# Patient Record
Sex: MALE | Race: WHITE | NOT HISPANIC OR LATINO | Employment: OTHER | URBAN - METROPOLITAN AREA
[De-identification: names, ages, dates, MRNs, and addresses within clinical notes are randomized per-mention and may not be internally consistent; named-entity substitution may affect disease eponyms.]

---

## 2017-01-01 ENCOUNTER — APPOINTMENT (OUTPATIENT)
Dept: RADIOLOGY | Facility: MEDICAL CENTER | Age: 77
End: 2017-01-01
Attending: HOSPITALIST
Payer: MEDICARE

## 2017-01-01 ENCOUNTER — HOSPITAL ENCOUNTER (OUTPATIENT)
Facility: MEDICAL CENTER | Age: 77
End: 2017-05-10
Attending: INTERNAL MEDICINE | Admitting: INTERNAL MEDICINE
Payer: MEDICARE

## 2017-01-01 ENCOUNTER — HOSPITAL ENCOUNTER (INPATIENT)
Facility: REHABILITATION | Age: 77
End: 2017-01-01
Attending: PHYSICAL MEDICINE & REHABILITATION | Admitting: PHYSICAL MEDICINE & REHABILITATION
Payer: MEDICARE

## 2017-01-01 ENCOUNTER — APPOINTMENT (OUTPATIENT)
Dept: RADIOLOGY | Facility: MEDICAL CENTER | Age: 77
End: 2017-01-01
Attending: INTERNAL MEDICINE
Payer: MEDICARE

## 2017-01-01 ENCOUNTER — RESOLUTE PROFESSIONAL BILLING HOSPITAL PROF FEE (OUTPATIENT)
Dept: HOSPITALIST | Facility: MEDICAL CENTER | Age: 77
End: 2017-01-01
Payer: MEDICARE

## 2017-01-01 ENCOUNTER — APPOINTMENT (OUTPATIENT)
Dept: RADIOLOGY | Facility: MEDICAL CENTER | Age: 77
DRG: 853 | End: 2017-01-01
Attending: HOSPITALIST
Payer: MEDICARE

## 2017-01-01 ENCOUNTER — APPOINTMENT (OUTPATIENT)
Dept: RADIOLOGY | Facility: MEDICAL CENTER | Age: 77
DRG: 853 | End: 2017-01-01
Attending: EMERGENCY MEDICINE
Payer: MEDICARE

## 2017-01-01 ENCOUNTER — APPOINTMENT (OUTPATIENT)
Dept: RADIOLOGY | Facility: MEDICAL CENTER | Age: 77
DRG: 853 | End: 2017-01-01
Attending: INTERNAL MEDICINE
Payer: MEDICARE

## 2017-01-01 ENCOUNTER — HOSPITAL ENCOUNTER (INPATIENT)
Facility: MEDICAL CENTER | Age: 77
LOS: 1 days | DRG: 378 | End: 2017-03-23
Attending: EMERGENCY MEDICINE | Admitting: HOSPITALIST
Payer: MEDICARE

## 2017-01-01 ENCOUNTER — HOSPITAL ENCOUNTER (INPATIENT)
Facility: MEDICAL CENTER | Age: 77
LOS: 14 days | DRG: 853 | End: 2017-06-07
Attending: EMERGENCY MEDICINE | Admitting: INTERNAL MEDICINE
Payer: MEDICARE

## 2017-01-01 ENCOUNTER — HOSPITAL ENCOUNTER (OUTPATIENT)
Facility: MEDICAL CENTER | Age: 77
End: 2017-07-03
Attending: HOSPITALIST | Admitting: HOSPITALIST
Payer: MEDICARE

## 2017-01-01 VITALS
OXYGEN SATURATION: 100 % | RESPIRATION RATE: 18 BRPM | TEMPERATURE: 97.9 F | HEIGHT: 72 IN | DIASTOLIC BLOOD PRESSURE: 70 MMHG | SYSTOLIC BLOOD PRESSURE: 151 MMHG | WEIGHT: 236.55 LBS | BODY MASS INDEX: 32.04 KG/M2 | HEART RATE: 82 BPM

## 2017-01-01 VITALS
RESPIRATION RATE: 27 BRPM | DIASTOLIC BLOOD PRESSURE: 66 MMHG | SYSTOLIC BLOOD PRESSURE: 104 MMHG | OXYGEN SATURATION: 99 % | BODY MASS INDEX: 31.78 KG/M2 | HEIGHT: 70 IN | WEIGHT: 222 LBS | TEMPERATURE: 97.4 F | HEART RATE: 90 BPM

## 2017-01-01 VITALS
DIASTOLIC BLOOD PRESSURE: 64 MMHG | WEIGHT: 247.8 LBS | HEART RATE: 96 BPM | RESPIRATION RATE: 18 BRPM | BODY MASS INDEX: 33.56 KG/M2 | TEMPERATURE: 98.5 F | OXYGEN SATURATION: 91 % | SYSTOLIC BLOOD PRESSURE: 114 MMHG | HEIGHT: 72 IN

## 2017-01-01 DIAGNOSIS — J96.01 ACUTE RESPIRATORY FAILURE WITH HYPOXIA (HCC): ICD-10-CM

## 2017-01-01 DIAGNOSIS — N28.9 RENAL INSUFFICIENCY: ICD-10-CM

## 2017-01-01 DIAGNOSIS — K92.2 ACUTE LOWER GI HEMORRHAGE: ICD-10-CM

## 2017-01-01 LAB
ABO GROUP BLD: NORMAL
ALBUMIN SERPL BCP-MCNC: 2 G/DL (ref 3.2–4.9)
ALBUMIN SERPL BCP-MCNC: 2.3 G/DL (ref 3.2–4.9)
ALBUMIN SERPL BCP-MCNC: 2.4 G/DL (ref 3.2–4.9)
ALBUMIN SERPL BCP-MCNC: 2.5 G/DL (ref 3.2–4.9)
ALBUMIN SERPL BCP-MCNC: 2.5 G/DL (ref 3.2–4.9)
ALBUMIN SERPL BCP-MCNC: 2.6 G/DL (ref 3.2–4.9)
ALBUMIN SERPL BCP-MCNC: 2.6 G/DL (ref 3.2–4.9)
ALBUMIN SERPL BCP-MCNC: 2.7 G/DL (ref 3.2–4.9)
ALBUMIN SERPL BCP-MCNC: 2.8 G/DL (ref 3.2–4.9)
ALBUMIN SERPL BCP-MCNC: 2.8 G/DL (ref 3.2–4.9)
ALBUMIN SERPL BCP-MCNC: 2.9 G/DL (ref 3.2–4.9)
ALBUMIN SERPL BCP-MCNC: 2.9 G/DL (ref 3.2–4.9)
ALBUMIN SERPL BCP-MCNC: 3 G/DL (ref 3.2–4.9)
ALBUMIN SERPL BCP-MCNC: 3.2 G/DL (ref 3.2–4.9)
ALBUMIN SERPL BCP-MCNC: 3.4 G/DL (ref 3.2–4.9)
ALBUMIN SERPL BCP-MCNC: 3.6 G/DL (ref 3.2–4.9)
ALBUMIN SERPL BCP-MCNC: 4.2 G/DL (ref 3.2–4.9)
ALBUMIN/GLOB SERPL: 0.5 G/DL
ALBUMIN/GLOB SERPL: 0.6 G/DL
ALBUMIN/GLOB SERPL: 0.6 G/DL
ALBUMIN/GLOB SERPL: 0.7 G/DL
ALBUMIN/GLOB SERPL: 0.8 G/DL
ALBUMIN/GLOB SERPL: 0.9 G/DL
ALBUMIN/GLOB SERPL: 0.9 G/DL
ALBUMIN/GLOB SERPL: 1 G/DL
ALBUMIN/GLOB SERPL: 1 G/DL
ALBUMIN/GLOB SERPL: 1.2 G/DL
ALP SERPL-CCNC: 100 U/L (ref 30–99)
ALP SERPL-CCNC: 104 U/L (ref 30–99)
ALP SERPL-CCNC: 112 U/L (ref 30–99)
ALP SERPL-CCNC: 147 U/L (ref 30–99)
ALP SERPL-CCNC: 157 U/L (ref 30–99)
ALP SERPL-CCNC: 35 U/L (ref 30–99)
ALP SERPL-CCNC: 40 U/L (ref 30–99)
ALP SERPL-CCNC: 41 U/L (ref 30–99)
ALP SERPL-CCNC: 43 U/L (ref 30–99)
ALP SERPL-CCNC: 44 U/L (ref 30–99)
ALP SERPL-CCNC: 48 U/L (ref 30–99)
ALP SERPL-CCNC: 50 U/L (ref 30–99)
ALP SERPL-CCNC: 66 U/L (ref 30–99)
ALP SERPL-CCNC: 76 U/L (ref 30–99)
ALP SERPL-CCNC: 82 U/L (ref 30–99)
ALP SERPL-CCNC: 82 U/L (ref 30–99)
ALP SERPL-CCNC: 85 U/L (ref 30–99)
ALP SERPL-CCNC: 87 U/L (ref 30–99)
ALP SERPL-CCNC: 95 U/L (ref 30–99)
ALP SERPL-CCNC: 97 U/L (ref 30–99)
ALP SERPL-CCNC: NORMAL U/L (ref 30–99)
ALT SERPL-CCNC: 100 U/L (ref 2–50)
ALT SERPL-CCNC: 11 U/L (ref 2–50)
ALT SERPL-CCNC: 131 U/L (ref 2–50)
ALT SERPL-CCNC: 147 U/L (ref 2–50)
ALT SERPL-CCNC: 16 U/L (ref 2–50)
ALT SERPL-CCNC: 19 U/L (ref 2–50)
ALT SERPL-CCNC: 196 U/L (ref 2–50)
ALT SERPL-CCNC: 27 U/L (ref 2–50)
ALT SERPL-CCNC: 39 U/L (ref 2–50)
ALT SERPL-CCNC: 42 U/L (ref 2–50)
ALT SERPL-CCNC: 42 U/L (ref 2–50)
ALT SERPL-CCNC: 44 U/L (ref 2–50)
ALT SERPL-CCNC: 48 U/L (ref 2–50)
ALT SERPL-CCNC: 49 U/L (ref 2–50)
ALT SERPL-CCNC: 51 U/L (ref 2–50)
ALT SERPL-CCNC: 53 U/L (ref 2–50)
ALT SERPL-CCNC: 54 U/L (ref 2–50)
ALT SERPL-CCNC: 71 U/L (ref 2–50)
ALT SERPL-CCNC: 86 U/L (ref 2–50)
ALT SERPL-CCNC: 93 U/L (ref 2–50)
ALT SERPL-CCNC: NORMAL U/L (ref 2–50)
AMMONIA PLAS-SCNC: 37 UMOL/L (ref 11–45)
ANION GAP SERPL CALC-SCNC: 10 MMOL/L (ref 0–11.9)
ANION GAP SERPL CALC-SCNC: 11 MMOL/L (ref 0–11.9)
ANION GAP SERPL CALC-SCNC: 12 MMOL/L (ref 0–11.9)
ANION GAP SERPL CALC-SCNC: 13 MMOL/L (ref 0–11.9)
ANION GAP SERPL CALC-SCNC: 14 MMOL/L (ref 0–11.9)
ANION GAP SERPL CALC-SCNC: 14 MMOL/L (ref 0–11.9)
ANION GAP SERPL CALC-SCNC: 15 MMOL/L (ref 0–11.9)
ANION GAP SERPL CALC-SCNC: 16 MMOL/L (ref 0–11.9)
ANION GAP SERPL CALC-SCNC: 19 MMOL/L (ref 0–11.9)
ANION GAP SERPL CALC-SCNC: 4 MMOL/L (ref 0–11.9)
ANION GAP SERPL CALC-SCNC: 5 MMOL/L (ref 0–11.9)
ANION GAP SERPL CALC-SCNC: 6 MMOL/L (ref 0–11.9)
ANION GAP SERPL CALC-SCNC: 7 MMOL/L (ref 0–11.9)
ANION GAP SERPL CALC-SCNC: 8 MMOL/L (ref 0–11.9)
ANION GAP SERPL CALC-SCNC: 9 MMOL/L (ref 0–11.9)
ANISOCYTOSIS BLD QL SMEAR: ABNORMAL
APPEARANCE UR: ABNORMAL
APPEARANCE UR: ABNORMAL
APPEARANCE UR: CLEAR
APPEARANCE UR: CLEAR
APTT PPP: 26.7 SEC (ref 24.7–36)
APTT PPP: 28.5 SEC (ref 24.7–36)
APTT PPP: 31.5 SEC (ref 24.7–36)
APTT PPP: 33.6 SEC (ref 24.7–36)
APTT PPP: 33.7 SEC (ref 24.7–36)
APTT PPP: 33.9 SEC (ref 24.7–36)
APTT PPP: 35.7 SEC (ref 24.7–36)
APTT PPP: 39.6 SEC (ref 24.7–36)
AST SERPL-CCNC: 110 U/L (ref 12–45)
AST SERPL-CCNC: 114 U/L (ref 12–45)
AST SERPL-CCNC: 129 U/L (ref 12–45)
AST SERPL-CCNC: 18 U/L (ref 12–45)
AST SERPL-CCNC: 269 U/L (ref 12–45)
AST SERPL-CCNC: 29 U/L (ref 12–45)
AST SERPL-CCNC: 33 U/L (ref 12–45)
AST SERPL-CCNC: 33 U/L (ref 12–45)
AST SERPL-CCNC: 34 U/L (ref 12–45)
AST SERPL-CCNC: 35 U/L (ref 12–45)
AST SERPL-CCNC: 36 U/L (ref 12–45)
AST SERPL-CCNC: 41 U/L (ref 12–45)
AST SERPL-CCNC: 45 U/L (ref 12–45)
AST SERPL-CCNC: 46 U/L (ref 12–45)
AST SERPL-CCNC: 48 U/L (ref 12–45)
AST SERPL-CCNC: 53 U/L (ref 12–45)
AST SERPL-CCNC: 56 U/L (ref 12–45)
AST SERPL-CCNC: 58 U/L (ref 12–45)
AST SERPL-CCNC: 63 U/L (ref 12–45)
AST SERPL-CCNC: 63 U/L (ref 12–45)
AST SERPL-CCNC: 86 U/L (ref 12–45)
AST SERPL-CCNC: 97 U/L (ref 12–45)
BACTERIA #/AREA URNS HPF: ABNORMAL /HPF
BACTERIA BLD CULT: NORMAL
BACTERIA SPEC RESP CULT: ABNORMAL
BACTERIA SPEC RESP CULT: ABNORMAL
BACTERIA SPEC RESP CULT: NORMAL
BACTERIA UR CULT: NORMAL
BACTERIA UR CULT: NORMAL
BARCODED ABORH UBTYP: 600
BARCODED ABORH UBTYP: 9500
BARCODED PRD CODE UBPRD: NORMAL
BARCODED UNIT NUM UBUNT: NORMAL
BASE EXCESS BLDA CALC-SCNC: -1 MMOL/L (ref -4–3)
BASE EXCESS BLDA CALC-SCNC: -2 MMOL/L (ref -4–3)
BASE EXCESS BLDA CALC-SCNC: -4 MMOL/L (ref -4–3)
BASE EXCESS BLDA CALC-SCNC: -5 MMOL/L (ref -4–3)
BASE EXCESS BLDA CALC-SCNC: -5 MMOL/L (ref -4–3)
BASE EXCESS BLDA CALC-SCNC: -6 MMOL/L (ref -4–3)
BASE EXCESS BLDA CALC-SCNC: -7 MMOL/L (ref -4–3)
BASE EXCESS BLDA CALC-SCNC: -9 MMOL/L (ref -4–3)
BASE EXCESS BLDA CALC-SCNC: 1 MMOL/L (ref -4–3)
BASE EXCESS BLDA CALC-SCNC: 3 MMOL/L (ref -4–3)
BASE EXCESS BLDA CALC-SCNC: 4 MMOL/L (ref -4–3)
BASO STIPL BLD QL SMEAR: NORMAL
BASOPHILS # BLD AUTO: 0 % (ref 0–1.8)
BASOPHILS # BLD AUTO: 0.8 % (ref 0–1.8)
BASOPHILS # BLD AUTO: 0.8 % (ref 0–1.8)
BASOPHILS # BLD AUTO: 0.9 % (ref 0–1.8)
BASOPHILS # BLD AUTO: 1 % (ref 0–1.8)
BASOPHILS # BLD AUTO: 1.1 % (ref 0–1.8)
BASOPHILS # BLD AUTO: 1.2 % (ref 0–1.8)
BASOPHILS # BLD AUTO: 1.2 % (ref 0–1.8)
BASOPHILS # BLD AUTO: 1.3 % (ref 0–1.8)
BASOPHILS # BLD AUTO: 1.8 % (ref 0–1.8)
BASOPHILS # BLD AUTO: 1.8 % (ref 0–1.8)
BASOPHILS # BLD AUTO: 2 % (ref 0–1.8)
BASOPHILS # BLD AUTO: 2.7 % (ref 0–1.8)
BASOPHILS # BLD AUTO: 2.7 % (ref 0–1.8)
BASOPHILS # BLD AUTO: 6.2 % (ref 0–1.8)
BASOPHILS # BLD: 0 K/UL (ref 0–0.12)
BASOPHILS # BLD: 0.05 K/UL (ref 0–0.12)
BASOPHILS # BLD: 0.06 K/UL (ref 0–0.12)
BASOPHILS # BLD: 0.06 K/UL (ref 0–0.12)
BASOPHILS # BLD: 0.07 K/UL (ref 0–0.12)
BASOPHILS # BLD: 0.08 K/UL (ref 0–0.12)
BASOPHILS # BLD: 0.1 K/UL (ref 0–0.12)
BASOPHILS # BLD: 0.11 K/UL (ref 0–0.12)
BASOPHILS # BLD: 0.11 K/UL (ref 0–0.12)
BASOPHILS # BLD: 0.12 K/UL (ref 0–0.12)
BASOPHILS # BLD: 0.14 K/UL (ref 0–0.12)
BASOPHILS # BLD: 0.17 K/UL (ref 0–0.12)
BASOPHILS # BLD: 0.18 K/UL (ref 0–0.12)
BASOPHILS # BLD: 0.2 K/UL (ref 0–0.12)
BASOPHILS # BLD: 0.29 K/UL (ref 0–0.12)
BASOPHILS # BLD: 0.35 K/UL (ref 0–0.12)
BILIRUB CONJ SERPL-MCNC: 0.9 MG/DL (ref 0.1–0.5)
BILIRUB CONJ SERPL-MCNC: 4.2 MG/DL (ref 0.1–0.5)
BILIRUB CONJ SERPL-MCNC: 6 MG/DL (ref 0.1–0.5)
BILIRUB INDIRECT SERPL-MCNC: 1.1 MG/DL (ref 0–1)
BILIRUB INDIRECT SERPL-MCNC: 2.4 MG/DL (ref 0–1)
BILIRUB INDIRECT SERPL-MCNC: 3 MG/DL (ref 0–1)
BILIRUB SERPL-MCNC: 1.3 MG/DL (ref 0.1–1.5)
BILIRUB SERPL-MCNC: 1.5 MG/DL (ref 0.1–1.5)
BILIRUB SERPL-MCNC: 1.7 MG/DL (ref 0.1–1.5)
BILIRUB SERPL-MCNC: 1.7 MG/DL (ref 0.1–1.5)
BILIRUB SERPL-MCNC: 1.8 MG/DL (ref 0.1–1.5)
BILIRUB SERPL-MCNC: 1.9 MG/DL (ref 0.1–1.5)
BILIRUB SERPL-MCNC: 1.9 MG/DL (ref 0.1–1.5)
BILIRUB SERPL-MCNC: 12.2 MG/DL (ref 0.1–1.5)
BILIRUB SERPL-MCNC: 12.5 MG/DL (ref 0.1–1.5)
BILIRUB SERPL-MCNC: 2 MG/DL (ref 0.1–1.5)
BILIRUB SERPL-MCNC: 2.1 MG/DL (ref 0.1–1.5)
BILIRUB SERPL-MCNC: 2.1 MG/DL (ref 0.1–1.5)
BILIRUB SERPL-MCNC: 2.2 MG/DL (ref 0.1–1.5)
BILIRUB SERPL-MCNC: 2.4 MG/DL (ref 0.1–1.5)
BILIRUB SERPL-MCNC: 2.5 MG/DL (ref 0.1–1.5)
BILIRUB SERPL-MCNC: 3 MG/DL (ref 0.1–1.5)
BILIRUB SERPL-MCNC: 3.5 MG/DL (ref 0.1–1.5)
BILIRUB SERPL-MCNC: 4.2 MG/DL (ref 0.1–1.5)
BILIRUB SERPL-MCNC: 4.3 MG/DL (ref 0.1–1.5)
BILIRUB SERPL-MCNC: 5 MG/DL (ref 0.1–1.5)
BILIRUB SERPL-MCNC: 6.6 MG/DL (ref 0.1–1.5)
BILIRUB SERPL-MCNC: 9 MG/DL (ref 0.1–1.5)
BILIRUB UR QL STRIP.AUTO: NEGATIVE
BLD GP AB INVEST PLASRBC-IMP: NORMAL
BLD GP AB INVEST PLASRBC-IMP: NORMAL
BLD GP AB SCN SERPL QL ELUTION: NORMAL
BLD GP AB SCN SERPL QL: NORMAL
BNP SERPL-MCNC: 125 PG/ML (ref 0–100)
BNP SERPL-MCNC: 153 PG/ML (ref 0–100)
BNP SERPL-MCNC: 27 PG/ML (ref 0–100)
BNP SERPL-MCNC: 454 PG/ML (ref 0–100)
BNP SERPL-MCNC: 67 PG/ML (ref 0–100)
BODY TEMPERATURE: 36.8 CENTIGRADE
BODY TEMPERATURE: 38 CENTIGRADE
BODY TEMPERATURE: 38.3 CENTIGRADE
BODY TEMPERATURE: 38.6 CENTIGRADE
BODY TEMPERATURE: 38.8 CENTIGRADE
BODY TEMPERATURE: ABNORMAL CENTIGRADE
BODY TEMPERATURE: ABNORMAL CENTIGRADE
BODY TEMPERATURE: ABNORMAL DEGREES
BUN SERPL-MCNC: 15 MG/DL (ref 8–22)
BUN SERPL-MCNC: 19 MG/DL (ref 8–22)
BUN SERPL-MCNC: 20 MG/DL (ref 8–22)
BUN SERPL-MCNC: 21 MG/DL (ref 8–22)
BUN SERPL-MCNC: 23 MG/DL (ref 8–22)
BUN SERPL-MCNC: 25 MG/DL (ref 8–22)
BUN SERPL-MCNC: 26 MG/DL (ref 8–22)
BUN SERPL-MCNC: 27 MG/DL (ref 8–22)
BUN SERPL-MCNC: 29 MG/DL (ref 8–22)
BUN SERPL-MCNC: 30 MG/DL (ref 8–22)
BUN SERPL-MCNC: 32 MG/DL (ref 8–22)
BUN SERPL-MCNC: 32 MG/DL (ref 8–22)
BUN SERPL-MCNC: 33 MG/DL (ref 8–22)
BUN SERPL-MCNC: 34 MG/DL (ref 8–22)
BUN SERPL-MCNC: 34 MG/DL (ref 8–22)
BUN SERPL-MCNC: 35 MG/DL (ref 8–22)
BUN SERPL-MCNC: 37 MG/DL (ref 8–22)
BUN SERPL-MCNC: 38 MG/DL (ref 8–22)
BUN SERPL-MCNC: 39 MG/DL (ref 8–22)
BUN SERPL-MCNC: 39 MG/DL (ref 8–22)
BUN SERPL-MCNC: 41 MG/DL (ref 8–22)
BUN SERPL-MCNC: 41 MG/DL (ref 8–22)
BUN SERPL-MCNC: 42 MG/DL (ref 8–22)
BUN SERPL-MCNC: 43 MG/DL (ref 8–22)
BUN SERPL-MCNC: 44 MG/DL (ref 8–22)
BUN SERPL-MCNC: 46 MG/DL (ref 8–22)
BUN SERPL-MCNC: 50 MG/DL (ref 8–22)
BUN SERPL-MCNC: 51 MG/DL (ref 8–22)
BUN SERPL-MCNC: 51 MG/DL (ref 8–22)
BUN SERPL-MCNC: 52 MG/DL (ref 8–22)
BUN SERPL-MCNC: 56 MG/DL (ref 8–22)
BUN SERPL-MCNC: 56 MG/DL (ref 8–22)
BUN SERPL-MCNC: 61 MG/DL (ref 8–22)
BUN SERPL-MCNC: 61 MG/DL (ref 8–22)
BUN SERPL-MCNC: 63 MG/DL (ref 8–22)
BUN SERPL-MCNC: 63 MG/DL (ref 8–22)
BUN SERPL-MCNC: 66 MG/DL (ref 8–22)
BUN SERPL-MCNC: 67 MG/DL (ref 8–22)
BUN SERPL-MCNC: 74 MG/DL (ref 8–22)
BUN SERPL-MCNC: 75 MG/DL (ref 8–22)
BUN SERPL-MCNC: 75 MG/DL (ref 8–22)
CALCIUM SERPL-MCNC: 6.6 MG/DL (ref 8.4–10.2)
CALCIUM SERPL-MCNC: 7.3 MG/DL (ref 8.5–10.5)
CALCIUM SERPL-MCNC: 7.3 MG/DL (ref 8.5–10.5)
CALCIUM SERPL-MCNC: 7.6 MG/DL (ref 8.4–10.2)
CALCIUM SERPL-MCNC: 7.6 MG/DL (ref 8.5–10.5)
CALCIUM SERPL-MCNC: 7.7 MG/DL (ref 8.4–10.2)
CALCIUM SERPL-MCNC: 7.9 MG/DL (ref 8.5–10.5)
CALCIUM SERPL-MCNC: 8 MG/DL (ref 8.4–10.2)
CALCIUM SERPL-MCNC: 8 MG/DL (ref 8.5–10.5)
CALCIUM SERPL-MCNC: 8.1 MG/DL (ref 8.4–10.2)
CALCIUM SERPL-MCNC: 8.1 MG/DL (ref 8.5–10.5)
CALCIUM SERPL-MCNC: 8.2 MG/DL (ref 8.4–10.2)
CALCIUM SERPL-MCNC: 8.2 MG/DL (ref 8.4–10.2)
CALCIUM SERPL-MCNC: 8.3 MG/DL (ref 8.4–10.2)
CALCIUM SERPL-MCNC: 8.3 MG/DL (ref 8.5–10.5)
CALCIUM SERPL-MCNC: 8.3 MG/DL (ref 8.5–10.5)
CALCIUM SERPL-MCNC: 8.4 MG/DL (ref 8.4–10.2)
CALCIUM SERPL-MCNC: 8.5 MG/DL (ref 8.4–10.2)
CALCIUM SERPL-MCNC: 8.5 MG/DL (ref 8.4–10.2)
CALCIUM SERPL-MCNC: 8.5 MG/DL (ref 8.5–10.5)
CALCIUM SERPL-MCNC: 8.5 MG/DL (ref 8.5–10.5)
CALCIUM SERPL-MCNC: 8.6 MG/DL (ref 8.4–10.2)
CALCIUM SERPL-MCNC: 8.6 MG/DL (ref 8.4–10.2)
CALCIUM SERPL-MCNC: 8.6 MG/DL (ref 8.5–10.5)
CALCIUM SERPL-MCNC: 8.7 MG/DL (ref 8.4–10.2)
CALCIUM SERPL-MCNC: 8.7 MG/DL (ref 8.5–10.5)
CALCIUM SERPL-MCNC: 8.8 MG/DL (ref 8.4–10.2)
CALCIUM SERPL-MCNC: 8.8 MG/DL (ref 8.4–10.2)
CALCIUM SERPL-MCNC: 8.9 MG/DL (ref 8.4–10.2)
CALCIUM SERPL-MCNC: 8.9 MG/DL (ref 8.5–10.5)
CALCIUM SERPL-MCNC: 8.9 MG/DL (ref 8.5–10.5)
CALCIUM SERPL-MCNC: 9 MG/DL (ref 8.4–10.2)
CALCIUM SERPL-MCNC: 9.1 MG/DL (ref 8.4–10.2)
CALCIUM SERPL-MCNC: 9.3 MG/DL (ref 8.5–10.5)
CALCIUM SERPL-MCNC: 9.4 MG/DL (ref 8.5–10.5)
CASTS URNS QL MICRO: ABNORMAL /LPF
CASTS URNS QL MICRO: ABNORMAL /LPF
CHLORIDE SERPL-SCNC: 100 MMOL/L (ref 96–112)
CHLORIDE SERPL-SCNC: 102 MMOL/L (ref 96–112)
CHLORIDE SERPL-SCNC: 102 MMOL/L (ref 96–112)
CHLORIDE SERPL-SCNC: 103 MMOL/L (ref 96–112)
CHLORIDE SERPL-SCNC: 103 MMOL/L (ref 96–112)
CHLORIDE SERPL-SCNC: 104 MMOL/L (ref 96–112)
CHLORIDE SERPL-SCNC: 104 MMOL/L (ref 96–112)
CHLORIDE SERPL-SCNC: 105 MMOL/L (ref 96–112)
CHLORIDE SERPL-SCNC: 105 MMOL/L (ref 96–112)
CHLORIDE SERPL-SCNC: 106 MMOL/L (ref 96–112)
CHLORIDE SERPL-SCNC: 106 MMOL/L (ref 96–112)
CHLORIDE SERPL-SCNC: 107 MMOL/L (ref 96–112)
CHLORIDE SERPL-SCNC: 107 MMOL/L (ref 96–112)
CHLORIDE SERPL-SCNC: 108 MMOL/L (ref 96–112)
CHLORIDE SERPL-SCNC: 109 MMOL/L (ref 96–112)
CHLORIDE SERPL-SCNC: 110 MMOL/L (ref 96–112)
CHLORIDE SERPL-SCNC: 111 MMOL/L (ref 96–112)
CHLORIDE SERPL-SCNC: 111 MMOL/L (ref 96–112)
CHLORIDE SERPL-SCNC: 112 MMOL/L (ref 96–112)
CHLORIDE SERPL-SCNC: 113 MMOL/L (ref 96–112)
CHLORIDE SERPL-SCNC: 113 MMOL/L (ref 96–112)
CHLORIDE SERPL-SCNC: 115 MMOL/L (ref 96–112)
CHLORIDE SERPL-SCNC: 115 MMOL/L (ref 96–112)
CHLORIDE SERPL-SCNC: 118 MMOL/L (ref 96–112)
CHLORIDE SERPL-SCNC: 119 MMOL/L (ref 96–112)
CHLORIDE SERPL-SCNC: 120 MMOL/L (ref 96–112)
CHLORIDE SERPL-SCNC: 120 MMOL/L (ref 96–112)
CHLORIDE SERPL-SCNC: 121 MMOL/L (ref 96–112)
CO2 BLDA-SCNC: 19 MMOL/L (ref 20–33)
CO2 BLDA-SCNC: 19 MMOL/L (ref 20–33)
CO2 BLDA-SCNC: 20 MMOL/L (ref 20–33)
CO2 BLDA-SCNC: 21 MMOL/L (ref 20–33)
CO2 BLDA-SCNC: 22 MMOL/L (ref 20–33)
CO2 BLDA-SCNC: 24 MMOL/L (ref 20–33)
CO2 BLDA-SCNC: 25 MMOL/L (ref 20–33)
CO2 BLDA-SCNC: 27 MMOL/L (ref 20–33)
CO2 BLDA-SCNC: 29 MMOL/L (ref 20–33)
CO2 SERPL-SCNC: 14 MMOL/L (ref 20–33)
CO2 SERPL-SCNC: 17 MMOL/L (ref 20–33)
CO2 SERPL-SCNC: 17 MMOL/L (ref 20–33)
CO2 SERPL-SCNC: 19 MMOL/L (ref 20–33)
CO2 SERPL-SCNC: 19 MMOL/L (ref 20–33)
CO2 SERPL-SCNC: 20 MMOL/L (ref 20–33)
CO2 SERPL-SCNC: 21 MMOL/L (ref 20–33)
CO2 SERPL-SCNC: 22 MMOL/L (ref 20–33)
CO2 SERPL-SCNC: 22 MMOL/L (ref 20–33)
CO2 SERPL-SCNC: 23 MMOL/L (ref 20–33)
CO2 SERPL-SCNC: 24 MMOL/L (ref 20–33)
CO2 SERPL-SCNC: 25 MMOL/L (ref 20–33)
CO2 SERPL-SCNC: 26 MMOL/L (ref 20–33)
CO2 SERPL-SCNC: 27 MMOL/L (ref 20–33)
CO2 SERPL-SCNC: 28 MMOL/L (ref 20–33)
CO2 SERPL-SCNC: 29 MMOL/L (ref 20–33)
CO2 SERPL-SCNC: 30 MMOL/L (ref 20–33)
CO2 SERPL-SCNC: 32 MMOL/L (ref 20–33)
CO2 SERPL-SCNC: 32 MMOL/L (ref 20–33)
COLOR UR: YELLOW
COMMENT 1642: NORMAL
COMMENT 1642: NORMAL
COMPONENT P 8504P: NORMAL
COMPONENT R 8504R: NORMAL
CORTIS SERPL-MCNC: 5 UG/DL (ref 0–23)
CREAT SERPL-MCNC: 1.25 MG/DL (ref 0.5–1.4)
CREAT SERPL-MCNC: 1.26 MG/DL (ref 0.5–1.4)
CREAT SERPL-MCNC: 1.34 MG/DL (ref 0.5–1.4)
CREAT SERPL-MCNC: 1.37 MG/DL (ref 0.5–1.4)
CREAT SERPL-MCNC: 1.37 MG/DL (ref 0.5–1.4)
CREAT SERPL-MCNC: 1.4 MG/DL (ref 0.5–1.4)
CREAT SERPL-MCNC: 1.42 MG/DL (ref 0.5–1.4)
CREAT SERPL-MCNC: 1.43 MG/DL (ref 0.5–1.4)
CREAT SERPL-MCNC: 1.47 MG/DL (ref 0.5–1.4)
CREAT SERPL-MCNC: 1.52 MG/DL (ref 0.5–1.4)
CREAT SERPL-MCNC: 1.52 MG/DL (ref 0.5–1.4)
CREAT SERPL-MCNC: 1.53 MG/DL (ref 0.5–1.4)
CREAT SERPL-MCNC: 1.55 MG/DL (ref 0.5–1.4)
CREAT SERPL-MCNC: 1.57 MG/DL (ref 0.5–1.4)
CREAT SERPL-MCNC: 1.57 MG/DL (ref 0.5–1.4)
CREAT SERPL-MCNC: 1.6 MG/DL (ref 0.5–1.4)
CREAT SERPL-MCNC: 1.61 MG/DL (ref 0.5–1.4)
CREAT SERPL-MCNC: 1.64 MG/DL (ref 0.5–1.4)
CREAT SERPL-MCNC: 1.66 MG/DL (ref 0.5–1.4)
CREAT SERPL-MCNC: 1.69 MG/DL (ref 0.5–1.4)
CREAT SERPL-MCNC: 1.75 MG/DL (ref 0.5–1.4)
CREAT SERPL-MCNC: 1.78 MG/DL (ref 0.5–1.4)
CREAT SERPL-MCNC: 1.82 MG/DL (ref 0.5–1.4)
CREAT SERPL-MCNC: 1.87 MG/DL (ref 0.5–1.4)
CREAT SERPL-MCNC: 1.88 MG/DL (ref 0.5–1.4)
CREAT SERPL-MCNC: 1.91 MG/DL (ref 0.5–1.4)
CREAT SERPL-MCNC: 2.01 MG/DL (ref 0.5–1.4)
CREAT SERPL-MCNC: 2.07 MG/DL (ref 0.5–1.4)
CREAT SERPL-MCNC: 2.17 MG/DL (ref 0.5–1.4)
CREAT SERPL-MCNC: 2.18 MG/DL (ref 0.5–1.4)
CREAT SERPL-MCNC: 2.23 MG/DL (ref 0.5–1.4)
CREAT SERPL-MCNC: 2.39 MG/DL (ref 0.5–1.4)
CREAT SERPL-MCNC: 2.39 MG/DL (ref 0.5–1.4)
CREAT SERPL-MCNC: 2.47 MG/DL (ref 0.5–1.4)
CREAT SERPL-MCNC: 2.5 MG/DL (ref 0.5–1.4)
CREAT SERPL-MCNC: 2.53 MG/DL (ref 0.5–1.4)
CREAT SERPL-MCNC: 2.55 MG/DL (ref 0.5–1.4)
CREAT SERPL-MCNC: 2.63 MG/DL (ref 0.5–1.4)
CREAT SERPL-MCNC: 2.65 MG/DL (ref 0.5–1.4)
CREAT SERPL-MCNC: 2.78 MG/DL (ref 0.5–1.4)
CREAT SERPL-MCNC: 3.15 MG/DL (ref 0.5–1.4)
CRP SERPL HS-MCNC: 10.2 MG/DL (ref 0–0.75)
CRP SERPL HS-MCNC: 10.76 MG/DL (ref 0–0.75)
DAT C3D-SP REAG RBC QL: NORMAL
DAT IGG-SP REAG RBC QL: NORMAL
DEPRECATED D DIMER PPP IA-ACNC: 1928 NG/ML(D-DU)
EKG IMPRESSION: NORMAL
EOSINOPHIL # BLD AUTO: 0 K/UL (ref 0–0.51)
EOSINOPHIL # BLD AUTO: 0.02 K/UL (ref 0–0.51)
EOSINOPHIL # BLD AUTO: 0.03 K/UL (ref 0–0.51)
EOSINOPHIL # BLD AUTO: 0.05 K/UL (ref 0–0.51)
EOSINOPHIL # BLD AUTO: 0.06 K/UL (ref 0–0.51)
EOSINOPHIL # BLD AUTO: 0.12 K/UL (ref 0–0.51)
EOSINOPHIL # BLD AUTO: 0.12 K/UL (ref 0–0.51)
EOSINOPHIL # BLD AUTO: 0.15 K/UL (ref 0–0.51)
EOSINOPHIL # BLD AUTO: 0.15 K/UL (ref 0–0.51)
EOSINOPHIL # BLD AUTO: 0.16 K/UL (ref 0–0.51)
EOSINOPHIL # BLD AUTO: 0.17 K/UL (ref 0–0.51)
EOSINOPHIL # BLD AUTO: 0.18 K/UL (ref 0–0.51)
EOSINOPHIL # BLD AUTO: 0.25 K/UL (ref 0–0.51)
EOSINOPHIL # BLD AUTO: 0.3 K/UL (ref 0–0.51)
EOSINOPHIL # BLD AUTO: 0.34 K/UL (ref 0–0.51)
EOSINOPHIL # BLD AUTO: 0.34 K/UL (ref 0–0.51)
EOSINOPHIL # BLD AUTO: 0.36 K/UL (ref 0–0.51)
EOSINOPHIL # BLD AUTO: 0.37 K/UL (ref 0–0.51)
EOSINOPHIL # BLD AUTO: 0.77 K/UL (ref 0–0.51)
EOSINOPHIL NFR BLD: 0 % (ref 0–6.9)
EOSINOPHIL NFR BLD: 0.2 % (ref 0–6.9)
EOSINOPHIL NFR BLD: 0.2 % (ref 0–6.9)
EOSINOPHIL NFR BLD: 0.3 % (ref 0–6.9)
EOSINOPHIL NFR BLD: 0.3 % (ref 0–6.9)
EOSINOPHIL NFR BLD: 0.5 % (ref 0–6.9)
EOSINOPHIL NFR BLD: 1 % (ref 0–6.9)
EOSINOPHIL NFR BLD: 2 % (ref 0–6.9)
EOSINOPHIL NFR BLD: 2.7 % (ref 0–6.9)
EOSINOPHIL NFR BLD: 2.7 % (ref 0–6.9)
EOSINOPHIL NFR BLD: 3 % (ref 0–6.9)
EOSINOPHIL NFR BLD: 3 % (ref 0–6.9)
EOSINOPHIL NFR BLD: 3.4 % (ref 0–6.9)
EOSINOPHIL NFR BLD: 3.6 % (ref 0–6.9)
EOSINOPHIL NFR BLD: 3.9 % (ref 0–6.9)
EOSINOPHIL NFR BLD: 4 % (ref 0–6.9)
EOSINOPHIL NFR BLD: 4.4 % (ref 0–6.9)
EOSINOPHIL NFR BLD: 4.5 % (ref 0–6.9)
EOSINOPHIL NFR BLD: 6.4 % (ref 0–6.9)
EOSINOPHIL NFR BLD: 6.4 % (ref 0–6.9)
EOSINOPHIL NFR BLD: 9 % (ref 0–6.9)
EPI CELLS #/AREA URNS HPF: ABNORMAL /HPF
EPI CELLS #/AREA URNS HPF: ABNORMAL /HPF
ERYTHROCYTE [DISTWIDTH] IN BLOOD BY AUTOMATED COUNT: 61.2 FL (ref 35.9–50)
ERYTHROCYTE [DISTWIDTH] IN BLOOD BY AUTOMATED COUNT: 61.3 FL (ref 35.9–50)
ERYTHROCYTE [DISTWIDTH] IN BLOOD BY AUTOMATED COUNT: 63.3 FL (ref 35.9–50)
ERYTHROCYTE [DISTWIDTH] IN BLOOD BY AUTOMATED COUNT: 63.5 FL (ref 35.9–50)
ERYTHROCYTE [DISTWIDTH] IN BLOOD BY AUTOMATED COUNT: 63.9 FL (ref 35.9–50)
ERYTHROCYTE [DISTWIDTH] IN BLOOD BY AUTOMATED COUNT: 64 FL (ref 35.9–50)
ERYTHROCYTE [DISTWIDTH] IN BLOOD BY AUTOMATED COUNT: 64.1 FL (ref 35.9–50)
ERYTHROCYTE [DISTWIDTH] IN BLOOD BY AUTOMATED COUNT: 64.1 FL (ref 35.9–50)
ERYTHROCYTE [DISTWIDTH] IN BLOOD BY AUTOMATED COUNT: 64.4 FL (ref 35.9–50)
ERYTHROCYTE [DISTWIDTH] IN BLOOD BY AUTOMATED COUNT: 64.5 FL (ref 35.9–50)
ERYTHROCYTE [DISTWIDTH] IN BLOOD BY AUTOMATED COUNT: 64.6 FL (ref 35.9–50)
ERYTHROCYTE [DISTWIDTH] IN BLOOD BY AUTOMATED COUNT: 64.7 FL (ref 35.9–50)
ERYTHROCYTE [DISTWIDTH] IN BLOOD BY AUTOMATED COUNT: 64.9 FL (ref 35.9–50)
ERYTHROCYTE [DISTWIDTH] IN BLOOD BY AUTOMATED COUNT: 64.9 FL (ref 35.9–50)
ERYTHROCYTE [DISTWIDTH] IN BLOOD BY AUTOMATED COUNT: 65 FL (ref 35.9–50)
ERYTHROCYTE [DISTWIDTH] IN BLOOD BY AUTOMATED COUNT: 65.4 FL (ref 35.9–50)
ERYTHROCYTE [DISTWIDTH] IN BLOOD BY AUTOMATED COUNT: 65.5 FL (ref 35.9–50)
ERYTHROCYTE [DISTWIDTH] IN BLOOD BY AUTOMATED COUNT: 65.8 FL (ref 35.9–50)
ERYTHROCYTE [DISTWIDTH] IN BLOOD BY AUTOMATED COUNT: 66.9 FL (ref 35.9–50)
ERYTHROCYTE [DISTWIDTH] IN BLOOD BY AUTOMATED COUNT: 67.2 FL (ref 35.9–50)
ERYTHROCYTE [DISTWIDTH] IN BLOOD BY AUTOMATED COUNT: 67.3 FL (ref 35.9–50)
ERYTHROCYTE [DISTWIDTH] IN BLOOD BY AUTOMATED COUNT: 67.7 FL (ref 35.9–50)
ERYTHROCYTE [DISTWIDTH] IN BLOOD BY AUTOMATED COUNT: 67.8 FL (ref 35.9–50)
ERYTHROCYTE [DISTWIDTH] IN BLOOD BY AUTOMATED COUNT: 68.1 FL (ref 35.9–50)
ERYTHROCYTE [DISTWIDTH] IN BLOOD BY AUTOMATED COUNT: 68.5 FL (ref 35.9–50)
ERYTHROCYTE [DISTWIDTH] IN BLOOD BY AUTOMATED COUNT: 68.9 FL (ref 35.9–50)
ERYTHROCYTE [DISTWIDTH] IN BLOOD BY AUTOMATED COUNT: 69 FL (ref 35.9–50)
ERYTHROCYTE [DISTWIDTH] IN BLOOD BY AUTOMATED COUNT: 69.4 FL (ref 35.9–50)
ERYTHROCYTE [DISTWIDTH] IN BLOOD BY AUTOMATED COUNT: 69.7 FL (ref 35.9–50)
ERYTHROCYTE [DISTWIDTH] IN BLOOD BY AUTOMATED COUNT: 69.8 FL (ref 35.9–50)
ERYTHROCYTE [DISTWIDTH] IN BLOOD BY AUTOMATED COUNT: 70.1 FL (ref 35.9–50)
ERYTHROCYTE [DISTWIDTH] IN BLOOD BY AUTOMATED COUNT: 70.2 FL (ref 35.9–50)
ERYTHROCYTE [DISTWIDTH] IN BLOOD BY AUTOMATED COUNT: 70.4 FL (ref 35.9–50)
ERYTHROCYTE [DISTWIDTH] IN BLOOD BY AUTOMATED COUNT: 70.5 FL (ref 35.9–50)
ERYTHROCYTE [DISTWIDTH] IN BLOOD BY AUTOMATED COUNT: 71.6 FL (ref 35.9–50)
ERYTHROCYTE [DISTWIDTH] IN BLOOD BY AUTOMATED COUNT: 71.7 FL (ref 35.9–50)
ERYTHROCYTE [DISTWIDTH] IN BLOOD BY AUTOMATED COUNT: 72.8 FL (ref 35.9–50)
ERYTHROCYTE [DISTWIDTH] IN BLOOD BY AUTOMATED COUNT: 73.1 FL (ref 35.9–50)
ERYTHROCYTE [DISTWIDTH] IN BLOOD BY AUTOMATED COUNT: 73.6 FL (ref 35.9–50)
ERYTHROCYTE [DISTWIDTH] IN BLOOD BY AUTOMATED COUNT: 74.1 FL (ref 35.9–50)
ERYTHROCYTE [DISTWIDTH] IN BLOOD BY AUTOMATED COUNT: 75 FL (ref 35.9–50)
ERYTHROCYTE [DISTWIDTH] IN BLOOD BY AUTOMATED COUNT: 75.7 FL (ref 35.9–50)
ERYTHROCYTE [DISTWIDTH] IN BLOOD BY AUTOMATED COUNT: 75.9 FL (ref 35.9–50)
ERYTHROCYTE [DISTWIDTH] IN BLOOD BY AUTOMATED COUNT: 76.2 FL (ref 35.9–50)
ERYTHROCYTE [DISTWIDTH] IN BLOOD BY AUTOMATED COUNT: 76.3 FL (ref 35.9–50)
ERYTHROCYTE [DISTWIDTH] IN BLOOD BY AUTOMATED COUNT: 77.6 FL (ref 35.9–50)
ERYTHROCYTE [DISTWIDTH] IN BLOOD BY AUTOMATED COUNT: 78.2 FL (ref 35.9–50)
ERYTHROCYTE [DISTWIDTH] IN BLOOD BY AUTOMATED COUNT: 78.3 FL (ref 35.9–50)
ERYTHROCYTE [DISTWIDTH] IN BLOOD BY AUTOMATED COUNT: 79.2 FL (ref 35.9–50)
ERYTHROCYTE [DISTWIDTH] IN BLOOD BY AUTOMATED COUNT: 79.7 FL (ref 35.9–50)
ERYTHROCYTE [DISTWIDTH] IN BLOOD BY AUTOMATED COUNT: 80.9 FL (ref 35.9–50)
EST. AVERAGE GLUCOSE BLD GHB EST-MCNC: 117 MG/DL
FERRITIN SERPL-MCNC: 1171.6 NG/ML (ref 22–322)
FIBRINOGEN PPP-MCNC: 258 MG/DL (ref 215–460)
FOLATE SERPL-MCNC: >23.3 NG/ML
FSP PPP-MCNC: ABNORMAL UG/ML
FUNGUS SPEC CULT: NORMAL
FUNGUS SPEC CULT: NORMAL
GFR SERPL CREATININE-BSD FRML MDRD: 19 ML/MIN/1.73 M 2
GFR SERPL CREATININE-BSD FRML MDRD: 22 ML/MIN/1.73 M 2
GFR SERPL CREATININE-BSD FRML MDRD: 24 ML/MIN/1.73 M 2
GFR SERPL CREATININE-BSD FRML MDRD: 24 ML/MIN/1.73 M 2
GFR SERPL CREATININE-BSD FRML MDRD: 25 ML/MIN/1.73 M 2
GFR SERPL CREATININE-BSD FRML MDRD: 26 ML/MIN/1.73 M 2
GFR SERPL CREATININE-BSD FRML MDRD: 27 ML/MIN/1.73 M 2
GFR SERPL CREATININE-BSD FRML MDRD: 27 ML/MIN/1.73 M 2
GFR SERPL CREATININE-BSD FRML MDRD: 29 ML/MIN/1.73 M 2
GFR SERPL CREATININE-BSD FRML MDRD: 30 ML/MIN/1.73 M 2
GFR SERPL CREATININE-BSD FRML MDRD: 30 ML/MIN/1.73 M 2
GFR SERPL CREATININE-BSD FRML MDRD: 31 ML/MIN/1.73 M 2
GFR SERPL CREATININE-BSD FRML MDRD: 33 ML/MIN/1.73 M 2
GFR SERPL CREATININE-BSD FRML MDRD: 34 ML/MIN/1.73 M 2
GFR SERPL CREATININE-BSD FRML MDRD: 35 ML/MIN/1.73 M 2
GFR SERPL CREATININE-BSD FRML MDRD: 36 ML/MIN/1.73 M 2
GFR SERPL CREATININE-BSD FRML MDRD: 37 ML/MIN/1.73 M 2
GFR SERPL CREATININE-BSD FRML MDRD: 38 ML/MIN/1.73 M 2
GFR SERPL CREATININE-BSD FRML MDRD: 40 ML/MIN/1.73 M 2
GFR SERPL CREATININE-BSD FRML MDRD: 41 ML/MIN/1.73 M 2
GFR SERPL CREATININE-BSD FRML MDRD: 41 ML/MIN/1.73 M 2
GFR SERPL CREATININE-BSD FRML MDRD: 42 ML/MIN/1.73 M 2
GFR SERPL CREATININE-BSD FRML MDRD: 42 ML/MIN/1.73 M 2
GFR SERPL CREATININE-BSD FRML MDRD: 43 ML/MIN/1.73 M 2
GFR SERPL CREATININE-BSD FRML MDRD: 43 ML/MIN/1.73 M 2
GFR SERPL CREATININE-BSD FRML MDRD: 44 ML/MIN/1.73 M 2
GFR SERPL CREATININE-BSD FRML MDRD: 45 ML/MIN/1.73 M 2
GFR SERPL CREATININE-BSD FRML MDRD: 47 ML/MIN/1.73 M 2
GFR SERPL CREATININE-BSD FRML MDRD: 48 ML/MIN/1.73 M 2
GFR SERPL CREATININE-BSD FRML MDRD: 49 ML/MIN/1.73 M 2
GFR SERPL CREATININE-BSD FRML MDRD: 49 ML/MIN/1.73 M 2
GFR SERPL CREATININE-BSD FRML MDRD: 51 ML/MIN/1.73 M 2
GFR SERPL CREATININE-BSD FRML MDRD: 51 ML/MIN/1.73 M 2
GFR SERPL CREATININE-BSD FRML MDRD: 52 ML/MIN/1.73 M 2
GFR SERPL CREATININE-BSD FRML MDRD: 56 ML/MIN/1.73 M 2
GFR SERPL CREATININE-BSD FRML MDRD: 56 ML/MIN/1.73 M 2
GIANT PLATELETS BLD QL SMEAR: NORMAL
GIANT PLATELETS BLD QL SMEAR: NORMAL
GLOBULIN SER CALC-MCNC: 2.4 G/DL (ref 1.9–3.5)
GLOBULIN SER CALC-MCNC: 3.1 G/DL (ref 1.9–3.5)
GLOBULIN SER CALC-MCNC: 3.1 G/DL (ref 1.9–3.5)
GLOBULIN SER CALC-MCNC: 3.2 G/DL (ref 1.9–3.5)
GLOBULIN SER CALC-MCNC: 3.2 G/DL (ref 1.9–3.5)
GLOBULIN SER CALC-MCNC: 3.3 G/DL (ref 1.9–3.5)
GLOBULIN SER CALC-MCNC: 3.4 G/DL (ref 1.9–3.5)
GLOBULIN SER CALC-MCNC: 3.4 G/DL (ref 1.9–3.5)
GLOBULIN SER CALC-MCNC: 3.5 G/DL (ref 1.9–3.5)
GLOBULIN SER CALC-MCNC: 3.6 G/DL (ref 1.9–3.5)
GLOBULIN SER CALC-MCNC: 3.9 G/DL (ref 1.9–3.5)
GLOBULIN SER CALC-MCNC: 4 G/DL (ref 1.9–3.5)
GLOBULIN SER CALC-MCNC: 4.2 G/DL (ref 1.9–3.5)
GLOBULIN SER CALC-MCNC: 4.2 G/DL (ref 1.9–3.5)
GLOBULIN SER CALC-MCNC: 4.5 G/DL (ref 1.9–3.5)
GLOBULIN SER CALC-MCNC: 4.8 G/DL (ref 1.9–3.5)
GLOBULIN SER CALC-MCNC: 5.4 G/DL (ref 1.9–3.5)
GLUCOSE BLD-MCNC: 100 MG/DL (ref 65–99)
GLUCOSE BLD-MCNC: 101 MG/DL (ref 65–99)
GLUCOSE BLD-MCNC: 102 MG/DL (ref 65–99)
GLUCOSE BLD-MCNC: 104 MG/DL (ref 65–99)
GLUCOSE BLD-MCNC: 106 MG/DL (ref 65–99)
GLUCOSE BLD-MCNC: 106 MG/DL (ref 65–99)
GLUCOSE BLD-MCNC: 107 MG/DL (ref 65–99)
GLUCOSE BLD-MCNC: 109 MG/DL (ref 65–99)
GLUCOSE BLD-MCNC: 110 MG/DL (ref 65–99)
GLUCOSE BLD-MCNC: 111 MG/DL (ref 65–99)
GLUCOSE BLD-MCNC: 111 MG/DL (ref 65–99)
GLUCOSE BLD-MCNC: 112 MG/DL (ref 65–99)
GLUCOSE BLD-MCNC: 113 MG/DL (ref 65–99)
GLUCOSE BLD-MCNC: 114 MG/DL (ref 65–99)
GLUCOSE BLD-MCNC: 115 MG/DL (ref 65–99)
GLUCOSE BLD-MCNC: 116 MG/DL (ref 65–99)
GLUCOSE BLD-MCNC: 116 MG/DL (ref 65–99)
GLUCOSE BLD-MCNC: 117 MG/DL (ref 65–99)
GLUCOSE BLD-MCNC: 118 MG/DL (ref 65–99)
GLUCOSE BLD-MCNC: 120 MG/DL (ref 65–99)
GLUCOSE BLD-MCNC: 121 MG/DL (ref 65–99)
GLUCOSE BLD-MCNC: 122 MG/DL (ref 65–99)
GLUCOSE BLD-MCNC: 122 MG/DL (ref 65–99)
GLUCOSE BLD-MCNC: 123 MG/DL (ref 65–99)
GLUCOSE BLD-MCNC: 125 MG/DL (ref 65–99)
GLUCOSE BLD-MCNC: 127 MG/DL (ref 65–99)
GLUCOSE BLD-MCNC: 128 MG/DL (ref 65–99)
GLUCOSE BLD-MCNC: 128 MG/DL (ref 65–99)
GLUCOSE BLD-MCNC: 129 MG/DL (ref 65–99)
GLUCOSE BLD-MCNC: 129 MG/DL (ref 65–99)
GLUCOSE BLD-MCNC: 133 MG/DL (ref 65–99)
GLUCOSE BLD-MCNC: 134 MG/DL (ref 65–99)
GLUCOSE BLD-MCNC: 137 MG/DL (ref 65–99)
GLUCOSE BLD-MCNC: 138 MG/DL (ref 65–99)
GLUCOSE BLD-MCNC: 138 MG/DL (ref 65–99)
GLUCOSE BLD-MCNC: 139 MG/DL (ref 65–99)
GLUCOSE BLD-MCNC: 140 MG/DL (ref 65–99)
GLUCOSE BLD-MCNC: 143 MG/DL (ref 65–99)
GLUCOSE BLD-MCNC: 144 MG/DL (ref 65–99)
GLUCOSE BLD-MCNC: 144 MG/DL (ref 65–99)
GLUCOSE BLD-MCNC: 145 MG/DL (ref 65–99)
GLUCOSE BLD-MCNC: 146 MG/DL (ref 65–99)
GLUCOSE BLD-MCNC: 146 MG/DL (ref 65–99)
GLUCOSE BLD-MCNC: 147 MG/DL (ref 65–99)
GLUCOSE BLD-MCNC: 148 MG/DL (ref 65–99)
GLUCOSE BLD-MCNC: 150 MG/DL (ref 65–99)
GLUCOSE BLD-MCNC: 150 MG/DL (ref 65–99)
GLUCOSE BLD-MCNC: 151 MG/DL (ref 65–99)
GLUCOSE BLD-MCNC: 152 MG/DL (ref 65–99)
GLUCOSE BLD-MCNC: 154 MG/DL (ref 65–99)
GLUCOSE BLD-MCNC: 155 MG/DL (ref 65–99)
GLUCOSE BLD-MCNC: 158 MG/DL (ref 65–99)
GLUCOSE BLD-MCNC: 160 MG/DL (ref 65–99)
GLUCOSE BLD-MCNC: 160 MG/DL (ref 65–99)
GLUCOSE BLD-MCNC: 163 MG/DL (ref 65–99)
GLUCOSE BLD-MCNC: 163 MG/DL (ref 65–99)
GLUCOSE BLD-MCNC: 165 MG/DL (ref 65–99)
GLUCOSE BLD-MCNC: 167 MG/DL (ref 65–99)
GLUCOSE BLD-MCNC: 169 MG/DL (ref 65–99)
GLUCOSE BLD-MCNC: 171 MG/DL (ref 65–99)
GLUCOSE BLD-MCNC: 171 MG/DL (ref 65–99)
GLUCOSE BLD-MCNC: 173 MG/DL (ref 65–99)
GLUCOSE BLD-MCNC: 175 MG/DL (ref 65–99)
GLUCOSE BLD-MCNC: 180 MG/DL (ref 65–99)
GLUCOSE BLD-MCNC: 182 MG/DL (ref 65–99)
GLUCOSE BLD-MCNC: 182 MG/DL (ref 65–99)
GLUCOSE BLD-MCNC: 184 MG/DL (ref 65–99)
GLUCOSE BLD-MCNC: 186 MG/DL (ref 65–99)
GLUCOSE BLD-MCNC: 187 MG/DL (ref 65–99)
GLUCOSE BLD-MCNC: 198 MG/DL (ref 65–99)
GLUCOSE BLD-MCNC: 201 MG/DL (ref 65–99)
GLUCOSE BLD-MCNC: 203 MG/DL (ref 65–99)
GLUCOSE BLD-MCNC: 203 MG/DL (ref 65–99)
GLUCOSE BLD-MCNC: 206 MG/DL (ref 65–99)
GLUCOSE BLD-MCNC: 210 MG/DL (ref 65–99)
GLUCOSE BLD-MCNC: 212 MG/DL (ref 65–99)
GLUCOSE BLD-MCNC: 214 MG/DL (ref 65–99)
GLUCOSE BLD-MCNC: 223 MG/DL (ref 65–99)
GLUCOSE BLD-MCNC: 224 MG/DL (ref 65–99)
GLUCOSE BLD-MCNC: 226 MG/DL (ref 65–99)
GLUCOSE BLD-MCNC: 230 MG/DL (ref 65–99)
GLUCOSE BLD-MCNC: 231 MG/DL (ref 65–99)
GLUCOSE BLD-MCNC: 237 MG/DL (ref 65–99)
GLUCOSE BLD-MCNC: 258 MG/DL (ref 65–99)
GLUCOSE BLD-MCNC: 269 MG/DL (ref 65–99)
GLUCOSE BLD-MCNC: 280 MG/DL (ref 65–99)
GLUCOSE BLD-MCNC: 283 MG/DL (ref 65–99)
GLUCOSE BLD-MCNC: 284 MG/DL (ref 65–99)
GLUCOSE BLD-MCNC: 285 MG/DL (ref 65–99)
GLUCOSE BLD-MCNC: 294 MG/DL (ref 65–99)
GLUCOSE BLD-MCNC: 300 MG/DL (ref 65–99)
GLUCOSE BLD-MCNC: 307 MG/DL (ref 65–99)
GLUCOSE BLD-MCNC: 320 MG/DL (ref 65–99)
GLUCOSE BLD-MCNC: 329 MG/DL (ref 65–99)
GLUCOSE BLD-MCNC: 333 MG/DL (ref 65–99)
GLUCOSE BLD-MCNC: 360 MG/DL (ref 65–99)
GLUCOSE BLD-MCNC: 95 MG/DL (ref 65–99)
GLUCOSE SERPL-MCNC: 105 MG/DL (ref 65–99)
GLUCOSE SERPL-MCNC: 105 MG/DL (ref 65–99)
GLUCOSE SERPL-MCNC: 107 MG/DL (ref 65–99)
GLUCOSE SERPL-MCNC: 108 MG/DL (ref 65–99)
GLUCOSE SERPL-MCNC: 108 MG/DL (ref 65–99)
GLUCOSE SERPL-MCNC: 109 MG/DL (ref 65–99)
GLUCOSE SERPL-MCNC: 109 MG/DL (ref 65–99)
GLUCOSE SERPL-MCNC: 111 MG/DL (ref 65–99)
GLUCOSE SERPL-MCNC: 114 MG/DL (ref 65–99)
GLUCOSE SERPL-MCNC: 116 MG/DL (ref 65–99)
GLUCOSE SERPL-MCNC: 117 MG/DL (ref 65–99)
GLUCOSE SERPL-MCNC: 117 MG/DL (ref 65–99)
GLUCOSE SERPL-MCNC: 121 MG/DL (ref 65–99)
GLUCOSE SERPL-MCNC: 123 MG/DL (ref 65–99)
GLUCOSE SERPL-MCNC: 125 MG/DL (ref 65–99)
GLUCOSE SERPL-MCNC: 127 MG/DL (ref 65–99)
GLUCOSE SERPL-MCNC: 133 MG/DL (ref 65–99)
GLUCOSE SERPL-MCNC: 135 MG/DL (ref 65–99)
GLUCOSE SERPL-MCNC: 14 MG/DL (ref 65–99)
GLUCOSE SERPL-MCNC: 141 MG/DL (ref 65–99)
GLUCOSE SERPL-MCNC: 143 MG/DL (ref 65–99)
GLUCOSE SERPL-MCNC: 144 MG/DL (ref 65–99)
GLUCOSE SERPL-MCNC: 148 MG/DL (ref 65–99)
GLUCOSE SERPL-MCNC: 149 MG/DL (ref 65–99)
GLUCOSE SERPL-MCNC: 153 MG/DL (ref 65–99)
GLUCOSE SERPL-MCNC: 155 MG/DL (ref 65–99)
GLUCOSE SERPL-MCNC: 155 MG/DL (ref 65–99)
GLUCOSE SERPL-MCNC: 170 MG/DL (ref 65–99)
GLUCOSE SERPL-MCNC: 175 MG/DL (ref 65–99)
GLUCOSE SERPL-MCNC: 179 MG/DL (ref 65–99)
GLUCOSE SERPL-MCNC: 179 MG/DL (ref 65–99)
GLUCOSE SERPL-MCNC: 220 MG/DL (ref 65–99)
GLUCOSE SERPL-MCNC: 220 MG/DL (ref 65–99)
GLUCOSE SERPL-MCNC: 224 MG/DL (ref 65–99)
GLUCOSE SERPL-MCNC: 240 MG/DL (ref 65–99)
GLUCOSE SERPL-MCNC: 249 MG/DL (ref 65–99)
GLUCOSE SERPL-MCNC: 249 MG/DL (ref 65–99)
GLUCOSE SERPL-MCNC: 277 MG/DL (ref 65–99)
GLUCOSE SERPL-MCNC: 290 MG/DL (ref 65–99)
GLUCOSE SERPL-MCNC: 306 MG/DL (ref 65–99)
GLUCOSE SERPL-MCNC: 328 MG/DL (ref 65–99)
GLUCOSE SERPL-MCNC: 94 MG/DL (ref 65–99)
GLUCOSE SERPL-MCNC: 95 MG/DL (ref 65–99)
GLUCOSE UR STRIP.AUTO-MCNC: NEGATIVE MG/DL
GRAM STN SPEC: ABNORMAL
GRAM STN SPEC: NORMAL
HAV IGM SERPL QL IA: NEGATIVE
HBA1C MFR BLD: 5.7 % (ref 0–5.6)
HBV CORE IGM SER QL: NEGATIVE
HBV SURFACE AG SER QL: NEGATIVE
HCO3 BLDA-SCNC: 17 MMOL/L (ref 17–25)
HCO3 BLDA-SCNC: 17.9 MMOL/L (ref 17–25)
HCO3 BLDA-SCNC: 18 MMOL/L (ref 17–25)
HCO3 BLDA-SCNC: 18.8 MMOL/L (ref 17–25)
HCO3 BLDA-SCNC: 19.9 MMOL/L (ref 17–25)
HCO3 BLDA-SCNC: 21.1 MMOL/L (ref 17–25)
HCO3 BLDA-SCNC: 21.4 MMOL/L (ref 17–25)
HCO3 BLDA-SCNC: 22 MMOL/L (ref 17–25)
HCO3 BLDA-SCNC: 22.6 MMOL/L (ref 17–25)
HCO3 BLDA-SCNC: 22.7 MMOL/L (ref 17–25)
HCO3 BLDA-SCNC: 24 MMOL/L (ref 17–25)
HCO3 BLDA-SCNC: 24 MMOL/L (ref 17–25)
HCO3 BLDA-SCNC: 26.4 MMOL/L (ref 17–25)
HCO3 BLDA-SCNC: 27 MMOL/L (ref 17–25)
HCO3 BLDA-SCNC: 27.4 MMOL/L (ref 17–25)
HCO3 BLDA-SCNC: 28 MMOL/L (ref 17–25)
HCO3 BLDA-SCNC: 29 MMOL/L (ref 17–25)
HCO3 BLDA-SCNC: 30 MMOL/L (ref 17–25)
HCT VFR BLD AUTO: 19.5 % (ref 42–52)
HCT VFR BLD AUTO: 20.2 % (ref 42–52)
HCT VFR BLD AUTO: 20.5 % (ref 42–52)
HCT VFR BLD AUTO: 21.7 % (ref 42–52)
HCT VFR BLD AUTO: 21.9 % (ref 42–52)
HCT VFR BLD AUTO: 21.9 % (ref 42–52)
HCT VFR BLD AUTO: 22.5 % (ref 42–52)
HCT VFR BLD AUTO: 22.6 % (ref 42–52)
HCT VFR BLD AUTO: 22.9 % (ref 42–52)
HCT VFR BLD AUTO: 23.2 % (ref 42–52)
HCT VFR BLD AUTO: 23.3 % (ref 42–52)
HCT VFR BLD AUTO: 23.5 % (ref 42–52)
HCT VFR BLD AUTO: 23.7 % (ref 42–52)
HCT VFR BLD AUTO: 23.8 % (ref 42–52)
HCT VFR BLD AUTO: 24 % (ref 42–52)
HCT VFR BLD AUTO: 24 % (ref 42–52)
HCT VFR BLD AUTO: 24.2 % (ref 42–52)
HCT VFR BLD AUTO: 24.3 % (ref 42–52)
HCT VFR BLD AUTO: 24.3 % (ref 42–52)
HCT VFR BLD AUTO: 24.5 % (ref 42–52)
HCT VFR BLD AUTO: 24.6 % (ref 42–52)
HCT VFR BLD AUTO: 24.6 % (ref 42–52)
HCT VFR BLD AUTO: 24.7 % (ref 42–52)
HCT VFR BLD AUTO: 25.1 % (ref 42–52)
HCT VFR BLD AUTO: 25.3 % (ref 42–52)
HCT VFR BLD AUTO: 25.4 % (ref 42–52)
HCT VFR BLD AUTO: 25.4 % (ref 42–52)
HCT VFR BLD AUTO: 25.5 % (ref 42–52)
HCT VFR BLD AUTO: 25.6 % (ref 42–52)
HCT VFR BLD AUTO: 25.8 % (ref 42–52)
HCT VFR BLD AUTO: 25.8 % (ref 42–52)
HCT VFR BLD AUTO: 26 % (ref 42–52)
HCT VFR BLD AUTO: 26 % (ref 42–52)
HCT VFR BLD AUTO: 26.1 % (ref 42–52)
HCT VFR BLD AUTO: 26.2 % (ref 42–52)
HCT VFR BLD AUTO: 26.2 % (ref 42–52)
HCT VFR BLD AUTO: 26.4 % (ref 42–52)
HCT VFR BLD AUTO: 26.8 % (ref 42–52)
HCT VFR BLD AUTO: 27 % (ref 42–52)
HCT VFR BLD AUTO: 27.2 % (ref 42–52)
HCT VFR BLD AUTO: 27.3 % (ref 42–52)
HCT VFR BLD AUTO: 27.3 % (ref 42–52)
HCT VFR BLD AUTO: 27.4 % (ref 42–52)
HCT VFR BLD AUTO: 27.4 % (ref 42–52)
HCT VFR BLD AUTO: 27.5 % (ref 42–52)
HCT VFR BLD AUTO: 27.9 % (ref 42–52)
HCT VFR BLD AUTO: 28.7 % (ref 42–52)
HCT VFR BLD AUTO: 29.1 % (ref 42–52)
HCT VFR BLD AUTO: 29.1 % (ref 42–52)
HCT VFR BLD AUTO: 29.9 % (ref 42–52)
HCT VFR BLD AUTO: 29.9 % (ref 42–52)
HCT VFR BLD AUTO: 31 % (ref 42–52)
HCT VFR BLD AUTO: 32.3 % (ref 42–52)
HCT VFR BLD AUTO: 32.7 % (ref 42–52)
HCT VFR BLD AUTO: 32.8 % (ref 42–52)
HCT VFR BLD AUTO: 33.5 % (ref 42–52)
HCT VFR BLD AUTO: 34.8 % (ref 42–52)
HCT VFR BLD AUTO: 36.1 % (ref 42–52)
HCV AB SER QL: NEGATIVE
HEMOCCULT STL QL: POSITIVE
HEPIND PLTAB  51052: NEGATIVE
HGB BLD-MCNC: 10.3 G/DL (ref 14–18)
HGB BLD-MCNC: 10.6 G/DL (ref 14–18)
HGB BLD-MCNC: 6 G/DL (ref 14–18)
HGB BLD-MCNC: 6 G/DL (ref 14–18)
HGB BLD-MCNC: 6.1 G/DL (ref 14–18)
HGB BLD-MCNC: 6.3 G/DL (ref 14–18)
HGB BLD-MCNC: 6.5 G/DL (ref 14–18)
HGB BLD-MCNC: 6.6 G/DL (ref 14–18)
HGB BLD-MCNC: 6.6 G/DL (ref 14–18)
HGB BLD-MCNC: 6.8 G/DL (ref 14–18)
HGB BLD-MCNC: 6.9 G/DL (ref 14–18)
HGB BLD-MCNC: 6.9 G/DL (ref 14–18)
HGB BLD-MCNC: 7 G/DL (ref 14–18)
HGB BLD-MCNC: 7.1 G/DL (ref 14–18)
HGB BLD-MCNC: 7.2 G/DL (ref 14–18)
HGB BLD-MCNC: 7.2 G/DL (ref 14–18)
HGB BLD-MCNC: 7.3 G/DL (ref 14–18)
HGB BLD-MCNC: 7.4 G/DL (ref 14–18)
HGB BLD-MCNC: 7.5 G/DL (ref 14–18)
HGB BLD-MCNC: 7.6 G/DL (ref 14–18)
HGB BLD-MCNC: 7.7 G/DL (ref 14–18)
HGB BLD-MCNC: 7.8 G/DL (ref 14–18)
HGB BLD-MCNC: 7.9 G/DL (ref 14–18)
HGB BLD-MCNC: 8 G/DL (ref 14–18)
HGB BLD-MCNC: 8.1 G/DL (ref 14–18)
HGB BLD-MCNC: 8.2 G/DL (ref 14–18)
HGB BLD-MCNC: 8.2 G/DL (ref 14–18)
HGB BLD-MCNC: 8.3 G/DL (ref 14–18)
HGB BLD-MCNC: 8.4 G/DL (ref 14–18)
HGB BLD-MCNC: 8.5 G/DL (ref 14–18)
HGB BLD-MCNC: 8.6 G/DL (ref 14–18)
HGB BLD-MCNC: 8.8 G/DL (ref 14–18)
HGB BLD-MCNC: 9.2 G/DL (ref 14–18)
HGB BLD-MCNC: 9.3 G/DL (ref 14–18)
HGB BLD-MCNC: 9.5 G/DL (ref 14–18)
HGB BLD-MCNC: 9.6 G/DL (ref 14–18)
HGB BLD-MCNC: 9.9 G/DL (ref 14–18)
HYALINE CASTS #/AREA URNS LPF: ABNORMAL /LPF
HYPOCHROMIA BLD QL SMEAR: ABNORMAL
IMM GRANULOCYTES # BLD AUTO: 0.13 K/UL (ref 0–0.11)
IMM GRANULOCYTES # BLD AUTO: 0.14 K/UL (ref 0–0.11)
IMM GRANULOCYTES # BLD AUTO: 0.14 K/UL (ref 0–0.11)
IMM GRANULOCYTES # BLD AUTO: 0.21 K/UL (ref 0–0.11)
IMM GRANULOCYTES # BLD AUTO: 0.21 K/UL (ref 0–0.11)
IMM GRANULOCYTES # BLD AUTO: 0.22 K/UL (ref 0–0.11)
IMM GRANULOCYTES # BLD AUTO: 0.22 K/UL (ref 0–0.11)
IMM GRANULOCYTES # BLD AUTO: 0.26 K/UL (ref 0–0.11)
IMM GRANULOCYTES # BLD AUTO: 0.32 K/UL (ref 0–0.11)
IMM GRANULOCYTES # BLD AUTO: 0.61 K/UL (ref 0–0.11)
IMM GRANULOCYTES NFR BLD AUTO: 1.5 % (ref 0–0.9)
IMM GRANULOCYTES NFR BLD AUTO: 2.1 % (ref 0–0.9)
IMM GRANULOCYTES NFR BLD AUTO: 3.1 % (ref 0–0.9)
IMM GRANULOCYTES NFR BLD AUTO: 3.7 % (ref 0–0.9)
IMM GRANULOCYTES NFR BLD AUTO: 4.1 % (ref 0–0.9)
IMM GRANULOCYTES NFR BLD AUTO: 4.9 % (ref 0–0.9)
IMM GRANULOCYTES NFR BLD AUTO: 6.1 % (ref 0–0.9)
IMM GRANULOCYTES NFR BLD AUTO: 7 % (ref 0–0.9)
INR PPP: 1.05 (ref 0.87–1.13)
INR PPP: 1.11 (ref 0.87–1.13)
INR PPP: 1.12 (ref 0.87–1.13)
INR PPP: 1.14 (ref 0.87–1.13)
INR PPP: 1.19 (ref 0.87–1.13)
INR PPP: 1.24 (ref 0.87–1.13)
INR PPP: 1.28 (ref 0.87–1.13)
INR PPP: 1.45 (ref 0.87–1.13)
IRON SATN MFR SERPL: 18 % (ref 15–55)
IRON SATN MFR SERPL: 77 % (ref 15–55)
IRON SERPL-MCNC: 119 UG/DL (ref 50–180)
IRON SERPL-MCNC: 50 UG/DL (ref 50–180)
KETONES UR STRIP.AUTO-MCNC: NEGATIVE MG/DL
LACTATE BLD-SCNC: 0.7 MMOL/L (ref 0.5–2)
LACTATE BLD-SCNC: 1.33 MMOL/L (ref 0.5–2)
LACTATE BLD-SCNC: 1.82 MMOL/L (ref 0.5–2)
LACTATE BLD-SCNC: 2.3 MMOL/L (ref 0.5–2)
LACTATE BLD-SCNC: 2.9 MMOL/L (ref 0.5–2)
LACTATE BLD-SCNC: 3 MMOL/L (ref 0.5–2)
LACTATE BLD-SCNC: 4.82 MMOL/L (ref 0.5–2)
LEUKOCYTE ESTERASE UR QL STRIP.AUTO: ABNORMAL
LEUKOCYTE ESTERASE UR QL STRIP.AUTO: ABNORMAL
LEUKOCYTE ESTERASE UR QL STRIP.AUTO: NEGATIVE
LEUKOCYTE ESTERASE UR QL STRIP.AUTO: NEGATIVE
LG PLATELETS BLD QL SMEAR: NORMAL
LIPASE SERPL-CCNC: 24 U/L (ref 11–82)
LIPASE SERPL-CCNC: 98 U/L (ref 11–82)
LV EJECT FRACT  99904: 60
LV EJECT FRACT  99904: 60
LV EJECT FRACT MOD 2C 99903: 53.68
LV EJECT FRACT MOD 4C 99902: 76.37
LV EJECT FRACT MOD BP 99901: 67.73
LYMPHOCYTES # BLD AUTO: 0.3 K/UL (ref 1–4.8)
LYMPHOCYTES # BLD AUTO: 0.39 K/UL (ref 1–4.8)
LYMPHOCYTES # BLD AUTO: 0.42 K/UL (ref 1–4.8)
LYMPHOCYTES # BLD AUTO: 0.55 K/UL (ref 1–4.8)
LYMPHOCYTES # BLD AUTO: 0.55 K/UL (ref 1–4.8)
LYMPHOCYTES # BLD AUTO: 0.62 K/UL (ref 1–4.8)
LYMPHOCYTES # BLD AUTO: 0.66 K/UL (ref 1–4.8)
LYMPHOCYTES # BLD AUTO: 0.66 K/UL (ref 1–4.8)
LYMPHOCYTES # BLD AUTO: 0.68 K/UL (ref 1–4.8)
LYMPHOCYTES # BLD AUTO: 0.69 K/UL (ref 1–4.8)
LYMPHOCYTES # BLD AUTO: 0.73 K/UL (ref 1–4.8)
LYMPHOCYTES # BLD AUTO: 0.75 K/UL (ref 1–4.8)
LYMPHOCYTES # BLD AUTO: 0.76 K/UL (ref 1–4.8)
LYMPHOCYTES # BLD AUTO: 0.78 K/UL (ref 1–4.8)
LYMPHOCYTES # BLD AUTO: 0.79 K/UL (ref 1–4.8)
LYMPHOCYTES # BLD AUTO: 0.82 K/UL (ref 1–4.8)
LYMPHOCYTES # BLD AUTO: 0.86 K/UL (ref 1–4.8)
LYMPHOCYTES # BLD AUTO: 1.02 K/UL (ref 1–4.8)
LYMPHOCYTES # BLD AUTO: 1.03 K/UL (ref 1–4.8)
LYMPHOCYTES # BLD AUTO: 1.14 K/UL (ref 1–4.8)
LYMPHOCYTES # BLD AUTO: 1.16 K/UL (ref 1–4.8)
LYMPHOCYTES # BLD AUTO: 1.17 K/UL (ref 1–4.8)
LYMPHOCYTES # BLD AUTO: 1.24 K/UL (ref 1–4.8)
LYMPHOCYTES # BLD AUTO: 1.28 K/UL (ref 1–4.8)
LYMPHOCYTES # BLD AUTO: 1.39 K/UL (ref 1–4.8)
LYMPHOCYTES # BLD AUTO: 1.48 K/UL (ref 1–4.8)
LYMPHOCYTES # BLD AUTO: 1.56 K/UL (ref 1–4.8)
LYMPHOCYTES # BLD AUTO: 1.75 K/UL (ref 1–4.8)
LYMPHOCYTES # BLD AUTO: 1.81 K/UL (ref 1–4.8)
LYMPHOCYTES # BLD AUTO: 2 K/UL (ref 1–4.8)
LYMPHOCYTES NFR BLD: 10 % (ref 22–41)
LYMPHOCYTES NFR BLD: 10.3 % (ref 22–41)
LYMPHOCYTES NFR BLD: 11.8 % (ref 22–41)
LYMPHOCYTES NFR BLD: 11.9 % (ref 22–41)
LYMPHOCYTES NFR BLD: 12.8 % (ref 22–41)
LYMPHOCYTES NFR BLD: 12.9 % (ref 22–41)
LYMPHOCYTES NFR BLD: 13 % (ref 22–41)
LYMPHOCYTES NFR BLD: 13.2 % (ref 22–41)
LYMPHOCYTES NFR BLD: 13.4 % (ref 22–41)
LYMPHOCYTES NFR BLD: 13.6 % (ref 22–41)
LYMPHOCYTES NFR BLD: 13.7 % (ref 22–41)
LYMPHOCYTES NFR BLD: 13.8 % (ref 22–41)
LYMPHOCYTES NFR BLD: 16 % (ref 22–41)
LYMPHOCYTES NFR BLD: 17.8 % (ref 22–41)
LYMPHOCYTES NFR BLD: 18.2 % (ref 22–41)
LYMPHOCYTES NFR BLD: 18.5 % (ref 22–41)
LYMPHOCYTES NFR BLD: 19 % (ref 22–41)
LYMPHOCYTES NFR BLD: 21 % (ref 22–41)
LYMPHOCYTES NFR BLD: 21 % (ref 22–41)
LYMPHOCYTES NFR BLD: 23 % (ref 22–41)
LYMPHOCYTES NFR BLD: 30 % (ref 22–41)
LYMPHOCYTES NFR BLD: 5.3 % (ref 22–41)
LYMPHOCYTES NFR BLD: 6.2 % (ref 22–41)
LYMPHOCYTES NFR BLD: 6.4 % (ref 22–41)
LYMPHOCYTES NFR BLD: 7 % (ref 22–41)
LYMPHOCYTES NFR BLD: 7.2 % (ref 22–41)
LYMPHOCYTES NFR BLD: 7.3 % (ref 22–41)
LYMPHOCYTES NFR BLD: 8.1 % (ref 22–41)
LYMPHOCYTES NFR BLD: 9 % (ref 22–41)
LYMPHOCYTES NFR BLD: 9 % (ref 22–41)
LYMPHOCYTES NFR BLD: 9.7 % (ref 22–41)
LYMPHOCYTES NFR BLD: 9.8 % (ref 22–41)
MACROCYTES BLD QL SMEAR: ABNORMAL
MAGNESIUM SERPL-MCNC: 2.3 MG/DL (ref 1.5–2.5)
MAGNESIUM SERPL-MCNC: 2.5 MG/DL (ref 1.5–2.5)
MAGNESIUM SERPL-MCNC: 2.8 MG/DL (ref 1.5–2.5)
MAGNESIUM SERPL-MCNC: 3 MG/DL (ref 1.5–2.5)
MAGNESIUM SERPL-MCNC: 3 MG/DL (ref 1.5–2.5)
MANUAL DIFF BLD: NORMAL
MCH RBC QN AUTO: 23.7 PG (ref 27–33)
MCH RBC QN AUTO: 23.9 PG (ref 27–33)
MCH RBC QN AUTO: 24.3 PG (ref 27–33)
MCH RBC QN AUTO: 25.2 PG (ref 27–33)
MCH RBC QN AUTO: 25.3 PG (ref 27–33)
MCH RBC QN AUTO: 25.4 PG (ref 27–33)
MCH RBC QN AUTO: 25.4 PG (ref 27–33)
MCH RBC QN AUTO: 25.5 PG (ref 27–33)
MCH RBC QN AUTO: 25.6 PG (ref 27–33)
MCH RBC QN AUTO: 25.7 PG (ref 27–33)
MCH RBC QN AUTO: 25.7 PG (ref 27–33)
MCH RBC QN AUTO: 25.8 PG (ref 27–33)
MCH RBC QN AUTO: 25.9 PG (ref 27–33)
MCH RBC QN AUTO: 25.9 PG (ref 27–33)
MCH RBC QN AUTO: 26 PG (ref 27–33)
MCH RBC QN AUTO: 26.2 PG (ref 27–33)
MCH RBC QN AUTO: 26.3 PG (ref 27–33)
MCH RBC QN AUTO: 26.7 PG (ref 27–33)
MCH RBC QN AUTO: 26.8 PG (ref 27–33)
MCH RBC QN AUTO: 26.8 PG (ref 27–33)
MCH RBC QN AUTO: 26.9 PG (ref 27–33)
MCH RBC QN AUTO: 27 PG (ref 27–33)
MCH RBC QN AUTO: 27.1 PG (ref 27–33)
MCH RBC QN AUTO: 27.2 PG (ref 27–33)
MCH RBC QN AUTO: 27.3 PG (ref 27–33)
MCH RBC QN AUTO: 27.4 PG (ref 27–33)
MCH RBC QN AUTO: 27.4 PG (ref 27–33)
MCH RBC QN AUTO: 27.8 PG (ref 27–33)
MCH RBC QN AUTO: 27.8 PG (ref 27–33)
MCH RBC QN AUTO: 28.8 PG (ref 27–33)
MCH RBC QN AUTO: 29.1 PG (ref 27–33)
MCH RBC QN AUTO: 29.3 PG (ref 27–33)
MCH RBC QN AUTO: 29.4 PG (ref 27–33)
MCH RBC QN AUTO: 29.9 PG (ref 27–33)
MCHC RBC AUTO-ENTMCNC: 27.5 G/DL (ref 33.7–35.3)
MCHC RBC AUTO-ENTMCNC: 27.8 G/DL (ref 33.7–35.3)
MCHC RBC AUTO-ENTMCNC: 27.9 G/DL (ref 33.7–35.3)
MCHC RBC AUTO-ENTMCNC: 28.1 G/DL (ref 33.7–35.3)
MCHC RBC AUTO-ENTMCNC: 28.3 G/DL (ref 33.7–35.3)
MCHC RBC AUTO-ENTMCNC: 28.3 G/DL (ref 33.7–35.3)
MCHC RBC AUTO-ENTMCNC: 28.4 G/DL (ref 33.7–35.3)
MCHC RBC AUTO-ENTMCNC: 28.6 G/DL (ref 33.7–35.3)
MCHC RBC AUTO-ENTMCNC: 28.7 G/DL (ref 33.7–35.3)
MCHC RBC AUTO-ENTMCNC: 28.7 G/DL (ref 33.7–35.3)
MCHC RBC AUTO-ENTMCNC: 28.8 G/DL (ref 33.7–35.3)
MCHC RBC AUTO-ENTMCNC: 28.9 G/DL (ref 33.7–35.3)
MCHC RBC AUTO-ENTMCNC: 29 G/DL (ref 33.7–35.3)
MCHC RBC AUTO-ENTMCNC: 29.2 G/DL (ref 33.7–35.3)
MCHC RBC AUTO-ENTMCNC: 29.3 G/DL (ref 33.7–35.3)
MCHC RBC AUTO-ENTMCNC: 29.4 G/DL (ref 33.7–35.3)
MCHC RBC AUTO-ENTMCNC: 29.4 G/DL (ref 33.7–35.3)
MCHC RBC AUTO-ENTMCNC: 29.5 G/DL (ref 33.7–35.3)
MCHC RBC AUTO-ENTMCNC: 29.6 G/DL (ref 33.7–35.3)
MCHC RBC AUTO-ENTMCNC: 29.7 G/DL (ref 33.7–35.3)
MCHC RBC AUTO-ENTMCNC: 29.9 G/DL (ref 33.7–35.3)
MCHC RBC AUTO-ENTMCNC: 29.9 G/DL (ref 33.7–35.3)
MCHC RBC AUTO-ENTMCNC: 30 G/DL (ref 33.7–35.3)
MCHC RBC AUTO-ENTMCNC: 30.1 G/DL (ref 33.7–35.3)
MCHC RBC AUTO-ENTMCNC: 30.2 G/DL (ref 33.7–35.3)
MCHC RBC AUTO-ENTMCNC: 30.4 G/DL (ref 33.7–35.3)
MCHC RBC AUTO-ENTMCNC: 30.5 G/DL (ref 33.7–35.3)
MCHC RBC AUTO-ENTMCNC: 30.5 G/DL (ref 33.7–35.3)
MCHC RBC AUTO-ENTMCNC: 30.6 G/DL (ref 33.7–35.3)
MCHC RBC AUTO-ENTMCNC: 30.7 G/DL (ref 33.7–35.3)
MCHC RBC AUTO-ENTMCNC: 31.1 G/DL (ref 33.7–35.3)
MCV RBC AUTO: 102 FL (ref 81.4–97.8)
MCV RBC AUTO: 106 FL (ref 81.4–97.8)
MCV RBC AUTO: 84.4 FL (ref 81.4–97.8)
MCV RBC AUTO: 84.9 FL (ref 81.4–97.8)
MCV RBC AUTO: 85 FL (ref 81.4–97.8)
MCV RBC AUTO: 85.3 FL (ref 81.4–97.8)
MCV RBC AUTO: 85.4 FL (ref 81.4–97.8)
MCV RBC AUTO: 85.5 FL (ref 81.4–97.8)
MCV RBC AUTO: 85.9 FL (ref 81.4–97.8)
MCV RBC AUTO: 86.1 FL (ref 81.4–97.8)
MCV RBC AUTO: 86.8 FL (ref 81.4–97.8)
MCV RBC AUTO: 87.2 FL (ref 81.4–97.8)
MCV RBC AUTO: 87.5 FL (ref 81.4–97.8)
MCV RBC AUTO: 87.5 FL (ref 81.4–97.8)
MCV RBC AUTO: 88.1 FL (ref 81.4–97.8)
MCV RBC AUTO: 88.3 FL (ref 81.4–97.8)
MCV RBC AUTO: 88.5 FL (ref 81.4–97.8)
MCV RBC AUTO: 88.7 FL (ref 81.4–97.8)
MCV RBC AUTO: 88.7 FL (ref 81.4–97.8)
MCV RBC AUTO: 88.8 FL (ref 81.4–97.8)
MCV RBC AUTO: 88.9 FL (ref 81.4–97.8)
MCV RBC AUTO: 89.1 FL (ref 81.4–97.8)
MCV RBC AUTO: 89.2 FL (ref 81.4–97.8)
MCV RBC AUTO: 89.4 FL (ref 81.4–97.8)
MCV RBC AUTO: 89.5 FL (ref 81.4–97.8)
MCV RBC AUTO: 89.5 FL (ref 81.4–97.8)
MCV RBC AUTO: 89.7 FL (ref 81.4–97.8)
MCV RBC AUTO: 89.8 FL (ref 81.4–97.8)
MCV RBC AUTO: 89.9 FL (ref 81.4–97.8)
MCV RBC AUTO: 90 FL (ref 81.4–97.8)
MCV RBC AUTO: 90.1 FL (ref 81.4–97.8)
MCV RBC AUTO: 90.3 FL (ref 81.4–97.8)
MCV RBC AUTO: 90.3 FL (ref 81.4–97.8)
MCV RBC AUTO: 90.4 FL (ref 81.4–97.8)
MCV RBC AUTO: 90.4 FL (ref 81.4–97.8)
MCV RBC AUTO: 90.6 FL (ref 81.4–97.8)
MCV RBC AUTO: 90.6 FL (ref 81.4–97.8)
MCV RBC AUTO: 90.7 FL (ref 81.4–97.8)
MCV RBC AUTO: 91.1 FL (ref 81.4–97.8)
MCV RBC AUTO: 91.1 FL (ref 81.4–97.8)
MCV RBC AUTO: 91.5 FL (ref 81.4–97.8)
MCV RBC AUTO: 91.7 FL (ref 81.4–97.8)
MCV RBC AUTO: 91.8 FL (ref 81.4–97.8)
MCV RBC AUTO: 91.8 FL (ref 81.4–97.8)
MCV RBC AUTO: 92 FL (ref 81.4–97.8)
MCV RBC AUTO: 92.3 FL (ref 81.4–97.8)
MCV RBC AUTO: 92.7 FL (ref 81.4–97.8)
MCV RBC AUTO: 92.9 FL (ref 81.4–97.8)
MCV RBC AUTO: 93.1 FL (ref 81.4–97.8)
MCV RBC AUTO: 94.5 FL (ref 81.4–97.8)
MCV RBC AUTO: 96.5 FL (ref 81.4–97.8)
MCV RBC AUTO: 97.3 FL (ref 81.4–97.8)
METAMYELOCYTES NFR BLD MANUAL: 0.9 %
METAMYELOCYTES NFR BLD MANUAL: 1 %
METAMYELOCYTES NFR BLD MANUAL: 1 %
METAMYELOCYTES NFR BLD MANUAL: 1.9 %
METAMYELOCYTES NFR BLD MANUAL: 2 %
METAMYELOCYTES NFR BLD MANUAL: 2.7 %
METAMYELOCYTES NFR BLD MANUAL: 2.7 %
METAMYELOCYTES NFR BLD MANUAL: 2.8 %
MICRO URNS: ABNORMAL
MICRO URNS: NORMAL
MICROCYTES BLD QL SMEAR: ABNORMAL
MONOCYTES # BLD AUTO: 0 K/UL (ref 0–0.85)
MONOCYTES # BLD AUTO: 0.05 K/UL (ref 0–0.85)
MONOCYTES # BLD AUTO: 0.08 K/UL (ref 0–0.85)
MONOCYTES # BLD AUTO: 0.09 K/UL (ref 0–0.85)
MONOCYTES # BLD AUTO: 0.1 K/UL (ref 0–0.85)
MONOCYTES # BLD AUTO: 0.12 K/UL (ref 0–0.85)
MONOCYTES # BLD AUTO: 0.14 K/UL (ref 0–0.85)
MONOCYTES # BLD AUTO: 0.15 K/UL (ref 0–0.85)
MONOCYTES # BLD AUTO: 0.18 K/UL (ref 0–0.85)
MONOCYTES # BLD AUTO: 0.18 K/UL (ref 0–0.85)
MONOCYTES # BLD AUTO: 0.2 K/UL (ref 0–0.85)
MONOCYTES # BLD AUTO: 0.22 K/UL (ref 0–0.85)
MONOCYTES # BLD AUTO: 0.22 K/UL (ref 0–0.85)
MONOCYTES # BLD AUTO: 0.23 K/UL (ref 0–0.85)
MONOCYTES # BLD AUTO: 0.24 K/UL (ref 0–0.85)
MONOCYTES # BLD AUTO: 0.25 K/UL (ref 0–0.85)
MONOCYTES # BLD AUTO: 0.26 K/UL (ref 0–0.85)
MONOCYTES # BLD AUTO: 0.27 K/UL (ref 0–0.85)
MONOCYTES # BLD AUTO: 0.29 K/UL (ref 0–0.85)
MONOCYTES # BLD AUTO: 0.31 K/UL (ref 0–0.85)
MONOCYTES # BLD AUTO: 0.34 K/UL (ref 0–0.85)
MONOCYTES # BLD AUTO: 0.34 K/UL (ref 0–0.85)
MONOCYTES # BLD AUTO: 0.39 K/UL (ref 0–0.85)
MONOCYTES # BLD AUTO: 0.4 K/UL (ref 0–0.85)
MONOCYTES # BLD AUTO: 0.43 K/UL (ref 0–0.85)
MONOCYTES # BLD AUTO: 0.43 K/UL (ref 0–0.85)
MONOCYTES # BLD AUTO: 0.74 K/UL (ref 0–0.85)
MONOCYTES NFR BLD AUTO: 0 % (ref 0–13.4)
MONOCYTES NFR BLD AUTO: 1 % (ref 0–13.4)
MONOCYTES NFR BLD AUTO: 1.8 % (ref 0–13.4)
MONOCYTES NFR BLD AUTO: 2 % (ref 0–13.4)
MONOCYTES NFR BLD AUTO: 2.6 % (ref 0–13.4)
MONOCYTES NFR BLD AUTO: 2.7 % (ref 0–13.4)
MONOCYTES NFR BLD AUTO: 2.8 % (ref 0–13.4)
MONOCYTES NFR BLD AUTO: 2.8 % (ref 0–13.4)
MONOCYTES NFR BLD AUTO: 3.7 % (ref 0–13.4)
MONOCYTES NFR BLD AUTO: 3.7 % (ref 0–13.4)
MONOCYTES NFR BLD AUTO: 3.9 % (ref 0–13.4)
MONOCYTES NFR BLD AUTO: 4 % (ref 0–13.4)
MONOCYTES NFR BLD AUTO: 4.1 % (ref 0–13.4)
MONOCYTES NFR BLD AUTO: 4.3 % (ref 0–13.4)
MONOCYTES NFR BLD AUTO: 4.7 % (ref 0–13.4)
MONOCYTES NFR BLD AUTO: 4.9 % (ref 0–13.4)
MONOCYTES NFR BLD AUTO: 5.5 % (ref 0–13.4)
MONOCYTES NFR BLD AUTO: 5.6 % (ref 0–13.4)
MONOCYTES NFR BLD AUTO: 6.1 % (ref 0–13.4)
MONOCYTES NFR BLD AUTO: 7.4 % (ref 0–13.4)
MORPHOLOGY BLD-IMP: NORMAL
MRSA DNA SPEC QL NAA+PROBE: NORMAL
MUCOUS THREADS #/AREA URNS HPF: ABNORMAL /HPF
MUCOUS THREADS #/AREA URNS HPF: ABNORMAL /HPF
MYELOCYTES NFR BLD MANUAL: 0.9 %
MYELOCYTES NFR BLD MANUAL: 1 %
MYELOCYTES NFR BLD MANUAL: 1.9 %
MYELOCYTES NFR BLD MANUAL: 2 %
MYELOCYTES NFR BLD MANUAL: 3.7 %
NEUTROPHILS # BLD AUTO: 10.82 K/UL (ref 1.82–7.42)
NEUTROPHILS # BLD AUTO: 12.31 K/UL (ref 1.82–7.42)
NEUTROPHILS # BLD AUTO: 17.6 K/UL (ref 1.82–7.42)
NEUTROPHILS # BLD AUTO: 3.48 K/UL (ref 1.82–7.42)
NEUTROPHILS # BLD AUTO: 3.66 K/UL (ref 1.82–7.42)
NEUTROPHILS # BLD AUTO: 3.74 K/UL (ref 1.82–7.42)
NEUTROPHILS # BLD AUTO: 3.82 K/UL (ref 1.82–7.42)
NEUTROPHILS # BLD AUTO: 3.82 K/UL (ref 1.82–7.42)
NEUTROPHILS # BLD AUTO: 3.87 K/UL (ref 1.82–7.42)
NEUTROPHILS # BLD AUTO: 4.04 K/UL (ref 1.82–7.42)
NEUTROPHILS # BLD AUTO: 4.33 K/UL (ref 1.82–7.42)
NEUTROPHILS # BLD AUTO: 4.55 K/UL (ref 1.82–7.42)
NEUTROPHILS # BLD AUTO: 4.66 K/UL (ref 1.82–7.42)
NEUTROPHILS # BLD AUTO: 4.7 K/UL (ref 1.82–7.42)
NEUTROPHILS # BLD AUTO: 4.7 K/UL (ref 1.82–7.42)
NEUTROPHILS # BLD AUTO: 4.77 K/UL (ref 1.82–7.42)
NEUTROPHILS # BLD AUTO: 4.85 K/UL (ref 1.82–7.42)
NEUTROPHILS # BLD AUTO: 4.88 K/UL (ref 1.82–7.42)
NEUTROPHILS # BLD AUTO: 4.94 K/UL (ref 1.82–7.42)
NEUTROPHILS # BLD AUTO: 5.01 K/UL (ref 1.82–7.42)
NEUTROPHILS # BLD AUTO: 5.09 K/UL (ref 1.82–7.42)
NEUTROPHILS # BLD AUTO: 5.3 K/UL (ref 1.82–7.42)
NEUTROPHILS # BLD AUTO: 5.33 K/UL (ref 1.82–7.42)
NEUTROPHILS # BLD AUTO: 5.53 K/UL (ref 1.82–7.42)
NEUTROPHILS # BLD AUTO: 5.84 K/UL (ref 1.82–7.42)
NEUTROPHILS # BLD AUTO: 6.08 K/UL (ref 1.82–7.42)
NEUTROPHILS # BLD AUTO: 6.21 K/UL (ref 1.82–7.42)
NEUTROPHILS # BLD AUTO: 6.45 K/UL (ref 1.82–7.42)
NEUTROPHILS # BLD AUTO: 7.47 K/UL (ref 1.82–7.42)
NEUTROPHILS # BLD AUTO: 7.79 K/UL (ref 1.82–7.42)
NEUTROPHILS # BLD AUTO: 8.33 K/UL (ref 1.82–7.42)
NEUTROPHILS # BLD AUTO: 8.77 K/UL (ref 1.82–7.42)
NEUTROPHILS NFR BLD: 58 % (ref 44–72)
NEUTROPHILS NFR BLD: 63 % (ref 44–72)
NEUTROPHILS NFR BLD: 64.5 % (ref 44–72)
NEUTROPHILS NFR BLD: 65 % (ref 44–72)
NEUTROPHILS NFR BLD: 70.6 % (ref 44–72)
NEUTROPHILS NFR BLD: 70.9 % (ref 44–72)
NEUTROPHILS NFR BLD: 71 % (ref 44–72)
NEUTROPHILS NFR BLD: 71 % (ref 44–72)
NEUTROPHILS NFR BLD: 71.1 % (ref 44–72)
NEUTROPHILS NFR BLD: 71.4 % (ref 44–72)
NEUTROPHILS NFR BLD: 71.7 % (ref 44–72)
NEUTROPHILS NFR BLD: 72.3 % (ref 44–72)
NEUTROPHILS NFR BLD: 74.1 % (ref 44–72)
NEUTROPHILS NFR BLD: 74.6 % (ref 44–72)
NEUTROPHILS NFR BLD: 74.7 % (ref 44–72)
NEUTROPHILS NFR BLD: 75.7 % (ref 44–72)
NEUTROPHILS NFR BLD: 76.2 % (ref 44–72)
NEUTROPHILS NFR BLD: 77 % (ref 44–72)
NEUTROPHILS NFR BLD: 78.6 % (ref 44–72)
NEUTROPHILS NFR BLD: 79 % (ref 44–72)
NEUTROPHILS NFR BLD: 80.2 % (ref 44–72)
NEUTROPHILS NFR BLD: 80.8 % (ref 44–72)
NEUTROPHILS NFR BLD: 81.9 % (ref 44–72)
NEUTROPHILS NFR BLD: 82.2 % (ref 44–72)
NEUTROPHILS NFR BLD: 82.9 % (ref 44–72)
NEUTROPHILS NFR BLD: 83 % (ref 44–72)
NEUTROPHILS NFR BLD: 83.5 % (ref 44–72)
NEUTROPHILS NFR BLD: 84 % (ref 44–72)
NEUTROPHILS NFR BLD: 84.1 % (ref 44–72)
NEUTROPHILS NFR BLD: 85 % (ref 44–72)
NEUTROPHILS NFR BLD: 85.8 % (ref 44–72)
NEUTROPHILS NFR BLD: 88.3 % (ref 44–72)
NEUTS BAND NFR BLD MANUAL: 0.9 % (ref 0–10)
NEUTS BAND NFR BLD MANUAL: 0.9 % (ref 0–10)
NEUTS BAND NFR BLD MANUAL: 1 % (ref 0–10)
NEUTS BAND NFR BLD MANUAL: 1.9 % (ref 0–10)
NEUTS BAND NFR BLD MANUAL: 1.9 % (ref 0–10)
NEUTS BAND NFR BLD MANUAL: 2 % (ref 0–10)
NEUTS BAND NFR BLD MANUAL: 2.7 % (ref 0–10)
NEUTS BAND NFR BLD MANUAL: 2.7 % (ref 0–10)
NEUTS BAND NFR BLD MANUAL: 2.8 % (ref 0–10)
NEUTS BAND NFR BLD MANUAL: 3 % (ref 0–10)
NEUTS BAND NFR BLD MANUAL: 3 % (ref 0–10)
NEUTS BAND NFR BLD MANUAL: 4 % (ref 0–10)
NEUTS BAND NFR BLD MANUAL: 4 % (ref 0–10)
NEUTS BAND NFR BLD MANUAL: 4.1 % (ref 0–10)
NEUTS BAND NFR BLD MANUAL: 4.5 % (ref 0–10)
NEUTS BAND NFR BLD MANUAL: 5 % (ref 0–10)
NEUTS BAND NFR BLD MANUAL: 5 % (ref 0–10)
NEUTS BAND NFR BLD MANUAL: 5.4 % (ref 0–10)
NEUTS BAND NFR BLD MANUAL: 6 % (ref 0–10)
NEUTS BAND NFR BLD MANUAL: 6.4 % (ref 0–10)
NITRITE UR QL STRIP.AUTO: NEGATIVE
NRBC # BLD AUTO: 0.1 K/UL
NRBC # BLD AUTO: 0.14 K/UL
NRBC # BLD AUTO: 0.16 K/UL
NRBC # BLD AUTO: 0.19 K/UL
NRBC # BLD AUTO: 0.31 K/UL
NRBC # BLD AUTO: 0.32 K/UL
NRBC # BLD AUTO: 0.32 K/UL
NRBC # BLD AUTO: 0.37 K/UL
NRBC # BLD AUTO: 0.47 K/UL
NRBC # BLD AUTO: 0.5 K/UL
NRBC # BLD AUTO: 0.51 K/UL
NRBC # BLD AUTO: 0.51 K/UL
NRBC # BLD AUTO: 0.54 K/UL
NRBC # BLD AUTO: 0.58 K/UL
NRBC # BLD AUTO: 0.66 K/UL
NRBC # BLD AUTO: 0.67 K/UL
NRBC # BLD AUTO: 0.69 K/UL
NRBC # BLD AUTO: 0.69 K/UL
NRBC # BLD AUTO: 0.7 K/UL
NRBC # BLD AUTO: 0.71 K/UL
NRBC # BLD AUTO: 0.73 K/UL
NRBC # BLD AUTO: 0.74 K/UL
NRBC # BLD AUTO: 0.76 K/UL
NRBC # BLD AUTO: 0.78 K/UL
NRBC # BLD AUTO: 0.83 K/UL
NRBC # BLD AUTO: 0.85 K/UL
NRBC # BLD AUTO: 0.94 K/UL
NRBC # BLD AUTO: 0.97 K/UL
NRBC # BLD AUTO: 1.12 K/UL
NRBC # BLD AUTO: 1.13 K/UL
NRBC # BLD AUTO: 1.68 K/UL
NRBC # BLD AUTO: 2.09 K/UL
NRBC BLD AUTO-RTO: 1.3 /100 WBC
NRBC BLD AUTO-RTO: 1.5 /100 WBC
NRBC BLD AUTO-RTO: 10.5 /100 WBC
NRBC BLD AUTO-RTO: 10.7 /100 WBC
NRBC BLD AUTO-RTO: 10.9 /100 WBC
NRBC BLD AUTO-RTO: 11.1 /100 WBC
NRBC BLD AUTO-RTO: 11.2 /100 WBC
NRBC BLD AUTO-RTO: 11.3 /100 WBC
NRBC BLD AUTO-RTO: 11.9 /100 WBC
NRBC BLD AUTO-RTO: 12 /100 WBC
NRBC BLD AUTO-RTO: 12.3 /100 WBC
NRBC BLD AUTO-RTO: 12.4 /100 WBC
NRBC BLD AUTO-RTO: 12.8 /100 WBC
NRBC BLD AUTO-RTO: 13.1 /100 WBC
NRBC BLD AUTO-RTO: 14.4 /100 WBC
NRBC BLD AUTO-RTO: 14.6 /100 WBC
NRBC BLD AUTO-RTO: 21 /100 WBC
NRBC BLD AUTO-RTO: 3.1 /100 WBC
NRBC BLD AUTO-RTO: 3.3 /100 WBC
NRBC BLD AUTO-RTO: 4.1 /100 WBC
NRBC BLD AUTO-RTO: 4.2 /100 WBC
NRBC BLD AUTO-RTO: 5.7 /100 WBC
NRBC BLD AUTO-RTO: 5.7 /100 WBC
NRBC BLD AUTO-RTO: 6.1 /100 WBC
NRBC BLD AUTO-RTO: 6.2 /100 WBC
NRBC BLD AUTO-RTO: 6.6 /100 WBC
NRBC BLD AUTO-RTO: 8.4 /100 WBC
NRBC BLD AUTO-RTO: 8.5 /100 WBC
NRBC BLD AUTO-RTO: 8.9 /100 WBC
NRBC BLD AUTO-RTO: 9.3 /100 WBC
NRBC BLD AUTO-RTO: 9.6 /100 WBC
NRBC BLD AUTO-RTO: 9.7 /100 WBC
O2/TOTAL GAS SETTING VFR VENT: 100 %
O2/TOTAL GAS SETTING VFR VENT: 100 %
O2/TOTAL GAS SETTING VFR VENT: 30 %
O2/TOTAL GAS SETTING VFR VENT: 40 %
O2/TOTAL GAS SETTING VFR VENT: 45 %
O2/TOTAL GAS SETTING VFR VENT: 70 % (ref 30–60)
O2/TOTAL GAS SETTING VFR VENT: 70 % (ref 30–60)
O2/TOTAL GAS SETTING VFR VENT: 90 % (ref 30–60)
OVALOCYTES BLD QL SMEAR: NORMAL
PCO2 BLDA: 26.8 MMHG (ref 26–37)
PCO2 BLDA: 29.8 MMHG (ref 26–37)
PCO2 BLDA: 32.3 MMHG (ref 26–37)
PCO2 BLDA: 32.7 MMHG (ref 26–37)
PCO2 BLDA: 33.1 MMHG (ref 26–37)
PCO2 BLDA: 33.9 MMHG (ref 26–37)
PCO2 BLDA: 35.3 MMHG (ref 26–37)
PCO2 BLDA: 37 MMHG (ref 26–37)
PCO2 BLDA: 38.8 MMHG (ref 26–37)
PCO2 BLDA: 43.1 MMHG (ref 26–37)
PCO2 BLDA: 45.1 MMHG (ref 26–37)
PCO2 BLDA: 48.7 MMHG (ref 26–37)
PCO2 BLDA: 49.3 MMHG (ref 26–37)
PCO2 BLDA: 56.1 MMHG (ref 26–37)
PCO2 BLDA: 57.3 MMHG (ref 26–37)
PCO2 BLDA: 73.3 MMHG (ref 26–37)
PCO2 BLDA: 78.7 MMHG (ref 26–37)
PCO2 BLDA: 96.1 MMHG (ref 26–37)
PCO2 TEMP ADJ BLDA: 104 MMHG (ref 26–37)
PCO2 TEMP ADJ BLDA: 30.2 MMHG (ref 26–37)
PCO2 TEMP ADJ BLDA: 33.6 MMHG (ref 26–37)
PCO2 TEMP ADJ BLDA: 33.9 MMHG (ref 26–37)
PCO2 TEMP ADJ BLDA: 34.4 MMHG (ref 26–37)
PCO2 TEMP ADJ BLDA: 35.1 MMHG (ref 26–37)
PCO2 TEMP ADJ BLDA: 37.6 MMHG (ref 26–37)
PCO2 TEMP ADJ BLDA: 38.6 MMHG (ref 26–37)
PCO2 TEMP ADJ BLDA: 40 MMHG (ref 26–37)
PCO2 TEMP ADJ BLDA: 42.5 MMHG (ref 26–37)
PCO2 TEMP ADJ BLDA: 48.3 MMHG (ref 26–37)
PCO2 TEMP ADJ BLDA: 51.5 MMHG (ref 26–37)
PCO2 TEMP ADJ BLDA: 55.8 MMHG (ref 26–37)
PCO2 TEMP ADJ BLDA: 59.4 MMHG (ref 26–37)
PCO2 TEMP ADJ BLDA: 86.2 MMHG (ref 26–37)
PH BLDA: 7.04 [PH] (ref 7.4–7.5)
PH BLDA: 7.08 [PH] (ref 7.4–7.5)
PH BLDA: 7.13 [PH] (ref 7.4–7.5)
PH BLDA: 7.2 [PH] (ref 7.4–7.5)
PH BLDA: 7.31 [PH] (ref 7.4–7.5)
PH BLDA: 7.34 [PH] (ref 7.4–7.5)
PH BLDA: 7.35 [PH] (ref 7.4–7.5)
PH BLDA: 7.36 [PH] (ref 7.4–7.5)
PH BLDA: 7.37 [PH] (ref 7.4–7.5)
PH BLDA: 7.37 [PH] (ref 7.4–7.5)
PH BLDA: 7.38 [PH] (ref 7.4–7.5)
PH BLDA: 7.39 [PH] (ref 7.4–7.5)
PH BLDA: 7.39 [PH] (ref 7.4–7.5)
PH BLDA: 7.4 [PH] (ref 7.4–7.5)
PH BLDA: 7.41 [PH] (ref 7.4–7.5)
PH BLDA: 7.42 [PH] (ref 7.4–7.5)
PH BLDA: 7.42 [PH] (ref 7.4–7.5)
PH BLDA: 7.51 [PH] (ref 7.4–7.5)
PH TEMP ADJ BLDA: 7.05 [PH] (ref 7.4–7.5)
PH TEMP ADJ BLDA: 7.12 [PH] (ref 7.4–7.5)
PH TEMP ADJ BLDA: 7.21 [PH] (ref 7.4–7.5)
PH TEMP ADJ BLDA: 7.32 [PH] (ref 7.4–7.5)
PH TEMP ADJ BLDA: 7.33 [PH] (ref 7.4–7.5)
PH TEMP ADJ BLDA: 7.35 [PH] (ref 7.4–7.5)
PH TEMP ADJ BLDA: 7.36 [PH] (ref 7.4–7.5)
PH TEMP ADJ BLDA: 7.36 [PH] (ref 7.4–7.5)
PH TEMP ADJ BLDA: 7.37 [PH] (ref 7.4–7.5)
PH TEMP ADJ BLDA: 7.38 [PH] (ref 7.4–7.5)
PH TEMP ADJ BLDA: 7.38 [PH] (ref 7.4–7.5)
PH TEMP ADJ BLDA: 7.39 [PH] (ref 7.4–7.5)
PH TEMP ADJ BLDA: 7.39 [PH] (ref 7.4–7.5)
PH TEMP ADJ BLDA: 7.42 [PH] (ref 7.4–7.5)
PH TEMP ADJ BLDA: 7.5 [PH] (ref 7.4–7.5)
PH UR STRIP.AUTO: 5 [PH]
PH UR STRIP.AUTO: 5.5 [PH]
PHOSPHATE SERPL-MCNC: 2.6 MG/DL (ref 2.5–4.5)
PHOSPHATE SERPL-MCNC: 3.3 MG/DL (ref 2.5–4.5)
PHOSPHATE SERPL-MCNC: 3.3 MG/DL (ref 2.5–4.5)
PLATELET # BLD AUTO: 100 K/UL (ref 164–446)
PLATELET # BLD AUTO: 103 K/UL (ref 164–446)
PLATELET # BLD AUTO: 104 K/UL (ref 164–446)
PLATELET # BLD AUTO: 118 K/UL (ref 164–446)
PLATELET # BLD AUTO: 120 K/UL (ref 164–446)
PLATELET # BLD AUTO: 124 K/UL (ref 164–446)
PLATELET # BLD AUTO: 157 K/UL (ref 164–446)
PLATELET # BLD AUTO: 160 K/UL (ref 164–446)
PLATELET # BLD AUTO: 166 K/UL (ref 164–446)
PLATELET # BLD AUTO: 167 K/UL (ref 164–446)
PLATELET # BLD AUTO: 169 K/UL (ref 164–446)
PLATELET # BLD AUTO: 171 K/UL (ref 164–446)
PLATELET # BLD AUTO: 172 K/UL (ref 164–446)
PLATELET # BLD AUTO: 173 K/UL (ref 164–446)
PLATELET # BLD AUTO: 175 K/UL (ref 164–446)
PLATELET # BLD AUTO: 176 K/UL (ref 164–446)
PLATELET # BLD AUTO: 179 K/UL (ref 164–446)
PLATELET # BLD AUTO: 180 K/UL (ref 164–446)
PLATELET # BLD AUTO: 182 K/UL (ref 164–446)
PLATELET # BLD AUTO: 184 K/UL (ref 164–446)
PLATELET # BLD AUTO: 193 K/UL (ref 164–446)
PLATELET # BLD AUTO: 194 K/UL (ref 164–446)
PLATELET # BLD AUTO: 199 K/UL (ref 164–446)
PLATELET # BLD AUTO: 204 K/UL (ref 164–446)
PLATELET # BLD AUTO: 222 K/UL (ref 164–446)
PLATELET # BLD AUTO: 277 K/UL (ref 164–446)
PLATELET # BLD AUTO: 49 K/UL (ref 164–446)
PLATELET # BLD AUTO: 55 K/UL (ref 164–446)
PLATELET # BLD AUTO: 61 K/UL (ref 164–446)
PLATELET # BLD AUTO: 61 K/UL (ref 164–446)
PLATELET # BLD AUTO: 62 K/UL (ref 164–446)
PLATELET # BLD AUTO: 64 K/UL (ref 164–446)
PLATELET # BLD AUTO: 64 K/UL (ref 164–446)
PLATELET # BLD AUTO: 65 K/UL (ref 164–446)
PLATELET # BLD AUTO: 66 K/UL (ref 164–446)
PLATELET # BLD AUTO: 67 K/UL (ref 164–446)
PLATELET # BLD AUTO: 68 K/UL (ref 164–446)
PLATELET # BLD AUTO: 68 K/UL (ref 164–446)
PLATELET # BLD AUTO: 69 K/UL (ref 164–446)
PLATELET # BLD AUTO: 70 K/UL (ref 164–446)
PLATELET # BLD AUTO: 71 K/UL (ref 164–446)
PLATELET # BLD AUTO: 73 K/UL (ref 164–446)
PLATELET # BLD AUTO: 74 K/UL (ref 164–446)
PLATELET # BLD AUTO: 75 K/UL (ref 164–446)
PLATELET # BLD AUTO: 78 K/UL (ref 164–446)
PLATELET # BLD AUTO: 79 K/UL (ref 164–446)
PLATELET # BLD AUTO: 80 K/UL (ref 164–446)
PLATELET # BLD AUTO: 82 K/UL (ref 164–446)
PLATELET # BLD AUTO: 83 K/UL (ref 164–446)
PLATELET # BLD AUTO: 84 K/UL (ref 164–446)
PLATELET # BLD AUTO: 87 K/UL (ref 164–446)
PLATELET # BLD AUTO: 88 K/UL (ref 164–446)
PLATELET # BLD AUTO: 90 K/UL (ref 164–446)
PLATELET # BLD AUTO: 91 K/UL (ref 164–446)
PLATELET BLD QL SMEAR: NORMAL
PLATELETS.RETICULATED NFR BLD AUTO: 10.4 K/UL (ref 0.6–13.1)
PMV BLD AUTO: 10.1 FL (ref 9–12.9)
PMV BLD AUTO: 10.2 FL (ref 9–12.9)
PMV BLD AUTO: 10.3 FL (ref 9–12.9)
PMV BLD AUTO: 10.4 FL (ref 9–12.9)
PMV BLD AUTO: 10.5 FL (ref 9–12.9)
PMV BLD AUTO: 10.6 FL (ref 9–12.9)
PMV BLD AUTO: 10.7 FL (ref 9–12.9)
PMV BLD AUTO: 10.9 FL (ref 9–12.9)
PMV BLD AUTO: 11 FL (ref 9–12.9)
PMV BLD AUTO: 11 FL (ref 9–12.9)
PMV BLD AUTO: 11.1 FL (ref 9–12.9)
PMV BLD AUTO: 11.4 FL (ref 9–12.9)
PMV BLD AUTO: 11.5 FL (ref 9–12.9)
PMV BLD AUTO: 11.6 FL (ref 9–12.9)
PMV BLD AUTO: 12.1 FL (ref 9–12.9)
PMV BLD AUTO: 12.8 FL (ref 9–12.9)
PMV BLD AUTO: 12.9 FL (ref 9–12.9)
PMV BLD AUTO: 9.4 FL (ref 9–12.9)
PMV BLD AUTO: 9.4 FL (ref 9–12.9)
PMV BLD AUTO: 9.7 FL (ref 9–12.9)
PMV BLD AUTO: 9.8 FL (ref 9–12.9)
PMV BLD AUTO: 9.8 FL (ref 9–12.9)
PO2 BLDA: 116.2 MMHG (ref 64–87)
PO2 BLDA: 27.5 MMHG (ref 64–87)
PO2 BLDA: 42.7 MMHG (ref 64–87)
PO2 BLDA: 49.2 MMHG (ref 64–87)
PO2 BLDA: 52 MMHG (ref 64–87)
PO2 BLDA: 52 MMHG (ref 64–87)
PO2 BLDA: 58 MMHG (ref 64–87)
PO2 BLDA: 59 MMHG (ref 64–87)
PO2 BLDA: 69 MMHG (ref 64–87)
PO2 BLDA: 73 MMHG (ref 64–87)
PO2 BLDA: 73 MMHG (ref 64–87)
PO2 BLDA: 75.4 MMHG (ref 64–87)
PO2 BLDA: 78.7 MMHG (ref 64–87)
PO2 BLDA: 80 MMHG (ref 64–87)
PO2 BLDA: 85.8 MMHG (ref 64–87)
PO2 BLDA: 93 MMHG (ref 64–87)
PO2 BLDA: 95 MMHG (ref 64–87)
PO2 BLDA: 99 MMHG (ref 64–87)
PO2 TEMP ADJ BLDA: 105 MMHG (ref 64–87)
PO2 TEMP ADJ BLDA: 48.5 MMHG (ref 64–87)
PO2 TEMP ADJ BLDA: 53 MMHG (ref 64–87)
PO2 TEMP ADJ BLDA: 59.4 MMHG (ref 64–87)
PO2 TEMP ADJ BLDA: 61 MMHG (ref 64–87)
PO2 TEMP ADJ BLDA: 65 MMHG (ref 64–87)
PO2 TEMP ADJ BLDA: 66 MMHG (ref 64–87)
PO2 TEMP ADJ BLDA: 77 MMHG (ref 64–87)
PO2 TEMP ADJ BLDA: 77.7 MMHG (ref 64–87)
PO2 TEMP ADJ BLDA: 81 MMHG (ref 64–87)
PO2 TEMP ADJ BLDA: 82.2 MMHG (ref 64–87)
PO2 TEMP ADJ BLDA: 84 MMHG (ref 64–87)
PO2 TEMP ADJ BLDA: 91.5 MMHG (ref 64–87)
PO2 TEMP ADJ BLDA: 93 MMHG (ref 64–87)
PO2 TEMP ADJ BLDA: 96 MMHG (ref 64–87)
POIKILOCYTOSIS BLD QL SMEAR: NORMAL
POLYCHROMASIA BLD QL SMEAR: NORMAL
POTASSIUM SERPL-SCNC: 3.3 MMOL/L (ref 3.6–5.5)
POTASSIUM SERPL-SCNC: 3.4 MMOL/L (ref 3.6–5.5)
POTASSIUM SERPL-SCNC: 3.5 MMOL/L (ref 3.6–5.5)
POTASSIUM SERPL-SCNC: 3.6 MMOL/L (ref 3.6–5.5)
POTASSIUM SERPL-SCNC: 3.6 MMOL/L (ref 3.6–5.5)
POTASSIUM SERPL-SCNC: 3.7 MMOL/L (ref 3.6–5.5)
POTASSIUM SERPL-SCNC: 3.8 MMOL/L (ref 3.6–5.5)
POTASSIUM SERPL-SCNC: 3.9 MMOL/L (ref 3.6–5.5)
POTASSIUM SERPL-SCNC: 4 MMOL/L (ref 3.6–5.5)
POTASSIUM SERPL-SCNC: 4.1 MMOL/L (ref 3.6–5.5)
POTASSIUM SERPL-SCNC: 4.2 MMOL/L (ref 3.6–5.5)
POTASSIUM SERPL-SCNC: 4.3 MMOL/L (ref 3.6–5.5)
POTASSIUM SERPL-SCNC: 4.4 MMOL/L (ref 3.6–5.5)
POTASSIUM SERPL-SCNC: 4.5 MMOL/L (ref 3.6–5.5)
POTASSIUM SERPL-SCNC: 4.6 MMOL/L (ref 3.6–5.5)
POTASSIUM SERPL-SCNC: 4.8 MMOL/L (ref 3.6–5.5)
POTASSIUM SERPL-SCNC: 4.8 MMOL/L (ref 3.6–5.5)
POTASSIUM SERPL-SCNC: 5.1 MMOL/L (ref 3.6–5.5)
POTASSIUM SERPL-SCNC: 5.2 MMOL/L (ref 3.6–5.5)
POTASSIUM SERPL-SCNC: 5.5 MMOL/L (ref 3.6–5.5)
POTASSIUM SERPL-SCNC: 5.7 MMOL/L (ref 3.6–5.5)
POTASSIUM SERPL-SCNC: 6 MMOL/L (ref 3.6–5.5)
POTASSIUM SERPL-SCNC: 6.7 MMOL/L (ref 3.6–5.5)
PREALB SERPL-MCNC: 17 MG/DL (ref 18–38)
PRODUCT TYPE UPROD: NORMAL
PROT SERPL-MCNC: 4.4 G/DL (ref 6–8.2)
PROT SERPL-MCNC: 5.4 G/DL (ref 6–8.2)
PROT SERPL-MCNC: 5.6 G/DL (ref 6–8.2)
PROT SERPL-MCNC: 5.7 G/DL (ref 6–8.2)
PROT SERPL-MCNC: 5.8 G/DL (ref 6–8.2)
PROT SERPL-MCNC: 5.9 G/DL (ref 6–8.2)
PROT SERPL-MCNC: 5.9 G/DL (ref 6–8.2)
PROT SERPL-MCNC: 6 G/DL (ref 6–8.2)
PROT SERPL-MCNC: 6.1 G/DL (ref 6–8.2)
PROT SERPL-MCNC: 6.1 G/DL (ref 6–8.2)
PROT SERPL-MCNC: 6.2 G/DL (ref 6–8.2)
PROT SERPL-MCNC: 6.5 G/DL (ref 6–8.2)
PROT SERPL-MCNC: 6.5 G/DL (ref 6–8.2)
PROT SERPL-MCNC: 6.8 G/DL (ref 6–8.2)
PROT SERPL-MCNC: 6.9 G/DL (ref 6–8.2)
PROT SERPL-MCNC: 7.1 G/DL (ref 6–8.2)
PROT SERPL-MCNC: 7.6 G/DL (ref 6–8.2)
PROT SERPL-MCNC: 7.7 G/DL (ref 6–8.2)
PROT SERPL-MCNC: 7.7 G/DL (ref 6–8.2)
PROT SERPL-MCNC: 8.2 G/DL (ref 6–8.2)
PROT UR QL STRIP: 30 MG/DL
PROT UR QL STRIP: 50 MG/DL
PROT UR QL STRIP: NEGATIVE MG/DL
PROT UR QL STRIP: NEGATIVE MG/DL
PROTHROMBIN TIME: 13.5 SEC (ref 12–14.6)
PROTHROMBIN TIME: 14.2 SEC (ref 12–14.6)
PROTHROMBIN TIME: 14.4 SEC (ref 12–14.6)
PROTHROMBIN TIME: 14.7 SEC (ref 12–14.6)
PROTHROMBIN TIME: 14.9 SEC (ref 12–14.6)
PROTHROMBIN TIME: 15.4 SEC (ref 12–14.6)
PROTHROMBIN TIME: 15.8 SEC (ref 12–14.6)
PROTHROMBIN TIME: 17.4 SEC (ref 12–14.6)
RBC # BLD AUTO: 2.12 M/UL (ref 4.7–6.1)
RBC # BLD AUTO: 2.2 M/UL (ref 4.7–6.1)
RBC # BLD AUTO: 2.25 M/UL (ref 4.7–6.1)
RBC # BLD AUTO: 2.31 M/UL (ref 4.7–6.1)
RBC # BLD AUTO: 2.44 M/UL (ref 4.7–6.1)
RBC # BLD AUTO: 2.48 M/UL (ref 4.7–6.1)
RBC # BLD AUTO: 2.57 M/UL (ref 4.7–6.1)
RBC # BLD AUTO: 2.58 M/UL (ref 4.7–6.1)
RBC # BLD AUTO: 2.63 M/UL (ref 4.7–6.1)
RBC # BLD AUTO: 2.65 M/UL (ref 4.7–6.1)
RBC # BLD AUTO: 2.68 M/UL (ref 4.7–6.1)
RBC # BLD AUTO: 2.68 M/UL (ref 4.7–6.1)
RBC # BLD AUTO: 2.69 M/UL (ref 4.7–6.1)
RBC # BLD AUTO: 2.7 M/UL (ref 4.7–6.1)
RBC # BLD AUTO: 2.7 M/UL (ref 4.7–6.1)
RBC # BLD AUTO: 2.71 M/UL (ref 4.7–6.1)
RBC # BLD AUTO: 2.72 M/UL (ref 4.7–6.1)
RBC # BLD AUTO: 2.73 M/UL (ref 4.7–6.1)
RBC # BLD AUTO: 2.76 M/UL (ref 4.7–6.1)
RBC # BLD AUTO: 2.79 M/UL (ref 4.7–6.1)
RBC # BLD AUTO: 2.8 M/UL (ref 4.7–6.1)
RBC # BLD AUTO: 2.81 M/UL (ref 4.7–6.1)
RBC # BLD AUTO: 2.82 M/UL (ref 4.7–6.1)
RBC # BLD AUTO: 2.82 M/UL (ref 4.7–6.1)
RBC # BLD AUTO: 2.86 M/UL (ref 4.7–6.1)
RBC # BLD AUTO: 2.86 M/UL (ref 4.7–6.1)
RBC # BLD AUTO: 2.87 M/UL (ref 4.7–6.1)
RBC # BLD AUTO: 2.87 M/UL (ref 4.7–6.1)
RBC # BLD AUTO: 2.88 M/UL (ref 4.7–6.1)
RBC # BLD AUTO: 2.88 M/UL (ref 4.7–6.1)
RBC # BLD AUTO: 2.9 M/UL (ref 4.7–6.1)
RBC # BLD AUTO: 2.93 M/UL (ref 4.7–6.1)
RBC # BLD AUTO: 2.94 M/UL (ref 4.7–6.1)
RBC # BLD AUTO: 2.97 M/UL (ref 4.7–6.1)
RBC # BLD AUTO: 2.98 M/UL (ref 4.7–6.1)
RBC # BLD AUTO: 2.99 M/UL (ref 4.7–6.1)
RBC # BLD AUTO: 3 M/UL (ref 4.7–6.1)
RBC # BLD AUTO: 3.04 M/UL (ref 4.7–6.1)
RBC # BLD AUTO: 3.09 M/UL (ref 4.7–6.1)
RBC # BLD AUTO: 3.09 M/UL (ref 4.7–6.1)
RBC # BLD AUTO: 3.12 M/UL (ref 4.7–6.1)
RBC # BLD AUTO: 3.12 M/UL (ref 4.7–6.1)
RBC # BLD AUTO: 3.18 M/UL (ref 4.7–6.1)
RBC # BLD AUTO: 3.24 M/UL (ref 4.7–6.1)
RBC # BLD AUTO: 3.26 M/UL (ref 4.7–6.1)
RBC # BLD AUTO: 3.42 M/UL (ref 4.7–6.1)
RBC # BLD AUTO: 3.47 M/UL (ref 4.7–6.1)
RBC # BLD AUTO: 3.54 M/UL (ref 4.7–6.1)
RBC # BLD AUTO: 3.55 M/UL (ref 4.7–6.1)
RBC # BLD AUTO: 3.59 M/UL (ref 4.7–6.1)
RBC # BLD AUTO: 3.65 M/UL (ref 4.7–6.1)
RBC # BLD AUTO: 4.01 M/UL (ref 4.7–6.1)
RBC # URNS HPF: ABNORMAL /HPF
RBC BLD AUTO: PRESENT
RBC UR QL AUTO: ABNORMAL
RBC UR QL AUTO: NEGATIVE
RH BLD: NORMAL
RHODAMINE-AURAMINE STN SPEC: NORMAL
RHODAMINE-AURAMINE STN SPEC: NORMAL
SAO2 % BLDA: 49.4 % (ref 93–99)
SAO2 % BLDA: 68 % (ref 93–99)
SAO2 % BLDA: 70 % (ref 93–99)
SAO2 % BLDA: 77.5 % (ref 93–99)
SAO2 % BLDA: 87 % (ref 93–99)
SAO2 % BLDA: 89 % (ref 93–99)
SAO2 % BLDA: 91 % (ref 93–99)
SAO2 % BLDA: 93 % (ref 93–99)
SAO2 % BLDA: 93.9 % (ref 93–99)
SAO2 % BLDA: 94 % (ref 93–99)
SAO2 % BLDA: 94.2 % (ref 93–99)
SAO2 % BLDA: 95 % (ref 93–99)
SAO2 % BLDA: 95.5 % (ref 93–99)
SAO2 % BLDA: 96 % (ref 93–99)
SAO2 % BLDA: 97 % (ref 93–99)
SAO2 % BLDA: 97 % (ref 93–99)
SAO2 % BLDA: 97.5 % (ref 93–99)
SAO2 % BLDA: 98 % (ref 93–99)
SCHISTOCYTES BLD QL SMEAR: NORMAL
SIGNIFICANT IND 70042: ABNORMAL
SIGNIFICANT IND 70042: NORMAL
SITE SITE: ABNORMAL
SITE SITE: NORMAL
SMUDGE CELLS BLD QL SMEAR: NORMAL
SODIUM SERPL-SCNC: 133 MMOL/L (ref 135–145)
SODIUM SERPL-SCNC: 136 MMOL/L (ref 135–145)
SODIUM SERPL-SCNC: 137 MMOL/L (ref 135–145)
SODIUM SERPL-SCNC: 138 MMOL/L (ref 135–145)
SODIUM SERPL-SCNC: 139 MMOL/L (ref 135–145)
SODIUM SERPL-SCNC: 140 MMOL/L (ref 135–145)
SODIUM SERPL-SCNC: 140 MMOL/L (ref 135–145)
SODIUM SERPL-SCNC: 141 MMOL/L (ref 135–145)
SODIUM SERPL-SCNC: 142 MMOL/L (ref 135–145)
SODIUM SERPL-SCNC: 142 MMOL/L (ref 135–145)
SODIUM SERPL-SCNC: 144 MMOL/L (ref 135–145)
SODIUM SERPL-SCNC: 145 MMOL/L (ref 135–145)
SODIUM SERPL-SCNC: 145 MMOL/L (ref 135–145)
SODIUM SERPL-SCNC: 146 MMOL/L (ref 135–145)
SODIUM SERPL-SCNC: 147 MMOL/L (ref 135–145)
SODIUM SERPL-SCNC: 147 MMOL/L (ref 135–145)
SODIUM SERPL-SCNC: 148 MMOL/L (ref 135–145)
SODIUM SERPL-SCNC: 149 MMOL/L (ref 135–145)
SODIUM SERPL-SCNC: 150 MMOL/L (ref 135–145)
SODIUM SERPL-SCNC: 152 MMOL/L (ref 135–145)
SODIUM SERPL-SCNC: 155 MMOL/L (ref 135–145)
SOURCE SOURCE: ABNORMAL
SOURCE SOURCE: NORMAL
SP GR UR STRIP.AUTO: 1.01
SP GR UR STRIP.AUTO: 1.02
SPECIMEN DRAWN FROM PATIENT: ABNORMAL
SPECIMEN DRAWN FROM PATIENT: NORMAL
SPECIMEN DRAWN FROM PATIENT: NORMAL
STAT TRANS INVEST 8506STI: NORMAL
STOMATOCYTES BLD QL SMEAR: NORMAL
T4 FREE SERPL-MCNC: 0.97 NG/DL (ref 0.53–1.43)
TIBC SERPL-MCNC: 155 UG/DL (ref 250–450)
TIBC SERPL-MCNC: 283 UG/DL (ref 250–450)
TRIGL SERPL-MCNC: 105 MG/DL (ref 0–149)
TRIGL SERPL-MCNC: 162 MG/DL (ref 0–149)
TRIGL SERPL-MCNC: 169 MG/DL (ref 0–149)
TRIGL SERPL-MCNC: 272 MG/DL (ref 0–149)
TRIGL SERPL-MCNC: 291 MG/DL (ref 0–149)
TROPONIN I SERPL-MCNC: 0.02 NG/ML (ref 0–0.04)
TROPONIN I SERPL-MCNC: 0.09 NG/ML (ref 0–0.04)
TROPONIN I SERPL-MCNC: 0.1 NG/ML (ref 0–0.04)
TROPONIN I SERPL-MCNC: 0.11 NG/ML (ref 0–0.04)
TROPONIN I SERPL-MCNC: 0.14 NG/ML (ref 0–0.04)
TROPONIN I SERPL-MCNC: 0.27 NG/ML (ref 0–0.04)
TROPONIN I SERPL-MCNC: 0.39 NG/ML (ref 0–0.04)
TROPONIN I SERPL-MCNC: 0.53 NG/ML (ref 0–0.04)
TROPONIN I SERPL-MCNC: NORMAL NG/ML (ref 0–0.04)
TSH SERPL DL<=0.005 MIU/L-ACNC: 1.37 UIU/ML (ref 0.35–5.5)
TSH SERPL DL<=0.005 MIU/L-ACNC: 1.37 UIU/ML (ref 0.3–3.7)
TSH SERPL DL<=0.005 MIU/L-ACNC: 20.21 UIU/ML (ref 0.35–5.5)
TSH SERPL DL<=0.005 MIU/L-ACNC: 4.93 UIU/ML (ref 0.35–5.5)
UNIT STATUS USTAT: NORMAL
VIT B12 SERPL-MCNC: 600 PG/ML (ref 211–911)
WBC # BLD AUTO: 10 K/UL (ref 4.8–10.8)
WBC # BLD AUTO: 10.7 K/UL (ref 4.8–10.8)
WBC # BLD AUTO: 11.3 K/UL (ref 4.8–10.8)
WBC # BLD AUTO: 12.3 K/UL (ref 4.8–10.8)
WBC # BLD AUTO: 13.4 K/UL (ref 4.8–10.8)
WBC # BLD AUTO: 14.3 K/UL (ref 4.8–10.8)
WBC # BLD AUTO: 14.5 K/UL (ref 4.8–10.8)
WBC # BLD AUTO: 14.7 K/UL (ref 4.8–10.8)
WBC # BLD AUTO: 19.5 K/UL (ref 4.8–10.8)
WBC # BLD AUTO: 20 K/UL (ref 4.8–10.8)
WBC # BLD AUTO: 21.1 K/UL (ref 4.8–10.8)
WBC # BLD AUTO: 27.7 K/UL (ref 4.8–10.8)
WBC # BLD AUTO: 4.9 K/UL (ref 4.8–10.8)
WBC # BLD AUTO: 5 K/UL (ref 4.8–10.8)
WBC # BLD AUTO: 5.2 K/UL (ref 4.8–10.8)
WBC # BLD AUTO: 5.3 K/UL (ref 4.8–10.8)
WBC # BLD AUTO: 5.3 K/UL (ref 4.8–10.8)
WBC # BLD AUTO: 5.4 K/UL (ref 4.8–10.8)
WBC # BLD AUTO: 5.6 K/UL (ref 4.8–10.8)
WBC # BLD AUTO: 5.6 K/UL (ref 4.8–10.8)
WBC # BLD AUTO: 5.7 K/UL (ref 4.8–10.8)
WBC # BLD AUTO: 5.9 K/UL (ref 4.8–10.8)
WBC # BLD AUTO: 6 K/UL (ref 4.8–10.8)
WBC # BLD AUTO: 6 K/UL (ref 4.8–10.8)
WBC # BLD AUTO: 6.1 K/UL (ref 4.8–10.8)
WBC # BLD AUTO: 6.2 K/UL (ref 4.8–10.8)
WBC # BLD AUTO: 6.3 K/UL (ref 4.8–10.8)
WBC # BLD AUTO: 6.3 K/UL (ref 4.8–10.8)
WBC # BLD AUTO: 6.4 K/UL (ref 4.8–10.8)
WBC # BLD AUTO: 6.5 K/UL (ref 4.8–10.8)
WBC # BLD AUTO: 6.6 K/UL (ref 4.8–10.8)
WBC # BLD AUTO: 6.8 K/UL (ref 4.8–10.8)
WBC # BLD AUTO: 6.8 K/UL (ref 4.8–10.8)
WBC # BLD AUTO: 6.9 K/UL (ref 4.8–10.8)
WBC # BLD AUTO: 7.6 K/UL (ref 4.8–10.8)
WBC # BLD AUTO: 7.9 K/UL (ref 4.8–10.8)
WBC # BLD AUTO: 8 K/UL (ref 4.8–10.8)
WBC # BLD AUTO: 8 K/UL (ref 4.8–10.8)
WBC # BLD AUTO: 8.1 K/UL (ref 4.8–10.8)
WBC # BLD AUTO: 8.3 K/UL (ref 4.8–10.8)
WBC # BLD AUTO: 8.5 K/UL (ref 4.8–10.8)
WBC # BLD AUTO: 8.6 K/UL (ref 4.8–10.8)
WBC # BLD AUTO: 8.7 K/UL (ref 4.8–10.8)
WBC # BLD AUTO: 9.1 K/UL (ref 4.8–10.8)
WBC # BLD AUTO: 9.1 K/UL (ref 4.8–10.8)
WBC # BLD AUTO: 9.3 K/UL (ref 4.8–10.8)
WBC #/AREA URNS HPF: ABNORMAL /HPF

## 2017-01-01 PROCEDURE — 86880 COOMBS TEST DIRECT: CPT

## 2017-01-01 PROCEDURE — 700102 HCHG RX REV CODE 250 W/ 637 OVERRIDE(OP): Performed by: INTERNAL MEDICINE

## 2017-01-01 PROCEDURE — 700111 HCHG RX REV CODE 636 W/ 250 OVERRIDE (IP): Performed by: EMERGENCY MEDICINE

## 2017-01-01 PROCEDURE — 71010 DX-CHEST-PORTABLE (1 VIEW): CPT

## 2017-01-01 PROCEDURE — 99291 CRITICAL CARE FIRST HOUR: CPT | Performed by: HOSPITALIST

## 2017-01-01 PROCEDURE — 74000 DX-ABDOMEN-1 VIEW: CPT

## 2017-01-01 PROCEDURE — 86901 BLOOD TYPING SEROLOGIC RH(D): CPT

## 2017-01-01 PROCEDURE — 94640 AIRWAY INHALATION TREATMENT: CPT

## 2017-01-01 PROCEDURE — 700111 HCHG RX REV CODE 636 W/ 250 OVERRIDE (IP): Performed by: INTERNAL MEDICINE

## 2017-01-01 PROCEDURE — 70450 CT HEAD/BRAIN W/O DYE: CPT

## 2017-01-01 PROCEDURE — 86900 BLOOD TYPING SEROLOGIC ABO: CPT

## 2017-01-01 PROCEDURE — 97167 OT EVAL HIGH COMPLEX 60 MIN: CPT

## 2017-01-01 PROCEDURE — 80053 COMPREHEN METABOLIC PANEL: CPT

## 2017-01-01 PROCEDURE — 84484 ASSAY OF TROPONIN QUANT: CPT

## 2017-01-01 PROCEDURE — 86922 COMPATIBILITY TEST ANTIGLOB: CPT

## 2017-01-01 PROCEDURE — 82533 TOTAL CORTISOL: CPT

## 2017-01-01 PROCEDURE — 99291 CRITICAL CARE FIRST HOUR: CPT | Performed by: INTERNAL MEDICINE

## 2017-01-01 PROCEDURE — 94003 VENT MGMT INPAT SUBQ DAY: CPT

## 2017-01-01 PROCEDURE — 80048 BASIC METABOLIC PNL TOTAL CA: CPT

## 2017-01-01 PROCEDURE — 36415 COLL VENOUS BLD VENIPUNCTURE: CPT

## 2017-01-01 PROCEDURE — 85018 HEMOGLOBIN: CPT

## 2017-01-01 PROCEDURE — 82962 GLUCOSE BLOOD TEST: CPT | Mod: 91

## 2017-01-01 PROCEDURE — 86850 RBC ANTIBODY SCREEN: CPT

## 2017-01-01 PROCEDURE — 86870 RBC ANTIBODY IDENTIFICATION: CPT | Mod: 91

## 2017-01-01 PROCEDURE — 85027 COMPLETE CBC AUTOMATED: CPT

## 2017-01-01 PROCEDURE — 99232 SBSQ HOSP IP/OBS MODERATE 35: CPT | Performed by: HOSPITALIST

## 2017-01-01 PROCEDURE — 85025 COMPLETE CBC W/AUTO DIFF WBC: CPT | Mod: 91

## 2017-01-01 PROCEDURE — 700105 HCHG RX REV CODE 258: Performed by: INTERNAL MEDICINE

## 2017-01-01 PROCEDURE — 86902 BLOOD TYPE ANTIGEN DONOR EA: CPT

## 2017-01-01 PROCEDURE — 87015 SPECIMEN INFECT AGNT CONCNTJ: CPT

## 2017-01-01 PROCEDURE — C9113 INJ PANTOPRAZOLE SODIUM, VIA: HCPCS | Performed by: INTERNAL MEDICINE

## 2017-01-01 PROCEDURE — A9270 NON-COVERED ITEM OR SERVICE: HCPCS | Performed by: INTERNAL MEDICINE

## 2017-01-01 PROCEDURE — 85007 BL SMEAR W/DIFF WBC COUNT: CPT

## 2017-01-01 PROCEDURE — 36430 TRANSFUSION BLD/BLD COMPNT: CPT

## 2017-01-01 PROCEDURE — 94002 VENT MGMT INPAT INIT DAY: CPT

## 2017-01-01 PROCEDURE — 82746 ASSAY OF FOLIC ACID SERUM: CPT

## 2017-01-01 PROCEDURE — 93306 TTE W/DOPPLER COMPLETE: CPT | Mod: 26 | Performed by: INTERNAL MEDICINE

## 2017-01-01 PROCEDURE — 85610 PROTHROMBIN TIME: CPT

## 2017-01-01 PROCEDURE — 36600 WITHDRAWAL OF ARTERIAL BLOOD: CPT

## 2017-01-01 PROCEDURE — 31645 BRNCHSC W/THER ASPIR 1ST: CPT | Performed by: INTERNAL MEDICINE

## 2017-01-01 PROCEDURE — 700101 HCHG RX REV CODE 250: Performed by: INTERNAL MEDICINE

## 2017-01-01 PROCEDURE — 96375 TX/PRO/DX INJ NEW DRUG ADDON: CPT

## 2017-01-01 PROCEDURE — 160048 HCHG OR STATISTICAL LEVEL 1-5: Performed by: INTERNAL MEDICINE

## 2017-01-01 PROCEDURE — 85055 RETICULATED PLATELET ASSAY: CPT

## 2017-01-01 PROCEDURE — 85379 FIBRIN DEGRADATION QUANT: CPT

## 2017-01-01 PROCEDURE — 0BH18EZ INSERTION OF ENDOTRACHEAL AIRWAY INTO TRACHEA, VIA NATURAL OR ARTIFICIAL OPENING ENDOSCOPIC: ICD-10-PCS | Performed by: EMERGENCY MEDICINE

## 2017-01-01 PROCEDURE — 99233 SBSQ HOSP IP/OBS HIGH 50: CPT | Performed by: HOSPITALIST

## 2017-01-01 PROCEDURE — 85027 COMPLETE CBC AUTOMATED: CPT | Mod: 91

## 2017-01-01 PROCEDURE — 81003 URINALYSIS AUTO W/O SCOPE: CPT

## 2017-01-01 PROCEDURE — 85025 COMPLETE CBC W/AUTO DIFF WBC: CPT

## 2017-01-01 PROCEDURE — 70480 CT ORBIT/EAR/FOSSA W/O DYE: CPT

## 2017-01-01 PROCEDURE — 82803 BLOOD GASES ANY COMBINATION: CPT

## 2017-01-01 PROCEDURE — G0378 HOSPITAL OBSERVATION PER HR: HCPCS

## 2017-01-01 PROCEDURE — 700105 HCHG RX REV CODE 258

## 2017-01-01 PROCEDURE — 85730 THROMBOPLASTIN TIME PARTIAL: CPT

## 2017-01-01 PROCEDURE — 88305 TISSUE EXAM BY PATHOLOGIST: CPT

## 2017-01-01 PROCEDURE — 87205 SMEAR GRAM STAIN: CPT

## 2017-01-01 PROCEDURE — 85362 FIBRIN DEGRADATION PRODUCTS: CPT

## 2017-01-01 PROCEDURE — A9270 NON-COVERED ITEM OR SERVICE: HCPCS | Performed by: HOSPITALIST

## 2017-01-01 PROCEDURE — 770022 HCHG ROOM/CARE - ICU (200)

## 2017-01-01 PROCEDURE — 86902 BLOOD TYPE ANTIGEN DONOR EA: CPT | Mod: 91

## 2017-01-01 PROCEDURE — 86923 COMPATIBILITY TEST ELECTRIC: CPT

## 2017-01-01 PROCEDURE — 700111 HCHG RX REV CODE 636 W/ 250 OVERRIDE (IP): Performed by: PSYCHIATRY & NEUROLOGY

## 2017-01-01 PROCEDURE — 700105 HCHG RX REV CODE 258: Performed by: EMERGENCY MEDICINE

## 2017-01-01 PROCEDURE — 160204 HCHG ENDO MINUTES - 1ST 30 MINS LEVEL 5: Performed by: INTERNAL MEDICINE

## 2017-01-01 PROCEDURE — 99220 PR INITIAL OBSERVATION CARE,LEVL III: CPT | Performed by: HOSPITALIST

## 2017-01-01 PROCEDURE — 87086 URINE CULTURE/COLONY COUNT: CPT

## 2017-01-01 PROCEDURE — 99291 CRITICAL CARE FIRST HOUR: CPT | Mod: 25 | Performed by: INTERNAL MEDICINE

## 2017-01-01 PROCEDURE — 160035 HCHG PACU - 1ST 60 MINS PHASE I: Performed by: INTERNAL MEDICINE

## 2017-01-01 PROCEDURE — 84478 ASSAY OF TRIGLYCERIDES: CPT

## 2017-01-01 PROCEDURE — 94150 VITAL CAPACITY TEST: CPT

## 2017-01-01 PROCEDURE — 700111 HCHG RX REV CODE 636 W/ 250 OVERRIDE (IP)

## 2017-01-01 PROCEDURE — 87102 FUNGUS ISOLATION CULTURE: CPT

## 2017-01-01 PROCEDURE — 82962 GLUCOSE BLOOD TEST: CPT

## 2017-01-01 PROCEDURE — 87206 SMEAR FLUORESCENT/ACID STAI: CPT

## 2017-01-01 PROCEDURE — 99292 CRITICAL CARE ADDL 30 MIN: CPT | Mod: 25 | Performed by: INTERNAL MEDICINE

## 2017-01-01 PROCEDURE — 83605 ASSAY OF LACTIC ACID: CPT

## 2017-01-01 PROCEDURE — 700105 HCHG RX REV CODE 258: Performed by: PHARMACIST

## 2017-01-01 PROCEDURE — 700101 HCHG RX REV CODE 250: Performed by: EMERGENCY MEDICINE

## 2017-01-01 PROCEDURE — 36556 INSERT NON-TUNNEL CV CATH: CPT

## 2017-01-01 PROCEDURE — 83880 ASSAY OF NATRIURETIC PEPTIDE: CPT

## 2017-01-01 PROCEDURE — 87116 MYCOBACTERIA CULTURE: CPT

## 2017-01-01 PROCEDURE — 87070 CULTURE OTHR SPECIMN AEROBIC: CPT

## 2017-01-01 PROCEDURE — 83690 ASSAY OF LIPASE: CPT

## 2017-01-01 PROCEDURE — 02HV33Z INSERTION OF INFUSION DEVICE INTO SUPERIOR VENA CAVA, PERCUTANEOUS APPROACH: ICD-10-PCS | Performed by: INTERNAL MEDICINE

## 2017-01-01 PROCEDURE — 84443 ASSAY THYROID STIM HORMONE: CPT

## 2017-01-01 PROCEDURE — 302214 INTUBATION BOX: Performed by: EMERGENCY MEDICINE

## 2017-01-01 PROCEDURE — P9016 RBC LEUKOCYTES REDUCED: HCPCS

## 2017-01-01 PROCEDURE — 700105 HCHG RX REV CODE 258: Performed by: HOSPITALIST

## 2017-01-01 PROCEDURE — A4606 OXYGEN PROBE USED W OXIMETER: HCPCS | Performed by: INTERNAL MEDICINE

## 2017-01-01 PROCEDURE — 96367 TX/PROPH/DG ADDL SEQ IV INF: CPT

## 2017-01-01 PROCEDURE — 80076 HEPATIC FUNCTION PANEL: CPT

## 2017-01-01 PROCEDURE — 83735 ASSAY OF MAGNESIUM: CPT

## 2017-01-01 PROCEDURE — 76937 US GUIDE VASCULAR ACCESS: CPT

## 2017-01-01 PROCEDURE — P9034 PLATELETS, PHERESIS: HCPCS

## 2017-01-01 PROCEDURE — 84132 ASSAY OF SERUM POTASSIUM: CPT

## 2017-01-01 PROCEDURE — 80074 ACUTE HEPATITIS PANEL: CPT

## 2017-01-01 PROCEDURE — 82803 BLOOD GASES ANY COMBINATION: CPT | Mod: 91

## 2017-01-01 PROCEDURE — 93970 EXTREMITY STUDY: CPT | Mod: 26 | Performed by: SURGERY

## 2017-01-01 PROCEDURE — 85014 HEMATOCRIT: CPT

## 2017-01-01 PROCEDURE — 500066 HCHG BITE BLOCK, ECT: Performed by: INTERNAL MEDICINE

## 2017-01-01 PROCEDURE — 700111 HCHG RX REV CODE 636 W/ 250 OVERRIDE (IP): Performed by: HOSPITALIST

## 2017-01-01 PROCEDURE — 0BCB8ZZ EXTIRPATION OF MATTER FROM LEFT LOWER LOBE BRONCHUS, VIA NATURAL OR ARTIFICIAL OPENING ENDOSCOPIC: ICD-10-PCS | Performed by: INTERNAL MEDICINE

## 2017-01-01 PROCEDURE — 51702 INSERT TEMP BLADDER CATH: CPT

## 2017-01-01 PROCEDURE — 160203 HCHG ENDO MINUTES - 1ST 30 MINS LEVEL 4: Performed by: INTERNAL MEDICINE

## 2017-01-01 PROCEDURE — 83550 IRON BINDING TEST: CPT

## 2017-01-01 PROCEDURE — 88112 CYTOPATH CELL ENHANCE TECH: CPT

## 2017-01-01 PROCEDURE — 94668 MNPJ CHEST WALL SBSQ: CPT

## 2017-01-01 PROCEDURE — 700102 HCHG RX REV CODE 250 W/ 637 OVERRIDE(OP): Performed by: HOSPITALIST

## 2017-01-01 PROCEDURE — 99285 EMERGENCY DEPT VISIT HI MDM: CPT

## 2017-01-01 PROCEDURE — 84100 ASSAY OF PHOSPHORUS: CPT

## 2017-01-01 PROCEDURE — 85007 BL SMEAR W/DIFF WBC COUNT: CPT | Mod: 91

## 2017-01-01 PROCEDURE — 302978 HCHG BRONCHOSCOPY-DIAGNOSTIC

## 2017-01-01 PROCEDURE — 0BB68ZX EXCISION OF RIGHT LOWER LOBE BRONCHUS, VIA NATURAL OR ARTIFICIAL OPENING ENDOSCOPIC, DIAGNOSTIC: ICD-10-PCS | Performed by: INTERNAL MEDICINE

## 2017-01-01 PROCEDURE — 96366 THER/PROPH/DIAG IV INF ADDON: CPT

## 2017-01-01 PROCEDURE — 76775 US EXAM ABDO BACK WALL LIM: CPT

## 2017-01-01 PROCEDURE — 0BBB8ZX EXCISION OF LEFT LOWER LOBE BRONCHUS, VIA NATURAL OR ARTIFICIAL OPENING ENDOSCOPIC, DIAGNOSTIC: ICD-10-PCS | Performed by: INTERNAL MEDICINE

## 2017-01-01 PROCEDURE — 73630 X-RAY EXAM OF FOOT: CPT | Mod: RT

## 2017-01-01 PROCEDURE — 84439 ASSAY OF FREE THYROXINE: CPT

## 2017-01-01 PROCEDURE — 93971 EXTREMITY STUDY: CPT | Mod: RT

## 2017-01-01 PROCEDURE — 83540 ASSAY OF IRON: CPT

## 2017-01-01 PROCEDURE — 85018 HEMOGLOBIN: CPT | Mod: 59

## 2017-01-01 PROCEDURE — 86860 RBC ANTIBODY ELUTION: CPT

## 2017-01-01 PROCEDURE — 94760 N-INVAS EAR/PLS OXIMETRY 1: CPT

## 2017-01-01 PROCEDURE — 96368 THER/DIAG CONCURRENT INF: CPT

## 2017-01-01 PROCEDURE — 700111 HCHG RX REV CODE 636 W/ 250 OVERRIDE (IP): Performed by: PHARMACIST

## 2017-01-01 PROCEDURE — 82272 OCCULT BLD FECES 1-3 TESTS: CPT

## 2017-01-01 PROCEDURE — 87040 BLOOD CULTURE FOR BACTERIA: CPT | Mod: 91

## 2017-01-01 PROCEDURE — 82607 VITAMIN B-12: CPT

## 2017-01-01 PROCEDURE — 80069 RENAL FUNCTION PANEL: CPT

## 2017-01-01 PROCEDURE — 0DB68ZX EXCISION OF STOMACH, VIA NATURAL OR ARTIFICIAL OPENING ENDOSCOPIC, DIAGNOSTIC: ICD-10-PCS | Performed by: INTERNAL MEDICINE

## 2017-01-01 PROCEDURE — 99239 HOSP IP/OBS DSCHRG MGMT >30: CPT | Performed by: INTERNAL MEDICINE

## 2017-01-01 PROCEDURE — 770020 HCHG ROOM/CARE - TELE (206)

## 2017-01-01 PROCEDURE — B548ZZA ULTRASONOGRAPHY OF SUPERIOR VENA CAVA, GUIDANCE: ICD-10-PCS | Performed by: INTERNAL MEDICINE

## 2017-01-01 PROCEDURE — P9016 RBC LEUKOCYTES REDUCED: HCPCS | Mod: 91

## 2017-01-01 PROCEDURE — 97162 PT EVAL MOD COMPLEX 30 MIN: CPT

## 2017-01-01 PROCEDURE — 83036 HEMOGLOBIN GLYCOSYLATED A1C: CPT

## 2017-01-01 PROCEDURE — 87186 SC STD MICRODIL/AGAR DIL: CPT | Mod: 91

## 2017-01-01 PROCEDURE — 502240 HCHG MISC OR SUPPLY RC 0272: Performed by: INTERNAL MEDICINE

## 2017-01-01 PROCEDURE — 36556 INSERT NON-TUNNEL CV CATH: CPT | Performed by: INTERNAL MEDICINE

## 2017-01-01 PROCEDURE — 85018 HEMOGLOBIN: CPT | Mod: 91

## 2017-01-01 PROCEDURE — 5A1955Z RESPIRATORY VENTILATION, GREATER THAN 96 CONSECUTIVE HOURS: ICD-10-PCS | Performed by: INTERNAL MEDICINE

## 2017-01-01 PROCEDURE — 88312 SPECIAL STAINS GROUP 1: CPT

## 2017-01-01 PROCEDURE — 85384 FIBRINOGEN ACTIVITY: CPT

## 2017-01-01 PROCEDURE — 160208 HCHG ENDO MINUTES - EA ADDL 1 MIN LEVEL 4: Performed by: INTERNAL MEDICINE

## 2017-01-01 PROCEDURE — 96365 THER/PROPH/DIAG IV INF INIT: CPT

## 2017-01-01 PROCEDURE — 99233 SBSQ HOSP IP/OBS HIGH 50: CPT | Performed by: INTERNAL MEDICINE

## 2017-01-01 PROCEDURE — 82728 ASSAY OF FERRITIN: CPT

## 2017-01-01 PROCEDURE — 0B968ZZ DRAINAGE OF RIGHT LOWER LOBE BRONCHUS, VIA NATURAL OR ARTIFICIAL OPENING ENDOSCOPIC: ICD-10-PCS | Performed by: INTERNAL MEDICINE

## 2017-01-01 PROCEDURE — 302136 NUTRITION PUMP: Performed by: INTERNAL MEDICINE

## 2017-01-01 PROCEDURE — 92610 EVALUATE SWALLOWING FUNCTION: CPT

## 2017-01-01 PROCEDURE — 700101 HCHG RX REV CODE 250

## 2017-01-01 PROCEDURE — 700101 HCHG RX REV CODE 250: Performed by: PSYCHIATRY & NEUROLOGY

## 2017-01-01 PROCEDURE — 87186 SC STD MICRODIL/AGAR DIL: CPT

## 2017-01-01 PROCEDURE — 93970 EXTREMITY STUDY: CPT

## 2017-01-01 PROCEDURE — G8988 SELF CARE GOAL STATUS: HCPCS | Mod: CJ

## 2017-01-01 PROCEDURE — 81001 URINALYSIS AUTO W/SCOPE: CPT

## 2017-01-01 PROCEDURE — 87641 MR-STAPH DNA AMP PROBE: CPT

## 2017-01-01 PROCEDURE — 87077 CULTURE AEROBIC IDENTIFY: CPT

## 2017-01-01 PROCEDURE — 160002 HCHG RECOVERY MINUTES (STAT): Performed by: INTERNAL MEDICINE

## 2017-01-01 PROCEDURE — 99152 MOD SED SAME PHYS/QHP 5/>YRS: CPT | Performed by: INTERNAL MEDICINE

## 2017-01-01 PROCEDURE — 82140 ASSAY OF AMMONIA: CPT

## 2017-01-01 PROCEDURE — 302244 HCHG LTACH STAT

## 2017-01-01 PROCEDURE — 93306 TTE W/DOPPLER COMPLETE: CPT

## 2017-01-01 PROCEDURE — 85610 PROTHROMBIN TIME: CPT | Mod: 91

## 2017-01-01 PROCEDURE — 30233N1 TRANSFUSION OF NONAUTOLOGOUS RED BLOOD CELLS INTO PERIPHERAL VEIN, PERCUTANEOUS APPROACH: ICD-10-PCS | Performed by: HOSPITALIST

## 2017-01-01 PROCEDURE — 86140 C-REACTIVE PROTEIN: CPT

## 2017-01-01 PROCEDURE — 0DJD8ZZ INSPECTION OF LOWER INTESTINAL TRACT, VIA NATURAL OR ARTIFICIAL OPENING ENDOSCOPIC: ICD-10-PCS | Performed by: INTERNAL MEDICINE

## 2017-01-01 PROCEDURE — 93005 ELECTROCARDIOGRAM TRACING: CPT | Performed by: EMERGENCY MEDICINE

## 2017-01-01 PROCEDURE — 86901 BLOOD TYPING SEROLOGIC RH(D): CPT | Mod: 91

## 2017-01-01 PROCEDURE — 304561 HCHG STAT O2

## 2017-01-01 PROCEDURE — 0BC68ZZ EXTIRPATION OF MATTER FROM RIGHT LOWER LOBE BRONCHUS, VIA NATURAL OR ARTIFICIAL OPENING ENDOSCOPIC: ICD-10-PCS | Performed by: INTERNAL MEDICINE

## 2017-01-01 PROCEDURE — 85014 HEMATOCRIT: CPT | Mod: 91

## 2017-01-01 PROCEDURE — 84134 ASSAY OF PREALBUMIN: CPT

## 2017-01-01 PROCEDURE — 94667 MNPJ CHEST WALL 1ST: CPT

## 2017-01-01 PROCEDURE — G8996 SWALLOW CURRENT STATUS: HCPCS | Mod: CM

## 2017-01-01 PROCEDURE — G8979 MOBILITY GOAL STATUS: HCPCS | Mod: CI

## 2017-01-01 PROCEDURE — 99232 SBSQ HOSP IP/OBS MODERATE 35: CPT | Performed by: INTERNAL MEDICINE

## 2017-01-01 PROCEDURE — 302136 NUTRITION PUMP: Performed by: HOSPITALIST

## 2017-01-01 PROCEDURE — 99153 MOD SED SAME PHYS/QHP EA: CPT | Performed by: INTERNAL MEDICINE

## 2017-01-01 PROCEDURE — 86922 COMPATIBILITY TEST ANTIGLOB: CPT | Mod: 91

## 2017-01-01 PROCEDURE — 31500 INSERT EMERGENCY AIRWAY: CPT

## 2017-01-01 PROCEDURE — 0B9B8ZZ DRAINAGE OF LEFT LOWER LOBE BRONCHUS, VIA NATURAL OR ARTIFICIAL OPENING ENDOSCOPIC: ICD-10-PCS | Performed by: INTERNAL MEDICINE

## 2017-01-01 PROCEDURE — 99291 CRITICAL CARE FIRST HOUR: CPT

## 2017-01-01 PROCEDURE — 86022 PLATELET ANTIBODIES: CPT

## 2017-01-01 PROCEDURE — 87040 BLOOD CULTURE FOR BACTERIA: CPT

## 2017-01-01 PROCEDURE — 86850 RBC ANTIBODY SCREEN: CPT | Mod: 91

## 2017-01-01 PROCEDURE — G8997 SWALLOW GOAL STATUS: HCPCS | Mod: CJ

## 2017-01-01 PROCEDURE — 303105 HCHG CATHETER EXTRA

## 2017-01-01 PROCEDURE — G8987 SELF CARE CURRENT STATUS: HCPCS | Mod: CM

## 2017-01-01 PROCEDURE — 80048 BASIC METABOLIC PNL TOTAL CA: CPT | Mod: 91

## 2017-01-01 PROCEDURE — 99238 HOSP IP/OBS DSCHRG MGMT 30/<: CPT | Performed by: HOSPITALIST

## 2017-01-01 PROCEDURE — 99292 CRITICAL CARE ADDL 30 MIN: CPT | Performed by: HOSPITALIST

## 2017-01-01 PROCEDURE — G8978 MOBILITY CURRENT STATUS: HCPCS | Mod: CM

## 2017-01-01 PROCEDURE — 302146: Performed by: INTERNAL MEDICINE

## 2017-01-01 RX ORDER — FUROSEMIDE 10 MG/ML
40 INJECTION INTRAMUSCULAR; INTRAVENOUS
Status: DISCONTINUED | OUTPATIENT
Start: 2017-01-01 | End: 2017-01-01

## 2017-01-01 RX ORDER — AMOXICILLIN 250 MG
2 CAPSULE ORAL 2 TIMES DAILY
Status: DISCONTINUED | OUTPATIENT
Start: 2017-01-01 | End: 2017-01-01

## 2017-01-01 RX ORDER — SODIUM CHLORIDE 9 MG/ML
INJECTION, SOLUTION INTRAVENOUS
Status: COMPLETED
Start: 2017-01-01 | End: 2017-01-01

## 2017-01-01 RX ORDER — LISINOPRIL 5 MG/1
5 TABLET ORAL DAILY
Status: DISCONTINUED | OUTPATIENT
Start: 2017-01-01 | End: 2017-01-01 | Stop reason: HOSPADM

## 2017-01-01 RX ORDER — POLYETHYLENE GLYCOL 3350 17 G/17G
1 POWDER, FOR SOLUTION ORAL
Status: DISCONTINUED | OUTPATIENT
Start: 2017-01-01 | End: 2017-01-01

## 2017-01-01 RX ORDER — POLYETHYLENE GLYCOL 3350 17 G/17G
1 POWDER, FOR SOLUTION ORAL
Status: DISCONTINUED | OUTPATIENT
Start: 2017-01-01 | End: 2017-01-01 | Stop reason: HOSPADM

## 2017-01-01 RX ORDER — FERROUS SULFATE 325(65) MG
325 TABLET ORAL 2 TIMES DAILY WITH MEALS
Qty: 60 TAB | Refills: 0 | Status: SHIPPED | OUTPATIENT
Start: 2017-01-01

## 2017-01-01 RX ORDER — SODIUM CHLORIDE 9 MG/ML
1000 INJECTION, SOLUTION INTRAVENOUS
Status: DISCONTINUED | OUTPATIENT
Start: 2017-01-01 | End: 2017-01-01

## 2017-01-01 RX ORDER — ANTIARTHRITIC COMBINATION NO.2 900 MG
1 TABLET ORAL DAILY
COMMUNITY

## 2017-01-01 RX ORDER — ASPIRIN 81 MG/1
81 TABLET, CHEWABLE ORAL DAILY
COMMUNITY
End: 2017-01-01

## 2017-01-01 RX ORDER — MIDAZOLAM HYDROCHLORIDE 1 MG/ML
INJECTION INTRAMUSCULAR; INTRAVENOUS
Status: DISPENSED
Start: 2017-01-01 | End: 2017-01-01

## 2017-01-01 RX ORDER — AMOXICILLIN 250 MG
2 CAPSULE ORAL 2 TIMES DAILY
Status: DISCONTINUED | OUTPATIENT
Start: 2017-01-01 | End: 2017-01-01 | Stop reason: HOSPADM

## 2017-01-01 RX ORDER — POTASSIUM CHLORIDE 1.5 G/1.58G
20 POWDER, FOR SOLUTION ORAL ONCE
Status: COMPLETED | OUTPATIENT
Start: 2017-01-01 | End: 2017-01-01

## 2017-01-01 RX ORDER — BISACODYL 10 MG
10 SUPPOSITORY, RECTAL RECTAL
Status: DISCONTINUED | OUTPATIENT
Start: 2017-01-01 | End: 2017-01-01 | Stop reason: HOSPADM

## 2017-01-01 RX ORDER — MIDAZOLAM HYDROCHLORIDE 1 MG/ML
INJECTION INTRAMUSCULAR; INTRAVENOUS
Status: DISCONTINUED | OUTPATIENT
Start: 2017-01-01 | End: 2017-01-01 | Stop reason: HOSPADM

## 2017-01-01 RX ORDER — CHLORHEXIDINE GLUCONATE ORAL RINSE 1.2 MG/ML
15 SOLUTION DENTAL 2 TIMES DAILY
Status: DISCONTINUED | OUTPATIENT
Start: 2017-01-01 | End: 2017-01-01

## 2017-01-01 RX ORDER — KETAMINE HYDROCHLORIDE 50 MG/ML
INJECTION, SOLUTION INTRAMUSCULAR; INTRAVENOUS
Status: COMPLETED
Start: 2017-01-01 | End: 2017-01-01

## 2017-01-01 RX ORDER — SODIUM CHLORIDE 9 MG/ML
INJECTION, SOLUTION INTRAVENOUS CONTINUOUS
Status: DISCONTINUED | OUTPATIENT
Start: 2017-01-01 | End: 2017-01-01

## 2017-01-01 RX ORDER — KETAMINE HYDROCHLORIDE 50 MG/ML
150 INJECTION, SOLUTION INTRAMUSCULAR; INTRAVENOUS ONCE
Status: COMPLETED | OUTPATIENT
Start: 2017-01-01 | End: 2017-01-01

## 2017-01-01 RX ORDER — OXYCODONE HYDROCHLORIDE 5 MG/1
2.5 TABLET ORAL
Status: DISCONTINUED | OUTPATIENT
Start: 2017-01-01 | End: 2017-01-01 | Stop reason: HOSPADM

## 2017-01-01 RX ORDER — DEXTROSE MONOHYDRATE 25 G/50ML
25 INJECTION, SOLUTION INTRAVENOUS
Status: DISCONTINUED | OUTPATIENT
Start: 2017-01-01 | End: 2017-01-01 | Stop reason: HOSPADM

## 2017-01-01 RX ORDER — HEPARIN SODIUM 1000 [USP'U]/ML
3800 INJECTION, SOLUTION INTRAVENOUS; SUBCUTANEOUS PRN
Status: DISCONTINUED | OUTPATIENT
Start: 2017-01-01 | End: 2017-01-01

## 2017-01-01 RX ORDER — ANTIARTHRITIC COMBINATION NO.2 900 MG
1 TABLET ORAL DAILY
Status: DISCONTINUED | OUTPATIENT
Start: 2017-01-01 | End: 2017-01-01

## 2017-01-01 RX ORDER — PANTOPRAZOLE SODIUM 40 MG/10ML
40 INJECTION, POWDER, LYOPHILIZED, FOR SOLUTION INTRAVENOUS 2 TIMES DAILY
Status: DISCONTINUED | OUTPATIENT
Start: 2017-01-01 | End: 2017-01-01

## 2017-01-01 RX ORDER — POTASSIUM CHLORIDE 1.5 G/1.58G
40 POWDER, FOR SOLUTION ORAL 2 TIMES DAILY
Status: DISCONTINUED | OUTPATIENT
Start: 2017-01-01 | End: 2017-01-01

## 2017-01-01 RX ORDER — PRAVASTATIN SODIUM 40 MG
20 TABLET ORAL NIGHTLY
Status: ON HOLD | COMMUNITY
End: 2017-01-01

## 2017-01-01 RX ORDER — NIACIN 500 MG
500 TABLET ORAL DAILY
Status: DISCONTINUED | OUTPATIENT
Start: 2017-01-01 | End: 2017-01-01 | Stop reason: HOSPADM

## 2017-01-01 RX ORDER — LEVOTHYROXINE SODIUM 0.07 MG/1
150 TABLET ORAL
Status: DISCONTINUED | OUTPATIENT
Start: 2017-01-01 | End: 2017-01-01 | Stop reason: HOSPADM

## 2017-01-01 RX ORDER — PEG-3350, SODIUM SULFATE, SODIUM CHLORIDE, POTASSIUM CHLORIDE, SODIUM ASCORBATE AND ASCORBIC ACID 7.5-2.691G
100 KIT ORAL 2 TIMES DAILY
Status: COMPLETED | OUTPATIENT
Start: 2017-01-01 | End: 2017-01-01

## 2017-01-01 RX ORDER — FUROSEMIDE 10 MG/ML
40 INJECTION INTRAMUSCULAR; INTRAVENOUS ONCE
Status: COMPLETED | OUTPATIENT
Start: 2017-01-01 | End: 2017-01-01

## 2017-01-01 RX ORDER — ONDANSETRON 2 MG/ML
4 INJECTION INTRAMUSCULAR; INTRAVENOUS EVERY 4 HOURS PRN
Status: DISCONTINUED | OUTPATIENT
Start: 2017-01-01 | End: 2017-01-01 | Stop reason: HOSPADM

## 2017-01-01 RX ORDER — ACETAMINOPHEN 325 MG/1
650 TABLET ORAL EVERY 6 HOURS PRN
Status: DISCONTINUED | OUTPATIENT
Start: 2017-01-01 | End: 2017-01-01 | Stop reason: HOSPADM

## 2017-01-01 RX ORDER — AMLODIPINE BESYLATE 5 MG/1
5 TABLET ORAL DAILY
COMMUNITY
End: 2017-01-01

## 2017-01-01 RX ORDER — SODIUM CHLORIDE, SODIUM LACTATE, POTASSIUM CHLORIDE, CALCIUM CHLORIDE 600; 310; 30; 20 MG/100ML; MG/100ML; MG/100ML; MG/100ML
INJECTION, SOLUTION INTRAVENOUS CONTINUOUS
Status: DISCONTINUED | OUTPATIENT
Start: 2017-01-01 | End: 2017-01-01

## 2017-01-01 RX ORDER — LISINOPRIL 5 MG/1
5 TABLET ORAL DAILY
Status: ON HOLD | COMMUNITY
End: 2017-01-01

## 2017-01-01 RX ORDER — SODIUM CHLORIDE 9 MG/ML
1000 INJECTION, SOLUTION INTRAVENOUS ONCE
Status: COMPLETED | OUTPATIENT
Start: 2017-01-01 | End: 2017-01-01

## 2017-01-01 RX ORDER — SODIUM CHLORIDE 9 MG/ML
INJECTION, SOLUTION INTRAVENOUS CONTINUOUS
Status: DISCONTINUED | OUTPATIENT
Start: 2017-01-01 | End: 2017-01-01 | Stop reason: HOSPADM

## 2017-01-01 RX ORDER — ONDANSETRON 4 MG/1
4 TABLET, ORALLY DISINTEGRATING ORAL EVERY 4 HOURS PRN
Status: DISCONTINUED | OUTPATIENT
Start: 2017-01-01 | End: 2017-01-01 | Stop reason: HOSPADM

## 2017-01-01 RX ORDER — SODIUM CHLORIDE 9 MG/ML
30 INJECTION, SOLUTION INTRAVENOUS
Status: DISCONTINUED | OUTPATIENT
Start: 2017-01-01 | End: 2017-01-01 | Stop reason: HOSPADM

## 2017-01-01 RX ORDER — POTASSIUM CHLORIDE 1.5 G/1.58G
40 POWDER, FOR SOLUTION ORAL 4 TIMES DAILY
Status: DISCONTINUED | OUTPATIENT
Start: 2017-01-01 | End: 2017-01-01 | Stop reason: HOSPADM

## 2017-01-01 RX ORDER — PRAVASTATIN SODIUM 20 MG
40 TABLET ORAL
Status: DISCONTINUED | OUTPATIENT
Start: 2017-01-01 | End: 2017-01-01 | Stop reason: HOSPADM

## 2017-01-01 RX ORDER — SODIUM CHLORIDE 9 MG/ML
40 INJECTION, SOLUTION INTRAVENOUS ONCE
Status: COMPLETED | OUTPATIENT
Start: 2017-01-01 | End: 2017-01-01

## 2017-01-01 RX ORDER — PRAVASTATIN SODIUM 40 MG
40 TABLET ORAL DAILY
Qty: 30 TAB | Refills: 11
Start: 2017-01-01

## 2017-01-01 RX ORDER — HEPARIN SODIUM 1000 [USP'U]/ML
7000 INJECTION, SOLUTION INTRAVENOUS; SUBCUTANEOUS ONCE
Status: COMPLETED | OUTPATIENT
Start: 2017-01-01 | End: 2017-01-01

## 2017-01-01 RX ORDER — PANTOPRAZOLE SODIUM 40 MG/10ML
40 INJECTION, POWDER, LYOPHILIZED, FOR SOLUTION INTRAVENOUS DAILY
Status: DISCONTINUED | OUTPATIENT
Start: 2017-01-01 | End: 2017-01-01

## 2017-01-01 RX ORDER — LISINOPRIL 5 MG/1
5 TABLET ORAL DAILY
COMMUNITY
End: 2017-01-01

## 2017-01-01 RX ORDER — IPRATROPIUM BROMIDE AND ALBUTEROL SULFATE 2.5; .5 MG/3ML; MG/3ML
3 SOLUTION RESPIRATORY (INHALATION)
Status: DISCONTINUED | OUTPATIENT
Start: 2017-01-01 | End: 2017-01-01 | Stop reason: HOSPADM

## 2017-01-01 RX ORDER — THIAMINE HCL 100 MG
800 TABLET ORAL DAILY
COMMUNITY
End: 2017-01-01

## 2017-01-01 RX ORDER — MAGNESIUM GLUCONATE 27 MG(500)
400 TABLET ORAL 2 TIMES DAILY
COMMUNITY
End: 2017-01-01

## 2017-01-01 RX ORDER — FUROSEMIDE 10 MG/ML
40 INJECTION INTRAMUSCULAR; INTRAVENOUS
Status: DISCONTINUED | OUTPATIENT
Start: 2017-01-01 | End: 2017-01-01 | Stop reason: HOSPADM

## 2017-01-01 RX ORDER — AMLODIPINE BESYLATE 5 MG/1
5 TABLET ORAL DAILY
Status: DISCONTINUED | OUTPATIENT
Start: 2017-01-01 | End: 2017-01-01 | Stop reason: HOSPADM

## 2017-01-01 RX ORDER — ROCURONIUM BROMIDE 10 MG/ML
100 INJECTION, SOLUTION INTRAVENOUS ONCE
Status: COMPLETED | OUTPATIENT
Start: 2017-01-01 | End: 2017-01-01

## 2017-01-01 RX ORDER — OXYCODONE HYDROCHLORIDE 5 MG/1
5 TABLET ORAL
Status: DISCONTINUED | OUTPATIENT
Start: 2017-01-01 | End: 2017-01-01 | Stop reason: HOSPADM

## 2017-01-01 RX ORDER — FERROUS SULFATE 325(65) MG
325 TABLET ORAL 2 TIMES DAILY WITH MEALS
Status: DISCONTINUED | OUTPATIENT
Start: 2017-01-01 | End: 2017-01-01 | Stop reason: HOSPADM

## 2017-01-01 RX ORDER — MAGNESIUM OXIDE 400 MG/1
400 TABLET ORAL 2 TIMES DAILY
COMMUNITY

## 2017-01-01 RX ORDER — CEFTRIAXONE 2 G/1
2 INJECTION, POWDER, FOR SOLUTION INTRAMUSCULAR; INTRAVENOUS ONCE
Status: DISCONTINUED | OUTPATIENT
Start: 2017-01-01 | End: 2017-01-01

## 2017-01-01 RX ORDER — BISACODYL 10 MG
10 SUPPOSITORY, RECTAL RECTAL
Status: DISCONTINUED | OUTPATIENT
Start: 2017-01-01 | End: 2017-01-01

## 2017-01-01 RX ORDER — LIDOCAINE HYDROCHLORIDE 10 MG/ML
1-2 INJECTION, SOLUTION INFILTRATION; PERINEURAL
Status: DISCONTINUED | OUTPATIENT
Start: 2017-01-01 | End: 2017-01-01 | Stop reason: HOSPADM

## 2017-01-01 RX ORDER — MORPHINE SULFATE 4 MG/ML
2 INJECTION, SOLUTION INTRAMUSCULAR; INTRAVENOUS
Status: DISCONTINUED | OUTPATIENT
Start: 2017-01-01 | End: 2017-01-01 | Stop reason: HOSPADM

## 2017-01-01 RX ORDER — IPRATROPIUM BROMIDE AND ALBUTEROL SULFATE 2.5; .5 MG/3ML; MG/3ML
3 SOLUTION RESPIRATORY (INHALATION)
Status: DISCONTINUED | OUTPATIENT
Start: 2017-01-01 | End: 2017-01-01

## 2017-01-01 RX ORDER — SODIUM CHLORIDE, SODIUM LACTATE, POTASSIUM CHLORIDE, CALCIUM CHLORIDE 600; 310; 30; 20 MG/100ML; MG/100ML; MG/100ML; MG/100ML
INJECTION, SOLUTION INTRAVENOUS
Status: COMPLETED
Start: 2017-01-01 | End: 2017-01-01

## 2017-01-01 RX ADMIN — OMEPRAZOLE 40 MG: 20 CAPSULE, DELAYED RELEASE ORAL at 20:25

## 2017-01-01 RX ADMIN — IPRATROPIUM BROMIDE AND ALBUTEROL SULFATE 3 ML: .5; 3 SOLUTION RESPIRATORY (INHALATION) at 10:39

## 2017-01-01 RX ADMIN — FUROSEMIDE 40 MG: 10 INJECTION, SOLUTION INTRAMUSCULAR; INTRAVENOUS at 05:01

## 2017-01-01 RX ADMIN — Medication 325 MG: at 09:09

## 2017-01-01 RX ADMIN — LEVOTHYROXINE SODIUM 150 MCG: 75 TABLET ORAL at 05:40

## 2017-01-01 RX ADMIN — POTASSIUM CHLORIDE 40 MEQ: 1.5 POWDER, FOR SOLUTION ORAL at 14:28

## 2017-01-01 RX ADMIN — PROPOFOL 25 MCG/KG/MIN: 10 INJECTION, EMULSION INTRAVENOUS at 22:00

## 2017-01-01 RX ADMIN — Medication 325 MG: at 17:38

## 2017-01-01 RX ADMIN — POLYETHYLENE GLYCOL 3350 1 PACKET: 17 POWDER, FOR SOLUTION ORAL at 03:27

## 2017-01-01 RX ADMIN — INSULIN LISPRO 4 UNITS: 100 INJECTION, SOLUTION INTRAVENOUS; SUBCUTANEOUS at 12:13

## 2017-01-01 RX ADMIN — PROPOFOL 25 MCG/KG/MIN: 10 INJECTION, EMULSION INTRAVENOUS at 19:29

## 2017-01-01 RX ADMIN — FUROSEMIDE 40 MG: 10 INJECTION, SOLUTION INTRAMUSCULAR; INTRAVENOUS at 06:00

## 2017-01-01 RX ADMIN — Medication 500 MG: at 09:26

## 2017-01-01 RX ADMIN — OXYCODONE HYDROCHLORIDE 5 MG: 5 TABLET ORAL at 20:12

## 2017-01-01 RX ADMIN — IPRATROPIUM BROMIDE AND ALBUTEROL SULFATE 3 ML: .5; 3 SOLUTION RESPIRATORY (INHALATION) at 14:23

## 2017-01-01 RX ADMIN — PROPOFOL 35 MCG/KG/MIN: 10 INJECTION, EMULSION INTRAVENOUS at 23:17

## 2017-01-01 RX ADMIN — KETAMINE HYDROCHLORIDE 150 MG: 50 INJECTION, SOLUTION INTRAMUSCULAR; INTRAVENOUS at 01:28

## 2017-01-01 RX ADMIN — POTASSIUM CHLORIDE 40 MEQ: 1.5 POWDER, FOR SOLUTION ORAL at 02:35

## 2017-01-01 RX ADMIN — FUROSEMIDE 40 MG: 10 INJECTION, SOLUTION INTRAMUSCULAR; INTRAVENOUS at 11:41

## 2017-01-01 RX ADMIN — IPRATROPIUM BROMIDE AND ALBUTEROL SULFATE 3 ML: .5; 3 SOLUTION RESPIRATORY (INHALATION) at 02:15

## 2017-01-01 RX ADMIN — IPRATROPIUM BROMIDE AND ALBUTEROL SULFATE 3 ML: .5; 3 SOLUTION RESPIRATORY (INHALATION) at 07:01

## 2017-01-01 RX ADMIN — PANTOPRAZOLE SODIUM 40 MG: 40 INJECTION, POWDER, FOR SOLUTION INTRAVENOUS at 07:43

## 2017-01-01 RX ADMIN — Medication 325 MG: at 06:35

## 2017-01-01 RX ADMIN — PROPOFOL 35 MCG/KG/MIN: 10 INJECTION, EMULSION INTRAVENOUS at 19:07

## 2017-01-01 RX ADMIN — AMPICILLIN SODIUM AND SULBACTAM SODIUM 3 G: 2; 1 INJECTION, POWDER, FOR SOLUTION INTRAMUSCULAR; INTRAVENOUS at 11:00

## 2017-01-01 RX ADMIN — IPRATROPIUM BROMIDE AND ALBUTEROL SULFATE 3 ML: .5; 3 SOLUTION RESPIRATORY (INHALATION) at 22:14

## 2017-01-01 RX ADMIN — IPRATROPIUM BROMIDE AND ALBUTEROL SULFATE 3 ML: .5; 3 SOLUTION RESPIRATORY (INHALATION) at 18:43

## 2017-01-01 RX ADMIN — Medication 325 MG: at 17:30

## 2017-01-01 RX ADMIN — IPRATROPIUM BROMIDE AND ALBUTEROL SULFATE 3 ML: .5; 3 SOLUTION RESPIRATORY (INHALATION) at 18:30

## 2017-01-01 RX ADMIN — PROPOFOL 10 MCG/KG/MIN: 10 INJECTION, EMULSION INTRAVENOUS at 02:00

## 2017-01-01 RX ADMIN — IPRATROPIUM BROMIDE AND ALBUTEROL SULFATE 3 ML: .5; 3 SOLUTION RESPIRATORY (INHALATION) at 20:01

## 2017-01-01 RX ADMIN — IPRATROPIUM BROMIDE AND ALBUTEROL SULFATE 3 ML: .5; 3 SOLUTION RESPIRATORY (INHALATION) at 02:42

## 2017-01-01 RX ADMIN — IPRATROPIUM BROMIDE AND ALBUTEROL SULFATE 3 ML: .5; 3 SOLUTION RESPIRATORY (INHALATION) at 14:31

## 2017-01-01 RX ADMIN — LEVOTHYROXINE SODIUM 150 MCG: 75 TABLET ORAL at 06:14

## 2017-01-01 RX ADMIN — POTASSIUM CHLORIDE 40 MEQ: 1.5 POWDER, FOR SOLUTION ORAL at 20:57

## 2017-01-01 RX ADMIN — IPRATROPIUM BROMIDE AND ALBUTEROL SULFATE 3 ML: .5; 3 SOLUTION RESPIRATORY (INHALATION) at 22:39

## 2017-01-01 RX ADMIN — THERA TABS 1 TABLET: TAB at 08:05

## 2017-01-01 RX ADMIN — AMPICILLIN SODIUM AND SULBACTAM SODIUM 3 G: 2; 1 INJECTION, POWDER, FOR SOLUTION INTRAMUSCULAR; INTRAVENOUS at 20:22

## 2017-01-01 RX ADMIN — IPRATROPIUM BROMIDE AND ALBUTEROL SULFATE 3 ML: .5; 3 SOLUTION RESPIRATORY (INHALATION) at 14:46

## 2017-01-01 RX ADMIN — IPRATROPIUM BROMIDE AND ALBUTEROL SULFATE 3 ML: .5; 3 SOLUTION RESPIRATORY (INHALATION) at 16:13

## 2017-01-01 RX ADMIN — Medication 325 MG: at 16:13

## 2017-01-01 RX ADMIN — Medication 325 MG: at 07:59

## 2017-01-01 RX ADMIN — Medication 325 MG: at 06:14

## 2017-01-01 RX ADMIN — IPRATROPIUM BROMIDE AND ALBUTEROL SULFATE 3 ML: .5; 3 SOLUTION RESPIRATORY (INHALATION) at 02:37

## 2017-01-01 RX ADMIN — LEVOTHYROXINE SODIUM 150 MCG: 75 TABLET ORAL at 05:47

## 2017-01-01 RX ADMIN — AMPICILLIN SODIUM AND SULBACTAM SODIUM 3 G: 2; 1 INJECTION, POWDER, FOR SOLUTION INTRAMUSCULAR; INTRAVENOUS at 09:22

## 2017-01-01 RX ADMIN — PRAVASTATIN SODIUM 40 MG: 20 TABLET ORAL at 22:27

## 2017-01-01 RX ADMIN — THERA TABS 1 TABLET: TAB at 10:48

## 2017-01-01 RX ADMIN — Medication 500 MG: at 07:59

## 2017-01-01 RX ADMIN — PROPOFOL 40 MCG/KG/MIN: 10 INJECTION, EMULSION INTRAVENOUS at 04:45

## 2017-01-01 RX ADMIN — PROPOFOL 30 MCG/KG/MIN: 10 INJECTION, EMULSION INTRAVENOUS at 09:26

## 2017-01-01 RX ADMIN — CHLORHEXIDINE GLUCONATE 15 ML: 1.2 RINSE ORAL at 07:58

## 2017-01-01 RX ADMIN — PRAVASTATIN SODIUM 40 MG: 20 TABLET ORAL at 22:07

## 2017-01-01 RX ADMIN — IPRATROPIUM BROMIDE AND ALBUTEROL SULFATE 3 ML: .5; 3 SOLUTION RESPIRATORY (INHALATION) at 22:47

## 2017-01-01 RX ADMIN — OXYCODONE HYDROCHLORIDE 5 MG: 5 TABLET ORAL at 01:29

## 2017-01-01 RX ADMIN — PROPOFOL 30 MCG/KG/MIN: 10 INJECTION, EMULSION INTRAVENOUS at 02:38

## 2017-01-01 RX ADMIN — MAGNESIUM GLUCONATE 500 MG ORAL TABLET 400 MG: 500 TABLET ORAL at 22:19

## 2017-01-01 RX ADMIN — IPRATROPIUM BROMIDE AND ALBUTEROL SULFATE 3 ML: .5; 3 SOLUTION RESPIRATORY (INHALATION) at 11:50

## 2017-01-01 RX ADMIN — OMEPRAZOLE 40 MG: 20 CAPSULE, DELAYED RELEASE ORAL at 20:39

## 2017-01-01 RX ADMIN — LEVOTHYROXINE SODIUM 150 MCG: 75 TABLET ORAL at 06:41

## 2017-01-01 RX ADMIN — PANTOPRAZOLE SODIUM 40 MG: 40 INJECTION, POWDER, FOR SOLUTION INTRAVENOUS at 08:26

## 2017-01-01 RX ADMIN — INSULIN LISPRO 3 UNITS: 100 INJECTION, SOLUTION INTRAVENOUS; SUBCUTANEOUS at 06:38

## 2017-01-01 RX ADMIN — Medication 325 MG: at 08:11

## 2017-01-01 RX ADMIN — IPRATROPIUM BROMIDE AND ALBUTEROL SULFATE 3 ML: .5; 3 SOLUTION RESPIRATORY (INHALATION) at 22:37

## 2017-01-01 RX ADMIN — FENTANYL CITRATE 75 MCG: 50 INJECTION, SOLUTION INTRAMUSCULAR; INTRAVENOUS at 03:43

## 2017-01-01 RX ADMIN — STANDARDIZED SENNA CONCENTRATE AND DOCUSATE SODIUM 2 TABLET: 8.6; 5 TABLET, FILM COATED ORAL at 22:35

## 2017-01-01 RX ADMIN — SODIUM CHLORIDE 8 MG/HR: 9 INJECTION, SOLUTION INTRAVENOUS at 12:13

## 2017-01-01 RX ADMIN — PROPOFOL 40 MCG/KG/MIN: 10 INJECTION, EMULSION INTRAVENOUS at 02:00

## 2017-01-01 RX ADMIN — KETAMINE HYDROCHLORIDE 150 MG: 50 INJECTION, SOLUTION, CONCENTRATE INTRAMUSCULAR; INTRAVENOUS at 01:28

## 2017-01-01 RX ADMIN — PROPOFOL 35 MCG/KG/MIN: 10 INJECTION, EMULSION INTRAVENOUS at 18:28

## 2017-01-01 RX ADMIN — CHLORHEXIDINE GLUCONATE 15 ML: 1.2 RINSE ORAL at 09:26

## 2017-01-01 RX ADMIN — OMEPRAZOLE 40 MG: 20 CAPSULE, DELAYED RELEASE ORAL at 08:09

## 2017-01-01 RX ADMIN — SODIUM CHLORIDE: 9 INJECTION, SOLUTION INTRAVENOUS at 10:34

## 2017-01-01 RX ADMIN — INSULIN LISPRO 3 UNITS: 100 INJECTION, SOLUTION INTRAVENOUS; SUBCUTANEOUS at 11:16

## 2017-01-01 RX ADMIN — Medication 500 MG: at 08:26

## 2017-01-01 RX ADMIN — SODIUM CHLORIDE, POTASSIUM CHLORIDE, SODIUM LACTATE AND CALCIUM CHLORIDE: 600; 310; 30; 20 INJECTION, SOLUTION INTRAVENOUS at 09:27

## 2017-01-01 RX ADMIN — STANDARDIZED SENNA CONCENTRATE AND DOCUSATE SODIUM 2 TABLET: 8.6; 5 TABLET, FILM COATED ORAL at 07:40

## 2017-01-01 RX ADMIN — IPRATROPIUM BROMIDE AND ALBUTEROL SULFATE 3 ML: .5; 3 SOLUTION RESPIRATORY (INHALATION) at 02:41

## 2017-01-01 RX ADMIN — FENTANYL CITRATE 75 MCG: 50 INJECTION, SOLUTION INTRAMUSCULAR; INTRAVENOUS at 21:49

## 2017-01-01 RX ADMIN — IPRATROPIUM BROMIDE AND ALBUTEROL SULFATE 3 ML: .5; 3 SOLUTION RESPIRATORY (INHALATION) at 07:15

## 2017-01-01 RX ADMIN — THERA TABS 1 TABLET: TAB at 08:10

## 2017-01-01 RX ADMIN — CHLORHEXIDINE GLUCONATE 15 ML: 1.2 RINSE ORAL at 07:54

## 2017-01-01 RX ADMIN — IPRATROPIUM BROMIDE AND ALBUTEROL SULFATE 3 ML: .5; 3 SOLUTION RESPIRATORY (INHALATION) at 19:35

## 2017-01-01 RX ADMIN — AMPICILLIN SODIUM AND SULBACTAM SODIUM 3 G: 2; 1 INJECTION, POWDER, FOR SOLUTION INTRAMUSCULAR; INTRAVENOUS at 17:30

## 2017-01-01 RX ADMIN — IPRATROPIUM BROMIDE AND ALBUTEROL SULFATE 3 ML: .5; 3 SOLUTION RESPIRATORY (INHALATION) at 09:07

## 2017-01-01 RX ADMIN — SODIUM CHLORIDE 250 ML: 9 INJECTION, SOLUTION INTRAVENOUS at 04:45

## 2017-01-01 RX ADMIN — IPRATROPIUM BROMIDE AND ALBUTEROL SULFATE 3 ML: .5; 3 SOLUTION RESPIRATORY (INHALATION) at 14:09

## 2017-01-01 RX ADMIN — OXYCODONE HYDROCHLORIDE 5 MG: 5 TABLET ORAL at 09:20

## 2017-01-01 RX ADMIN — HEPARIN SODIUM 7000 UNITS: 1000 INJECTION, SOLUTION INTRAVENOUS; SUBCUTANEOUS at 23:18

## 2017-01-01 RX ADMIN — INSULIN LISPRO 3 UNITS: 100 INJECTION, SOLUTION INTRAVENOUS; SUBCUTANEOUS at 17:46

## 2017-01-01 RX ADMIN — PROPOFOL 80 MCG/KG/MIN: 10 INJECTION, EMULSION INTRAVENOUS at 15:01

## 2017-01-01 RX ADMIN — Medication 325 MG: at 08:05

## 2017-01-01 RX ADMIN — MAGNESIUM GLUCONATE 500 MG ORAL TABLET 400 MG: 500 TABLET ORAL at 07:50

## 2017-01-01 RX ADMIN — POTASSIUM CHLORIDE 40 MEQ: 1.5 POWDER, FOR SOLUTION ORAL at 20:39

## 2017-01-01 RX ADMIN — CHLORHEXIDINE GLUCONATE 15 ML: 1.2 RINSE ORAL at 22:35

## 2017-01-01 RX ADMIN — IPRATROPIUM BROMIDE AND ALBUTEROL SULFATE 3 ML: .5; 3 SOLUTION RESPIRATORY (INHALATION) at 22:36

## 2017-01-01 RX ADMIN — AMPICILLIN SODIUM AND SULBACTAM SODIUM 3 G: 2; 1 INJECTION, POWDER, FOR SOLUTION INTRAMUSCULAR; INTRAVENOUS at 23:35

## 2017-01-01 RX ADMIN — Medication 325 MG: at 10:49

## 2017-01-01 RX ADMIN — SODIUM CHLORIDE: 9 INJECTION, SOLUTION INTRAVENOUS at 00:53

## 2017-01-01 RX ADMIN — SODIUM CHLORIDE: 9 INJECTION, SOLUTION INTRAVENOUS at 08:45

## 2017-01-01 RX ADMIN — STANDARDIZED SENNA CONCENTRATE AND DOCUSATE SODIUM 2 TABLET: 8.6; 5 TABLET, FILM COATED ORAL at 20:59

## 2017-01-01 RX ADMIN — CHLORHEXIDINE GLUCONATE 15 ML: 1.2 RINSE ORAL at 22:19

## 2017-01-01 RX ADMIN — Medication 500 MG: at 09:09

## 2017-01-01 RX ADMIN — PANTOPRAZOLE SODIUM 40 MG: 40 INJECTION, POWDER, FOR SOLUTION INTRAVENOUS at 12:55

## 2017-01-01 RX ADMIN — PRAVASTATIN SODIUM 40 MG: 20 TABLET ORAL at 22:21

## 2017-01-01 RX ADMIN — INSULIN LISPRO 3 UNITS: 100 INJECTION, SOLUTION INTRAVENOUS; SUBCUTANEOUS at 00:20

## 2017-01-01 RX ADMIN — POTASSIUM CHLORIDE 20 MEQ: 1.5 POWDER, FOR SOLUTION ORAL at 11:41

## 2017-01-01 RX ADMIN — AMPICILLIN SODIUM AND SULBACTAM SODIUM 3 G: 2; 1 INJECTION, POWDER, FOR SOLUTION INTRAMUSCULAR; INTRAVENOUS at 18:02

## 2017-01-01 RX ADMIN — IPRATROPIUM BROMIDE AND ALBUTEROL SULFATE 3 ML: .5; 3 SOLUTION RESPIRATORY (INHALATION) at 09:44

## 2017-01-01 RX ADMIN — SODIUM CHLORIDE, POTASSIUM CHLORIDE, SODIUM LACTATE AND CALCIUM CHLORIDE 1000 ML: 600; 310; 30; 20 INJECTION, SOLUTION INTRAVENOUS at 19:26

## 2017-01-01 RX ADMIN — INSULIN LISPRO 3 UNITS: 100 INJECTION, SOLUTION INTRAVENOUS; SUBCUTANEOUS at 11:31

## 2017-01-01 RX ADMIN — INSULIN LISPRO 3 UNITS: 100 INJECTION, SOLUTION INTRAVENOUS; SUBCUTANEOUS at 11:28

## 2017-01-01 RX ADMIN — CHLORHEXIDINE GLUCONATE 15 ML: 1.2 RINSE ORAL at 07:31

## 2017-01-01 RX ADMIN — AMPICILLIN SODIUM AND SULBACTAM SODIUM 3 G: 2; 1 INJECTION, POWDER, FOR SOLUTION INTRAMUSCULAR; INTRAVENOUS at 00:17

## 2017-01-01 RX ADMIN — FUROSEMIDE 40 MG: 10 INJECTION, SOLUTION INTRAMUSCULAR; INTRAVENOUS at 16:48

## 2017-01-01 RX ADMIN — IPRATROPIUM BROMIDE AND ALBUTEROL SULFATE 3 ML: .5; 3 SOLUTION RESPIRATORY (INHALATION) at 18:46

## 2017-01-01 RX ADMIN — IPRATROPIUM BROMIDE AND ALBUTEROL SULFATE 3 ML: .5; 3 SOLUTION RESPIRATORY (INHALATION) at 19:49

## 2017-01-01 RX ADMIN — POTASSIUM CHLORIDE 40 MEQ: 1.5 POWDER, FOR SOLUTION ORAL at 08:06

## 2017-01-01 RX ADMIN — OMEPRAZOLE 40 MG: 20 CAPSULE, DELAYED RELEASE ORAL at 10:47

## 2017-01-01 RX ADMIN — SODIUM CHLORIDE, POTASSIUM CHLORIDE, SODIUM LACTATE AND CALCIUM CHLORIDE: 600; 310; 30; 20 INJECTION, SOLUTION INTRAVENOUS at 22:47

## 2017-01-01 RX ADMIN — Medication 500 MG: at 07:45

## 2017-01-01 RX ADMIN — Medication 325 MG: at 05:37

## 2017-01-01 RX ADMIN — AMPICILLIN SODIUM AND SULBACTAM SODIUM 3 G: 2; 1 INJECTION, POWDER, FOR SOLUTION INTRAMUSCULAR; INTRAVENOUS at 18:08

## 2017-01-01 RX ADMIN — CHLORHEXIDINE GLUCONATE 15 ML: 1.2 RINSE ORAL at 20:24

## 2017-01-01 RX ADMIN — FENTANYL CITRATE 75 MCG: 50 INJECTION, SOLUTION INTRAMUSCULAR; INTRAVENOUS at 07:40

## 2017-01-01 RX ADMIN — STANDARDIZED SENNA CONCENTRATE AND DOCUSATE SODIUM 2 TABLET: 8.6; 5 TABLET, FILM COATED ORAL at 22:07

## 2017-01-01 RX ADMIN — IPRATROPIUM BROMIDE AND ALBUTEROL SULFATE 3 ML: .5; 3 SOLUTION RESPIRATORY (INHALATION) at 18:52

## 2017-01-01 RX ADMIN — FUROSEMIDE 40 MG: 10 INJECTION, SOLUTION INTRAMUSCULAR; INTRAVENOUS at 11:34

## 2017-01-01 RX ADMIN — AMPICILLIN SODIUM AND SULBACTAM SODIUM 3 G: 2; 1 INJECTION, POWDER, FOR SOLUTION INTRAMUSCULAR; INTRAVENOUS at 23:19

## 2017-01-01 RX ADMIN — POTASSIUM CHLORIDE 40 MEQ: 1.5 POWDER, FOR SOLUTION ORAL at 02:30

## 2017-01-01 RX ADMIN — PANTOPRAZOLE SODIUM 40 MG: 40 INJECTION, POWDER, FOR SOLUTION INTRAVENOUS at 07:50

## 2017-01-01 RX ADMIN — POLYETHYLENE GLYCOL 3350, SODIUM SULFATE, SODIUM CHLORIDE, POTASSIUM CHLORIDE, ASCORBIC ACID, SODIUM ASCORBATE 100 G: KIT at 08:21

## 2017-01-01 RX ADMIN — FENTANYL CITRATE 75 MCG/HR: 50 INJECTION, SOLUTION INTRAMUSCULAR; INTRAVENOUS at 12:55

## 2017-01-01 RX ADMIN — PRAVASTATIN SODIUM 40 MG: 20 TABLET ORAL at 20:25

## 2017-01-01 RX ADMIN — IPRATROPIUM BROMIDE AND ALBUTEROL SULFATE 3 ML: .5; 3 SOLUTION RESPIRATORY (INHALATION) at 22:26

## 2017-01-01 RX ADMIN — PROPOFOL 30 MCG/KG/MIN: 10 INJECTION, EMULSION INTRAVENOUS at 23:38

## 2017-01-01 RX ADMIN — IPRATROPIUM BROMIDE AND ALBUTEROL SULFATE 3 ML: .5; 3 SOLUTION RESPIRATORY (INHALATION) at 07:00

## 2017-01-01 RX ADMIN — ACETAMINOPHEN 650 MG: 325 TABLET, FILM COATED ORAL at 19:26

## 2017-01-01 RX ADMIN — IPRATROPIUM BROMIDE AND ALBUTEROL SULFATE 3 ML: .5; 3 SOLUTION RESPIRATORY (INHALATION) at 22:25

## 2017-01-01 RX ADMIN — AMPICILLIN SODIUM AND SULBACTAM SODIUM 3 G: 2; 1 INJECTION, POWDER, FOR SOLUTION INTRAMUSCULAR; INTRAVENOUS at 20:16

## 2017-01-01 RX ADMIN — OMEPRAZOLE 40 MG: 20 CAPSULE, DELAYED RELEASE ORAL at 20:08

## 2017-01-01 RX ADMIN — SODIUM CHLORIDE: 9 INJECTION, SOLUTION INTRAVENOUS at 23:00

## 2017-01-01 RX ADMIN — AMPICILLIN SODIUM AND SULBACTAM SODIUM 3 G: 2; 1 INJECTION, POWDER, FOR SOLUTION INTRAMUSCULAR; INTRAVENOUS at 05:38

## 2017-01-01 RX ADMIN — PROPOFOL 25 MCG/KG/MIN: 10 INJECTION, EMULSION INTRAVENOUS at 03:30

## 2017-01-01 RX ADMIN — IPRATROPIUM BROMIDE AND ALBUTEROL SULFATE 3 ML: .5; 3 SOLUTION RESPIRATORY (INHALATION) at 06:41

## 2017-01-01 RX ADMIN — PROPOFOL 20 MCG/KG/MIN: 10 INJECTION, EMULSION INTRAVENOUS at 06:30

## 2017-01-01 RX ADMIN — POTASSIUM CHLORIDE 40 MEQ: 1.5 POWDER, FOR SOLUTION ORAL at 10:47

## 2017-01-01 RX ADMIN — POTASSIUM CHLORIDE 40 MEQ: 1.5 POWDER, FOR SOLUTION ORAL at 22:08

## 2017-01-01 RX ADMIN — STANDARDIZED SENNA CONCENTRATE AND DOCUSATE SODIUM 2 TABLET: 8.6; 5 TABLET, FILM COATED ORAL at 08:05

## 2017-01-01 RX ADMIN — CHLORHEXIDINE GLUCONATE 15 ML: 1.2 RINSE ORAL at 20:13

## 2017-01-01 RX ADMIN — HEPARIN SODIUM 1450 UNITS/HR: 5000 INJECTION, SOLUTION INTRAVENOUS at 23:19

## 2017-01-01 RX ADMIN — IPRATROPIUM BROMIDE AND ALBUTEROL SULFATE 3 ML: .5; 3 SOLUTION RESPIRATORY (INHALATION) at 18:51

## 2017-01-01 RX ADMIN — OMEPRAZOLE 40 MG: 20 CAPSULE, DELAYED RELEASE ORAL at 22:07

## 2017-01-01 RX ADMIN — Medication 325 MG: at 09:26

## 2017-01-01 RX ADMIN — POTASSIUM CHLORIDE 20 MEQ: 1.5 POWDER, FOR SOLUTION ORAL at 11:34

## 2017-01-01 RX ADMIN — POTASSIUM CHLORIDE 40 MEQ: 1.5 POWDER, FOR SOLUTION ORAL at 20:12

## 2017-01-01 RX ADMIN — PRAVASTATIN SODIUM 40 MG: 20 TABLET ORAL at 22:36

## 2017-01-01 RX ADMIN — DOXYCYCLINE 100 MG: 100 INJECTION, POWDER, LYOPHILIZED, FOR SOLUTION INTRAVENOUS at 10:35

## 2017-01-01 RX ADMIN — Medication 325 MG: at 17:31

## 2017-01-01 RX ADMIN — SODIUM CHLORIDE: 9 INJECTION, SOLUTION INTRAVENOUS at 15:47

## 2017-01-01 RX ADMIN — PROPOFOL 15 MCG/KG/MIN: 10 INJECTION, EMULSION INTRAVENOUS at 02:23

## 2017-01-01 RX ADMIN — Medication 325 MG: at 05:33

## 2017-01-01 RX ADMIN — INSULIN LISPRO 3 UNITS: 100 INJECTION, SOLUTION INTRAVENOUS; SUBCUTANEOUS at 17:31

## 2017-01-01 RX ADMIN — Medication 325 MG: at 17:03

## 2017-01-01 RX ADMIN — Medication 500 MG: at 08:07

## 2017-01-01 RX ADMIN — OMEPRAZOLE 40 MG: 20 CAPSULE, DELAYED RELEASE ORAL at 07:58

## 2017-01-01 RX ADMIN — PROPOFOL 40 MCG/KG/MIN: 10 INJECTION, EMULSION INTRAVENOUS at 13:36

## 2017-01-01 RX ADMIN — ROCURONIUM BROMIDE 100 MG: 10 INJECTION INTRAVENOUS at 01:28

## 2017-01-01 RX ADMIN — AMPICILLIN SODIUM AND SULBACTAM SODIUM 3 G: 2; 1 INJECTION, POWDER, FOR SOLUTION INTRAMUSCULAR; INTRAVENOUS at 18:28

## 2017-01-01 RX ADMIN — LEVOTHYROXINE SODIUM 150 MCG: 75 TABLET ORAL at 07:00

## 2017-01-01 RX ADMIN — IPRATROPIUM BROMIDE AND ALBUTEROL SULFATE 3 ML: .5; 3 SOLUTION RESPIRATORY (INHALATION) at 02:44

## 2017-01-01 RX ADMIN — IPRATROPIUM BROMIDE AND ALBUTEROL SULFATE 3 ML: .5; 3 SOLUTION RESPIRATORY (INHALATION) at 07:43

## 2017-01-01 RX ADMIN — AMPICILLIN SODIUM AND SULBACTAM SODIUM 3 G: 2; 1 INJECTION, POWDER, FOR SOLUTION INTRAMUSCULAR; INTRAVENOUS at 05:47

## 2017-01-01 RX ADMIN — FENTANYL CITRATE 50 MCG: 50 INJECTION, SOLUTION INTRAMUSCULAR; INTRAVENOUS at 04:54

## 2017-01-01 RX ADMIN — PROPOFOL 40 MCG/KG/MIN: 10 INJECTION, EMULSION INTRAVENOUS at 13:34

## 2017-01-01 RX ADMIN — Medication 325 MG: at 16:48

## 2017-01-01 RX ADMIN — IPRATROPIUM BROMIDE AND ALBUTEROL SULFATE 3 ML: .5; 3 SOLUTION RESPIRATORY (INHALATION) at 07:36

## 2017-01-01 RX ADMIN — THERA TABS 1 TABLET: TAB at 09:26

## 2017-01-01 RX ADMIN — STANDARDIZED SENNA CONCENTRATE AND DOCUSATE SODIUM 2 TABLET: 8.6; 5 TABLET, FILM COATED ORAL at 07:50

## 2017-01-01 RX ADMIN — SODIUM CHLORIDE, POTASSIUM CHLORIDE, SODIUM LACTATE AND CALCIUM CHLORIDE 1000 ML: 600; 310; 30; 20 INJECTION, SOLUTION INTRAVENOUS at 03:25

## 2017-01-01 RX ADMIN — IPRATROPIUM BROMIDE AND ALBUTEROL SULFATE 3 ML: .5; 3 SOLUTION RESPIRATORY (INHALATION) at 22:42

## 2017-01-01 RX ADMIN — LEVOTHYROXINE SODIUM 150 MCG: 75 TABLET ORAL at 08:07

## 2017-01-01 RX ADMIN — FUROSEMIDE 40 MG: 10 INJECTION, SOLUTION INTRAMUSCULAR; INTRAVENOUS at 15:18

## 2017-01-01 RX ADMIN — PROPOFOL 40 MCG/KG/MIN: 10 INJECTION, EMULSION INTRAVENOUS at 03:41

## 2017-01-01 RX ADMIN — STANDARDIZED SENNA CONCENTRATE AND DOCUSATE SODIUM 2 TABLET: 8.6; 5 TABLET, FILM COATED ORAL at 09:26

## 2017-01-01 RX ADMIN — IPRATROPIUM BROMIDE AND ALBUTEROL SULFATE 3 ML: .5; 3 SOLUTION RESPIRATORY (INHALATION) at 18:36

## 2017-01-01 RX ADMIN — PROPOFOL 40 MCG/KG/MIN: 10 INJECTION, EMULSION INTRAVENOUS at 05:50

## 2017-01-01 RX ADMIN — LEVOTHYROXINE SODIUM 150 MCG: 75 TABLET ORAL at 10:49

## 2017-01-01 RX ADMIN — DOXYCYCLINE 100 MG: 100 INJECTION, POWDER, LYOPHILIZED, FOR SOLUTION INTRAVENOUS at 21:15

## 2017-01-01 RX ADMIN — ROCURONIUM BROMIDE 100 MG: 10 INJECTION INTRAVENOUS at 02:00

## 2017-01-01 RX ADMIN — PROPOFOL 40 MCG/KG/MIN: 10 INJECTION, EMULSION INTRAVENOUS at 02:27

## 2017-01-01 RX ADMIN — IPRATROPIUM BROMIDE AND ALBUTEROL SULFATE 3 ML: .5; 3 SOLUTION RESPIRATORY (INHALATION) at 15:24

## 2017-01-01 RX ADMIN — SODIUM CHLORIDE 500 ML: 9 INJECTION, SOLUTION INTRAVENOUS at 22:21

## 2017-01-01 RX ADMIN — FUROSEMIDE 40 MG: 10 INJECTION, SOLUTION INTRAMUSCULAR; INTRAVENOUS at 15:27

## 2017-01-01 RX ADMIN — IPRATROPIUM BROMIDE AND ALBUTEROL SULFATE 3 ML: .5; 3 SOLUTION RESPIRATORY (INHALATION) at 19:10

## 2017-01-01 RX ADMIN — PROPOFOL 20 MCG/KG/MIN: 10 INJECTION, EMULSION INTRAVENOUS at 07:45

## 2017-01-01 RX ADMIN — SODIUM CHLORIDE 1000 ML: 9 INJECTION, SOLUTION INTRAVENOUS at 13:25

## 2017-01-01 RX ADMIN — CEFEPIME 2 G: 2 INJECTION, POWDER, FOR SOLUTION INTRAMUSCULAR; INTRAVENOUS at 21:30

## 2017-01-01 RX ADMIN — CHLORHEXIDINE GLUCONATE 15 ML: 1.2 RINSE ORAL at 22:14

## 2017-01-01 RX ADMIN — IPRATROPIUM BROMIDE AND ALBUTEROL SULFATE 3 ML: .5; 3 SOLUTION RESPIRATORY (INHALATION) at 09:37

## 2017-01-01 RX ADMIN — CHLORHEXIDINE GLUCONATE 15 ML: 1.2 RINSE ORAL at 08:26

## 2017-01-01 RX ADMIN — FUROSEMIDE 40 MG: 10 INJECTION, SOLUTION INTRAMUSCULAR; INTRAVENOUS at 08:26

## 2017-01-01 RX ADMIN — FENTANYL CITRATE 50 MCG: 50 INJECTION, SOLUTION INTRAMUSCULAR; INTRAVENOUS at 00:02

## 2017-01-01 RX ADMIN — PRAVASTATIN SODIUM 40 MG: 20 TABLET ORAL at 20:09

## 2017-01-01 RX ADMIN — LEVOTHYROXINE SODIUM 150 MCG: 75 TABLET ORAL at 08:10

## 2017-01-01 RX ADMIN — PROPOFOL 40 MCG/KG/MIN: 10 INJECTION, EMULSION INTRAVENOUS at 17:34

## 2017-01-01 RX ADMIN — IPRATROPIUM BROMIDE AND ALBUTEROL SULFATE 3 ML: .5; 3 SOLUTION RESPIRATORY (INHALATION) at 06:11

## 2017-01-01 RX ADMIN — Medication 325 MG: at 17:45

## 2017-01-01 RX ADMIN — OXYCODONE HYDROCHLORIDE 5 MG: 5 TABLET ORAL at 22:10

## 2017-01-01 RX ADMIN — Medication 325 MG: at 05:52

## 2017-01-01 RX ADMIN — LEVOTHYROXINE SODIUM 150 MCG: 75 TABLET ORAL at 05:38

## 2017-01-01 RX ADMIN — CHLORHEXIDINE GLUCONATE 15 ML: 1.2 RINSE ORAL at 20:16

## 2017-01-01 RX ADMIN — POTASSIUM CHLORIDE 40 MEQ: 1.5 POWDER, FOR SOLUTION ORAL at 15:18

## 2017-01-01 RX ADMIN — SODIUM CHLORIDE, POTASSIUM CHLORIDE, SODIUM LACTATE AND CALCIUM CHLORIDE: 600; 310; 30; 20 INJECTION, SOLUTION INTRAVENOUS at 11:41

## 2017-01-01 RX ADMIN — THERA TABS 1 TABLET: TAB at 07:50

## 2017-01-01 RX ADMIN — DOXYCYCLINE 100 MG: 100 INJECTION, POWDER, LYOPHILIZED, FOR SOLUTION INTRAVENOUS at 07:50

## 2017-01-01 RX ADMIN — SODIUM CHLORIDE 4264 ML: 9 INJECTION, SOLUTION INTRAVENOUS at 21:00

## 2017-01-01 RX ADMIN — POTASSIUM CHLORIDE 40 MEQ: 1.5 POWDER, FOR SOLUTION ORAL at 11:16

## 2017-01-01 RX ADMIN — THERA TABS 1 TABLET: TAB at 08:26

## 2017-01-01 RX ADMIN — PANTOPRAZOLE SODIUM 40 MG: 40 INJECTION, POWDER, FOR SOLUTION INTRAVENOUS at 09:10

## 2017-01-01 RX ADMIN — AMPICILLIN SODIUM AND SULBACTAM SODIUM 3 G: 2; 1 INJECTION, POWDER, FOR SOLUTION INTRAMUSCULAR; INTRAVENOUS at 07:30

## 2017-01-01 RX ADMIN — LEVOTHYROXINE SODIUM 150 MCG: 75 TABLET ORAL at 06:13

## 2017-01-01 RX ADMIN — AMPICILLIN SODIUM AND SULBACTAM SODIUM 3 G: 2; 1 INJECTION, POWDER, FOR SOLUTION INTRAMUSCULAR; INTRAVENOUS at 12:45

## 2017-01-01 RX ADMIN — IPRATROPIUM BROMIDE AND ALBUTEROL SULFATE 3 ML: .5; 3 SOLUTION RESPIRATORY (INHALATION) at 09:46

## 2017-01-01 RX ADMIN — LEVOTHYROXINE SODIUM 150 MCG: 75 TABLET ORAL at 06:06

## 2017-01-01 RX ADMIN — CHLORHEXIDINE GLUCONATE 15 ML: 1.2 RINSE ORAL at 20:08

## 2017-01-01 RX ADMIN — PRAVASTATIN SODIUM 40 MG: 20 TABLET ORAL at 20:07

## 2017-01-01 RX ADMIN — OMEPRAZOLE 40 MG: 20 CAPSULE, DELAYED RELEASE ORAL at 08:06

## 2017-01-01 RX ADMIN — OMEPRAZOLE 40 MG: 20 CAPSULE, DELAYED RELEASE ORAL at 09:26

## 2017-01-01 RX ADMIN — Medication 325 MG: at 17:26

## 2017-01-01 RX ADMIN — PANTOPRAZOLE SODIUM 40 MG: 40 INJECTION, POWDER, FOR SOLUTION INTRAVENOUS at 09:22

## 2017-01-01 RX ADMIN — IPRATROPIUM BROMIDE AND ALBUTEROL SULFATE 3 ML: .5; 3 SOLUTION RESPIRATORY (INHALATION) at 18:54

## 2017-01-01 RX ADMIN — AMPICILLIN SODIUM AND SULBACTAM SODIUM 3 G: 2; 1 INJECTION, POWDER, FOR SOLUTION INTRAMUSCULAR; INTRAVENOUS at 05:50

## 2017-01-01 RX ADMIN — THERA TABS 1 TABLET: TAB at 07:54

## 2017-01-01 RX ADMIN — STANDARDIZED SENNA CONCENTRATE AND DOCUSATE SODIUM 2 TABLET: 8.6; 5 TABLET, FILM COATED ORAL at 08:10

## 2017-01-01 RX ADMIN — Medication 500 MG: at 08:08

## 2017-01-01 RX ADMIN — PROPOFOL 30 MCG/KG/MIN: 10 INJECTION, EMULSION INTRAVENOUS at 14:17

## 2017-01-01 RX ADMIN — IPRATROPIUM BROMIDE AND ALBUTEROL SULFATE 3 ML: .5; 3 SOLUTION RESPIRATORY (INHALATION) at 06:26

## 2017-01-01 RX ADMIN — ACETAMINOPHEN 650 MG: 325 TABLET, FILM COATED ORAL at 03:57

## 2017-01-01 RX ADMIN — CHLORHEXIDINE GLUCONATE 15 ML: 1.2 RINSE ORAL at 07:50

## 2017-01-01 RX ADMIN — Medication 500 MG: at 08:10

## 2017-01-01 RX ADMIN — MAGNESIUM SULFATE HEPTAHYDRATE 1 G: 1 INJECTION, SOLUTION INTRAVENOUS at 07:30

## 2017-01-01 RX ADMIN — SODIUM CHLORIDE: 9 INJECTION, SOLUTION INTRAVENOUS at 21:10

## 2017-01-01 RX ADMIN — PANTOPRAZOLE SODIUM 40 MG: 40 INJECTION, POWDER, FOR SOLUTION INTRAVENOUS at 20:16

## 2017-01-01 RX ADMIN — SODIUM CHLORIDE: 9 INJECTION, SOLUTION INTRAVENOUS at 16:14

## 2017-01-01 RX ADMIN — INSULIN LISPRO 3 UNITS: 100 INJECTION, SOLUTION INTRAVENOUS; SUBCUTANEOUS at 17:32

## 2017-01-01 RX ADMIN — PROPOFOL 45 MCG/KG/MIN: 10 INJECTION, EMULSION INTRAVENOUS at 05:29

## 2017-01-01 RX ADMIN — PROPOFOL 40 MCG/KG/MIN: 10 INJECTION, EMULSION INTRAVENOUS at 13:46

## 2017-01-01 RX ADMIN — IPRATROPIUM BROMIDE AND ALBUTEROL SULFATE 3 ML: .5; 3 SOLUTION RESPIRATORY (INHALATION) at 06:25

## 2017-01-01 RX ADMIN — IPRATROPIUM BROMIDE AND ALBUTEROL SULFATE 3 ML: .5; 3 SOLUTION RESPIRATORY (INHALATION) at 06:15

## 2017-01-01 RX ADMIN — IPRATROPIUM BROMIDE AND ALBUTEROL SULFATE 3 ML: .5; 3 SOLUTION RESPIRATORY (INHALATION) at 18:53

## 2017-01-01 RX ADMIN — PROPOFOL 40 MCG/KG/MIN: 10 INJECTION, EMULSION INTRAVENOUS at 09:10

## 2017-01-01 RX ADMIN — PROPOFOL 15 MCG/KG/MIN: 10 INJECTION, EMULSION INTRAVENOUS at 14:34

## 2017-01-01 RX ADMIN — PROPOFOL 45 MCG/KG/MIN: 10 INJECTION, EMULSION INTRAVENOUS at 17:30

## 2017-01-01 RX ADMIN — SODIUM CHLORIDE 8 MG/HR: 9 INJECTION, SOLUTION INTRAVENOUS at 07:30

## 2017-01-01 RX ADMIN — IPRATROPIUM BROMIDE AND ALBUTEROL SULFATE 3 ML: .5; 3 SOLUTION RESPIRATORY (INHALATION) at 07:37

## 2017-01-01 RX ADMIN — AMPICILLIN SODIUM AND SULBACTAM SODIUM 3 G: 2; 1 INJECTION, POWDER, FOR SOLUTION INTRAMUSCULAR; INTRAVENOUS at 12:19

## 2017-01-01 RX ADMIN — IPRATROPIUM BROMIDE AND ALBUTEROL SULFATE 3 ML: .5; 3 SOLUTION RESPIRATORY (INHALATION) at 02:46

## 2017-01-01 RX ADMIN — AMPICILLIN SODIUM AND SULBACTAM SODIUM 3 G: 2; 1 INJECTION, POWDER, FOR SOLUTION INTRAMUSCULAR; INTRAVENOUS at 00:11

## 2017-01-01 RX ADMIN — STANDARDIZED SENNA CONCENTRATE AND DOCUSATE SODIUM 2 TABLET: 8.6; 5 TABLET, FILM COATED ORAL at 22:19

## 2017-01-01 RX ADMIN — IPRATROPIUM BROMIDE AND ALBUTEROL SULFATE 3 ML: .5; 3 SOLUTION RESPIRATORY (INHALATION) at 14:08

## 2017-01-01 RX ADMIN — SODIUM CHLORIDE 500 ML: 9 INJECTION, SOLUTION INTRAVENOUS at 09:37

## 2017-01-01 RX ADMIN — THERA TABS 1 TABLET: TAB at 08:08

## 2017-01-01 RX ADMIN — INSULIN LISPRO 3 UNITS: 100 INJECTION, SOLUTION INTRAVENOUS; SUBCUTANEOUS at 06:27

## 2017-01-01 RX ADMIN — OMEPRAZOLE 40 MG: 20 CAPSULE, DELAYED RELEASE ORAL at 08:26

## 2017-01-01 RX ADMIN — THERA TABS 1 TABLET: TAB at 07:59

## 2017-01-01 RX ADMIN — IPRATROPIUM BROMIDE AND ALBUTEROL SULFATE 3 ML: .5; 3 SOLUTION RESPIRATORY (INHALATION) at 14:19

## 2017-01-01 RX ADMIN — POTASSIUM CHLORIDE 40 MEQ: 1.5 POWDER, FOR SOLUTION ORAL at 01:29

## 2017-01-01 RX ADMIN — IPRATROPIUM BROMIDE AND ALBUTEROL SULFATE 3 ML: .5; 3 SOLUTION RESPIRATORY (INHALATION) at 11:34

## 2017-01-01 RX ADMIN — IPRATROPIUM BROMIDE AND ALBUTEROL SULFATE 3 ML: .5; 3 SOLUTION RESPIRATORY (INHALATION) at 11:44

## 2017-01-01 RX ADMIN — IPRATROPIUM BROMIDE AND ALBUTEROL SULFATE 3 ML: .5; 3 SOLUTION RESPIRATORY (INHALATION) at 15:39

## 2017-01-01 RX ADMIN — IPRATROPIUM BROMIDE AND ALBUTEROL SULFATE 3 ML: .5; 3 SOLUTION RESPIRATORY (INHALATION) at 12:17

## 2017-01-01 RX ADMIN — PROPOFOL 20 MCG/KG/MIN: 10 INJECTION, EMULSION INTRAVENOUS at 15:37

## 2017-01-01 RX ADMIN — Medication 325 MG: at 17:23

## 2017-01-01 RX ADMIN — MAGNESIUM GLUCONATE 500 MG ORAL TABLET 400 MG: 500 TABLET ORAL at 07:40

## 2017-01-01 RX ADMIN — OMEPRAZOLE 40 MG: 20 CAPSULE, DELAYED RELEASE ORAL at 20:07

## 2017-01-01 RX ADMIN — Medication 500 MG: at 07:51

## 2017-01-01 RX ADMIN — PROPOFOL 45 MCG/KG/MIN: 10 INJECTION, EMULSION INTRAVENOUS at 12:35

## 2017-01-01 RX ADMIN — POTASSIUM CHLORIDE 40 MEQ: 1.5 POWDER, FOR SOLUTION ORAL at 08:09

## 2017-01-01 RX ADMIN — STANDARDIZED SENNA CONCENTRATE AND DOCUSATE SODIUM 2 TABLET: 8.6; 5 TABLET, FILM COATED ORAL at 09:09

## 2017-01-01 RX ADMIN — CHLORHEXIDINE GLUCONATE 15 ML: 1.2 RINSE ORAL at 09:09

## 2017-01-01 RX ADMIN — POTASSIUM CHLORIDE 40 MEQ: 1.5 POWDER, FOR SOLUTION ORAL at 08:26

## 2017-01-01 RX ADMIN — INSULIN LISPRO 3 UNITS: 100 INJECTION, SOLUTION INTRAVENOUS; SUBCUTANEOUS at 11:49

## 2017-01-01 RX ADMIN — IPRATROPIUM BROMIDE AND ALBUTEROL SULFATE 3 ML: .5; 3 SOLUTION RESPIRATORY (INHALATION) at 22:24

## 2017-01-01 RX ADMIN — IPRATROPIUM BROMIDE AND ALBUTEROL SULFATE 3 ML: .5; 3 SOLUTION RESPIRATORY (INHALATION) at 14:02

## 2017-01-01 RX ADMIN — PRAVASTATIN SODIUM 40 MG: 20 TABLET ORAL at 20:13

## 2017-01-01 RX ADMIN — THERA TABS 1 TABLET: TAB at 07:40

## 2017-01-01 RX ADMIN — PROPOFOL 30 MCG/KG/MIN: 10 INJECTION, EMULSION INTRAVENOUS at 04:24

## 2017-01-01 RX ADMIN — IPRATROPIUM BROMIDE AND ALBUTEROL SULFATE 3 ML: .5; 3 SOLUTION RESPIRATORY (INHALATION) at 11:14

## 2017-01-01 RX ADMIN — OMEPRAZOLE 40 MG: 20 CAPSULE, DELAYED RELEASE ORAL at 20:57

## 2017-01-01 RX ADMIN — THERA TABS 1 TABLET: TAB at 09:09

## 2017-01-01 RX ADMIN — CHLORHEXIDINE GLUCONATE 15 ML: 1.2 RINSE ORAL at 09:22

## 2017-01-01 RX ADMIN — POTASSIUM CHLORIDE 40 MEQ: 1.5 POWDER, FOR SOLUTION ORAL at 15:27

## 2017-01-01 RX ADMIN — IPRATROPIUM BROMIDE AND ALBUTEROL SULFATE 3 ML: .5; 3 SOLUTION RESPIRATORY (INHALATION) at 14:36

## 2017-01-01 RX ADMIN — INSULIN LISPRO 3 UNITS: 100 INJECTION, SOLUTION INTRAVENOUS; SUBCUTANEOUS at 12:29

## 2017-01-01 RX ADMIN — AMPICILLIN SODIUM AND SULBACTAM SODIUM 3 G: 2; 1 INJECTION, POWDER, FOR SOLUTION INTRAMUSCULAR; INTRAVENOUS at 06:41

## 2017-01-01 RX ADMIN — PROPOFOL 40 MCG/KG/MIN: 10 INJECTION, EMULSION INTRAVENOUS at 09:36

## 2017-01-01 RX ADMIN — DOXYCYCLINE 100 MG: 100 INJECTION, POWDER, LYOPHILIZED, FOR SOLUTION INTRAVENOUS at 08:23

## 2017-01-01 RX ADMIN — IPRATROPIUM BROMIDE AND ALBUTEROL SULFATE 3 ML: .5; 3 SOLUTION RESPIRATORY (INHALATION) at 02:11

## 2017-01-01 RX ADMIN — OMEPRAZOLE 40 MG: 20 CAPSULE, DELAYED RELEASE ORAL at 22:14

## 2017-01-01 RX ADMIN — STANDARDIZED SENNA CONCENTRATE AND DOCUSATE SODIUM 2 TABLET: 8.6; 5 TABLET, FILM COATED ORAL at 07:59

## 2017-01-01 RX ADMIN — PROPOFOL 45 MCG/KG/MIN: 10 INJECTION, EMULSION INTRAVENOUS at 08:21

## 2017-01-01 RX ADMIN — IPRATROPIUM BROMIDE AND ALBUTEROL SULFATE 3 ML: .5; 3 SOLUTION RESPIRATORY (INHALATION) at 18:47

## 2017-01-01 RX ADMIN — PROPOFOL 20 MCG/KG/MIN: 10 INJECTION, EMULSION INTRAVENOUS at 12:58

## 2017-01-01 RX ADMIN — PANTOPRAZOLE SODIUM 40 MG: 40 INJECTION, POWDER, FOR SOLUTION INTRAVENOUS at 20:13

## 2017-01-01 RX ADMIN — OMEPRAZOLE 40 MG: 20 CAPSULE, DELAYED RELEASE ORAL at 08:07

## 2017-01-01 RX ADMIN — Medication 325 MG: at 08:08

## 2017-01-01 RX ADMIN — PROPOFOL 40 MCG/KG/MIN: 10 INJECTION, EMULSION INTRAVENOUS at 21:58

## 2017-01-01 RX ADMIN — Medication 500 MG: at 07:54

## 2017-01-01 RX ADMIN — CHLORHEXIDINE GLUCONATE 15 ML: 1.2 RINSE ORAL at 07:44

## 2017-01-01 RX ADMIN — LEVOTHYROXINE SODIUM 150 MCG: 75 TABLET ORAL at 06:34

## 2017-01-01 RX ADMIN — FUROSEMIDE 40 MG: 10 INJECTION, SOLUTION INTRAMUSCULAR; INTRAVENOUS at 11:16

## 2017-01-01 RX ADMIN — FUROSEMIDE 40 MG: 10 INJECTION, SOLUTION INTRAMUSCULAR; INTRAVENOUS at 06:38

## 2017-01-01 RX ADMIN — PROPOFOL 45 MCG/KG/MIN: 10 INJECTION, EMULSION INTRAVENOUS at 02:43

## 2017-01-01 RX ADMIN — PROPOFOL 45 MCG/KG/MIN: 10 INJECTION, EMULSION INTRAVENOUS at 19:26

## 2017-01-01 RX ADMIN — PROPOFOL 40 MCG/KG/MIN: 10 INJECTION, EMULSION INTRAVENOUS at 22:49

## 2017-01-01 RX ADMIN — DOXYCYCLINE 100 MG: 100 INJECTION, POWDER, LYOPHILIZED, FOR SOLUTION INTRAVENOUS at 22:19

## 2017-01-01 RX ADMIN — SODIUM CHLORIDE: 9 INJECTION, SOLUTION INTRAVENOUS at 12:03

## 2017-01-01 RX ADMIN — SODIUM CHLORIDE, POTASSIUM CHLORIDE, SODIUM LACTATE AND CALCIUM CHLORIDE: 600; 310; 30; 20 INJECTION, SOLUTION INTRAVENOUS at 00:17

## 2017-01-01 RX ADMIN — PRAVASTATIN SODIUM 40 MG: 20 TABLET ORAL at 20:57

## 2017-01-01 RX ADMIN — Medication 325 MG: at 18:31

## 2017-01-01 RX ADMIN — DOXYCYCLINE 100 MG: 100 INJECTION, POWDER, LYOPHILIZED, FOR SOLUTION INTRAVENOUS at 20:28

## 2017-01-01 RX ADMIN — CHLORHEXIDINE GLUCONATE 15 ML: 1.2 RINSE ORAL at 20:06

## 2017-01-01 RX ADMIN — IPRATROPIUM BROMIDE AND ALBUTEROL SULFATE 3 ML: .5; 3 SOLUTION RESPIRATORY (INHALATION) at 15:12

## 2017-01-01 RX ADMIN — STANDARDIZED SENNA CONCENTRATE AND DOCUSATE SODIUM 2 TABLET: 8.6; 5 TABLET, FILM COATED ORAL at 08:26

## 2017-01-01 RX ADMIN — ACETAMINOPHEN 650 MG: 325 TABLET, FILM COATED ORAL at 07:07

## 2017-01-01 RX ADMIN — POLYETHYLENE GLYCOL 3350, SODIUM SULFATE, SODIUM CHLORIDE, POTASSIUM CHLORIDE, ASCORBIC ACID, SODIUM ASCORBATE 100 G: KIT at 20:48

## 2017-01-01 RX ADMIN — AMPICILLIN SODIUM AND SULBACTAM SODIUM 3 G: 2; 1 INJECTION, POWDER, FOR SOLUTION INTRAMUSCULAR; INTRAVENOUS at 05:03

## 2017-01-01 RX ADMIN — IPRATROPIUM BROMIDE AND ALBUTEROL SULFATE 3 ML: .5; 3 SOLUTION RESPIRATORY (INHALATION) at 06:33

## 2017-01-01 RX ADMIN — INSULIN LISPRO 3 UNITS: 100 INJECTION, SOLUTION INTRAVENOUS; SUBCUTANEOUS at 22:18

## 2017-01-01 RX ADMIN — LEVOTHYROXINE SODIUM 150 MCG: 75 TABLET ORAL at 05:52

## 2017-01-01 RX ADMIN — PANTOPRAZOLE SODIUM 40 MG: 40 INJECTION, POWDER, FOR SOLUTION INTRAVENOUS at 22:35

## 2017-01-01 RX ADMIN — OMEPRAZOLE 40 MG: 20 CAPSULE, DELAYED RELEASE ORAL at 20:12

## 2017-01-01 RX ADMIN — AMPICILLIN SODIUM AND SULBACTAM SODIUM 3 G: 2; 1 INJECTION, POWDER, FOR SOLUTION INTRAMUSCULAR; INTRAVENOUS at 12:32

## 2017-01-01 RX ADMIN — BISACODYL 10 MG: 10 SUPPOSITORY RECTAL at 19:27

## 2017-01-01 RX ADMIN — IPRATROPIUM BROMIDE AND ALBUTEROL SULFATE 3 ML: .5; 3 SOLUTION RESPIRATORY (INHALATION) at 10:56

## 2017-01-01 RX ADMIN — FENTANYL CITRATE 50 MCG/HR: 50 INJECTION, SOLUTION INTRAMUSCULAR; INTRAVENOUS at 20:06

## 2017-01-01 RX ADMIN — PRAVASTATIN SODIUM 40 MG: 20 TABLET ORAL at 20:39

## 2017-01-01 RX ADMIN — SODIUM CHLORIDE: 9 INJECTION, SOLUTION INTRAVENOUS at 07:44

## 2017-01-01 RX ADMIN — Medication 500 MG: at 10:47

## 2017-01-01 RX ADMIN — STANDARDIZED SENNA CONCENTRATE AND DOCUSATE SODIUM 2 TABLET: 8.6; 5 TABLET, FILM COATED ORAL at 20:14

## 2017-01-01 RX ADMIN — PROPOFOL 35 MCG/KG/MIN: 10 INJECTION, EMULSION INTRAVENOUS at 22:17

## 2017-01-01 RX ADMIN — MAGNESIUM HYDROXIDE 30 ML: 400 SUSPENSION ORAL at 14:19

## 2017-01-01 RX ADMIN — SODIUM CHLORIDE, POTASSIUM CHLORIDE, SODIUM LACTATE AND CALCIUM CHLORIDE: 600; 310; 30; 20 INJECTION, SOLUTION INTRAVENOUS at 14:52

## 2017-01-01 RX ADMIN — KETAMINE HYDROCHLORIDE 150 MG: 50 INJECTION, SOLUTION, CONCENTRATE INTRAMUSCULAR; INTRAVENOUS at 02:00

## 2017-01-01 RX ADMIN — PRAVASTATIN SODIUM 40 MG: 20 TABLET ORAL at 22:14

## 2017-01-01 RX ADMIN — Medication 325 MG: at 18:02

## 2017-01-01 RX ADMIN — STANDARDIZED SENNA CONCENTRATE AND DOCUSATE SODIUM 2 TABLET: 8.6; 5 TABLET, FILM COATED ORAL at 20:07

## 2017-01-01 RX ADMIN — SODIUM CHLORIDE, POTASSIUM CHLORIDE, SODIUM LACTATE AND CALCIUM CHLORIDE: 600; 310; 30; 20 INJECTION, SOLUTION INTRAVENOUS at 15:34

## 2017-01-01 RX ADMIN — PROPOFOL 45 MCG/KG/MIN: 10 INJECTION, EMULSION INTRAVENOUS at 20:55

## 2017-01-01 RX ADMIN — OMEPRAZOLE 40 MG: 20 CAPSULE, DELAYED RELEASE ORAL at 07:54

## 2017-01-01 RX ADMIN — LEVOTHYROXINE SODIUM 150 MCG: 75 TABLET ORAL at 05:33

## 2017-01-01 RX ADMIN — AMPICILLIN SODIUM AND SULBACTAM SODIUM 3 G: 2; 1 INJECTION, POWDER, FOR SOLUTION INTRAMUSCULAR; INTRAVENOUS at 11:39

## 2017-01-01 RX ADMIN — IPRATROPIUM BROMIDE AND ALBUTEROL SULFATE 3 ML: .5; 3 SOLUTION RESPIRATORY (INHALATION) at 02:33

## 2017-01-01 RX ADMIN — CHLORHEXIDINE GLUCONATE 15 ML: 1.2 RINSE ORAL at 20:27

## 2017-01-01 ASSESSMENT — ENCOUNTER SYMPTOMS
EYES NEGATIVE: 1
BLOOD IN STOOL: 0
WEAKNESS: 1
FLANK PAIN: 0
CONSTIPATION: 0
ABDOMINAL PAIN: 0
VOMITING: 0
VOMITING: 0
SPUTUM PRODUCTION: 0
VOMITING: 0
SHORTNESS OF BREATH: 0
FEVER: 0
NAUSEA: 0
DEPRESSION: 0
EYE PAIN: 0
CONSTIPATION: 0
CONSTIPATION: 0
SHORTNESS OF BREATH: 0
MEMORY LOSS: 0
BLOOD IN STOOL: 0
VOMITING: 0
PALPITATIONS: 0
FEVER: 0
VOMITING: 0
WEAKNESS: 1
FALLS: 1
BLOOD IN STOOL: 0
EYE PAIN: 0
SEIZURES: 0
FEVER: 0
ABDOMINAL PAIN: 0
FALLS: 1
SHORTNESS OF BREATH: 1
PALPITATIONS: 0
SHORTNESS OF BREATH: 0
BLOOD IN STOOL: 0
PALPITATIONS: 0
ABDOMINAL PAIN: 0
CONSTIPATION: 0
EYE PAIN: 0
NERVOUS/ANXIOUS: 0
COUGH: 0
CHILLS: 0
EYE PAIN: 0
VOMITING: 0
FLANK PAIN: 0
SPUTUM PRODUCTION: 0
ABDOMINAL PAIN: 0
EYE PAIN: 0
PALPITATIONS: 0
DIAPHORESIS: 0
SHORTNESS OF BREATH: 1
EYES NEGATIVE: 1
HEADACHES: 0
EYES NEGATIVE: 1
EYE PAIN: 1
NAUSEA: 0
NAUSEA: 0
FEVER: 0
SHORTNESS OF BREATH: 0
FEVER: 0
HEARTBURN: 0
SHORTNESS OF BREATH: 1
FEVER: 0
FEVER: 0
DIARRHEA: 0
ABDOMINAL PAIN: 0
MEMORY LOSS: 0
COUGH: 1
FALLS: 1
COUGH: 0
COUGH: 0
DIAPHORESIS: 0
BLOOD IN STOOL: 0
EYE PAIN: 0
SHORTNESS OF BREATH: 0
WEAKNESS: 1
NAUSEA: 0
FALLS: 1
VOMITING: 0
ABDOMINAL PAIN: 0
NAUSEA: 0
ABDOMINAL PAIN: 1
NAUSEA: 0
HEADACHES: 0
VOMITING: 0
NAUSEA: 0
NERVOUS/ANXIOUS: 0
FEVER: 0
HEARTBURN: 0
SHORTNESS OF BREATH: 0
CHILLS: 0
WEAKNESS: 1
ORTHOPNEA: 0
PALPITATIONS: 0
NERVOUS/ANXIOUS: 0
WHEEZING: 0
ABDOMINAL PAIN: 0
HEADACHES: 0
ABDOMINAL PAIN: 0
SHORTNESS OF BREATH: 0
FALLS: 1
HEADACHES: 0
HEADACHES: 0
NAUSEA: 0
SHORTNESS OF BREATH: 1
DIARRHEA: 0
SHORTNESS OF BREATH: 0
COUGH: 0
CHILLS: 0
CHILLS: 0
ABDOMINAL PAIN: 0
DIARRHEA: 0
PALPITATIONS: 0
COUGH: 0
SHORTNESS OF BREATH: 0
ABDOMINAL PAIN: 0
VOMITING: 0
ABDOMINAL PAIN: 0
SHORTNESS OF BREATH: 0
FEVER: 0
WEAKNESS: 1
NAUSEA: 0
DIARRHEA: 0
PALPITATIONS: 0
FALLS: 1
SHORTNESS OF BREATH: 0
EYES NEGATIVE: 1
CHILLS: 0
BLOOD IN STOOL: 0
HEADACHES: 0
CHILLS: 0
COUGH: 0
HEADACHES: 0
COUGH: 0
NAUSEA: 0
NAUSEA: 0
SHORTNESS OF BREATH: 0
SORE THROAT: 0
SHORTNESS OF BREATH: 0
VOMITING: 0
SEIZURES: 0
NERVOUS/ANXIOUS: 0
EYE PAIN: 0
EYES NEGATIVE: 1
COUGH: 0
SEIZURES: 0
CONSTIPATION: 0
BLOOD IN STOOL: 0
SEIZURES: 0
FALLS: 1
COUGH: 0
FEVER: 0
NAUSEA: 0
VOMITING: 0
CHILLS: 0
NAUSEA: 0
BLURRED VISION: 0
VOMITING: 0
VOMITING: 0
NAUSEA: 0
EYES NEGATIVE: 1
WEAKNESS: 1
MEMORY LOSS: 0
NAUSEA: 0
HEMOPTYSIS: 0
DIARRHEA: 0
COUGH: 0
BLOOD IN STOOL: 0
COUGH: 0
ABDOMINAL PAIN: 0
ABDOMINAL PAIN: 0
COUGH: 0
ABDOMINAL PAIN: 0
NAUSEA: 0
EYE PAIN: 0
DIZZINESS: 0
WEAKNESS: 1
FEVER: 0
HEADACHES: 0
DIZZINESS: 0
EYES NEGATIVE: 1
CHILLS: 0
FLANK PAIN: 0
SHORTNESS OF BREATH: 0
HEADACHES: 0
FEVER: 0
SHORTNESS OF BREATH: 0
DIZZINESS: 0
FALLS: 1
DIAPHORESIS: 0
FOCAL WEAKNESS: 0
ABDOMINAL PAIN: 0
ABDOMINAL PAIN: 0
DIZZINESS: 0
DIARRHEA: 0
FEVER: 0
SHORTNESS OF BREATH: 0
FALLS: 1
COUGH: 0
COUGH: 0
FOCAL WEAKNESS: 0
NAUSEA: 0
CONSTIPATION: 0
ABDOMINAL PAIN: 0
FALLS: 0
BLURRED VISION: 0
EYE PAIN: 0
ABDOMINAL PAIN: 0
CHILLS: 0
ABDOMINAL PAIN: 0
FEVER: 0
NAUSEA: 0
SHORTNESS OF BREATH: 0
WEAKNESS: 1
BLOOD IN STOOL: 1
BRUISES/BLEEDS EASILY: 0
NAUSEA: 0
COUGH: 1
HEMOPTYSIS: 0
DIARRHEA: 0
VOMITING: 0
HEADACHES: 0
VOMITING: 0
FEVER: 0
PALPITATIONS: 0
COUGH: 0
CHILLS: 0
FEVER: 0
NAUSEA: 0
COUGH: 0
VOMITING: 0
SHORTNESS OF BREATH: 0
PALPITATIONS: 0
FALLS: 1
HEADACHES: 0
PALPITATIONS: 0
WEAKNESS: 1
NAUSEA: 0
FEVER: 0
HEADACHES: 0
CHILLS: 0
ABDOMINAL PAIN: 0
PALPITATIONS: 0
FEVER: 0
VOMITING: 0
CHILLS: 0
NAUSEA: 0
FALLS: 0
FEVER: 0
VOMITING: 0
EYES NEGATIVE: 1
EYE PAIN: 1
FEVER: 0
NAUSEA: 0
HEMOPTYSIS: 0
COUGH: 0
MYALGIAS: 0
HEADACHES: 0
PALPITATIONS: 0
MEMORY LOSS: 0
COUGH: 1
VOMITING: 0
VOMITING: 0
ABDOMINAL PAIN: 0
COUGH: 1
FOCAL WEAKNESS: 0
FALLS: 1
EYES NEGATIVE: 1
FEVER: 0
FEVER: 0
COUGH: 0
ABDOMINAL PAIN: 0
PALPITATIONS: 0
VOMITING: 0
EYES NEGATIVE: 1
VOMITING: 0
FEVER: 0
EYES NEGATIVE: 1
FALLS: 0
COUGH: 0
ABDOMINAL PAIN: 0
DEPRESSION: 0
DIZZINESS: 0
DIARRHEA: 0
VOMITING: 0
FALLS: 0
PALPITATIONS: 0
VOMITING: 0
WEAKNESS: 1
COUGH: 0
FLANK PAIN: 0
FOCAL WEAKNESS: 0
SHORTNESS OF BREATH: 0
HEADACHES: 0
FEVER: 0
SPUTUM PRODUCTION: 0
PALPITATIONS: 0
NAUSEA: 0
COUGH: 0
FEVER: 0
WEAKNESS: 1

## 2017-01-01 ASSESSMENT — PAIN SCALES - GENERAL
PAINLEVEL_OUTOF10: 0
PAINLEVEL_OUTOF10: ASSUMED PAIN PRESENT
PAINLEVEL_OUTOF10: 0

## 2017-01-01 ASSESSMENT — LIFESTYLE VARIABLES
REASON UNABLE TO ASSESS: INTUBATED AND SEDATED
EVER_SMOKED: YES
DO YOU DRINK ALCOHOL: NO
REASON UNABLE TO ASSESS: INTUBATED
EVER_SMOKED: YES
ALCOHOL_USE: NO
EVER_SMOKED: YES
REASON UNABLE TO ASSESS: INTUBATED AND SEDATED
DO YOU DRINK ALCOHOL: NO
REASON UNABLE TO ASSESS: INTUBATED AND SEDATED
EVER_SMOKED: YES
EVER_SMOKED: YES
ALCOHOL_USE: NO
EVER_SMOKED: YES

## 2017-01-01 ASSESSMENT — GAIT ASSESSMENTS: GAIT LEVEL OF ASSIST: UNABLE TO PARTICIPATE

## 2017-01-01 ASSESSMENT — PULMONARY FUNCTION TESTS
EPAP_CMH2O: 8
EPAP_CMH2O: 8
FVC: 1
EPAP_CMH2O: 8
FVC: 1.1
FVC: .5
EPAP_CMH2O: 8
FVC: .7
EPAP_CMH2O: 8
EPAP_CMH2O: 8

## 2017-01-01 ASSESSMENT — COGNITIVE AND FUNCTIONAL STATUS - GENERAL
DAILY ACTIVITIY SCORE: 8
DRESSING REGULAR UPPER BODY CLOTHING: A LOT
DRESSING REGULAR LOWER BODY CLOTHING: TOTAL
MOBILITY SCORE: 7
CLIMB 3 TO 5 STEPS WITH RAILING: TOTAL
SUGGESTED CMS G CODE MODIFIER DAILY ACTIVITY: CM
WALKING IN HOSPITAL ROOM: TOTAL
TOILETING: TOTAL
TURNING FROM BACK TO SIDE WHILE IN FLAT BAD: UNABLE
MOVING FROM LYING ON BACK TO SITTING ON SIDE OF FLAT BED: A LOT
EATING MEALS: TOTAL
HELP NEEDED FOR BATHING: TOTAL
MOVING TO AND FROM BED TO CHAIR: UNABLE
SUGGESTED CMS G CODE MODIFIER MOBILITY: CM
STANDING UP FROM CHAIR USING ARMS: TOTAL
PERSONAL GROOMING: A LOT

## 2017-01-01 ASSESSMENT — COPD QUESTIONNAIRES
DO YOU EVER COUGH UP ANY MUCUS OR PHLEGM?: YES, EVERY DAY
COPD SCREENING SCORE: 9
DURING THE PAST 4 WEEKS HOW MUCH DID YOU FEEL SHORT OF BREATH: MOST  OR ALL OF THE TIME
HAVE YOU SMOKED AT LEAST 100 CIGARETTES IN YOUR ENTIRE LIFE: YES

## 2017-01-01 ASSESSMENT — ACTIVITIES OF DAILY LIVING (ADL): TOILETING: INDEPENDENT

## 2017-03-21 PROBLEM — N28.9 ACUTE ON CHRONIC RENAL INSUFFICIENCY: Status: ACTIVE | Noted: 2017-01-01

## 2017-03-21 PROBLEM — K92.1 HEMATOCHEZIA: Status: ACTIVE | Noted: 2017-01-01

## 2017-03-21 PROBLEM — N18.9 ACUTE ON CHRONIC RENAL INSUFFICIENCY: Status: ACTIVE | Noted: 2017-01-01

## 2017-03-21 NOTE — ED PROVIDER NOTES
"ED Provider Note    CHIEF COMPLAINT  Chief Complaint   Patient presents with   • Bloody Stools       HPI  Sulaiman Rodgers is a 76 y.o. male who presents for evaluation of bloody stools.  Patient states that this morning he said 3 bowel movements with gross blood identified within the toilet after attempting to go.  Patient had some mild weakness associated with this.  The patient denies: Fever, URI symptoms, cardiorespiratory symptoms, vomiting, hematuria, dysuria, abdominal pain.  He indicates he did have a colonoscopy 4 years ago which showed some polyps but no other disease entities that he is aware of.  No recent travel.  No recent antibiotic use.  No other acute symptomatology or complaints.    REVIEW OF SYSTEMS  See HPI for further details.  The patient denies: Seizures, heart disease, cancer, stroke, gastrointestinal disorders.  All other systems negative.    PAST MEDICAL HISTORY  Past Medical History   Diagnosis Date   • Disorder of thyroid    • Cataract      bilateral IOL   • Diabetes (CMS-[facility])      oral meds only  \"pre diabetic\"       FAMILY HISTORY  History reviewed. No pertinent family history.    SOCIAL HISTORY  Previous smoker; positive alcohol use;    SURGICAL HISTORY  Past Surgical History   Procedure Laterality Date   • Other  03/10/2016     larangoscopy   • Microlaryngoscopy with laser N/A 4/22/2016     Procedure: MICROLARYNGOSCOPY WITH GOLD LASER;  Surgeon: Efrain Deleon M.D.;  Location: SURGERY SAME DAY [facility];  Service:        CURRENT MEDICATIONS  See nurses notes    ALLERGIES  No Known Allergies    PHYSICAL EXAM  VITAL SIGNS: /63 mmHg  Pulse 105  Temp(Src) 37.2 °C (99 °F)  Resp 16  Ht 1.829 m (6')  Wt 112.5 kg (248 lb 0.3 oz)  BMI 33.63 kg/m2  SpO2 95%   Constitutional: 76-year-old male, lying comfortably, oriented ×3  HENT: ,Atraumatic, Bilateral external ears normal, tympanic membranes clear, Oropharynx moist, No oral exudates, Nose normal.   Eyes: PERRL, EOMI, " Conjunctiva normal, No discharge.   Neck: Normal range of motion, No tenderness, Supple, No stridor.   Lymphatic: No lymphadenopathy noted.   Cardiovascular: Normal heart rate, Normal rhythm, No murmurs, No rubs, No gallops.   Thorax & Lungs: Normal Equal breath sounds, No respiratory distress, No wheezing, no stridor, no rales. No chest tenderness.   Abdomen: Soft, nontender, nondistended, no organomegaly, positive bowel sounds normal in quality. No guarding or rebound.  Skin: Good skin turgor, pink, warm, dry. No rashes, petechiae, purpura. Normal capillary refill.   Back: No tenderness, No CVA tenderness.   Genitalia: External genitalia appear normal, No masses or lesions. No discharge. Nontender  Rectal: Normal appearance, Normal sphincter tone. No external or internal lesions noted. Stool is grossly bloody and obviously heme-positive;  Extremities: Intact distal pulses, scant bilateral lower extremity edema, No tenderness, No cyanosis, No clubbing. Vascular: Pulses are 2+, symmetric in the upper and lower extremities.  Musculoskeletal: Mild diffuse arthritic changes. No tenderness to palpation or major deformities noted.   Neurologic: Alert & oriented x 3, Normal motor function, Normal sensory function, No gross focal deficits noted.   Psychiatric: Affect normal, Judgment normal, Mood normal.       COURSE & MEDICAL DECISION MAKING  Pertinent Labs & Imaging studies reviewed. (See chart for details)  1.  Monitor  2.  IV normal saline    Laboratory studies: CBC shows white count of 10.7, 79% neutrophils, 13% lymphocytes, 4% monocytes, hemoglobin 10.3, hematocrit 34.8; CMP shows a random glucose 117, BUN 27, creatinine 1.82, otherwise within normal limits; coags within normal limits;    Discussion/consultation: At this time, the patient presents with  hematochezia.  The patient does have a decrease in his hemoglobin and hematocrit.  He also has associated renal insufficiency which has worsened since his last  laboratory testing performed one year ago.  I spoke with the hospitalist on-call.  I spoke with gastroenterology on-call.  The patient will be admitted for further monitoring, treatment, and care.    FINAL IMPRESSION  1. Acute lower GI hemorrhage    2. Renal insufficiency        PLAN  1.  Patient will be admitted for further monitoring, treatment, and care.    Electronically signed by: Guy G Gansert, 3/21/2017 1:03 PM

## 2017-03-21 NOTE — IP AVS SNAPSHOT
" Home Care Instructions                                                                                                                  Name:Sulaiman Rodgers  Medical Record Number:[id number]  CSN: [id number]    YOB: 1940   Age: 76 y.o.  Sex: male  HT:1.829 m (6' 0.01\") WT: 112.4 kg (247 lb 12.8 oz)          Admit Date: 3/21/2017     Discharge Date:   Today's Date: 3/23/2017  Attending Doctor:  Ernesto Bond M.D.                  Allergies:  Review of patient's allergies indicates no known allergies.            Discharge Instructions       Discharge Instructions    Discharged to home by car with relative. Discharged via wheelchair, hospital escort: Yes.  Special equipment needed: Not Applicable    Be sure to schedule a follow-up appointment with your primary care doctor or any specialists as instructed.   Stop aspirin for 2 weeks then restart if no further bleeding, hold BP meds until BP > 140/90 at home (need to buy BP cuff for home use)    Discharge Plan:   Diet Plan: Discussed  Activity Level: Discussed  Confirmed Follow up Appointment: Patient to Call and Schedule Appointment  Confirmed Symptoms Management: Discussed  Medication Reconciliation Updated: Yes  Influenza Vaccine Indication: Patient Refuses    I understand that a diet low in cholesterol, fat, and sodium is recommended for good health. Unless I have been given specific instructions below for another diet, I accept this instruction as my diet prescription.   Other diet: regular    Special Instructions: None    · Is patient discharged on Warfarin / Coumadin?   No     · Is patient Post Blood Transfusion?  No    Depression / Suicide Risk    As you are discharged from this [facility] facility, it is important to learn how to keep safe from harming yourself.    Recognize the warning signs:  · Abrupt changes in personality, positive or negative- including increase in energy   · Giving away possessions  · Change in eating patterns- significant " "weight changes-  positive or negative  · Change in sleeping patterns- unable to sleep or sleeping all the time   · Unwillingness or inability to communicate  · Depression  · Unusual sadness, discouragement and loneliness  · Talk of wanting to die  · Neglect of personal appearance   · Rebelliousness- reckless behavior  · Withdrawal from people/activities they love  · Confusion- inability to concentrate     If you or a loved one observes any of these behaviors or has concerns about self-harm, here's what you can do:  · Talk about it- your feelings and reasons for harming yourself  · Remove any means that you might use to hurt yourself (examples: pills, rope, extension cords, firearm)  · Get professional help from the community (Mental Health, Substance Abuse, psychological counseling)  · Do not be alone:Call your Safe Contact- someone whom you trust who will be there for you.  · Call your local CRISIS HOTLINE 661-0147 or 214-034-8781  · Call your local Children's Mobile Crisis Response Team [place] (289) 928-9470 or wwwThe [employer]  · Call the toll free National Suicide Prevention Hotlines   · National Suicide Prevention Lifeline 133-460-QGZS (1344)  · National Hope Line Network 800-SUICIDE (809-6339)      Bloody Stools  Bloody stools often mean that there is a problem in the digestive tract. Your caregiver may use the term \"melena\" to describe black, tarry, and bad smelling stools or \"hematochezia\" to describe red or maroon-colored stools. Blood seen in the stool can be caused by bleeding anywhere along the intestinal tract.   A black stool usually means that blood is coming from the upper part of the gastrointestinal tract (esophagus, stomach, or small bowel). Passing maroon-colored stools or bright red blood usually means that blood is coming from lower down in the large bowel or the rectum. However, sometimes massive bleeding in the stomach or small intestine can cause bright red bloody stools.   "   Consuming black licorice, lead, iron pills, medicines containing bismuth subsalicylate, or blueberries can also cause black stools. Your caregiver can test black stools to see if blood is present.  It is important that the cause of the bleeding be found. Treatment can then be started, and the problem can be corrected. Rectal bleeding may not be serious, but you should not assume everything is okay until you know the cause. It is very important to follow up with your caregiver or a specialist in gastrointestinal problems.  CAUSES   Blood in the stools can come from various underlying causes. Often, the cause is not found during your first visit. Testing is often needed to discover the cause of bleeding in the gastrointestinal tract. Causes range from simple to serious or even life-threatening. Possible causes include:  · Hemorrhoids. These are veins that are full of blood (engorged) in the rectum. They cause pain, inflammation, and may bleed.  · Anal fissures. These are areas of painful tearing which may bleed. They are often caused by passing hard stool.  · Diverticulosis. These are pouches that form on the colon over time, with age, and may bleed significantly.  · Diverticulitis. This is inflammation in areas with diverticulosis. It can cause pain, fever, and bloody stools, although bleeding is rare.  · Proctitis and colitis. These are inflamed areas of the rectum or colon. They may cause pain, fever, and bloody stools.  · Polyps and cancer. Colon cancer is a leading cause of preventable cancer death. It often starts out as precancerous polyps that can be removed during a colonoscopy, preventing progression into cancer. Sometimes, polyps and cancer may cause rectal bleeding.  · Gastritis and ulcers. Bleeding from the upper gastrointestinal tract (near the stomach) may travel through the intestines and produce black, sometimes tarry, often bad smelling stools. In certain cases, if the bleeding is fast enough, the  stools may not be black, but red and the condition may be life-threatening.  SYMPTOMS   You may have stools that are bright red and bloody, that are normal color with blood on them, or that are dark black and tarry. In some cases, you may only have blood in the toilet bowl. Any of these cases need medical care. You may also have:  · Pain at the anus or anywhere in the rectum.  · Lightheadedness or feeling faint.  · Extreme weakness.  · Nausea or vomiting.  · Fever.  DIAGNOSIS  Your caregiver may use the following methods to find the cause of your bleeding:  · Taking a medical history. Age is important. Older people tend to develop polyps and cancer more often. If there is anal pain and a hard, large stool associated with bleeding, a tear of the anus may be the cause. If blood drips into the toilet after a bowel movement, bleeding hemorrhoids may be the problem. The color and frequency of the bleeding are additional considerations. In most cases, the medical history provides clues, but seldom the final answer.  · A visual and finger (digital) exam. Your caregiver will inspect the anal area, looking for tears and hemorrhoids. A finger exam can provide information when there is tenderness or a growth inside. In men, the prostate is also examined.  · Endoscopy. Several types of small, long scopes (endoscopes) are used to view the colon.  · In the office, your caregiver may use a rigid, or more commonly, a flexible viewing sigmoidoscope. This exam is called flexible sigmoidoscopy. It is performed in 5 to 10 minutes.  · A more thorough exam is accomplished with a colonoscope. It allows your caregiver to view the entire 5 to 6 foot long colon. Medicine to help you relax (sedative) is usually given for this exam. Frequently, a bleeding lesion may be present beyond the reach of the sigmoidoscope. So, a colonoscopy may be the best exam to start with. Both exams are usually done on an outpatient basis. This means the patient  does not stay overnight in the hospital or surgery center.  · An upper endoscopy may be needed to examine your stomach. Sedation is used and a flexible endoscope is put in your mouth, down to your stomach.  · A barium enema X-ray. This is an X-ray exam. It uses liquid barium inserted by enema into the rectum. This test alone may not identify an actual bleeding point. X-rays highlight abnormal shadows, such as those made by lumps (tumors), diverticuli, or colitis.  TREATMENT   Treatment depends on the cause of your bleeding.   · For bleeding from the stomach or colon, the caregiver doing your endoscopy or colonoscopy may be able to stop the bleeding as part of the procedure.  · Inflammation or infection of the colon can be treated with medicines.  · Many rectal problems can be treated with creams, suppositories, or warm baths.  · Surgery is sometimes needed.  · Blood transfusions are sometimes needed if you have lost a lot of blood.  · For any bleeding problem, let your caregiver know if you take aspirin or other blood thinners regularly.  HOME CARE INSTRUCTIONS   · Take any medicines exactly as prescribed.  · Keep your stools soft by eating a diet high in fiber. Prunes (1 to 3 a day) work well for many people.  · Drink enough water and fluids to keep your urine clear or pale yellow.  · Take sitz baths if advised. A sitz bath is when you sit in a bathtub with warm water for 10 to 15 minutes to soak, soothe, and cleanse the rectal area.  · If enemas or suppositories are advised, be sure you know how to use them. Tell your caregiver if you have problems with this.  · Monitor your bowel movements to look for signs of improvement or worsening.  SEEK MEDICAL CARE IF:   · You do not improve in the time expected.  · Your condition worsens after initial improvement.  · You develop any new symptoms.  SEEK IMMEDIATE MEDICAL CARE IF:   · You develop severe or prolonged rectal bleeding.  · You vomit blood.  · You feel weak or  faint.  · You have a fever.  MAKE SURE YOU:  · Understand these instructions.  · Will watch your condition.  · Will get help right away if you are not doing well or get worse.  Document Released: 12/08/2003 Document Revised: 03/11/2013 Document Reviewed: 05/04/2012  ExitCare® Patient Information ©2014 [employer].      Chronic Kidney Disease  Chronic kidney disease occurs when the kidneys are damaged over a long period. The kidneys are two organs that lie on either side of the spine between the middle of the back and the front of the abdomen. The kidneys:  · Remove wastes and extra water from the blood.  · Produce important hormones. These help keep bones strong, regulate blood pressure, and help create red blood cells.  · Balance the fluids and chemicals in the blood and tissues.  A small amount of kidney damage may not cause problems, but a large amount of damage may make it difficult or impossible for the kidneys to work the way they should. If steps are not taken to slow down the kidney damage or stop it from getting worse, the kidneys may stop working permanently. Most of the time, chronic kidney disease does not go away. However, it can often be controlled, and those with the disease can usually live normal lives.  CAUSES  The most common causes of chronic kidney disease are diabetes and high blood pressure (hypertension). Chronic kidney disease may also be caused by:  · Diseases that cause the kidneys' filters to become inflamed.  · Diseases that affect the immune system.  · Genetic diseases.  · Medicines that damage the kidneys, such as anti-inflammatory medicines.  · Poisoning or exposure to toxic substances.  · A reoccurring kidney or urinary infection.  · A problem with urine flow. This may be caused by:  ¨ Cancer.  ¨ Kidney stones.  ¨ An enlarged prostate in males.  SIGNS AND SYMPTOMS  Because the kidney damage in chronic kidney disease occurs slowly, symptoms develop slowly and may not be obvious  until the kidney damage becomes severe. A person may have a kidney disease for years without showing any symptoms. Symptoms can include:  · Swelling (edema) of the legs, ankles, or feet.  · Tiredness (lethargy).  · Nausea or vomiting.  · Confusion.  · Problems with urination, such as:  ¨ Decreased urine production.  ¨ Frequent urination, especially at night.  ¨ Frequent accidents in children who are potty trained.  · Muscle twitches and cramps.  · Shortness of breath.  · Weakness.  · Persistent itchiness.  · Loss of appetite.  · Metallic taste in the mouth.  · Trouble sleeping.  · Slowed development in children.  · Short stature in children.  DIAGNOSIS  Chronic kidney disease may be detected and diagnosed by tests, including blood, urine, imaging, or kidney biopsy tests.  TREATMENT  Most chronic kidney diseases cannot be cured. Treatment usually involves relieving symptoms and preventing or slowing the progression of the disease. Treatment may include:  · A special diet. You may need to avoid alcohol and foods that are salty and high in potassium.  · Medicines. These may:  ¨ Lower blood pressure.  ¨ Relieve anemia.  ¨ Relieve swelling.  ¨ Protect the bones.  HOME CARE INSTRUCTIONS  · Follow your prescribed diet.  Your health care provider may instruct you to limit daily salt (sodium) and protein intake.  · Take medicines only as directed by your health care provider. Do not take any new medicines (prescription, over-the-counter, or nutritional supplements) unless approved by your health care provider. Many medicines can worsen your kidney damage or need to have the dose adjusted.    · Quit smoking if you smoke. Talk to your health care provider about a smoking cessation program.  · Keep all follow-up visits as directed by your health care provider.  · Monitor your blood pressure.  · Start or continue an exercise plan.  · Get immunizations as directed by your health care provider.  · Take vitamin and mineral  supplements as directed by your health care provider.  SEEK IMMEDIATE MEDICAL CARE IF:  · Your symptoms get worse or you develop new symptoms.  · You develop symptoms of end-stage kidney disease. These include:  ¨ Headaches.  ¨ Abnormally dark or light skin.  ¨ Numbness in the hands or feet.  ¨ Easy bruising.  ¨ Frequent hiccups.  ¨ Menstruation stops.  · You have a fever.  · You have decreased urine production.  · You have pain or bleeding when urinating.  MAKE SURE YOU:  · Understand these instructions.  · Will watch your condition.  · Will get help right away if you are not doing well or get worse.  FOR MORE INFORMATION   · American Association of Kidney Patients: www.aakp.org  · National Kidney Foundation: www.kidney.org  · American Kidney Fund: www.akfinc.org  · Life Options Rehabilitation Program: www.lifeoptions.org and www.kidneyschool.org     This information is not intended to replace advice given to you by your health care provider. Make sure you discuss any questions you have with your health care provider.     Document Released: 09/26/2009 Document Revised: 01/08/2016 Document Reviewed: 08/16/2013  [employer] Interactive Patient Education ©2016 Elsevier Inc.      Pravastatin tablets  What is this medicine?  PRAVASTATIN (PRA va stat in) is known as a HMG-CoA reductase inhibitor or 'statin'. It lowers the level of cholesterol and triglycerides in the blood. This drug may also reduce the risk of heart attack, stroke, or other health problems in patients with risk factors for heart disease. Diet and lifestyle changes are often used with this drug.  This medicine may be used for other purposes; ask your health care provider or pharmacist if you have questions.  COMMON BRAND NAME(S): Pravachol  What should I tell my health care provider before I take this medicine?  They need to know if you have any of these conditions:  -frequently drink alcoholic beverages  -kidney disease  -liver disease  -muscle aches or  weakness  -other medical condition  -an unusual or allergic reaction to pravastatin, other medicines, foods, dyes, or preservatives  -pregnant or trying to get pregnant  -breast-feeding  How should I use this medicine?  Take pravastatin tablets by mouth. Swallow the tablets with a drink of water. Pravastatin can be taken at anytime of the day, with or without food. Follow the directions on the prescription label. Take your doses at regular intervals. Do not take your medicine more often than directed.  Talk to your pediatrician regarding the use of this medicine in children. Special care may be needed. Pravastatin has been used in children as young as 8 years of age.  Overdosage: If you think you have taken too much of this medicine contact a poison control center or emergency room at once.  NOTE: This medicine is only for you. Do not share this medicine with others.  What if I miss a dose?  If you miss a dose, take it as soon as you can. If it is almost time for your next dose, take only that dose. Do not take double or extra doses.  What may interact with this medicine?  Do not take this medicine with any of the following medications:  -herbal medicines such as red yeast rice  This medicine may also interact with the following medications:  -alcohol  -antiviral medicines for HIV or AIDS  -certain medicines for fungal infections like ketoconazole and itraconazole  -colchicine  -cyclosporine  -other medicines for high cholesterol  -some antibiotics like clarithromycin, erythromycin, and telithromycin  This list may not describe all possible interactions. Give your health care provider a list of all the medicines, herbs, non-prescription drugs, or dietary supplements you use. Also tell them if you smoke, drink alcohol, or use illegal drugs. Some items may interact with your medicine.  What should I watch for while using this medicine?  Visit your doctor or health care professional for regular check-ups. You may need  regular tests to make sure your liver is working properly.  Tell your doctor or health care professional right away if you get any unexplained muscle pain, tenderness, or weakness, especially if you also have a fever and tiredness. Your doctor or health care professional may tell you to stop taking this medicine if you develop muscle problems. If your muscle problems do not go away after stopping this medicine, contact your health care professional.  This drug is only part of a total heart-health program. Your doctor or a dietician can suggest a low-cholesterol and low-fat diet to help. Avoid alcohol and smoking, and keep a proper exercise schedule.  Do not use this drug if you are pregnant or breast-feeding. Serious side effects to an unborn child or to an infant are possible. Talk to your doctor or pharmacist for more information.  This medicine may affect blood sugar levels. If you have diabetes, check with your doctor or health care professional before you change your diet or the dose of your diabetic medicine.  If you are going to have surgery tell your health care professional that you are taking this drug.  What side effects may I notice from receiving this medicine?  Side effects that you should report to your doctor or health care professional as soon as possible:  -allergic reactions like skin rash, itching or hives, swelling of the face, lips, or tongue  -dark urine  -fever  -muscle pain, cramps, or weakness  -redness, blistering, peeling or loosening of the skin, including inside the mouth  -trouble passing urine or change in the amount of urine  -unusually weak or tired  -yellowing of the eyes or skin  Side effects that usually do not require medical attention (report to your doctor or health care professional if they continue or are bothersome):  -gas  -headache  -heartburn  -indigestion  -stomach pain  This list may not describe all possible side effects. Call your doctor for medical advice about side  effects. You may report side effects to FDA at 0-732-FDA-0950.  Where should I keep my medicine?  Keep out of the reach of children.  Store at room temperature between 15 to 30 degrees C (59 to 86 degrees F). Protect from light. Keep container tightly closed. Throw away any unused medicine after the expiration date.  NOTE: This sheet is a summary. It may not cover all possible information. If you have questions about this medicine, talk to your doctor, pharmacist, or health care provider.  © 2014, Elsevier/[employer]. (11/6/2012 10:39:37 AM)      Iron Chewable Tablets  What is this medicine?  IRON (AHY regi) replaces iron that is essential to healthy red blood cells. Iron is used to treat iron deficiency anemia. Anemia may cause problems like tiredness, shortness of breath, or slowed growth in children. Only take iron if your doctor has told you to. Do not treat yourself with iron if you are feeling tired. Most healthy people get enough iron in their diets, particularly if they eat cereals, meat, poultry, and fish.  This medicine may be used for other purposes; ask your health care provider or pharmacist if you have questions.  COMMON BRAND NAME(S): ICAR Pediatric  What should I tell my health care provider before I take this medicine?  They need to know if you have any of these conditions:  -frequently drink alcohol  -bowel disease  -hemolytic anemia  -iron overload (hemochromatosis, hemosiderosis)  -liver disease  -problems with swallowing  -stomach ulcer or other stomach problems  -an unusual or allergic reaction to iron, other medicines, foods, dyes, or preservatives  -pregnant or trying to get pregnant  -breast-feeding  How should I use this medicine?  Take this medicine by mouth. Chew it completely before swallowing. Follow the directions on the prescription label. Take this medicine in an upright or sitting position. Try to take any bedtime doses at least 10 minutes before lying down. You may take this  medicine with food. Take your medicine at regular intervals. Do not take your medicine more often than directed. Do not stop taking except on your doctor's advice.  Talk to your pediatrician regarding the use of this medicine in children. While this drug may be prescribed for even very young children for selected conditions, precautions do apply.  Overdosage: If you think you have taken too much of this medicine contact a poison control center or emergency room at once.  NOTE: This medicine is only for you. Do not share this medicine with others.  What if I miss a dose?  If you miss a dose, take it as soon as you can. If it is almost time for your next dose, take only that dose. Do not take double or extra doses.  What may interact with this medicine?  If you are taking this iron product, you should not take iron in any other medicine or dietary supplement.  This medicine may also interact with the following medications:  -alendronate  -antacids  -cefdinir  -chloramphenicol  -cholestyramine  -deferoxamine  -dimercaprol  -etidronate  -medicines for stomach ulcers or other stomach problems  -pancreatic enzymes  -quinolone antibiotics (examples: Cipro, Floxin, Levaquin, Tequin and others)  -risedronate  -tetracycline antibiotics (examples: doxycycline, tetracycline, minocycline, and others)  -thyroid hormones  This list may not describe all possible interactions. Give your health care provider a list of all the medicines, herbs, non-prescription drugs, or dietary supplements you use. Also tell them if you smoke, drink alcohol, or use illegal drugs. Some items may interact with your medicine.  What should I watch for while using this medicine?  Use iron supplements only as directed by your health care professional. You will need important blood work while you are taking this medicine. It may take 3 to 6 months of therapy to treat low iron levels. Pregnant women should follow the dose and length of iron treatment as  directed by their doctors.  Do not use iron longer than prescribed, and do not take a higher dose than recommended. Long-term use may cause excess iron to build-up in the body.  Do not take iron with antacids. If you need to take an antacid, take it 2 hours after a dose of iron.  What side effects may I notice from receiving this medicine?  Side effects that you should report to your doctor or health care professional as soon as possible:  -allergic reactions like skin rash, itching or hives, swelling of the face, lips, or tongue  -blue lips, nails, or palms  -dark colored stools (this may be due to the iron, but can indicate a more serious condition)  -drowsiness  -pain with or difficulty swallowing  -pale or clammy skin  -seizures  -stomach pain  -unusually weak or tired  -vomiting  -weak, fast, or irregular heartbeat  Side effects that usually do not require medical attention (report to your doctor or health care professional if they continue or are bothersome):  -constipation  -indigestion  -nausea or stomach upset  This list may not describe all possible side effects. Call your doctor for medical advice about side effects. You may report side effects to FDA at 2-640-FDA-4374.  Where should I keep my medicine?  Keep out of the reach of children. Even small amounts of iron can be harmful to a child.  Store at room temperature between 15 and 30 degrees C (59 and 86 degrees F). Keep container tightly closed. Throw away any unused medicine after the expiration date.  NOTE: This sheet is a summary. It may not cover all possible information. If you have questions about this medicine, talk to your doctor, pharmacist, or health care provider.  © 2014, [employer]vier/Gold Standard. (5/7/2009 5:08:28 PM)              Follow-up Information     1. Follow up with Lavelle Dale D.O. In 1 week.    Specialty:  Family Medicine    Contact information    [address] [address]  [place] [address] [phone]           Discharge  Medication Instructions:    Below are the medications your physician expects you to take upon discharge:    Review all your home medications and newly ordered medications with your doctor and/or pharmacist. Follow medication instructions as directed by your doctor and/or pharmacist.    Please keep your medication list with you and share with your physician.               Medication List      START taking these medications        Instructions    ferrous sulfate 325 (65 FE) MG tablet    Take 1 Tab by mouth 2 times a day, with meals.   Dose:  325 mg         CHANGE how you take these medications        Instructions    pravastatin 40 MG tablet   What changed:    - how much to take  - when to take this   Commonly known as:  PRAVACHOL    Take 1 Tab by mouth every day.   Dose:  40 mg         CONTINUE taking these medications        Instructions    levothyroxine 150 MCG Tabs   Last time this was given:  150 mcg on 3/23/2017  6:13 AM   Commonly known as:  SYNTHROID    Take 150 mcg by mouth Every morning on an empty stomach.   Dose:  150 mcg       Magnesium 500 MG Tabs    Take 1,000 mg by mouth every day.   Dose:  1000 mg       metformin  MG Tb24   Commonly known as:  GLUCOPHAGE XR    Take 500 mg by mouth every day.   Dose:  500 mg       niacin 500 MG Tabs    Take 1,000 mg by mouth every day.   Dose:  1000 mg         STOP taking these medications     amlodipine 5 MG Tabs   Commonly known as:  NORVASC       aspirin 81 MG Chew chewable tablet   Commonly known as:  ASA       lisinopril 5 MG Tabs   Commonly known as:  PRINIVIL               Instructions           Diet / Nutrition:    Follow any diet instructions given to you by your doctor or the dietician, including how much salt (sodium) you are allowed each day.    If you are overweight, talk to your doctor about a weight reduction plan.    Activity:    Remain physically active following your doctor's instructions about exercise and activity.    Rest often.     Any time  you become even a little tired or short of breath, SIT DOWN and rest.    Worsening Symptoms:    Report any of the following signs and symptoms to the doctor's office immediately:    *Pain of jaw, arm, or neck  *Chest pain not relieved by medication                               *Dizziness or loss of consciousness  *Difficulty breathing even when at rest   *More tired than usual                                       *Bleeding drainage or swelling of surgical site  *Swelling of feet, ankles, legs or stomach                 *Fever (>100ºF)  *Pink or blood tinged sputum  *Weight gain (3lbs/day or 5lbs /week)           *Shock from internal defibrillator (if applicable)  *Palpitations or irregular heartbeats                *Cool and/or numb extremities    Stroke Awareness    Common Risk Factors for Stroke include:    Age  Atrial Fibrillation  Carotid Artery Stenosis  Diabetes Mellitus  Excessive alcohol consumption  High blood pressure  Overweight   Physical inactivity  Smoking    Warning signs and symptoms of a stroke include:    *Sudden numbness or weakness of the face, arm or leg (especially on one side of the body).  *Sudden confusion, trouble speaking or understanding.  *Sudden trouble seeing in one or both eyes.  *Sudden trouble walking, dizziness, loss of balance or coordination.Sudden severe headache with no known cause.    It is very important to get treatment quickly when a stroke occurs. If you experience any of the above warning signs, call 911 immediately.                   Disclaimer         Quit Smoking / Tobacco Use:    I understand the use of any tobacco products increases my chance of suffering from future heart disease or stroke and could cause other illnesses which may shorten my life. Quitting the use of tobacco products is the single most important thing I can do to improve my health. For further information on smoking / tobacco cessation call a Toll Free Quit Line at 1-348.197.5216 (*National Cancer  [place]) or 1-220.859.4208 (American Lung Association) or you can access the web based program at www.lungusa.org.    [place] Tobacco Users Help Line:  (632) 609-6569       Toll Free: 1-936.815.2782  Quit Tobacco Program [facility] (628)497-8588    Crisis Hotline:    [place] Crisis Hotline:  6-419-TGIKCNE or 1-654.455.1384    [place] Crisis Hotline:    1-450.648.6452 or 419-760-9207    Discharge Survey:   Thank you for choosing [facility]. We hope we did everything we could to make your hospital stay a pleasant one. You may be receiving a phone survey and we would appreciate your time and participation in answering the questions. Your input is very valuable to us in our efforts to improve our service to our patients and their families.        My signature on this form indicates that:    1. I have reviewed and understand the above information.  2. My questions regarding this information have been answered to my satisfaction.  3. I have formulated a plan with my discharge nurse to obtain my prescribed medications for home.                  Disclaimer         __________________________________                     __________       ________                       Patient Signature                                                 Date                    Time

## 2017-03-21 NOTE — H&P
"HOSPITAL MEDICINE HISTORY/ PHYSICAL    Date & Time note created:    3/21/2017   2:28 PM       Patient ID:   Name: Sulaiman Rodgers. YOB: 1940. Age: 76 y.o. male. MRN: [id number]    Admitting Attending:  Chirag Salinas     PCP : Lavelle Dale D.O.    Outpatient GI: [facility]        Chief Complaint:       Hematochezia    History of Present Illness:    Ana Maria is a 76 y.o. male w/h/o thyroid disorder, bilateral cataracts, diabetes who presents with hematochezia. This morning patient woke up and had the urge to go the bathroom. He then had a bout of hematochezia. It was a large amount which she said felt the toilet with blood. He then went out and then came back and had another episode. He had a total of 3 episodes this morning and that decided to come to the hospital. He had no abdominal pain. She just had a sensation of pressure when he needed to go. He does not have any history of GI bleed.  Patient did have a colonoscopy 4 years ago which was reportedly normal except for polyps.    Review of Systems:    Has cough  Please see HPI, all other systems were reviewed and are negative (AMA/CMS criteria)              Past Medical/ Family / Social history (PFSH):   Past Medical History   Diagnosis Date   • Disorder of thyroid    • Cataract      bilateral IOL   • Diabetes (CMS-[facility])      oral meds only  \"pre diabetic\"     Past Surgical History   Procedure Laterality Date   • Other  03/10/2016     larangoscopy   • Microlaryngoscopy with laser N/A 4/22/2016     Procedure: MICROLARYNGOSCOPY WITH GOLD LASER;  Surgeon: Efrain Deleon M.D.;  Location: SURGERY SAME DAY [facility];  Service:      Current Outpatient Medications:  No current facility-administered medications on file prior to encounter.     Current Outpatient Prescriptions on File Prior to Encounter   Medication Sig Dispense Refill   • metformin ER (GLUCOPHAGE XR) 500 MG TABLET SR 24 HR Take 500 mg by mouth every day.     • levothyroxine (SYNTHROID) " 150 MCG Tab Take 150 mcg by mouth Every morning on an empty stomach.     • amlodipine (NORVASC) 5 MG Tab Take 5 mg by mouth every day.     • aspirin (ASA) 81 MG Chew Tab chewable tablet Take 81 mg by mouth every day.     • niacin 500 MG Tab Take 1,000 mg by mouth every day.       Medication Allergy/Sensitivities:  No Known Allergies  Family History:  History reviewed. No pertinent family history.   Mother had unknown type of cancer  Father had gastrectomy as well as bowel problems    Social History:  Social History   Substance Use Topics   • Smoking status: Former Smoker -- 3.00 packs/day for 20 years     Types: Cigarettes     Quit date: 01/01/1970   • Smokeless tobacco: Never Used   • Alcohol Use: Yes      Comment: 1/week     #################################################################  Physical Exam:   Vitals/ General Appearance:   Weight/BMI: Body mass index is 33.63 kg/(m^2).  Blood pressure 111/63, pulse 105, temperature 37.2 °C (99 °F), resp. rate 16, height 1.829 m (6'), weight 112.5 kg (248 lb 0.3 oz), SpO2 95 %.   Filed Vitals:    03/21/17 1237   BP: 111/63   Pulse: 105   Temp: 37.2 °C (99 °F)   Resp: 16   Height: 1.829 m (6')   Weight: 112.5 kg (248 lb 0.3 oz)   SpO2: 95%    Oxygen Therapy:  Pulse Oximetry: 95 %, O2 Delivery: None (Room Air)    Constitutional:  well developed, obese  HENMT: Normocephalic, atraumatic, b/l ears normal, nose normal  Eyes:  EOMI, conjunctiva normal, no discharge  Neck: no tracheal deviation, supple  Cardiovascular: tachycardic, systolic murmur, normal rhythm, no murmurs, no rubs or gallops; no cyanosis, clubbing. Trace pitting edema bilateral lower extremities  Lungs: Respiratory effort is normal, normal breath sounds, breath sounds clear to auscultation b/l, no rales, rhonchi or wheezing  Abdomen: soft, non-tender, no guarding or rebound  Rectal: Per ERP, positive for blood and no masses  Skin: warm, dry, no erythema, no rash  Neurologic: Alert and oriented  Psychiatric:  Some anxiety or depression    #################################################################  Lab Data Review:    Objective  Recent Results (from the past 24 hour(s))   CBC WITH DIFFERENTIAL    Collection Time: 03/21/17  1:00 PM   Result Value Ref Range    WBC 10.7 4.8 - 10.8 K/uL    RBC 4.01 (L) 4.70 - 6.10 M/uL    Hemoglobin 10.3 (L) 14.0 - 18.0 g/dL    Hematocrit 34.8 (L) 42.0 - 52.0 %    MCV 86.8 81.4 - 97.8 fL    MCH 25.7 (L) 27.0 - 33.0 pg    MCHC 29.6 (L) 33.7 - 35.3 g/dL    RDW 76.2 (H) 35.9 - 50.0 fL    Platelet Count 277 164 - 446 K/uL    MPV 9.8 9.0 - 12.9 fL    Nucleated RBC 1.30 /100 WBC    NRBC (Absolute) 0.14 K/uL    Neutrophils-Polys 79.00 (H) 44.00 - 72.00 %    Lymphocytes 13.00 (L) 22.00 - 41.00 %    Monocytes 4.00 0.00 - 13.40 %    Eosinophils 0.00 0.00 - 6.90 %    Basophils 1.00 0.00 - 1.80 %    Neutrophils (Absolute) 8.77 (H) 1.82 - 7.42 K/uL    Lymphs (Absolute) 1.39 1.00 - 4.80 K/uL    Monos (Absolute) 0.43 0.00 - 0.85 K/uL    Eos (Absolute) 0.00 0.00 - 0.51 K/uL    Baso (Absolute) 0.11 0.00 - 0.12 K/uL   COMP METABOLIC PANEL    Collection Time: 03/21/17  1:00 PM   Result Value Ref Range    Total Protein 7.6 6.0 - 8.2 g/dL    Sodium 138 135 - 145 mmol/L    Potassium 4.6 3.6 - 5.5 mmol/L    Chloride 108 96 - 112 mmol/L    Co2 20 20 - 33 mmol/L    Anion Gap 10.0 0.0 - 11.9    Glucose 117 (H) 65 - 99 mg/dL    Bun 27 (H) 8 - 22 mg/dL    Creatinine 1.82 (H) 0.50 - 1.40 mg/dL    Calcium 8.9 8.4 - 10.2 mg/dL    AST(SGOT) 18 12 - 45 U/L    ALT(SGPT) 16 2 - 50 U/L    Alkaline Phosphatase 43 30 - 99 U/L    Total Bilirubin 1.5 0.1 - 1.5 mg/dL    Albumin 4.2 3.2 - 4.9 g/dL    Globulin 3.4 1.9 - 3.5 g/dL    A-G Ratio 1.2 g/dL   APTT    Collection Time: 03/21/17  1:00 PM   Result Value Ref Range    APTT 33.9 24.7 - 36.0 sec   PROTHROMBIN TIME (INR)    Collection Time: 03/21/17  1:00 PM   Result Value Ref Range    PT 14.4 12.0 - 14.6 sec    INR 1.14 (H) 0.87 - 1.13   DIFFERENTIAL MANUAL    Collection  Time: 03/21/17  1:00 PM   Result Value Ref Range    Bands-Stabs 3.00 0.00 - 10.00 %    Manual Diff Status PERFORMED    ESTIMATED GFR    Collection Time: 03/21/17  1:00 PM   Result Value Ref Range    GFR If  44 (A) >60 mL/min/1.73 m 2    GFR If Non  36 (A) >60 mL/min/1.73 m 2       (click the triangle to expand results)  My interpretation of lab results:   Hemoglobin 10.3, RDW elevated at 76.2, BUN 27, creatinine 1.82  Imaging/Procedures Review:    No orders to display     Assessment and Plan:      1. Hematochezia  - No history of this per patient  - [facility] Dr. Winchester consulted  - Trending hemoglobins and monitoring on telemetry  - Holding outpatient aspirin  2. Anemia due to acute blood loss  - Currently hemodynamically stable and no current indication for transfusion  - Monitoring carefully  3. Elevated RDW  - Checking iron panel  4. Diabetes type II with hyperglycemia  - Holding metformin while inpatient  - Sliding scale insulin  5. Hypothyroidism  - Continue levothyroxine  6. Hypertension  - Continue amlodipine and lisinopril with holding parameters  7. Prophylaxis: SCDs  8. Code: Full code per patient with wife present

## 2017-03-21 NOTE — IP AVS SNAPSHOT
3/23/2017          Sulaiman Rodgers  [address] [place] [address]    Dear Sulaiman:    [facility] wants to ensure your discharge home is safe and you or your loved ones have had all your questions answered regarding your care after you leave the hospital.    You may receive a telephone call within two days of your discharge.  This call is to make certain you understand your discharge instructions as well as ensure we provided you with the best care possible during your stay with us.     The call will only last approximately 3-5 minutes and will be done by a nurse.    Once again, we want to ensure your discharge home is safe and that you have a clear understanding of any next steps in your care.  If you have any questions or concerns, please do not hesitate to contact us, we are here for you.  Thank you for choosing [facility] for your healthcare needs.    Sincerely,    Rick Carrion    [facility]

## 2017-03-21 NOTE — CARE PLAN
Problem: Safety  Goal: Will remain free from injury  Outcome : Progressing as expected.                    Safety Precaution in place. No skid socks in use. Call light within reach. Hourly rounds in effect.  Reminded patient to call for assist.     Problem: Knowledge Deficit  Goal: Knowledge of disease process/condition, treatment plan, diagnostic tests, and medications will improve  Outcome : Progressing as expcted.                    Discussed Plan of care. Questions answered. Test- Colonoscopy in am. Verbalized understanding.

## 2017-03-21 NOTE — PROGRESS NOTES
1505 Received patient from ER via gurney accompanied by on male ER RN. Noted with steady gait.    1555 Admission Profile completed. No c/o's at this time. Fall Protocol in effect. Call light within reach. Reminded patient to call for assist.  Assessment completed. No distress noted. Plan of care reviewed with the patient. Verbalzied understanding.    1615 Dr Taveras visited, new orders received & acknowledged. Patient aware that he'll be having Colonoscopy in am.

## 2017-03-21 NOTE — IP AVS SNAPSHOT
" <p align=\"LEFT\"><IMG SRC=\"//[email or web address]$/[email or web address]\" alt=\"Image\" WIDTH=\"50%\" HEIGHT=\"200\" BORDER=\"\"></p>                   Name:Sulaiman Rodgers  Medical Record Number:[id number]  CSN: [id number]    YOB: 1940   Age: 76 y.o.  Sex: male  HT:1.829 m (6' 0.01\") WT: 112.4 kg (247 lb 12.8 oz)          Admit Date: 3/21/2017     Discharge Date:   Today's Date: 3/23/2017  Attending Doctor:  Ernesto Bond M.D.                  Allergies:  Review of patient's allergies indicates no known allergies.          Follow-up Information     1. Follow up with Lavelle Dale D.O. In 1 week.    Specialty:  Family Medicine    Contact information    [address] [place] [id number] [phone]           Medication List      Take these Medications        Instructions    ferrous sulfate 325 (65 FE) MG tablet    Take 1 Tab by mouth 2 times a day, with meals.   Dose:  325 mg       levothyroxine 150 MCG Tabs   Commonly known as:  SYNTHROID    Take 150 mcg by mouth Every morning on an empty stomach.   Dose:  150 mcg       Magnesium 500 MG Tabs    Take 1,000 mg by mouth every day.   Dose:  1000 mg       metformin  MG Tb24   Commonly known as:  GLUCOPHAGE XR    Take 500 mg by mouth every day.   Dose:  500 mg       niacin 500 MG Tabs    Take 1,000 mg by mouth every day.   Dose:  1000 mg       pravastatin 40 MG tablet   What changed:    - how much to take  - when to take this   Commonly known as:  PRAVACHOL    Take 1 Tab by mouth every day.   Dose:  40 mg         "

## 2017-03-21 NOTE — IP AVS SNAPSHOT
[email or web address] Access Code: [email or web address]  Expires: 4/22/2017 11:39 AM    [email or web address]  A secure, online tool to manage your health information     [employer]’s [email or web address]® is a secure, online tool that connects you to your personalized health information from the privacy of your home -- day or night - making it very easy for you to manage your healthcare. Once the activation process is completed, you can even access your medical information using the [email or web address] adan, which is available for free in the Apple Adan store or Google Play store.     [email or web address] provides the following levels of access (as shown below):   My Chart Features   [facility] Primary Care Doctor [facility]  Specialists [facility]  Urgent  Care Non-[facility]  Primary Care  Doctor   Email your healthcare team securely and privately 24/7 X X X X   Manage appointments: schedule your next appointment; view details of past/upcoming appointments X      Request prescription refills. X      View recent personal medical records, including lab and immunizations X X X X   View health record, including health history, allergies, medications X X X X   Read reports about your outpatient visits, procedures, consult and ER notes X X X X   See your discharge summary, which is a recap of your hospital and/or ER visit that includes your diagnosis, lab results, and care plan. X X       How to register for [email or web address]:  1. Go to  [email or web address].  2. Click on the Sign Up Now box, which takes you to the New Member Sign Up page. You will need to provide the following information:  a. Enter your [email or web address] Access Code exactly as it appears at the top of this page. (You will not need to use this code after you’ve completed the sign-up process. If you do not sign up before the expiration date, you must request a new code.)   b. Enter your date of birth.   c. Enter your home email address.   d. Click Submit, and follow the next screen’s instructions.  3. Create a [email or web address] ID. This will be your [email or web address]  login ID and cannot be changed, so think of one that is secure and easy to remember.  4. Create a [employer] password. You can change your password at any time.  5. Enter your Password Reset Question and Answer. This can be used at a later time if you forget your password.   6. Enter your e-mail address. This allows you to receive e-mail notifications when new information is available in [employer].  7. Click Sign Up. You can now view your health information.    For assistance activating your [employer] account, call [phone]

## 2017-03-21 NOTE — ED NOTES
Pt transported to [facility] with all possessions on zoll monitor with acls certified RN.

## 2017-03-22 NOTE — OR SURGEON
Immediate Post-Operative Note      PreOp Diagnosis: Hematochezia    PostOp Diagnosis: Sigmoid diverticulosis without bleeding.    Procedure(s):  COLONOSCOPY - Wound Class: Clean Contaminated    Surgeon(s):  Gera Taveras D.O.    Anesthesiologist/Type of Anesthesia:  No anesthesia staff entered./Sedation    Surgical Staff:  Endoscopy Technician: Jim Chase  Sedation/Monitoring Nurse: Ulises Martinez R.N.    Specimen: none    Estimated Blood Loss: none    Findings: above    Complications: none    Rec: Hold ASA x 2 weeks           DC home if stable      Will sign off. Call if needed.         3/22/2017 2:49 PM Gera Taveras

## 2017-03-22 NOTE — CONSULTS
DATE OF SERVICE:  03/21/2017    REQUESTING PHYSICIAN:  Chirag Salinas MD.    REASON FOR CONSULTATION:  Hematochezia.    HISTORY OF PRESENT ILLNESS:  This is a 76-year-old male who was in his usual   state of health until this morning.  He states he had sudden urgency to   defecate and passed a large amount of bright red blood.  There was no   associated nausea, vomiting or abdominal pain.  Over the course of the day, he   has had 4 more grossly bloody stools.    He states he otherwise feels well.  He denies any significant fatigue or   dizziness.    His baseline bowel pattern is regular.  He denies any chronic constipation or   diarrhea.  His last colonoscopy was in 2013 performed by me, which showed   diverticulosis.    REVIEW OF SYSTEMS:  PSYCHIATRIC:  He denies anxiety or depression.  MUSCULOSKELETAL:  No joint pain, swelling, or stiffness.  DERMATOLOGIC:  No rashes.  GASTROINTESTINAL:  As above.  CARDIOVASCULAR:  No chest pain, palpitation, or shortness of breath.  PULMONARY:  No cough or wheezing.  ALLERGY AND IMMUNOLOGY:  No hay fever.  ENDOCRINE:  Denies polyuria or polydipsia.  CONSTITUTIONAL:  No fevers, chills, or weight loss.    PAST MEDICAL HISTORY:  Diverticulosis, hypothyroidism, diabetes mellitus,   hypertension, and hyperlipidemia.    PAST SURGICAL HISTORY:  Colonoscopy and eye surgery.    OUTPATIENT MEDICATIONS:  1.  Aspirin.  2.  Amlodipine.  3.  Niacin.  4.  Metformin.  5.  Thyroxine.    ALLERGIES:  None.    SOCIAL HISTORY:  He is .  He does not currently smoke.  He has a   60-pack-year smoking history.  He does use occasional alcohol.    FAMILY HISTORY:  Father with unknown type of cancer.  Father with history of   partial gastric resection.    PHYSICAL EXAMINATION:  VITAL SIGNS:  Temperature is 37.2, pulse 98, respirations 16, BP is 111/63,   and O2 sat is 95% on room air.  GENERAL APPEARANCE:  Obese male, in no distress.  HEENT:  Sclerae are anicteric.  Oropharynx is moist.  NECK:   Supple.  No adenopathy.  HEART:  Regular rate and rhythm with II/VI systolic murmur.  LUNGS:  Clear to auscultation.  ABDOMEN:  Obese, soft, and nontender with positive bowel sounds.  There is no   guarding, rebound or rigidity.  EXTREMITIES:  Warm and dry without clubbing, cyanosis or edema.    LABORATORY DATA:  White count is 10, hematocrit 34, MCV is 86, and platelet   count is 277,000.  Sodium 138, potassium 4.6, chloride 108, bicarb 20, BUN is   27, and creatinine 1.8.  AST is 18, ALT is 16, and bilirubin 1.5.  INR is 1.1.    ASSESSMENT AND PLAN:  1.  A 76-year-old male with acute lower gastrointestinal bleeding.  Given his   known history of diverticulosis, clinical syndrome is consistent with   diverticular hemorrhage.  He is currently hemodynamically stable.  Plan, we   will proceed with colonoscopy tomorrow.  Informed consent was obtained after   explanation of risks, benefits, and alternatives.  If he manifests ongoing   bleeding not amenable to endoscopic therapy, I will consider further   evaluation with CT angiogram and possible embolization.  2.  Anemia secondary to acute blood loss and mild.  Plan is to follow serial   hematocrits.  3.  Chronic kidney disease.  4.  Diabetes mellitus.  5.  Hypothyroidism.  6.  Hypertension.  7.  Obesity.       ____________________________________     RICCO FELIPE DO    PIS / NTS    DD:  03/21/2017 16:26:30  DT:  03/21/2017 20:08:49    D#:  [id number]  Job#:  [id number]    cc: _____ _____

## 2017-03-22 NOTE — PROGRESS NOTES
Patent resting on and off in bed, no s/s of distress noted, respirations even and unlabored, safety precautions in place, CLIP

## 2017-03-22 NOTE — CARE PLAN
Problem: Communication  Goal: The ability to communicate needs accurately and effectively will improve  Oriented patient to the call light in order to alert staff of his needs. Educated patient about the plan of care, procedures, treatments, medications, and allowing for patient questions and answering them appropriately    Problem: Safety  Goal: Will remain free from falls  Assessing patient for risk factors for falls and implementing fall precautions as needed. Bed controls are on and bed is locked in a low position, treaded slipper socks on patient, CLIP

## 2017-03-22 NOTE — PROGRESS NOTES
Patient up to bathroom to void x 3 after bowel prep at this time, patient denies any needs or complaints, safety precautions in place, IVF infusing, CLIP

## 2017-03-22 NOTE — PROGRESS NOTES
0700 [facility] nurseAlicia, at bedside. Pt is resting in bed.    0750 POC (one more dose of moviprep @0900, colonoscopy @1400, NPO) discussed & verbalized understanding.    0830 Monitored heart rhythm is SR/ST w rare PVCs (0.18/ 0.08/ 0.36), rate 's.    1100 Stool is slightly brownish, watery, no blood shown.    1320 Pt transferred to OR for colonoscopy.    1555 Pt is back from OR.    1620 Pt denies any discomfort at this time, VSS, family is at bedside.    1900 Report given to NOC nurseMarilyn, at bedside.

## 2017-03-22 NOTE — PROGRESS NOTES
Report given to Day Shift RN ***, patient laying comfortably in bed and has no complaints at this time, safety precautions in place, CLIP

## 2017-03-22 NOTE — PROGRESS NOTES
Gastroenterology Progress Note     Author: Gera Taveras   Date & Time Created: 3/22/2017 12:12 PM    Interval History:  Stable overnight. Tolerated prep. Bleeding abated.    Review of Systems:  Review of Systems   Constitutional: Negative for fever and chills.   Gastrointestinal: Negative for nausea, vomiting, abdominal pain and blood in stool.       Physical Exam:  Physical Exam   Constitutional: No distress.   Eyes: No scleral icterus.   Cardiovascular: Normal rate and regular rhythm.    Pulmonary/Chest: Effort normal and breath sounds normal.   Abdominal: Soft. Bowel sounds are normal. He exhibits no distension. There is no tenderness.   Nursing note and vitals reviewed.      Labs:        Invalid input(s): [id number]      Recent Labs      17   1300  17   0540   SODIUM  138  140   POTASSIUM  4.6  4.8   CHLORIDE  108  115*   CO2  20  22   BUN  27*  23*   CREATININE  1.82*  1.64*   PHOSPHORUS   --   3.3   CALCIUM  8.9  8.0*     Recent Labs      17   1300  17   0540   ALTSGPT  16   --    ASTSGOT  18   --    ALKPHOSPHAT  43   --    TBILIRUBIN  1.5   --    GLUCOSE  117*  108*     Recent Labs      17   1300  17   1748  17   0020  17   0540   RBC  4.01*   --    --   2.94*   HEMOGLOBIN  10.3*  8.3*  7.9*  7.5*   HEMATOCRIT  34.8*   --    --   26.1*   PLATELETCT  277   --    --   222   PROTHROMBTM  14.4   --    --    --    APTT  33.9   --    --    --    INR  1.14*   --    --    --    IRON  50   --    --    --    TOTIRONBC  283   --    --    --      Recent Labs      17   1300  17   0540   WBC  10.7  6.5   NEUTSPOLYS  79.00*  71.00   LYMPHOCYTES  13.00*  19.00*   MONOCYTES  4.00  4.00   EOSINOPHILS  0.00  0.00   BASOPHILS  1.00  1.00   ASTSGOT  18   --    ALTSGPT  16   --    ALKPHOSPHAT  43   --    TBILIRUBIN  1.5   --      Hemodynamics:  Temp (24hrs), Av.7 °C (98 °F), Min:36.4 °C (97.5 °F), Max:37.2 °C (99 °F)  Temperature: 36.6 °C (97.9 °F)  Pulse   Av.1  Min: 86  Max: 105   Blood Pressure : 112/62 mmHg     Respiratory:    Respiration: 18, Pulse Oximetry: 94 %           Fluids:    Intake/Output Summary (Last 24 hours) at 17 1212  Last data filed at 17 2200   Gross per 24 hour   Intake   1400 ml   Output      0 ml   Net   1400 ml     Weight: 112.4 kg (247 lb 12.8 oz)  GI/Nutrition:  Orders Placed This Encounter   Procedures   • DIET NPO     Standing Status: Standing      Number of Occurrences: 1      Standing Expiration Date:      Order Specific Question:  Restrict to:     Answer:  Sips with Medications [3]     Medical Decision Making, by Problem:  Active Hospital Problems    Diagnosis   • Hematochezia: Likely diverticular hemorrhage.   • Acute on chronic renal insufficiency   Anemia. Acute. Secondary to #1.        Plan:  Colonoscopy today.     Medications reviewed, Labs reviewed and Radiology images reviewed

## 2017-03-22 NOTE — PROGRESS NOTES
Hospital Medicine Progress Note, Adult, Complex               Author: Ernesto Bond Date & Time created: 3/22/2017  4:19 PM     Interval History:  Seen post colonoscopy; feels fine without pain; last bloody bm was around 10 am today; none since.    Review of Systems:  Review of Systems   Constitutional: Negative for fever.   Eyes: Negative for blurred vision.   Respiratory: Negative for cough.    Cardiovascular: Negative for chest pain.   Gastrointestinal: Negative for heartburn and vomiting.   Genitourinary: Negative for dysuria.   Skin: Negative for rash.   Neurological: Negative for dizziness and headaches.   Psychiatric/Behavioral: Negative for depression.       Physical Exam:  Physical Exam   Constitutional: He is oriented to person, place, and time. No distress.   HENT:   Head: Normocephalic.   Eyes: EOM are normal.   Neck: Neck supple.   Cardiovascular: Normal rate and regular rhythm.    Murmur heard.  Pulmonary/Chest: Effort normal and breath sounds normal.   Abdominal: Soft. Bowel sounds are normal. He exhibits no distension. There is no tenderness.   Musculoskeletal: He exhibits no edema.   Neurological: He is alert and oriented to person, place, and time.   Skin: Skin is warm and dry.   Psychiatric: His behavior is normal.       Labs:        Invalid input(s): [id number]      Recent Labs      03/21/17   1300  03/22/17   0540   SODIUM  138  140   POTASSIUM  4.6  4.8   CHLORIDE  108  115*   CO2  20  22   BUN  27*  23*   CREATININE  1.82*  1.64*   PHOSPHORUS   --   3.3   CALCIUM  8.9  8.0*     Recent Labs      03/21/17   1300  03/22/17   0540   ALTSGPT  16   --    ASTSGOT  18   --    ALKPHOSPHAT  43   --    TBILIRUBIN  1.5   --    GLUCOSE  117*  108*     Recent Labs      03/21/17   1300   03/22/17   0020  03/22/17   0540  03/22/17   1237   RBC  4.01*   --    --   2.94*   --    HEMOGLOBIN  10.3*   < >  7.9*  7.5*  7.8*   HEMATOCRIT  34.8*   --    --   26.1*   --    PLATELETCT  277   --    --   222   --     PROTHROMBTM  14.4   --    --    --    --    APTT  33.9   --    --    --    --    INR  1.14*   --    --    --    --    IRON  50   --    --    --    --    TOTIRONBC  283   --    --    --    --     < > = values in this interval not displayed.     Recent Labs      17   1300  17   0540   WBC  10.7  6.5   NEUTSPOLYS  79.00*  71.00   LYMPHOCYTES  13.00*  19.00*   MONOCYTES  4.00  4.00   EOSINOPHILS  0.00  0.00   BASOPHILS  1.00  1.00   ASTSGOT  18   --    ALTSGPT  16   --    ALKPHOSPHAT  43   --    TBILIRUBIN  1.5   --            Hemodynamics:  Temp (24hrs), Av.6 °C (97.8 °F), Min:36.4 °C (97.5 °F), Max:37.1 °C (98.8 °F)  Temperature: 37.1 °C (98.8 °F)  Pulse  Av.1  Min: 86  Max: 105   Blood Pressure : (!) 99/54 mmHg, NIBP: (!) 86/46 mmHg     Respiratory:    Respiration: 16, Pulse Oximetry: 95 %           Fluids:    Intake/Output Summary (Last 24 hours) at 17 1619  Last data filed at 17 1457   Gross per 24 hour   Intake   2000 ml   Output      0 ml   Net   2000 ml        GI/Nutrition:  Orders Placed This Encounter   Procedures   • DIET ORDER     Standing Status: Standing      Number of Occurrences: 1      Standing Expiration Date:      Order Specific Question:  Diet:     Answer:  Regular [1]     Medical Decision Making, by Problem:  Active Hospital Problems    Diagnosis   • Hematochezia [K92.1] due to sigmoid diverticulosis, s/p colo today:  No active bleeding per colo report; continue to monitor h/h and watch clinically; home soon if no more bleeding. Hold ASA.   • Acute on chronic renal insufficiency (HCC) [N28.9, N18.9] improved; monitor  DMII; on Metformin; stable; cont rx  HTN:  Stable; hold meds for recent bleeding/hypovolemia.       Core Measures

## 2017-03-22 NOTE — PROGRESS NOTES
Tele strip at 1921 shows Sinus rhythm with a HR of 89.      Measurements: .18/.08/.32    Tele Shift Summary:    Rhythm : Sinus rhythm/tachycardia  Rate : 80s to low 100s    Ectopy : Per CCT Jazmín, pt had rare PVCs    Telemetry monitoring strips placed in pt chart.

## 2017-03-22 NOTE — PROGRESS NOTES
Bedside report received from Hermelinda RN, patient updated about POC and denies any needs or complaints at this time, safety precautions in place, CLIP

## 2017-03-22 NOTE — OR NURSING
1453 PT RECEIVED IN PACU, REPORT RECEIVED.   VSS, RESP SPONT, EVEN, NON LABORED. PT AWAKE BUT DROWSY. PT DENIES PAIN, NAUSEA. 1510 VSS.  1549 VSS. PT MEETS CRITERIA FOR TRANSFER TO ROOM

## 2017-03-22 NOTE — PROGRESS NOTES
Assessment performed, patient is A&O x 4 at this time, patient denies any pain or discomfort at this time, due medications administered at this time, FSB - no coverage needed, safety precautions in place, CLIP

## 2017-03-23 PROBLEM — I10 HTN (HYPERTENSION): Status: ACTIVE | Noted: 2017-01-01

## 2017-03-23 PROBLEM — N18.9 CKD (CHRONIC KIDNEY DISEASE): Status: ACTIVE | Noted: 2017-01-01

## 2017-03-23 PROBLEM — E11.9 DM (DIABETES MELLITUS) (HCC): Status: ACTIVE | Noted: 2017-01-01

## 2017-03-23 NOTE — PROGRESS NOTES
Pt resting in bed, low fowlers, no s/s of acute distress, denies any needs at this time, calm and cooperative, personal belongings w/in reach, safety precautions in place, all lines patent, IVF infusing, will CTM.

## 2017-03-23 NOTE — CARE PLAN
Problem: Safety  Goal: Will remain free from injury  Outcome: PROGRESSING AS EXPECTED  Discussed w/ pt the importance of using call light for assistance. Call light and personal belongings w/ in reach, bed in lowest position.         Problem: Knowledge Deficit  Goal: Knowledge of disease process/condition, treatment plan, diagnostic tests, and medications will improve  Outcome: PROGRESSING AS EXPECTED  Discussed POC w/ patient. Plan to trend HGB and d/c in AM in stable. Addressed any questions about diagnosis, POC and hospital stay. Pt is resting comfortably, call light w/in reach. Will continue to assess.

## 2017-03-23 NOTE — PROGRESS NOTES
Pt discharged home w/all personal belongings.  All lines discharged and VSS.  Pt verbalized understanding of all discharge instructions.

## 2017-03-23 NOTE — PROGRESS NOTES
Pt's tele summary: sinus rhythm w/rare PVC's and occasional PAC's.      HR 88      WV interval 0.20  QRS complex 0.08  QT interval 0.34

## 2017-03-23 NOTE — PROGRESS NOTES
Received bedside report from JAIME Eugene. Assumed care of pt who is resting in bed eyes closed, RR even/unlabored. Call light within reach.  Will CTM.     2200 Pt does not wish for FSBS to be taken this PM; has been stable in 100s so far. Assessment complete. Pt resting comfortably in bed; states no needs at this time. Bed in lowest position, call light w/in reach, will CTM.     0000 Pt up to BR. VSS. HGB stable.     0400 Tele Note:    Rhythm: Sinus rhythm  Rate: 70-90s  LA: 0.20  QRS: 0.08  QT: 0.32  Ectopy: Occasional PACs    0700 Patient report given to JAIME Edmond. HGB remains stable this AM. Safety precautions in place. Pt stable; no s/sx of distress noted.

## 2017-03-23 NOTE — PROGRESS NOTES
Pt declined schedule meds.  Assessed pt, discussed POC, updated communication board.  Pt resting in bed, semi-fowlers, calm/cooperative, respirations even and unlabored, no s/s of acute distress, all lines patent, IVF infusing, denies pain or discomfort, advised pt to use call light for assistance, personal belongings w/in reach, safety precautions in place, will CTM.

## 2017-03-23 NOTE — DISCHARGE PLANNING
Medical Social Work    Referral: Pt discussed at IDT rounds this AM.    Intervention: Per flowsheet, pt lives with spouse and expects to d.c home.  SW will give pt IMM paperwork after IDT rounds.    Plan: SW available for any assistance with d.c planning.    Care Transition Team Assessment    Information Source  Information Given By: Patient    Readmission Evaluation  Is this a readmission?: No    Elopement Risk  Legal Hold: No  Elopement Risk: Not at Risk for Elopement    Interdisciplinary Discharge Planning  Does Admitting Nurse Feel This Could be a Complex Discharge?: No  Primary Care Physician: Dr Dale (PCP)  Lives with - Patient's Self Care Capacity: Spouse  Patient or legal guardian wants to designate a caregiver (see row info): No  Support Systems: Spouse / Significant Other  Housing / Facility: [address]  Do You Take your Prescribed Medications Regularly: Yes  Able to Return to Previous ADL's: Yes  Mobility Issues: No  Prior Services: None  Patient Expects to be Discharged to:: home  Assistance Needed: No  Durable Medical Equipment: Not Applicable    Discharge Preparedness  What is your plan after discharge?: Home with help  What are your discharge supports?: Spouse         Finances  Prescription Coverage: Yes    Vision / Hearing Impairment  Vision Impairment : Yes  Right Eye Vision: Wears Glasses  Left Eye Vision: Wears Glasses  Hearing Impairment : No    Values / Beliefs / Concerns  Values / Beliefs Concerns : No    Advance Directive  Advance Directive?: None    Domestic Abuse  Have you ever been the victim of abuse or violence?: No  Physical Abuse or Sexual Abuse: No  Verbal Abuse or Emotional Abuse: No  Possible Abuse Reported to:: Not Applicable    Psychological Assessment  History of Substance Abuse: None         Anticipated Discharge Information  Anticipated discharge disposition: Home  Discharge Address: [facility] [address]  [address]  Discharge Contact Phone Number: [phone]

## 2017-03-23 NOTE — PROGRESS NOTES
Received report from [facility] nurseMarilyn.  Discussed POC.  Pt ambulating around bed looking for personal items, no s/s of acute distress, denies pain or discomfort, calm, respirations even and unlabored, on room air, advised pt to use call light for assistance, personal belongings w/in reach, safety precautions in place, assumed pt care, will CTM.

## 2017-03-23 NOTE — PROGRESS NOTES
Hospital Medicine Progress Note, Adult, Complex               Author: Ernesto Bond Date & Time created: 3/23/2017  8:33 AM     Interval History:  Feels ok; no recurrent bleed or bm since yesterday; tolerated po. No new c/o.    Review of Systems:  Review of Systems   Constitutional: Negative for fever.   Eyes: Negative for blurred vision.   Respiratory: Negative for cough.    Cardiovascular: Negative for chest pain.   Gastrointestinal: Negative for heartburn.   Genitourinary: Negative for dysuria.   Musculoskeletal: Negative for myalgias.   Skin: Negative for rash.   Neurological: Negative for dizziness and headaches.   Psychiatric/Behavioral: Negative for depression.       Physical Exam:  Physical Exam   Constitutional: He is oriented to person, place, and time. He appears well-developed.   HENT:   Head: Normocephalic.   Eyes: EOM are normal.   Neck: Neck supple.   Cardiovascular: Normal rate and regular rhythm.    Pulmonary/Chest: Effort normal and breath sounds normal.   Abdominal: Soft. Bowel sounds are normal. He exhibits no distension. There is no tenderness.   Musculoskeletal: He exhibits no edema or tenderness.   Neurological: He is alert and oriented to person, place, and time.   Skin: Skin is warm and dry.   Psychiatric: His behavior is normal.       Labs:        Invalid input(s): [id number]      Recent Labs      03/21/17   1300  03/22/17   0540  03/23/17   0045   SODIUM  138  140  138   POTASSIUM  4.6  4.8  4.0   CHLORIDE  108  115*  112   CO2  20  22  21   BUN  27*  23*  15   CREATININE  1.82*  1.64*  1.47*   PHOSPHORUS   --   3.3   --    CALCIUM  8.9  8.0*  8.1*     Recent Labs      03/21/17   1300  03/22/17   0540  03/23/17   0045   ALTSGPT  16   --    --    ASTSGOT  18   --    --    ALKPHOSPHAT  43   --    --    TBILIRUBIN  1.5   --    --    GLUCOSE  117*  108*  94     Recent Labs      03/21/17   1300   03/22/17   0540   03/22/17   1920  03/23/17   0045  03/23/17   0506   RBC  4.01*   --   2.94*   --     --    --    --    HEMOGLOBIN  10.3*   < >  7.5*   < >  7.6*  7.4*  7.5*   HEMATOCRIT  34.8*   --   26.1*   --    --    --    --    PLATELETCT  277   --   222   --    --    --    --    PROTHROMBTM  14.4   --    --    --    --    --    --    APTT  33.9   --    --    --    --    --    --    INR  1.14*   --    --    --    --    --    --    IRON  50   --    --    --    --    --    --    TOTIRONBC  283   --    --    --    --    --    --     < > = values in this interval not displayed.     Recent Labs      17   1300  17   0540   WBC  10.7  6.5   NEUTSPOLYS  79.00*  71.00   LYMPHOCYTES  13.00*  19.00*   MONOCYTES  4.00  4.00   EOSINOPHILS  0.00  0.00   BASOPHILS  1.00  1.00   ASTSGOT  18   --    ALTSGPT  16   --    ALKPHOSPHAT  43   --    TBILIRUBIN  1.5   --            Hemodynamics:  Temp (24hrs), Av.9 °C (98.5 °F), Min:36.5 °C (97.7 °F), Max:37.3 °C (99.1 °F)  Temperature: 36.7 °C (98 °F)  Pulse  Av.1  Min: 86  Max: 105   Blood Pressure : 116/55 mmHg, NIBP: (!) 86/46 mmHg     Respiratory:    Respiration: 18, Pulse Oximetry: 91 %           Fluids:    Intake/Output Summary (Last 24 hours) at 17 0833  Last data filed at 17 0800   Gross per 24 hour   Intake    810 ml   Output      0 ml   Net    810 ml        GI/Nutrition:  Orders Placed This Encounter   Procedures   • DIET ORDER     Standing Status: Standing      Number of Occurrences: 1      Standing Expiration Date:      Order Specific Question:  Diet:     Answer:  Regular [1]     Medical Decision Making, by Problem:  Active Hospital Problems    Diagnosis   • Hematochezia [K92.1] due to sigmoid diverticulosis: s/p colo; stable without further bleeding;  H/H stable; can go home today on iron; hold ASA for 2 weeks.   • Acute on chronic renal insufficiency (HCC) [N28.9, N18.9]: creat/bun back to baseline; outpatient f/u  HTN: bp's low normal due to recent bleed; hold bp meds until bp>140/90  DM2: stable; cont rx       Core Measures

## 2017-03-23 NOTE — DISCHARGE INSTRUCTIONS
Discharge Instructions    Discharged to home by car with relative. Discharged via wheelchair, hospital escort: Yes.  Special equipment needed: Not Applicable    Be sure to schedule a follow-up appointment with your primary care doctor or any specialists as instructed.   Stop aspirin for 2 weeks then restart if no further bleeding, hold BP meds until BP > 140/90 at home (need to buy BP cuff for home use)    Discharge Plan:   Diet Plan: Discussed  Activity Level: Discussed  Confirmed Follow up Appointment: Patient to Call and Schedule Appointment  Confirmed Symptoms Management: Discussed  Medication Reconciliation Updated: Yes  Influenza Vaccine Indication: Patient Refuses    I understand that a diet low in cholesterol, fat, and sodium is recommended for good health. Unless I have been given specific instructions below for another diet, I accept this instruction as my diet prescription.   Other diet: regular    Special Instructions: None    · Is patient discharged on Warfarin / Coumadin?   No     · Is patient Post Blood Transfusion?  No    Depression / Suicide Risk    As you are discharged from this [facility] facility, it is important to learn how to keep safe from harming yourself.    Recognize the warning signs:  · Abrupt changes in personality, positive or negative- including increase in energy   · Giving away possessions  · Change in eating patterns- significant weight changes-  positive or negative  · Change in sleeping patterns- unable to sleep or sleeping all the time   · Unwillingness or inability to communicate  · Depression  · Unusual sadness, discouragement and loneliness  · Talk of wanting to die  · Neglect of personal appearance   · Rebelliousness- reckless behavior  · Withdrawal from people/activities they love  · Confusion- inability to concentrate     If you or a loved one observes any of these behaviors or has concerns about self-harm, here's what you can do:  · Talk about it- your feelings and  "reasons for harming yourself  · Remove any means that you might use to hurt yourself (examples: pills, rope, extension cords, firearm)  · Get professional help from the community (Mental Health, Substance Abuse, psychological counseling)  · Do not be alone:Call your Safe Contact- someone whom you trust who will be there for you.  · Call your local CRISIS HOTLINE 108-4992 or 165-897-4884  · Call your local Children's Mobile Crisis Response Team [place] (062) 556-8442 or wwwturntable.fm  · Call the toll free National Suicide Prevention Hotlines   · National Suicide Prevention Lifeline 085-780-ULMB (9227)  · bright box Line Network 800-SUICIDE (567-2474)      Bloody Stools  Bloody stools often mean that there is a problem in the digestive tract. Your caregiver may use the term \"melena\" to describe black, tarry, and bad smelling stools or \"hematochezia\" to describe red or maroon-colored stools. Blood seen in the stool can be caused by bleeding anywhere along the intestinal tract.   A black stool usually means that blood is coming from the upper part of the gastrointestinal tract (esophagus, stomach, or small bowel). Passing maroon-colored stools or bright red blood usually means that blood is coming from lower down in the large bowel or the rectum. However, sometimes massive bleeding in the stomach or small intestine can cause bright red bloody stools.   Consuming black licorice, lead, iron pills, medicines containing bismuth subsalicylate, or blueberries can also cause black stools. Your caregiver can test black stools to see if blood is present.  It is important that the cause of the bleeding be found. Treatment can then be started, and the problem can be corrected. Rectal bleeding may not be serious, but you should not assume everything is okay until you know the cause. It is very important to follow up with your caregiver or a specialist in gastrointestinal problems.  CAUSES   Blood in the stools can " come from various underlying causes. Often, the cause is not found during your first visit. Testing is often needed to discover the cause of bleeding in the gastrointestinal tract. Causes range from simple to serious or even life-threatening. Possible causes include:  · Hemorrhoids. These are veins that are full of blood (engorged) in the rectum. They cause pain, inflammation, and may bleed.  · Anal fissures. These are areas of painful tearing which may bleed. They are often caused by passing hard stool.  · Diverticulosis. These are pouches that form on the colon over time, with age, and may bleed significantly.  · Diverticulitis. This is inflammation in areas with diverticulosis. It can cause pain, fever, and bloody stools, although bleeding is rare.  · Proctitis and colitis. These are inflamed areas of the rectum or colon. They may cause pain, fever, and bloody stools.  · Polyps and cancer. Colon cancer is a leading cause of preventable cancer death. It often starts out as precancerous polyps that can be removed during a colonoscopy, preventing progression into cancer. Sometimes, polyps and cancer may cause rectal bleeding.  · Gastritis and ulcers. Bleeding from the upper gastrointestinal tract (near the stomach) may travel through the intestines and produce black, sometimes tarry, often bad smelling stools. In certain cases, if the bleeding is fast enough, the stools may not be black, but red and the condition may be life-threatening.  SYMPTOMS   You may have stools that are bright red and bloody, that are normal color with blood on them, or that are dark black and tarry. In some cases, you may only have blood in the toilet bowl. Any of these cases need medical care. You may also have:  · Pain at the anus or anywhere in the rectum.  · Lightheadedness or feeling faint.  · Extreme weakness.  · Nausea or vomiting.  · Fever.  DIAGNOSIS  Your caregiver may use the following methods to find the cause of your  bleeding:  · Taking a medical history. Age is important. Older people tend to develop polyps and cancer more often. If there is anal pain and a hard, large stool associated with bleeding, a tear of the anus may be the cause. If blood drips into the toilet after a bowel movement, bleeding hemorrhoids may be the problem. The color and frequency of the bleeding are additional considerations. In most cases, the medical history provides clues, but seldom the final answer.  · A visual and finger (digital) exam. Your caregiver will inspect the anal area, looking for tears and hemorrhoids. A finger exam can provide information when there is tenderness or a growth inside. In men, the prostate is also examined.  · Endoscopy. Several types of small, long scopes (endoscopes) are used to view the colon.  · In the office, your caregiver may use a rigid, or more commonly, a flexible viewing sigmoidoscope. This exam is called flexible sigmoidoscopy. It is performed in 5 to 10 minutes.  · A more thorough exam is accomplished with a colonoscope. It allows your caregiver to view the entire 5 to 6 foot long colon. Medicine to help you relax (sedative) is usually given for this exam. Frequently, a bleeding lesion may be present beyond the reach of the sigmoidoscope. So, a colonoscopy may be the best exam to start with. Both exams are usually done on an outpatient basis. This means the patient does not stay overnight in the hospital or surgery center.  · An upper endoscopy may be needed to examine your stomach. Sedation is used and a flexible endoscope is put in your mouth, down to your stomach.  · A barium enema X-ray. This is an X-ray exam. It uses liquid barium inserted by enema into the rectum. This test alone may not identify an actual bleeding point. X-rays highlight abnormal shadows, such as those made by lumps (tumors), diverticuli, or colitis.  TREATMENT   Treatment depends on the cause of your bleeding.   · For bleeding from  the stomach or colon, the caregiver doing your endoscopy or colonoscopy may be able to stop the bleeding as part of the procedure.  · Inflammation or infection of the colon can be treated with medicines.  · Many rectal problems can be treated with creams, suppositories, or warm baths.  · Surgery is sometimes needed.  · Blood transfusions are sometimes needed if you have lost a lot of blood.  · For any bleeding problem, let your caregiver know if you take aspirin or other blood thinners regularly.  HOME CARE INSTRUCTIONS   · Take any medicines exactly as prescribed.  · Keep your stools soft by eating a diet high in fiber. Prunes (1 to 3 a day) work well for many people.  · Drink enough water and fluids to keep your urine clear or pale yellow.  · Take sitz baths if advised. A sitz bath is when you sit in a bathtub with warm water for 10 to 15 minutes to soak, soothe, and cleanse the rectal area.  · If enemas or suppositories are advised, be sure you know how to use them. Tell your caregiver if you have problems with this.  · Monitor your bowel movements to look for signs of improvement or worsening.  SEEK MEDICAL CARE IF:   · You do not improve in the time expected.  · Your condition worsens after initial improvement.  · You develop any new symptoms.  SEEK IMMEDIATE MEDICAL CARE IF:   · You develop severe or prolonged rectal bleeding.  · You vomit blood.  · You feel weak or faint.  · You have a fever.  MAKE SURE YOU:  · Understand these instructions.  · Will watch your condition.  · Will get help right away if you are not doing well or get worse.  Document Released: 12/08/2003 Document Revised: 03/11/2013 Document Reviewed: 05/04/2012  ExitCare® Patient Information ©2014 [employer].      Chronic Kidney Disease  Chronic kidney disease occurs when the kidneys are damaged over a long period. The kidneys are two organs that lie on either side of the spine between the middle of the back and the front of the abdomen. The  kidneys:  · Remove wastes and extra water from the blood.  · Produce important hormones. These help keep bones strong, regulate blood pressure, and help create red blood cells.  · Balance the fluids and chemicals in the blood and tissues.  A small amount of kidney damage may not cause problems, but a large amount of damage may make it difficult or impossible for the kidneys to work the way they should. If steps are not taken to slow down the kidney damage or stop it from getting worse, the kidneys may stop working permanently. Most of the time, chronic kidney disease does not go away. However, it can often be controlled, and those with the disease can usually live normal lives.  CAUSES  The most common causes of chronic kidney disease are diabetes and high blood pressure (hypertension). Chronic kidney disease may also be caused by:  · Diseases that cause the kidneys' filters to become inflamed.  · Diseases that affect the immune system.  · Genetic diseases.  · Medicines that damage the kidneys, such as anti-inflammatory medicines.  · Poisoning or exposure to toxic substances.  · A reoccurring kidney or urinary infection.  · A problem with urine flow. This may be caused by:  ¨ Cancer.  ¨ Kidney stones.  ¨ An enlarged prostate in males.  SIGNS AND SYMPTOMS  Because the kidney damage in chronic kidney disease occurs slowly, symptoms develop slowly and may not be obvious until the kidney damage becomes severe. A person may have a kidney disease for years without showing any symptoms. Symptoms can include:  · Swelling (edema) of the legs, ankles, or feet.  · Tiredness (lethargy).  · Nausea or vomiting.  · Confusion.  · Problems with urination, such as:  ¨ Decreased urine production.  ¨ Frequent urination, especially at night.  ¨ Frequent accidents in children who are potty trained.  · Muscle twitches and cramps.  · Shortness of breath.  · Weakness.  · Persistent itchiness.  · Loss of appetite.  · Metallic taste in the  mouth.  · Trouble sleeping.  · Slowed development in children.  · Short stature in children.  DIAGNOSIS  Chronic kidney disease may be detected and diagnosed by tests, including blood, urine, imaging, or kidney biopsy tests.  TREATMENT  Most chronic kidney diseases cannot be cured. Treatment usually involves relieving symptoms and preventing or slowing the progression of the disease. Treatment may include:  · A special diet. You may need to avoid alcohol and foods that are salty and high in potassium.  · Medicines. These may:  ¨ Lower blood pressure.  ¨ Relieve anemia.  ¨ Relieve swelling.  ¨ Protect the bones.  HOME CARE INSTRUCTIONS  · Follow your prescribed diet.  Your health care provider may instruct you to limit daily salt (sodium) and protein intake.  · Take medicines only as directed by your health care provider. Do not take any new medicines (prescription, over-the-counter, or nutritional supplements) unless approved by your health care provider. Many medicines can worsen your kidney damage or need to have the dose adjusted.    · Quit smoking if you smoke. Talk to your health care provider about a smoking cessation program.  · Keep all follow-up visits as directed by your health care provider.  · Monitor your blood pressure.  · Start or continue an exercise plan.  · Get immunizations as directed by your health care provider.  · Take vitamin and mineral supplements as directed by your health care provider.  SEEK IMMEDIATE MEDICAL CARE IF:  · Your symptoms get worse or you develop new symptoms.  · You develop symptoms of end-stage kidney disease. These include:  ¨ Headaches.  ¨ Abnormally dark or light skin.  ¨ Numbness in the hands or feet.  ¨ Easy bruising.  ¨ Frequent hiccups.  ¨ Menstruation stops.  · You have a fever.  · You have decreased urine production.  · You have pain or bleeding when urinating.  MAKE SURE YOU:  · Understand these instructions.  · Will watch your condition.  · Will get help right  away if you are not doing well or get worse.  FOR MORE INFORMATION   · American Association of Kidney Patients: www.aakp.org  · National Kidney Foundation: www.kidney.org  · American Kidney Fund: www.akfinc.org  · [employer] [facility]: www.lifeoptions.org and www.kidneyschool.org     This information is not intended to replace advice given to you by your health care provider. Make sure you discuss any questions you have with your health care provider.     Document Released: 09/26/2009 Document Revised: 01/08/2016 Document Reviewed: 08/16/2013  [place] Interactive Patient Education ©2016 [employer] Inc.      Pravastatin tablets  What is this medicine?  PRAVASTATIN (PRA va stat in) is known as a HMG-CoA reductase inhibitor or 'statin'. It lowers the level of cholesterol and triglycerides in the blood. This drug may also reduce the risk of heart attack, stroke, or other health problems in patients with risk factors for heart disease. Diet and lifestyle changes are often used with this drug.  This medicine may be used for other purposes; ask your health care provider or pharmacist if you have questions.  COMMON BRAND NAME(S): Pravachol  What should I tell my health care provider before I take this medicine?  They need to know if you have any of these conditions:  -frequently drink alcoholic beverages  -kidney disease  -liver disease  -muscle aches or weakness  -other medical condition  -an unusual or allergic reaction to pravastatin, other medicines, foods, dyes, or preservatives  -pregnant or trying to get pregnant  -breast-feeding  How should I use this medicine?  Take pravastatin tablets by mouth. Swallow the tablets with a drink of water. Pravastatin can be taken at anytime of the day, with or without food. Follow the directions on the prescription label. Take your doses at regular intervals. Do not take your medicine more often than directed.  Talk to your pediatrician regarding the use of this  medicine in children. Special care may be needed. Pravastatin has been used in children as young as 8 years of age.  Overdosage: If you think you have taken too much of this medicine contact a poison control center or emergency room at once.  NOTE: This medicine is only for you. Do not share this medicine with others.  What if I miss a dose?  If you miss a dose, take it as soon as you can. If it is almost time for your next dose, take only that dose. Do not take double or extra doses.  What may interact with this medicine?  Do not take this medicine with any of the following medications:  -herbal medicines such as red yeast rice  This medicine may also interact with the following medications:  -alcohol  -antiviral medicines for HIV or AIDS  -certain medicines for fungal infections like ketoconazole and itraconazole  -colchicine  -cyclosporine  -other medicines for high cholesterol  -some antibiotics like clarithromycin, erythromycin, and telithromycin  This list may not describe all possible interactions. Give your health care provider a list of all the medicines, herbs, non-prescription drugs, or dietary supplements you use. Also tell them if you smoke, drink alcohol, or use illegal drugs. Some items may interact with your medicine.  What should I watch for while using this medicine?  Visit your doctor or health care professional for regular check-ups. You may need regular tests to make sure your liver is working properly.  Tell your doctor or health care professional right away if you get any unexplained muscle pain, tenderness, or weakness, especially if you also have a fever and tiredness. Your doctor or health care professional may tell you to stop taking this medicine if you develop muscle problems. If your muscle problems do not go away after stopping this medicine, contact your health care professional.  This drug is only part of a total heart-health program. Your doctor or a dietician can suggest a  low-cholesterol and low-fat diet to help. Avoid alcohol and smoking, and keep a proper exercise schedule.  Do not use this drug if you are pregnant or breast-feeding. Serious side effects to an unborn child or to an infant are possible. Talk to your doctor or pharmacist for more information.  This medicine may affect blood sugar levels. If you have diabetes, check with your doctor or health care professional before you change your diet or the dose of your diabetic medicine.  If you are going to have surgery tell your health care professional that you are taking this drug.  What side effects may I notice from receiving this medicine?  Side effects that you should report to your doctor or health care professional as soon as possible:  -allergic reactions like skin rash, itching or hives, swelling of the face, lips, or tongue  -dark urine  -fever  -muscle pain, cramps, or weakness  -redness, blistering, peeling or loosening of the skin, including inside the mouth  -trouble passing urine or change in the amount of urine  -unusually weak or tired  -yellowing of the eyes or skin  Side effects that usually do not require medical attention (report to your doctor or health care professional if they continue or are bothersome):  -gas  -headache  -heartburn  -indigestion  -stomach pain  This list may not describe all possible side effects. Call your doctor for medical advice about side effects. You may report side effects to FDA at 9-971-FDA-3158.  Where should I keep my medicine?  Keep out of the reach of children.  Store at room temperature between 15 to 30 degrees C (59 to 86 degrees F). Protect from light. Keep container tightly closed. Throw away any unused medicine after the expiration date.  NOTE: This sheet is a summary. It may not cover all possible information. If you have questions about this medicine, talk to your doctor, pharmacist, or health care provider.  © 2014, Elsevier/[employer]. (11/6/2012 10:39:37  AM)      Iron Chewable Tablets  What is this medicine?  IRON (AHY regi) replaces iron that is essential to healthy red blood cells. Iron is used to treat iron deficiency anemia. Anemia may cause problems like tiredness, shortness of breath, or slowed growth in children. Only take iron if your doctor has told you to. Do not treat yourself with iron if you are feeling tired. Most healthy people get enough iron in their diets, particularly if they eat cereals, meat, poultry, and fish.  This medicine may be used for other purposes; ask your health care provider or pharmacist if you have questions.  COMMON BRAND NAME(S): SHON Pediatric  What should I tell my health care provider before I take this medicine?  They need to know if you have any of these conditions:  -frequently drink alcohol  -bowel disease  -hemolytic anemia  -iron overload (hemochromatosis, hemosiderosis)  -liver disease  -problems with swallowing  -stomach ulcer or other stomach problems  -an unusual or allergic reaction to iron, other medicines, foods, dyes, or preservatives  -pregnant or trying to get pregnant  -breast-feeding  How should I use this medicine?  Take this medicine by mouth. Chew it completely before swallowing. Follow the directions on the prescription label. Take this medicine in an upright or sitting position. Try to take any bedtime doses at least 10 minutes before lying down. You may take this medicine with food. Take your medicine at regular intervals. Do not take your medicine more often than directed. Do not stop taking except on your doctor's advice.  Talk to your pediatrician regarding the use of this medicine in children. While this drug may be prescribed for even very young children for selected conditions, precautions do apply.  Overdosage: If you think you have taken too much of this medicine contact a poison control center or emergency room at once.  NOTE: This medicine is only for you. Do not share this medicine with  others.  What if I miss a dose?  If you miss a dose, take it as soon as you can. If it is almost time for your next dose, take only that dose. Do not take double or extra doses.  What may interact with this medicine?  If you are taking this iron product, you should not take iron in any other medicine or dietary supplement.  This medicine may also interact with the following medications:  -alendronate  -antacids  -cefdinir  -chloramphenicol  -cholestyramine  -deferoxamine  -dimercaprol  -etidronate  -medicines for stomach ulcers or other stomach problems  -pancreatic enzymes  -quinolone antibiotics (examples: Cipro, Floxin, Levaquin, Tequin and others)  -risedronate  -tetracycline antibiotics (examples: doxycycline, tetracycline, minocycline, and others)  -thyroid hormones  This list may not describe all possible interactions. Give your health care provider a list of all the medicines, herbs, non-prescription drugs, or dietary supplements you use. Also tell them if you smoke, drink alcohol, or use illegal drugs. Some items may interact with your medicine.  What should I watch for while using this medicine?  Use iron supplements only as directed by your health care professional. You will need important blood work while you are taking this medicine. It may take 3 to 6 months of therapy to treat low iron levels. Pregnant women should follow the dose and length of iron treatment as directed by their doctors.  Do not use iron longer than prescribed, and do not take a higher dose than recommended. Long-term use may cause excess iron to build-up in the body.  Do not take iron with antacids. If you need to take an antacid, take it 2 hours after a dose of iron.  What side effects may I notice from receiving this medicine?  Side effects that you should report to your doctor or health care professional as soon as possible:  -allergic reactions like skin rash, itching or hives, swelling of the face, lips, or tongue  -blue lips,  nails, or palms  -dark colored stools (this may be due to the iron, but can indicate a more serious condition)  -drowsiness  -pain with or difficulty swallowing  -pale or clammy skin  -seizures  -stomach pain  -unusually weak or tired  -vomiting  -weak, fast, or irregular heartbeat  Side effects that usually do not require medical attention (report to your doctor or health care professional if they continue or are bothersome):  -constipation  -indigestion  -nausea or stomach upset  This list may not describe all possible side effects. Call your doctor for medical advice about side effects. You may report side effects to FDA at 5-199-FDA-1309.  Where should I keep my medicine?  Keep out of the reach of children. Even small amounts of iron can be harmful to a child.  Store at room temperature between 15 and 30 degrees C (59 and 86 degrees F). Keep container tightly closed. Throw away any unused medicine after the expiration date.  NOTE: This sheet is a summary. It may not cover all possible information. If you have questions about this medicine, talk to your doctor, pharmacist, or health care provider.  © 2014, Elsevier/Gold Standard. (5/7/2009 5:08:28 PM)

## 2017-03-23 NOTE — OP REPORT
DATE OF SERVICE:  03/22/2017    PREOPERATIVE DIAGNOSIS:  Lower gastrointestinal bleeding.    POSTOPERATIVE DIAGNOSES:  1.  Sigmoid diverticulosis.  2.  No evidence of active gastrointestinal bleeding.    PROCEDURE PERFORMED:  Colonoscopy.    SUBPROCEDURE:  None.    HISTORY OF PRESENT ILLNESS:  This 76-year-old male presents with acute lower   gastrointestinal bleeding.  Informed consent was obtained after explanation of   risks, benefits, and alternatives.    MEDICATIONS:  Versed 3 mg and fentanyl 100 mcg.    DESCRIPTION OF PROCEDURE:  The procedure was performed in the OR at [facility].  The patient was monitored throughout the procedure.    After adequate sedation was achieved, the Olympus  colonoscope was   advanced via the anus into the rectum; it was then advanced by a standard   procedure to the cecum.  Quality of the bowel prep was good.  Cecum was   identified by the appendiceal orifice and the ileocecal valve.  Cecum was   normal.  Terminal ileum was intubated and colonoscope was advanced several   centimeters.  There was no blood and blood present within the terminal ileum.    The colonoscope was then withdrawn, obtaining circumferential views of the   lumen.  The ascending, transverse, and descending colon were normal.  The   sigmoid colon showed scattered diverticulosis.  The rectum was normal.    Retroflexion of the colonoscope in the rectum revealed no abnormalities.    There was no old or new blood present within the colon.  The colonoscope was   withdrawn.  The patient tolerated the procedure well.  There were no apparent   complications.    IMPRESSION:  A 76-year-old male with acute lower gastrointestinal bleeding.    Colonoscopy shows sigmoid diverticulosis and no active bleeding.       ____________________________________     RICCO FELIPE DO    PIS / NTS    DD:  03/22/2017 14:52:37  DT:  03/22/2017 17:02:07    D#:  [id number]  Job#:  [id number]    cc: SHERRY BRISENO DO

## 2017-03-24 NOTE — DISCHARGE SUMMARY
DATE OF ADMISSION:  03/21/2017    DATE OF DISCHARGE:  03/23/2017    DISCHARGE DIAGNOSES:  1.  Acute lower gastrointestinal bleed secondary to sigmoid diverticulosis.  2.  Blood loss anemia.  3.  Diabetes mellitus type 2.  4.  Hypertension.  5.  Hypothyroidism.  6.  Chronic kidney disease stage III.    CONDITION:  Stable.    ACTIVITY:  As tolerated.    DIET:  Diabetic.    DISCHARGE MEDICATIONS:  Resume these medications at home including   levothyroxine 150 mcg daily, magnesium, metformin 500 mg daily, niacin 1000 mg   daily.  New medications include ferrous sulfate 325 mg b.i.d. with meals and   hold amlodipine 5 mg and lisinopril 5 mg until blood pressures greater than   140/90 at home.  Also hold aspirin 81 mg for 2 weeks and resume then if no   recurrent bleeding.  Also, the patient can continue with Pravachol 40 mg p.o.   daily as before.      FOLLOWUP:  Follow up with primary care physician in 1 week and Dr. Taveras   from GI as instructed.    HOSPITAL COURSE:  This is a 76-year-old male with history of hypertension,   diabetes mellitus type 2, who presents complaining of acute lower GI bleed   symptoms with large amount of bright red blood in the toilet multiple times on   the day of admission.  Patient came to the ER for further evaluation.  He   denies any abdominal pain, nausea or vomiting.  No previous history of GI   bleed.  Patient's initial hemoglobin and hematocrit were 10.3 and 34.8, but   eventually went down to around 7.5 at the time of discharge.  This has   stabilized, however, and the patient does not require transfusion.  Patient   was seen by GI in consultation and underwent colonoscopy.  He underwent a   colonoscopy on 03/22/2017 with Dr. Taveras.  Postop diagnoses were sigmoid   diverticulosis with no evidence of active GI bleed.  Patient did well after   the colonoscopy and had no recurrent bleeding.  He is able to tolerate p.o.   well.  His blood pressure medications have been held  due to low normal blood   pressure because of recent gastrointestinal bleed.  Patient is advised to hold   his blood pressure medications at home until blood pressure is measured to be   greater than 140/90.  He also had a mild acute kidney injury due to the acute   blood loss, but that has stabilized and his creatinine and BUN have gone back   to baseline.  The patient does have what seems like chronic kidney disease   stage III evident on his labs in April of last year.  Patient also was advised   to hold aspirin for 2 weeks and resume then if no recurrent bleeding.       ____________________________________     MD KAMILAH Walter / SEBASTIEN    DD:  03/23/2017 08:49:09  DT:  03/23/2017 12:09:04    D#:  [id number]  Job#:  [id number]

## 2017-05-10 NOTE — IP AVS SNAPSHOT
" <p align=\"LEFT\"><IMG SRC=\"//[email or web address]\" alt=\"Image\" WIDTH=\"50%\" HEIGHT=\"200\" BORDER=\"\"></p>                   Name:Sulaiman Rodgers  Medical Record Number:[id number]  CSN: [id number]    YOB: 1940   Age: 76 y.o.  Sex: male  HT:1.829 m (6') WT: 107.3 kg (236 lb 8.9 oz)          Admit Date: 5/10/2017     Discharge Date:   Today's Date: 5/10/2017  Attending Doctor:  Gera Taveras D.O.                  Allergies:  Review of patient's allergies indicates no known allergies.             Medication List      Ask your Physician about these medications        Instructions    ferrous sulfate 325 (65 FE) MG tablet    Take 1 Tab by mouth 2 times a day, with meals.   Dose:  325 mg       levothyroxine 150 MCG Tabs   Commonly known as:  SYNTHROID    Take 150 mcg by mouth Every morning on an empty stomach.   Dose:  150 mcg       Magnesium 500 MG Tabs    Take 1,000 mg by mouth every day.   Dose:  1000 mg       magnesium gluconate 500 MG tablet   Commonly known as:  MAG-G    Take 400 mg by mouth 2 Times a Day.   Dose:  400 mg       metformin  MG Tb24   Commonly known as:  GLUCOPHAGE XR    Take 500 mg by mouth every day.   Dose:  500 mg       niacin 500 MG Tabs    Take 1,000 mg by mouth every day.   Dose:  1000 mg       pravastatin 40 MG tablet   Commonly known as:  PRAVACHOL    Take 1 Tab by mouth every day.   Dose:  40 mg         "

## 2017-05-10 NOTE — IP AVS SNAPSHOT
Home Care Instructions                                                                                                                  Name:Sulaiman Rodgers  Medical Record Number:[id number]  CSN: [id number]    YOB: 1940   Age: 76 y.o.  Sex: male  HT:1.829 m (6') WT: 107.3 kg (236 lb 8.9 oz)          Admit Date: 5/10/2017     Discharge Date:   Today's Date: 5/10/2017  Attending Doctor:  Gera Taveras D.O.                  Allergies:  Review of patient's allergies indicates no known allergies.            Discharge Instructions       Discharge Instructions    Discharged to home by car with relative. Discharged via wheelchair, hospital escort: Yes.  Special equipment needed: Not Applicable    Be sure to schedule a follow-up appointment with your primary care doctor or any specialists as instructed.     Discharge Plan:   Influenza Vaccine Indication: Not indicated: Previously immunized this influenza season and > 8 years of age    I understand that a diet low in cholesterol, fat, and sodium is recommended for good health. Unless I have been given specific instructions below for another diet, I accept this instruction as my diet prescription.   Other diet: ada    Special Instructions: None    · Is patient discharged on Warfarin / Coumadin?   No     · Is patient Post Blood Transfusion?  No    Depression / Suicide Risk    As you are discharged from this [facility] facility, it is important to learn how to keep safe from harming yourself.    Recognize the warning signs:  · Abrupt changes in personality, positive or negative- including increase in energy   · Giving away possessions  · Change in eating patterns- significant weight changes-  positive or negative  · Change in sleeping patterns- unable to sleep or sleeping all the time   · Unwillingness or inability to communicate  · Depression  · Unusual sadness, discouragement and loneliness  · Talk of wanting to die  · Neglect of personal  appearance   · Rebelliousness- reckless behavior  · Withdrawal from people/activities they love  · Confusion- inability to concentrate     If you or a loved one observes any of these behaviors or has concerns about self-harm, here's what you can do:  · Talk about it- your feelings and reasons for harming yourself  · Remove any means that you might use to hurt yourself (examples: pills, rope, extension cords, firearm)  · Get professional help from the community (Mental Health, Substance Abuse, psychological counseling)  · Do not be alone:Call your Safe Contact- someone whom you trust who will be there for you.  · Call your local CRISIS HOTLINE 155-8045 or 087-308-9446  · Call your local Children's Mobile Crisis Response Team [place] (908) 666-8755 or www.[employer]  · Call the toll free National Suicide Prevention Hotlines   · National Suicide Prevention Lifeline 181-065-KDST (5030)  · [employer] Line Network 800-SUICIDE (380-6855)      Anemia, Nonspecific  Anemia is a condition in which the concentration of red blood cells or hemoglobin in the blood is below normal. Hemoglobin is a substance in red blood cells that carries oxygen to the tissues of the body. Anemia results in not enough oxygen reaching these tissues.   CAUSES   Common causes of anemia include:   · Excessive bleeding. Bleeding may be internal or external. This includes excessive bleeding from periods (in women) or from the intestine.    · Poor nutrition.    · Chronic kidney, thyroid, and liver disease.   · Bone marrow disorders that decrease red blood cell production.  · Cancer and treatments for cancer.  · HIV, AIDS, and their treatments.  · Spleen problems that increase red blood cell destruction.  · Blood disorders.  · Excess destruction of red blood cells due to infection, medicines, and autoimmune disorders.  SIGNS AND SYMPTOMS   · Minor weakness.    · Dizziness.    · Headache.  · Palpitations.    · Shortness of breath, especially  with exercise.    · Paleness.  · Cold sensitivity.  · Indigestion.  · Nausea.  · Difficulty sleeping.  · Difficulty concentrating.  Symptoms may occur suddenly or they may develop slowly.   DIAGNOSIS   Additional blood tests are often needed. These help your health care provider determine the best treatment. Your health care provider will check your stool for blood and look for other causes of blood loss.   TREATMENT   Treatment varies depending on the cause of the anemia. Treatment can include:   · Supplements of iron, vitamin B12, or folic acid.    · Hormone medicines.    · A blood transfusion. This may be needed if blood loss is severe.    · Hospitalization. This may be needed if there is significant continual blood loss.    · Dietary changes.  · Spleen removal.  HOME CARE INSTRUCTIONS  Keep all follow-up appointments. It often takes many weeks to correct anemia, and having your health care provider check on your condition and your response to treatment is very important.  SEEK IMMEDIATE MEDICAL CARE IF:   · You develop extreme weakness, shortness of breath, or chest pain.    · You become dizzy or have trouble concentrating.  · You develop heavy vaginal bleeding.    · You develop a rash.    · You have bloody or black, tarry stools.    · You faint.    · You vomit up blood.    · You vomit repeatedly.    · You have abdominal pain.  · You have a fever or persistent symptoms for more than 2-3 days.    · You have a fever and your symptoms suddenly get worse.    · You are dehydrated.    MAKE SURE YOU:  · Understand these instructions.  · Will watch your condition.  · Will get help right away if you are not doing well or get worse.     This information is not intended to replace advice given to you by your health care provider. Make sure you discuss any questions you have with your health care provider.     Document Released: 01/25/2006 Document Revised: 08/20/2014 Document Reviewed: 06/13/2014  [employer]  Patient Education ©2016 [employer]             Discharge Medication Instructions:    Below are the medications your physician expects you to take upon discharge:    Review all your home medications and newly ordered medications with your doctor and/or pharmacist. Follow medication instructions as directed by your doctor and/or pharmacist.    Please keep your medication list with you and share with your physician.               Medication List      ASK your doctor about these medications        Instructions    Morning Afternoon Evening Bedtime    ferrous sulfate 325 (65 FE) MG tablet        Take 1 Tab by mouth 2 times a day, with meals.   Dose:  325 mg                        levothyroxine 150 MCG Tabs   Commonly known as:  SYNTHROID        Take 150 mcg by mouth Every morning on an empty stomach.   Dose:  150 mcg                        Magnesium 500 MG Tabs        Take 1,000 mg by mouth every day.   Dose:  1000 mg                        magnesium gluconate 500 MG tablet   Commonly known as:  MAG-G        Take 400 mg by mouth 2 Times a Day.   Dose:  400 mg                        metformin  MG Tb24   Commonly known as:  GLUCOPHAGE XR        Take 500 mg by mouth every day.   Dose:  500 mg                        niacin 500 MG Tabs        Take 1,000 mg by mouth every day.   Dose:  1000 mg                        pravastatin 40 MG tablet   Commonly known as:  PRAVACHOL        Take 1 Tab by mouth every day.   Dose:  40 mg                                Instructions           Diet / Nutrition:    Follow any diet instructions given to you by your doctor or the dietician, including how much salt (sodium) you are allowed each day.    If you are overweight, talk to your doctor about a weight reduction plan.    Activity:    Remain physically active following your doctor's instructions about exercise and activity.    Rest often.     Any time you become even a little tired or short of breath, SIT DOWN and rest.    Worsening  Symptoms:    Report any of the following signs and symptoms to the doctor's office immediately:    *Pain of jaw, arm, or neck  *Chest pain not relieved by medication                               *Dizziness or loss of consciousness  *Difficulty breathing even when at rest   *More tired than usual                                       *Bleeding drainage or swelling of surgical site  *Swelling of feet, ankles, legs or stomach                 *Fever (>100ºF)  *Pink or blood tinged sputum  *Weight gain (3lbs/day or 5lbs /week)           *Shock from internal defibrillator (if applicable)  *Palpitations or irregular heartbeats                *Cool and/or numb extremities    Stroke Awareness    Common Risk Factors for Stroke include:    Age  Atrial Fibrillation  Carotid Artery Stenosis  Diabetes Mellitus  Excessive alcohol consumption  High blood pressure  Overweight   Physical inactivity  Smoking    Warning signs and symptoms of a stroke include:    *Sudden numbness or weakness of the face, arm or leg (especially on one side of the body).  *Sudden confusion, trouble speaking or understanding.  *Sudden trouble seeing in one or both eyes.  *Sudden trouble walking, dizziness, loss of balance or coordination.Sudden severe headache with no known cause.    It is very important to get treatment quickly when a stroke occurs. If you experience any of the above warning signs, call 911 immediately.                   Disclaimer         Quit Smoking / Tobacco Use:    I understand the use of any tobacco products increases my chance of suffering from future heart disease or stroke and could cause other illnesses which may shorten my life. Quitting the use of tobacco products is the single most important thing I can do to improve my health. For further information on smoking / tobacco cessation call a Toll Free Quit Line at 1-371.675.6115 (*National Cancer [place]) or 1-548.455.6300 (American Lung Association) or you can access the web  based program at www.lungusa.org.    [place] Tobacco Users Help Line:  (108) 224-3974       Toll Free: 1-875.854.7350  Quit Tobacco Program [facility] (310)404-7420    Crisis Hotline:    [place] Crisis Hotline:  5-689-IUBZPBW or 1-131.868.6505    [place] Crisis Hotline:    1-530.355.9885 or 294-275-5756    Discharge Survey:   Thank you for choosing [facility]. We hope we did everything we could to make your hospital stay a pleasant one. You may be receiving a phone survey and we would appreciate your time and participation in answering the questions. Your input is very valuable to us in our efforts to improve our service to our patients and their families.        My signature on this form indicates that:    1. I have reviewed and understand the above information.  2. My questions regarding this information have been answered to my satisfaction.  3. I have formulated a plan with my discharge nurse to obtain my prescribed medications for home.                  Disclaimer         __________________________________                     __________       ________                       Patient Signature                                                 Date                    Time

## 2017-05-10 NOTE — IP AVS SNAPSHOT
5/10/2017    Sulaiman Rodgers  [address] [place] [address]    Dear Sulaiman:    [facility] wants to ensure your discharge home is safe and you or your loved ones have had all of your questions answered regarding your care after you leave the hospital.    Below is a list of resources and contact information should you have any questions regarding your hospital stay, follow-up instructions, or active medical symptoms.    Questions or Concerns Regarding… Contact   Medical Questions Related to Your Discharge  (7 days a week, 8am-5pm) Contact a Nurse Care Coordinator   [phone]   Medical Questions Not Related to Your Discharge  (24 hours a day / 7 days a week)  Contact the Nurse Health Line   [phone]    Medications or Discharge Instructions Refer to your discharge packet   or contact your [facility] Primary Care Provider   [phone]   Follow-up Appointment(s) Schedule your appointment via MtoV   or contact Scheduling [phone]   Billing Review your statement via MtoV  or contact Billing [phone]   Medical Records Review your records via MtoV   or contact Medical Records [phone]     You may receive a telephone call within two days of discharge. This call is to make certain you understand your discharge instructions and have the opportunity to have any questions answered. You can also easily access your medical information, test results and upcoming appointments via the MtoV free online health management tool. You can learn more and sign up at [email or web address]. For assistance setting up your MtoV account, please call [phone].    Once again, we want to ensure your discharge home is safe and that you have a clear understanding of any next steps in your care. If you have any questions or concerns, please do not hesitate to contact us, we are here for you. Thank you for choosing [facility] for your healthcare needs.    Sincerely,    Your [facility] Healthcare Team

## 2017-05-10 NOTE — PROGRESS NOTES
Direct admit from home. Accepted by Dr. Taveras for transfusion.  ADT signed & held @ 1020, needs to be released upon pt arrival.  Written orders received, faxed to unit and scanned to media tab.  Pt coming by private car.

## 2017-05-10 NOTE — IP AVS SNAPSHOT
CloudBees Access Code: Activation code not generated  Current CloudBees Status: Active    "[employer]"hart  A secure, online tool to manage your health information     [employer]’s CloudBees® is a secure, online tool that connects you to your personalized health information from the privacy of your home -- day or night - making it very easy for you to manage your healthcare. Once the activation process is completed, you can even access your medical information using the CloudBees adan, which is available for free in the Apple Adan store or Google Play store.     CloudBees provides the following levels of access (as shown below):   My Chart Features   [facility] Primary Care Doctor [facility]  Specialists [facility]  Urgent  Care Non-[facility]  Primary Care  Doctor   Email your healthcare team securely and privately 24/7 X X X X   Manage appointments: schedule your next appointment; view details of past/upcoming appointments X      Request prescription refills. X      View recent personal medical records, including lab and immunizations X X X X   View health record, including health history, allergies, medications X X X X   Read reports about your outpatient visits, procedures, consult and ER notes X X X X   See your discharge summary, which is a recap of your hospital and/or ER visit that includes your diagnosis, lab results, and care plan. X X       How to register for CloudBees:  1. Go to  [email or web address].  2. Click on the Sign Up Now box, which takes you to the New Member Sign Up page. You will need to provide the following information:  a. Enter your CloudBees Access Code exactly as it appears at the top of this page. (You will not need to use this code after you’ve completed the sign-up process. If you do not sign up before the expiration date, you must request a new code.)   b. Enter your date of birth.   c. Enter your home email address.   d. Click Submit, and follow the next screen’s instructions.  3. Create a CloudBees ID. This will  be your [employer] login ID and cannot be changed, so think of one that is secure and easy to remember.  4. Create a [employer] password. You can change your password at any time.  5. Enter your Password Reset Question and Answer. This can be used at a later time if you forget your password.   6. Enter your e-mail address. This allows you to receive e-mail notifications when new information is available in Rebel Monkey.  7. Click Sign Up. You can now view your health information.    For assistance activating your [employer] account, call [phone]

## 2017-05-11 NOTE — PROGRESS NOTES
Feli SANDOVAL explained d/c instructions to pt. Pt d/c'd with discharge instructions. No questions/concerns. IV dc'd with tip intact. Pt walked out by CNA to meet wife.

## 2017-05-11 NOTE — PROGRESS NOTES
"2 RN skin assessment done; skin WDL.      General bruising/dry/flaking, states \"from pokes w/my GI doctor out patient\".   No wnds or anything open, no need for wnd care  "

## 2017-05-11 NOTE — DISCHARGE INSTRUCTIONS
Discharge Instructions    Discharged to home by car with relative. Discharged via wheelchair, hospital escort: Yes.  Special equipment needed: Not Applicable    Be sure to schedule a follow-up appointment with your primary care doctor or any specialists as instructed.     Discharge Plan:   Influenza Vaccine Indication: Not indicated: Previously immunized this influenza season and > 8 years of age    I understand that a diet low in cholesterol, fat, and sodium is recommended for good health. Unless I have been given specific instructions below for another diet, I accept this instruction as my diet prescription.   Other diet: ada    Special Instructions: None    · Is patient discharged on Warfarin / Coumadin?   No     · Is patient Post Blood Transfusion?  No    Depression / Suicide Risk    As you are discharged from this [facility] facility, it is important to learn how to keep safe from harming yourself.    Recognize the warning signs:  · Abrupt changes in personality, positive or negative- including increase in energy   · Giving away possessions  · Change in eating patterns- significant weight changes-  positive or negative  · Change in sleeping patterns- unable to sleep or sleeping all the time   · Unwillingness or inability to communicate  · Depression  · Unusual sadness, discouragement and loneliness  · Talk of wanting to die  · Neglect of personal appearance   · Rebelliousness- reckless behavior  · Withdrawal from people/activities they love  · Confusion- inability to concentrate     If you or a loved one observes any of these behaviors or has concerns about self-harm, here's what you can do:  · Talk about it- your feelings and reasons for harming yourself  · Remove any means that you might use to hurt yourself (examples: pills, rope, extension cords, firearm)  · Get professional help from the community (Mental Health, Substance Abuse, psychological counseling)  · Do not be alone:Call your Safe Contact-  someone whom you trust who will be there for you.  · Call your local CRISIS HOTLINE 367-1820 or 835-076-5887  · Call your local Children's Mobile Crisis Response Team [place] (344) 438-1510 or www.CrowdPlat  · Call the toll free National Suicide Prevention Hotlines   · National Suicide Prevention Lifeline 916-333-KYPF (6540)  · National Moneylib Line Network 800-SUICIDE (103-9550)      Anemia, Nonspecific  Anemia is a condition in which the concentration of red blood cells or hemoglobin in the blood is below normal. Hemoglobin is a substance in red blood cells that carries oxygen to the tissues of the body. Anemia results in not enough oxygen reaching these tissues.   CAUSES   Common causes of anemia include:   · Excessive bleeding. Bleeding may be internal or external. This includes excessive bleeding from periods (in women) or from the intestine.    · Poor nutrition.    · Chronic kidney, thyroid, and liver disease.   · Bone marrow disorders that decrease red blood cell production.  · Cancer and treatments for cancer.  · HIV, AIDS, and their treatments.  · Spleen problems that increase red blood cell destruction.  · Blood disorders.  · Excess destruction of red blood cells due to infection, medicines, and autoimmune disorders.  SIGNS AND SYMPTOMS   · Minor weakness.    · Dizziness.    · Headache.  · Palpitations.    · Shortness of breath, especially with exercise.    · Paleness.  · Cold sensitivity.  · Indigestion.  · Nausea.  · Difficulty sleeping.  · Difficulty concentrating.  Symptoms may occur suddenly or they may develop slowly.   DIAGNOSIS   Additional blood tests are often needed. These help your health care provider determine the best treatment. Your health care provider will check your stool for blood and look for other causes of blood loss.   TREATMENT   Treatment varies depending on the cause of the anemia. Treatment can include:   · Supplements of iron, vitamin B12, or folic acid.    · Hormone  medicines.    · A blood transfusion. This may be needed if blood loss is severe.    · Hospitalization. This may be needed if there is significant continual blood loss.    · Dietary changes.  · Spleen removal.  HOME CARE INSTRUCTIONS  Keep all follow-up appointments. It often takes many weeks to correct anemia, and having your health care provider check on your condition and your response to treatment is very important.  SEEK IMMEDIATE MEDICAL CARE IF:   · You develop extreme weakness, shortness of breath, or chest pain.    · You become dizzy or have trouble concentrating.  · You develop heavy vaginal bleeding.    · You develop a rash.    · You have bloody or black, tarry stools.    · You faint.    · You vomit up blood.    · You vomit repeatedly.    · You have abdominal pain.  · You have a fever or persistent symptoms for more than 2-3 days.    · You have a fever and your symptoms suddenly get worse.    · You are dehydrated.    MAKE SURE YOU:  · Understand these instructions.  · Will watch your condition.  · Will get help right away if you are not doing well or get worse.     This information is not intended to replace advice given to you by your health care provider. Make sure you discuss any questions you have with your health care provider.     Document Released: 01/25/2006 Document Revised: 08/20/2014 Document Reviewed: 06/13/2014  [employer] Interactive Patient Education ©2016 [employer]

## 2017-05-22 NOTE — H&P
Patient anemic without gross GI bleeding. Sending for outpatient transfusion.    Exam: VSS              Cor-RRR              Lungs-CTA      A/P     Anemia  -Outpatient transfusion

## 2017-05-24 PROBLEM — J18.9 CAP (COMMUNITY ACQUIRED PNEUMONIA): Status: ACTIVE | Noted: 2017-01-01

## 2017-05-24 NOTE — IP AVS SNAPSHOT
" <p align=\"LEFT\"><IMG SRC=\"//[email or web address]\" alt=\"Image\" WIDTH=\"50%\" HEIGHT=\"200\" BORDER=\"\"></p>                   Name:Sulaiman Rodgers  Medical Record Number:[id number]  CSN: [id number]    YOB: 1940   Age: 76 y.o.  Sex: male  HT:1.778 m (5' 10\") WT: 100.7 kg (222 lb 0.1 oz)          Admit Date: 5/24/2017     Discharge Date:   Today's Date: 6/7/2017  Attending Doctor:  Eduar Tapia M.D.                  Allergies:  Review of patient's allergies indicates no known allergies.             Medication List      Ask your Physician about these medications        Instructions    ferrous sulfate 325 (65 FE) MG tablet    Take 1 Tab by mouth 2 times a day, with meals.   Dose:  325 mg       levothyroxine 150 MCG Tabs   Commonly known as:  SYNTHROID    Take 150 mcg by mouth Every morning on an empty stomach.   Dose:  150 mcg       magnesium oxide 400 MG Tabs   Commonly known as:  MAG-OX    Take 400 mg by mouth 2 times a day.   Dose:  400 mg       MENS 50+ ADVANCED Caps    Take 1 Cap by mouth every day.   Dose:  1 Cap       metformin  MG Tb24   Commonly known as:  GLUCOPHAGE XR    Take 500 mg by mouth every day.   Dose:  500 mg       niacin 500 MG Tabs    Take 500 mg by mouth every day.   Dose:  500 mg       pravastatin 40 MG tablet   Commonly known as:  PRAVACHOL    Take 1 Tab by mouth every day.   Dose:  40 mg         "

## 2017-05-24 NOTE — IP AVS SNAPSHOT
" Home Care Instructions                                                                                                                  Name:Sulaiman Rodgers  Medical Record Number:[id number]  CSN: [id number]    YOB: 1940   Age: 76 y.o.  Sex: male  HT:1.778 m (5' 10\") WT: 100.7 kg (222 lb 0.1 oz)          Admit Date: 5/24/2017     Discharge Date:   Today's Date: 6/7/2017  Attending Doctor:  Eduar Tapia M.D.                  Allergies:  Review of patient's allergies indicates no known allergies.            Discharge Instructions       Discharge Instructions    Discharged to [facility] by ambulance with EMTs. Discharged via ambulance, hospital escort: Refused.  Special equipment needed: Oxygen      Be sure to schedule a follow-up appointment with your primary care doctor or any specialists as instructed.     Discharge Plan:   Diet Plan: Discussed  Activity Level: Discussed  Confirmed Follow up Appointment:  (Pt being transfered to outside facility)  Confirmed Symptoms Management: Discussed  Medication Reconciliation Updated: Yes  Influenza Vaccine Indication: Not indicated: Previously immunized this influenza season and > 8 years of age    I understand that a diet low in cholesterol, fat, and sodium is recommended for good health. Unless I have been given specific instructions below for another diet, I accept this instruction as my diet prescription.   Other diet: On tube feeds    Special Instructions: None    · Is patient discharged on Warfarin / Coumadin?   No     · Is patient Post Blood Transfusion?  Yes  POST BLOOD TRANSFUSION INFORMATION (ADULT)    The purpose of blood transfusion is to correct anemia, low levels of blood clotting factors or to correct acute blood loss.   Blood transfusion is very safe but occasionally unexpected adverse reactions do occur. Most adverse reactions occur during transfusion, within one to two days following transfusion or one to two weeks " following transfusion. Some adverse reactions can occur one to six months after transfusion and some even years later.             If any of the symptoms listed below happen to you during your transfusion,                                 please notify your nurse immediately.   · Fever or Chills  · Flushing of the Face  · Hives, rash or itching  · Difficulty in breathing or shortness of breath  · Pain or oozing of blood from the IV needle site  · Low back pain  · Nausea or vomiting  · Weakness or fainting  · Chest pain  · Blood in the urine  · Decreased frequency of urination    The above symptoms may also occur within 24 to 48 after transfusion; if so, notify your physician.     · Yellowing of the skin    This can happen one to six months after transfusion; if so, notify your physician    Depression / Suicide Risk    As you are discharged from this [facility] facility, it is important to learn how to keep safe from harming yourself.    Recognize the warning signs:  · Abrupt changes in personality, positive or negative- including increase in energy   · Giving away possessions  · Change in eating patterns- significant weight changes-  positive or negative  · Change in sleeping patterns- unable to sleep or sleeping all the time   · Unwillingness or inability to communicate  · Depression  · Unusual sadness, discouragement and loneliness  · Talk of wanting to die  · Neglect of personal appearance   · Rebelliousness- reckless behavior  · Withdrawal from people/activities they love  · Confusion- inability to concentrate     If you or a loved one observes any of these behaviors or has concerns about self-harm, here's what you can do:  · Talk about it- your feelings and reasons for harming yourself  · Remove any means that you might use to hurt yourself (examples: pills, rope, extension cords, firearm)  · Get professional help from the community (Mental Health, Substance Abuse, psychological counseling)  · Do not be  alone:Call your Safe Contact- someone whom you trust who will be there for you.  · Call your local CRISIS HOTLINE 872-5957 or 258-985-5501  · Call your local Children's Mobile Crisis Response Team [place] (285) 226-7044 or www.M-Factor  · Call the toll free National Suicide Prevention Hotlines   · National Suicide Prevention Lifeline 175-115-GPEJ (2891)  · [employer] Hope Line Network 800-SUICIDE (138-0930)           Discharge Medication Instructions:    Below are the medications your physician expects you to take upon discharge:    Review all your home medications and newly ordered medications with your doctor and/or pharmacist. Follow medication instructions as directed by your doctor and/or pharmacist.    Please keep your medication list with you and share with your physician.               Medication List      ASK your doctor about these medications        Instructions    Morning Afternoon Evening Bedtime    ferrous sulfate 325 (65 FE) MG tablet   Last time this was given:  325 mg on 6/7/2017  8:11 AM        Take 1 Tab by mouth 2 times a day, with meals.   Dose:  325 mg                        levothyroxine 150 MCG Tabs   Last time this was given:  150 mcg on 6/7/2017  8:10 AM   Commonly known as:  SYNTHROID        Take 150 mcg by mouth Every morning on an empty stomach.   Dose:  150 mcg                        magnesium oxide 400 MG Tabs   Commonly known as:  MAG-OX        Take 400 mg by mouth 2 times a day.   Dose:  400 mg                        MENS 50+ ADVANCED Caps        Take 1 Cap by mouth every day.   Dose:  1 Cap                        metformin  MG Tb24   Commonly known as:  GLUCOPHAGE XR        Take 500 mg by mouth every day.   Dose:  500 mg                        niacin 500 MG Tabs   Last time this was given:  500 mg on 6/7/2017  8:10 AM        Take 500 mg by mouth every day.   Dose:  500 mg                        pravastatin 40 MG tablet   Last time this was given:  40 mg on 6/6/2017   8:39 PM   Commonly known as:  PRAVACHOL        Take 1 Tab by mouth every day.   Dose:  40 mg                                Orders for after discharge     REFERRAL TO LONG TERM ACUTE CARE    Complete by:  As directed              Instructions           Diet / Nutrition:    Follow any diet instructions given to you by your doctor or the dietician, including how much salt (sodium) you are allowed each day.    If you are overweight, talk to your doctor about a weight reduction plan.    Activity:    Remain physically active following your doctor's instructions about exercise and activity.    Rest often.     Any time you become even a little tired or short of breath, SIT DOWN and rest.    Worsening Symptoms:    Report any of the following signs and symptoms to the doctor's office immediately:    *Pain of jaw, arm, or neck  *Chest pain not relieved by medication                               *Dizziness or loss of consciousness  *Difficulty breathing even when at rest   *More tired than usual                                       *Bleeding drainage or swelling of surgical site  *Swelling of feet, ankles, legs or stomach                 *Fever (>100ºF)  *Pink or blood tinged sputum  *Weight gain (3lbs/day or 5lbs /week)           *Shock from internal defibrillator (if applicable)  *Palpitations or irregular heartbeats                *Cool and/or numb extremities    Stroke Awareness    Common Risk Factors for Stroke include:    Age  Atrial Fibrillation  Carotid Artery Stenosis  Diabetes Mellitus  Excessive alcohol consumption  High blood pressure  Overweight   Physical inactivity  Smoking    Warning signs and symptoms of a stroke include:    *Sudden numbness or weakness of the face, arm or leg (especially on one side of the body).  *Sudden confusion, trouble speaking or understanding.  *Sudden trouble seeing in one or both eyes.  *Sudden trouble walking, dizziness, loss of balance or coordination.Sudden severe headache with  no known cause.    It is very important to get treatment quickly when a stroke occurs. If you experience any of the above warning signs, call 911 immediately.                   Disclaimer         Quit Smoking / Tobacco Use:    I understand the use of any tobacco products increases my chance of suffering from future heart disease or stroke and could cause other illnesses which may shorten my life. Quitting the use of tobacco products is the single most important thing I can do to improve my health. For further information on smoking / tobacco cessation call a Toll Free Quit Line at 1-521.525.4934 (*National Cancer [place]) or 1-576.397.3696 (American Lung Association) or you can access the web based program at www.lungusa.org.    [place] Tobacco Users Help Line:  (128) 673-9468       Toll Free: 1-570.996.3826  Quit Tobacco Program [facility] (094)128-0221    Crisis Hotline:    [employer] Crisis Hotline:  6-282-QQMINMX or 1-100.918.9892    [place] Crisis Hotline:    1-126.825.8032 or 749-620-2206    Discharge Survey:   Thank you for choosing [facility]. We hope we did everything we could to make your hospital stay a pleasant one. You may be receiving a phone survey and we would appreciate your time and participation in answering the questions. Your input is very valuable to us in our efforts to improve our service to our patients and their families.        My signature on this form indicates that:    1. I have reviewed and understand the above information.  2. My questions regarding this information have been answered to my satisfaction.  3. I have formulated a plan with my discharge nurse to obtain my prescribed medications for home.                  Disclaimer         __________________________________                     __________       ________                       Patient Signature                                                 Date                    Time

## 2017-05-24 NOTE — IP AVS SNAPSHOT
DrawQuest Access Code: Activation code not generated  Current DrawQuest Status: Active    [employer]  A secure, online tool to manage your health information     [employer]’s DrawQuest® is a secure, online tool that connects you to your personalized health information from the privacy of your home -- day or night - making it very easy for you to manage your healthcare. Once the activation process is completed, you can even access your medical information using the DrawQuest adan, which is available for free in the Apple Adan store or Google Play store.     DrawQuest provides the following levels of access (as shown below):   My Chart Features   [facility] Primary Care Doctor [facility]  Specialists [facility]  Urgent  Care Non-[facility]  Primary Care  Doctor   Email your healthcare team securely and privately 24/7 X X X X   Manage appointments: schedule your next appointment; view details of past/upcoming appointments X      Request prescription refills. X      View recent personal medical records, including lab and immunizations X X X X   View health record, including health history, allergies, medications X X X X   Read reports about your outpatient visits, procedures, consult and ER notes X X X X   See your discharge summary, which is a recap of your hospital and/or ER visit that includes your diagnosis, lab results, and care plan. X X       How to register for DrawQuest:  1. Go to  [email or web address].  2. Click on the Sign Up Now box, which takes you to the New Member Sign Up page. You will need to provide the following information:  a. Enter your DrawQuest Access Code exactly as it appears at the top of this page. (You will not need to use this code after you’ve completed the sign-up process. If you do not sign up before the expiration date, you must request a new code.)   b. Enter your date of birth.   c. Enter your home email address.   d. Click Submit, and follow the next screen’s instructions.  3. Create a DrawQuest ID. This will  be your [employer] login ID and cannot be changed, so think of one that is secure and easy to remember.  4. Create a [employer] password. You can change your password at any time.  5. Enter your Password Reset Question and Answer. This can be used at a later time if you forget your password.   6. Enter your e-mail address. This allows you to receive e-mail notifications when new information is available in [employer].  7. Click Sign Up. You can now view your health information.    For assistance activating your [employer] account, call [phone]

## 2017-05-24 NOTE — IP AVS SNAPSHOT
6/7/2017    Sulaiman Rodgers  [address] [place] [address]    Dear Sulaiman:    [facility] wants to ensure your discharge home is safe and you or your loved ones have had all of your questions answered regarding your care after you leave the hospital.    Below is a list of resources and contact information should you have any questions regarding your hospital stay, follow-up instructions, or active medical symptoms.    Questions or Concerns Regarding… Contact   Medical Questions Related to Your Discharge  (7 days a week, 8am-5pm) Contact a Nurse Care Coordinator   [phone]   Medical Questions Not Related to Your Discharge  (24 hours a day / 7 days a week)  Contact the Nurse Health Line   [phone]    Medications or Discharge Instructions Refer to your discharge packet   or contact your [facility] Primary Care Provider   [phone]   Follow-up Appointment(s) Schedule your appointment via [employer]   or contact Scheduling [phone]   Billing Review your statement via [employer]  or contact Billing [phone]   Medical Records Review your records via [employer]   or contact Medical Records [phone]     You may receive a telephone call within two days of discharge. This call is to make certain you understand your discharge instructions and have the opportunity to have any questions answered. You can also easily access your medical information, test results and upcoming appointments via the [employer] free online health management tool. You can learn more and sign up at CEDU/Winchannel. For assistance setting up your [employer] account, please call [phone].    Once again, we want to ensure your discharge home is safe and that you have a clear understanding of any next steps in your care. If you have any questions or concerns, please do not hesitate to contact us, we are here for you. Thank you for choosing [facility] for your healthcare needs.    Sincerely,    Your [facility] Healthcare Team

## 2017-05-25 PROBLEM — D69.6 THROMBOCYTOPENIA (HCC): Status: ACTIVE | Noted: 2017-01-01

## 2017-05-25 PROBLEM — J18.9 PNEUMONIA: Status: RESOLVED | Noted: 2017-01-01 | Resolved: 2017-01-01

## 2017-05-25 PROBLEM — A41.9 SEPTIC SHOCK(785.52): Status: ACTIVE | Noted: 2017-01-01

## 2017-05-25 PROBLEM — R65.21 SEPTIC SHOCK(785.52): Status: ACTIVE | Noted: 2017-01-01

## 2017-05-25 PROBLEM — R04.2 HEMOPTYSIS: Status: ACTIVE | Noted: 2017-01-01

## 2017-05-25 PROBLEM — D64.9 ANEMIA: Status: ACTIVE | Noted: 2017-01-01

## 2017-05-25 PROBLEM — J96.01 ACUTE RESPIRATORY FAILURE WITH HYPOXIA (HCC): Status: ACTIVE | Noted: 2017-01-01

## 2017-05-25 PROBLEM — J18.9 PNEUMONIA: Status: ACTIVE | Noted: 2017-01-01

## 2017-05-25 PROBLEM — I35.0 AORTIC STENOSIS, SEVERE: Status: ACTIVE | Noted: 2017-01-01

## 2017-05-25 NOTE — PROCEDURES
DATE OF SERVICE:  05/25/2017    DATE OF PROCEDURE:  05/25/2017    DATE OF SERVICE:  05/25/2017    TITLE OF THE PROCEDURE:  Diagnostic and therapeutic flexible fiberoptic   bronchoscopy.    INDICATION FOR THE PROCEDURE:  Hemoptysis, atelectasis.    POSTPROCEDURE DIAGNOSES:  1.  Normal endobronchial anatomy.  2.  No endobronchial tumor identified.  3.  No active bleeding sites identified in the lungs.  4.  Scant amounts of bright red blood-tinged secretions seen bilaterally,   suctioned until clear.    NARRATIVE:  The patient was sedated, intubated and ventilated at the time of   this procedure.  The flexible fiberoptic bronchoscope was inserted through the   lumen of the endotracheal tube and advanced into the distal trachea without   difficulty.  The airways were examined to the subsegmental bronchus level   bilaterally.  The endobronchial anatomy was normal.  No tumor was identified.    There was no active bleeding in either lung.  There was a scant to mild   amount of bright red blood-tinged secretions seen bilaterally and these were   suctioned until clear.  There did not appear to be any focal location in   either lung with increased bloody secretions to suggest a site of bleeding   within the lungs.  All of the secretions were suctioned until clear.    Bilateral bronchial washings from all lobes of both lungs were submitted to   the laboratory for cytology, Gram stain, culture and sensitivity, acid fast   bacilli, smear and culture and fungal culture.  The patient tolerated the   procedure quite nicely.  No complications were apparent.  His heart rate and   rhythm, blood pressure and oxygen saturation were continuously monitored.       ____________________________________     MD KARSTEN LANDIN / SEBATSIEN    DD:  05/25/2017 02:55:17  DT:  05/25/2017 03:06:24    D#:  [id number]  Job#:  [id number]

## 2017-05-25 NOTE — ED NOTES
Pt sitting in supine position semi fowlers position, bed in lowest position, call bell within reach.  No questions at this time.VSS, family at bedside

## 2017-05-25 NOTE — PROCEDURES
DATE OF SERVICE:  05/25/2017    DATE OF PROCEDURE:  05/25/2017    TITLE OF THE PROCEDURE:  Central venous catheter placement.    INDICATION FOR THE PROCEDURE:  Gentleman with respiratory failure, hemoptysis,   pneumonia and sepsis, requires central venous access for medication   administration.    NARRATIVE:  The right neck was prepped with chlorhexidine and draped in the   usual sterile fashion.  Xylocaine 1% solution was used for topical anesthesia.    A triple lumen central venous catheter was easily placed into the right   internal jugular vein under ultrasound guidance on the first attempt using the   technique described by Adolfo without difficulty or apparent complication.    The line was sutured into place and a sterile dressing was placed over the   line.  All 3 ports flushed and returned venous blood easily.  The patient   tolerated the procedure quite nicely.  No complications are apparent.  Chest   x-ray will be obtained in the next few minutes to confirm placement.       ____________________________________     MD KARSTEN LANDIN / SEBASTIEN    DD:  05/25/2017 04:12:34  DT:  05/25/2017 04:24:38    D#:  [id number]  Job#:  [id number]

## 2017-05-25 NOTE — RESPIRATORY CARE
Intubation Assist    Intubation assist performed yes  Reason for intubation respiratory failure  Positive Color Change on EZCap? yes  Difficult Intubation/Number of attempts no, one attempt  Evidence of aspiration no    Airway Group ET Tube Oral 8.0-Secured At  (cm): 24 (05/25/17 0144)    Strong Vent Mode: (S)CMV (05/25/17 0140)  Rate (breaths/min): 18 (05/25/17 0140)  Vt Target (mL): 420 (05/25/17 0140)  FiO2: 100 (05/25/17 0140)  PEEP/CPAP: 8 (05/25/17 0140)

## 2017-05-25 NOTE — ED NOTES
Family at bedside, pt verbalized understanding of poc and admission.  Bed in lowest position call bell within reach.

## 2017-05-25 NOTE — PROGRESS NOTES
Hospital Medicine Interval Note  Date of Service:  5/25/2017    Chief Complaint  76 y.o.-year-old male admitted 5/24/2017 with shortness of breath and required intubation.    Interval Problem Update      Bronchoscopy done 5/24. Blood out of the ETT this morning. Hb has dropped to 6.5 thus transfuse. Cr is 2 thus he cannot have a CTA.    Consultants/Specialty  Pulmonology.    Disposition  ICU     Review of Systems   Unable to perform ROS: intubated      Physical Exam Laboratory/Imaging   Filed Vitals:    05/25/17 0600 05/25/17 0622 05/25/17 0637 05/25/17 0700   BP:       Pulse: 90   88   Temp:       Resp: 26   25   Height:       Weight:       SpO2: 97% 97% 95% 97%     Physical Exam   Constitutional: No distress.   Eyes:   Pale conjunctiva   Cardiovascular: Normal rate and regular rhythm.    Murmur heard.  Abdominal: Soft. He exhibits no distension. There is no tenderness.   Genitourinary:   servin   Musculoskeletal: He exhibits no edema or tenderness.   Neurological:   sedated   Skin: Skin is warm and dry. There is pallor.    Lab Results   Component Value Date/Time    WBC 14.5* 05/25/2017 05:10 AM    HEMOGLOBIN 7.5* 05/25/2017 05:10 AM    HEMATOCRIT 27.3* 05/25/2017 05:10 AM    PLATELET COUNT 80* 05/25/2017 05:10 AM     Lab Results   Component Value Date/Time    SODIUM 138 05/24/2017 08:57 PM    POTASSIUM 4.2 05/24/2017 08:57 PM    CHLORIDE 108 05/24/2017 08:57 PM    CO2 20 05/24/2017 08:57 PM    GLUCOSE 170* 05/24/2017 08:57 PM    BUN 26* 05/24/2017 08:57 PM    CREATININE 1.60* 05/24/2017 08:57 PM      Assessment/Plan    Acute respiratory failure with hypoxia (CMS-HCC) (present on admission)  Assessment & Plan  Initiated on the vent 5/24. Pulmonary consulted. CTA chest once Cr is bossman.   DVT studies ordered.    Septic shock (CMS-HCC) (present on admission)  Assessment & Plan  Source is community acquired pneumonia. IV unasyn/doxy. Bronch was done on admit.    Hemoptysis (present on admission)  Assessment &  Plan  Bright-red blood in the ER, bronchoscopy was negative.     Acute on chronic renal insufficiency (HCC) (present on admission)  Assessment & Plan  CKD stage III. Baseline Cr appears to be 1.3 by review of the records.    DM (diabetes mellitus) (CMS-HCC) (present on admission)  Assessment & Plan  Sliding scale    HTN (hypertension) (present on admission)  Assessment & Plan  Hx of.    CAP (community acquired pneumonia) (present on admission)  Assessment & Plan  IV abx.    Anemia (present on admission)  Assessment & Plan  In the setting of recent GI bleed. Hb 6.5. Transfuse one unit of RBCs. Possible GI consult for EGD.    Thrombocytopenia (CMS-[facility]) (present on admission)  Assessment & Plan  Platelets 80    Aortic stenosis, severe (present on admission)  Assessment & Plan  See echo:  CONCLUSIONS  Normal left ventricular systolic function.  Left ventricular ejection fraction is visually estimated to be 60%.  Mild mitral regurgitation.  Severe aortic stenosis.  Aortic valve area calculated from the continuity equation is 1.0 cm2.  Vmax is 4.22  m/s. Transvalvular gradients are - Peak: 76  mmHg, Mean:   51  mmHg.   Moderate aortic insufficiency.  Moderate tricuspid regurgitation.  Right ventricular systolic pressure is estimated to be 60 mmHg.       Labs reviewed and Medications reviewed  Bond catheter: Unconscious / Sedated Patient on a Ventilator  Central line in place: Sepsis                       Pt is critically  Ill in the ICU, 34 minutes of critical care time spent with patient and nursing and in specific management of hypotensive shock in the ICU. See orders for blood transfusion.

## 2017-05-25 NOTE — PROGRESS NOTES
Pulmonary Critical Care Progress Note        DOS:  5/25/2017    Chief Complaint: SOB    History of Present Illness:   The entire history is obtained from healthcare providers and medical record as this gentleman cannot give me any history.  I was kindly asked by Dr. Chavira to see and evaluate this gentleman regarding the above problems.  This is a 76-year-old gentleman, who presented earlier this evening to [facility] with shortness of breath.  He apparently recently took a car trip to [place], [place].  He drove to [place] approximately 1 week ago and drove back from [place] on or about 05/24/2017.  He presented to the emergency room complaining of shortness of breath.  He was hypoxemic on presentation and was placed on supplemental oxygen.  There was a concern that he may have an acute pulmonary embolism and he was given a loading dose of intravenous heparin and started on a heparin drip on the weight-based protocol.  CT angiography of the chest was not ordered because of an increased creatinine of 1.60.  He has a history of underlying chronic kidney disease.  Subsequent starting his heparin, he felt like he had to cough. He had hemoptysis with bright red blood.  He developed tachypnea and hypoxemia and was then emergently intubated and placed on the ventilator.  At the time of my arrival in the emergency room, he is sedated on full mechanical ventilatory support.    ROS:    Respiratory: unable to perform due to the patient's inability to effectively communicate,   Cardiac: unable to perform due to the patient's inability to effectively communicate,   GI: unable to perform due to the patient's inability to effectively communicate.     Interval Events:  24 hour interval history reviewed   I/O's +420cc/24hrs  Bronch last night  Mostly old blood from ET tube - min amt on/off all day - cleared with suctioning  Agitated with Prop off - Not tolerating sedation vacations  Fentanyl 50  Prop 20  Tmax 103.1 - WBC  normal  No SBT  Doxy day 1  Blood cultures pending  CXR shows: Improved bilateral opacities/edema  Creat worse - no contrast for now  NIVT later in day bilat LE neg for DVT    PFSH:  No change.    Respiratory:  Strong Vent Mode: (S)CMV, Rate (breaths/min): 24, Vt Target (mL): 420, PEEP/CPAP: 15, FiO2: 100, Static Compliance (ml / cm H2O): 27  Pulse Oximetry: 92 %  PIP 27           Exam: Mildly labored on full support, clear anteriorly, coarse rales at bases L>R  ImagingAvailable data reviewed   Recent Labs      05/25/17   0210  05/25/17   0451   ISTATAPH  7.042*  7.204*   ISTATAPCO2  78.7*  57.3*   ISTATAPO2  52*  69   ISTATATCO2  24  24   [id number]  68*  89*   ISTATARTHCO3  21.4  22.6   ISTATARTBE  -9*  -5*   ISTATTEMP  see below  97.5 F   ISTATFIO2  100  100   ISTATSPEC  Arterial  Arterial   ISTATAPHTC   --   7.212*   [id number]   --   66       HemoDynamics:  Pulse: (!) 109, Heart Rate (Monitored): (!) 114  Blood Pressure : 128/79 mmHg, NIBP: 154/63 mmHg       Exam: SR, RRR, no M, no E, cool but has good cap refill   Imaging: Available data reviewed  Recent Labs      05/24/17 2057   TROPONINI  0.11*   BNPBTYPENAT  67       Neuro:  GCS Total Oscar Coma Score: 10       Exam: Sedate, PERRL, moves all four, restrained  Imaging: Available data reviewed    Fluids:  Intake/Output       05/23/17 0700 - 05/24/17 0659 (Not Admitted) 05/24/17 0700 - 05/25/17 0659 05/25/17 0700 - 05/26/17 0659      2229-7597 5189-4096 Total 0700-1859 1900-0659 Total 0700-1859 1900-0659 Total       Intake    I.V.  --  -- --  --  675.2 675.2  --  -- --    Propofol Volume -- -- -- -- 38.4 38.4 -- -- --    Heparin Volume -- -- -- -- 55.8 55.8 -- -- --    IV Volume (IV maintenance) -- -- -- -- 581 581 -- -- --    Total Intake -- -- -- -- 675.2 675.2 -- -- --       Output    Urine  --  -- --  --  300 300  --  -- --    Indwelling Cathether -- -- -- -- 300 300 -- -- --    Stool  --  -- --  --  -- --  --  -- --    Number of Times Stooled --  -- -- -- 0 x 0 x -- -- --    Total Output -- -- -- -- 300 300 -- -- --       Net I/O     -- -- -- -- 375.2 375.2 -- -- --        Weight: 106.595 kg (235 lb)  Recent Labs      17   SODIUM  138   POTASSIUM  4.2   CHLORIDE  108   CO2  20   BUN  26*   CREATININE  1.60*   CALCIUM  8.5       GI/Nutrition:  Exam: Obese, query slight distended, NT, soft  Imaging: Available data reviewed  NPO  Liver Function  Recent Labs      17   ALTSGPT  16   ASTSGOT  46*   ALKPHOSPHAT  44   TBILIRUBIN  2.4*   LIPASE  98*   GLUCOSE  170*       Heme:  Recent Labs      17   RBC  3.26*   HEMOGLOBIN  8.1*   HEMATOCRIT  27.9*   PLATELETCT  91*   PROTHROMBTM  14.7*   APTT  35.7   INR  1.11       Infectious Disease:  Temp  Av.3 °C (100.9 °F)  Min: 37.6 °C (99.7 °F)  Max: 39.5 °C (103.1 °F)  Monitored Temp  Av.7 °C (99.9 °F)  Min: 37.6 °C (99.7 °F)  Max: 37.8 °C (100 °F)  Micro: reviewed  Recent Labs      17   WBC  8.0   NEUTSPOLYS  74.10*   LYMPHOCYTES  18.50*   MONOCYTES  1.80   EOSINOPHILS  0.00   BASOPHILS  0.90   ASTSGOT  46*   ALTSGPT  16   ALKPHOSPHAT  44   TBILIRUBIN  2.4*     Current Facility-Administered Medications   Medication Dose Frequency Provider Last Rate Last Dose   • acetaminophen (TYLENOL) tablet 650 mg  650 mg Q6HRS PRN Caridad Ibarra M.D.   Stopped at 17 0300   • PROPOFOL 10 MG/ML IV EMUL       Stopped at 17 0145   • propofol (DIPRIVAN) injection  5-80 mcg/kg/min Continuous Kaz Mobley M.D. 32 mL/hr at 17 50 mcg/kg/min at 17 0515   • ampicillin/sulbactam (UNASYN) 3 g in  mL IVPB  3 g Q6HRS Kaz Mobley M.D. 200 mL/hr at 17 0503 3 g at 17 0503   • doxycycline (VIBRAMYCIN) 100 mg in  mL IVPB  100 mg Q12HRS Kaz Mobley M.D.       • Respiratory Care per Protocol   Continuous RT Kaz Mobley M.D.       • senna-docusate (PERICOLACE or SENOKOT S) 8.6-50 MG per tablet 2 Tab  2 Tab  BID Kaz Mobley M.D.        And   • polyethylene glycol/lytes (MIRALAX) PACKET 1 Packet  1 Packet QDAY PRN Kaz Mobley M.D.        And   • magnesium hydroxide (MILK OF MAGNESIA) suspension 30 mL  30 mL QDAY PRN Kaz Mobley M.D.        And   • bisacodyl (DULCOLAX) suppository 10 mg  10 mg QDAY PRN Kaz Mobley M.D.       • chlorhexidine (PERIDEX) 0.12 % solution 15 mL  15 mL BID Kaz Mobley M.D.       • lidocaine (XYLOCAINE) 1%  injection  1-2 mL Q30 MIN PRN Kaz Mobley M.D.       • MD ALERT...Adult ICU Electrolyte Replacement per Pharmacy Protocol   pharmacy to dose Kaz Mobley M.D.       • fentaNYL (SUBLIMAZE) injection 25 mcg  25 mcg Q HOUR PRN Kaz Mobley M.D.        Or   • fentaNYL (SUBLIMAZE) injection 50 mcg  50 mcg Q HOUR PRN Kaz Mobley M.D.        Or   • fentaNYL (SUBLIMAZE) injection 100 mcg  100 mcg Q HOUR PRN Kaz Mobley M.D.       • fentaNYL (SUBLIMAZE) 50 mcg/mL in 50mL   Continuous Kaz Mobley M.D.   50 mcg at 05/25/17 0454   • ipratropium-albuterol (DUONEB) nebulizer solution 3 mL  3 mL Q2HRS PRN (RT) Kaz Mobley M.D.       • ipratropium-albuterol (DUONEB) nebulizer solution 3 mL  3 mL Q4HRS (RT) Kaz Mobley M.D.   Stopped at 05/25/17 1864   • pantoprazole (PROTONIX) injection 40 mg  40 mg DAILY Kaz Mobley M.D.       • ferrous sulfate tablet 325 mg  325 mg BID WITH MEALS Caridad Ibarra M.D.       • levothyroxine (SYNTHROID) tablet 150 mcg  150 mcg AM ES Caridad Ibarra M.D.       • magnesium oxide (MAG-OX) tablet 400 mg  400 mg BID Caridad Ibarra M.D.       • niacin tablet 500 mg  500 mg DAILY Caridad Ibarra M.D.       • pravastatin (PRAVACHOL) tablet 40 mg  40 mg QHS Caridad Ibarra M.D.   40 mg at 05/24/17 2245   • NS infusion 3,198 mL  30 mL/kg Once PRN Caridad Ibarra M.D.       • NS infusion   Continuous Caridad Ibarra M.D. 83 mL/hr at 05/24/17 2300     • NS (BOLUS)  infusion 1,000 mL  1,000 mL Once PRN Caridad Ibarra M.D.       • oxycodone immediate-release (ROXICODONE) tablet 2.5 mg  2.5 mg Q3HRS PRLEON Ibarra M.D.        And   • oxycodone immediate-release (ROXICODONE) tablet 5 mg  5 mg Q3HRS PRN Caridad Ibarra M.D.        And   • morphine (pf) 4 mg/ml injection 2 mg  2 mg Q3HRS PRN Caridad Ibarra M.D.       • insulin lispro (HUMALOG) injection 2-9 Units  2-9 Units 4X/DAY ACHVALENTINO Ibarra M.D.       • glucose 4 g chewable tablet 16 g  16 g Q15 MIN PRLEON Ibarra M.D.        And   • dextrose 50% (D50W) injection 25 mL  25 mL Q15 MIN PRLEON Ibarra M.D.       • ondansetron (ZOFRAN) syringe/vial injection 4 mg  4 mg Q4HRS PRLEON Ibarra M.D.       • ondansetron (ZOFRAN ODT) dispertab 4 mg  4 mg Q4HRS PRLEON Ibarra M.D.       • multivitamin (THERAGRAN) tablet 1 Tab  1 Tab DAILY Caridad Ibarra M.D.         Last reviewed on 5/24/2017  9:56 PM by Zora Mccarthy [facility]    Quality  Measures:  Labs reviewed, Radiology images reviewed and Medications reviewed                      Problems/plan:  Ventilator-dependent respiratory failure.   Intubated in ER early AM 5/25   RT protocols   Not ready for SBT   Mobilize and sedation vacations when safe  Hemoptysis.  He underwent urgent bronchoscopy in the emergency department.  No bleeding sites were identified in the lungs.  There were some bright red bloody secretions seen bilaterally.  There was no active bleeding or endobronchial tumor.  There were no focally increased bloody secretions in    either lung or any particular lobes to suggest a location of active bleeding from his lungs.  There is a possibility that he may have had an acute pulmonary embolism.  He is at risk given his recent car ride to and from [place], [place].  CT angiography of the chest has not been performed as of yet because of his increased creatinine.   Eval ongoing - no CTA - AKLI   NIVT neg   Monitor for need for Tx FOB again   No heparin for now   Monitor for need for IVC  filter - no for now  Community-acquired pneumonia.   Cultures pending   Pulmonary toilet   IV ABX - Unasyn/Doxy  Sepsis, pulmonary source.   Protocols - fluids - serial LA   Pressors PRN - so far none!  Anemia with thrombocytopenia.   Serial lab - transfuse per policy - needs blood today   TEG with plt mapping if keeps bleeding - plt low - coags ok  Acute on chronic kidney disease.   No contrast die   Avoid nephrotoxins   May need renal eval  Elevated lipase.  His abdominal exam is unremarkable.   F/u lab in AM - image abd?  Elevated troponin - demand ischemia - NSTEMI?   ECHO - f/u labs - Cards eval PRN  Chronic diastolic heart failure with grade I diastolic dysfunction.  Hypothyroidism - replete  Diabetes mellitus type 2 - glycemic control - monitor for need for I drip  Dyslipidemia - diet/statin  Recent hospitalization for lower gastrointestinal hemorrhage, presumed due to sigmoid diverticulosis.  An orogastric tube has been placed in the emergency department.  He has dark appearing fluid from his stomach.  His hemoglobin has not significantly decreased from 05/10/2017.   Serial H/H   PPI IV   GI eval PRN  Remote history of significant smoking - COPD? PRN nebs.  Enteral nutrion when clinically appropriate  Prophylaxis    Discussed patient condition and risk of morbidity and/or mortality with RN, RT, Pharmacy, Charge nurse / hot rounds and hospitalist.    The patient remains critically ill.  Critical care time = 35 minutes in directly providing and coordinating critical care and extensive data review.  No time overlap and excludes procedures.     Arianna DIAL (Emelyibe), am scribing for, and in the presence of, Clayton Bautista III, M.D..    Electronically signed by: Arianna Wadsworth (Migdalia), 5/25/2017    Clayton DIAL III, M.D. personally performed the services described in this documentation, as scribed by Arianna Wadsworth in my presence, and it is both accurate and complete.

## 2017-05-25 NOTE — ED NOTES
"Chief Complaint   Patient presents with   • Shortness of Breath     sudden onset post long car ride     Pt ANGELA KENT for above complaints. Pt states he took a long road trip across the [facility], pt denies being on any blood thinners. Pt states he was taken off his blood thinners a few years ago per his PCP. Pt received a blood transfusion for a GI bleed 2 weeks from [facility] where post transfusion has had a strong productive cough ever since. Pt reports chest pain while coughing that radiates bilateral upper chest, denies resting chest pain. PT AOx4 speaking in 5 word sentences, in apparent distress. /79 mmHg  Pulse 120  Temp(Src) 39.5 °C (103.1 °F)  Resp 24  Ht 1.778 m (5' 10\")  Wt 106.595 kg (235 lb)  BMI 33.72 kg/m2  SpO2 90%  ERP Cait at bedside and orders received. EKG preformed bedside. Charge RN notified of code sepsis. Report to Latasha SANDOVAL who is primary RN and will assume care of pt.   "

## 2017-05-25 NOTE — H&P
PRIMARY CARE PHYSICIAN:  Lavelle Dale DO    CHIEF COMPLAINT:  Shortness of breath.    HISTORY OF PRESENT ILLNESS:  This is a 76-year-old male with a past medical   history significant for hypothyroidism, type 2 diabetes, dyslipidemia who   presents today for evaluation of shortness of breath that has been ongoing for   the past 2 days.  Patient drove to [place] approximately 1 week ago and drove   back today.  Patient states that he has noticed that his shortness of breath   has gotten worse.  Patient complains of cough with clear sputum production.   Patient was hypoxic on room air saturating in low 80s.    He denies any fevers, chills, chest pain, abdominal pain, urinary bowel   symptoms.  Patient complains of generalized weakness and fatigue and decreased   appetite associated with it.  Patient denies any recent sick contact.    Patient was admitted to our service in March 2017 for lower gastrointestinal   bleed secondary to sigmoid diverticulosis, status post colonoscopy at that   time and no active source of GI bleed was found.  Patient states that he   continues to have weakness since that time.  He also states that he was   transfused 1 unit of PRBCs approximately 2 weeks ago because his hemoglobin   was 7.4.  Patient denies any hematuria, hematochezia, melena.    REVIEW OF SYSTEMS:  A comprehensive review of systems was conducted and all   pertinent positive and negative are documented in the HPI.    PAST MEDICAL HISTORY:  Significant for type 2 diabetes, dyslipidemia, history   of lower GI bleed, chronic kidney disease stage III, hypothyroidism, and   history of blood loss anemia.    PAST SURGICAL HISTORY:  Significant for laryngoscopy in March of 2016.    SOCIAL HISTORY:  Patient is a former smoker, used to smoke 3 packs a day for   25 years, quit 40 years ago.  Drinks alcohol rarely.  Denies any illicit drug   use.  The patient lives with his wife.    ALLERGIES:  No known drug allergies.    FAMILY  HISTORY:  Significant for mother who had cancer, type unknown, father   who had MI at age 70.    HOME MEDICATIONS:  Significant for ferrous sulfate 325 mg twice a day,   Synthroid 150 mcg daily, magnesium oxide 400 mg twice a day, metformin 500 mg   daily, multivitamin, niacin 500 mg daily, and Pravachol 40 mg daily.    PHYSICAL EXAMINATION:  VITAL SIGNS:  Temperature of 38.4, heart rate of 103, respirations of 20,   blood pressure of 128/79, O2 saturation of 92% on 15 liters nonrebreather   mask.  GENERAL:  Patient is in mild-to-moderate distress.  HEENT:  Normocephalic, atraumatic.  Pupils equal and reactive to light.  Moist   oral mucosa noted.  No oral exudate or erythema noted.  NECK:  Supple, no lymphadenopathy or JVD noted.  CARDIOVASCULAR:  Tachycardia.  No murmurs, rubs or gallops noted.  LUNGS:  Decreased breath sounds bilaterally, bibasilar rales heard.  No   rhonchi or wheezes noted.  CHEST:  No accessory muscle use noted.  ABDOMEN:  Soft, nontender, nondistended.  Obese, bowel sounds present.  EXTREMITIES:  No clubbing, cyanosis or edema noted.  NEUROLOGIC:  Alert, awake, oriented x3, no focal deficits noted.  PSYCHIATRIC:  Normal mood, normal affect.    LABORATORY DATA:  WBC of 8, hemoglobin of 8.1, hematocrit of 27.9, platelets   of 91.  Troponin of 0.11, lactic acid of 2.9, bandemia 1.9%.  GFR 42.  Lipase   of 98, BUN of 26, creatinine of 1.6.  CRP of 10.76, lipase of 98.  BNP of 67.    IMAGING STUDIES:  Chest x-ray shows peripheral basilar consolidation   indicating edema or pneumonia, moderate cardiac silhouette enlargement.  EKG   per my read, sinus tachycardia, rate of 117, QTC of 458.  No acute ST or   T-wave abnormalities noted.    ASSESSMENT AND PLAN:  1.  For patient's acute hypoxemic respiratory failure likely secondary to   community-acquired pneumonia, the patient is started on sepsis protocol.    Blood and sputum cultures are ordered.  Urinalysis is also ordered.  We will   continue to  trend patient's lactic acid.  Patient is started on azithromycin   and Rocephin.  Influenza PCR is ordered for further evaluation.  Due to   patient's recent travel, possibly patient's acute hypoxic respiratory failure   could be secondary to pulmonary embolism versus DVT.  Patient has chronic   kidney disease and is unable to get a CTA Thorax.  Patient is started on heparin drip  empirically  and V/Q scan is ordered.  If the patient becomes hypoxic on 15 liters   nonrebreather mask,  will have low threshold to start patient on BiPAP   therapy.  2.  Sepsis secondary to community-acquired pneumonia.  Patient is started on   sepsis protocol.  Cultures have been ordered.  We will continue to trend   patient's lactic acid and treat patient with IV hydration.  3.  Normocytic anemia.  Patient is continued on ferrous sulfate 325 mg twice a   day.  Patient underwent a recent colonoscopy which was negative for any acute   bleeding.  Patient's H and H are presently stable.  Will continue to   monitor for signs of active bleeding.  4.  Hypothyroidism.  Patient is continued on Synthroid 150 mcg daily.  5.  Type 2 diabetes.  Patient is on insulin sliding scale.  The patient's   outpatient metformin will be presently held.  6.  Dyslipidemia.  Patient is continued on Pravachol 40 mg daily.  7.  Lactic acid, likely secondary to above.  We will continue to trend   patient's lactic acid.  8.  Elevated troponin secondary to demand.  We will continue to trend   patient's troponins and monitor patient closely on telemetry.  9.  Chronic kidney disease stage III, at baseline.  We will continue to   monitor and avoid nephrotoxins.  10. Thrombocytopenia. Will monitor closely for signs of bleeding.    DISPOSITION:  ICU, patient will likely require greater than 2 midnights of   care.    PROPHYLAXIS:  Patient is on heparin drip for DVT prophylaxis.  No GI   prophylaxis indicated.  Bowel protocol initiated.    CODE STATUS:  Full.    Critical  care time of 35 minutes was spent excluding any overlap and procedure   time.  Plan of care was discussed with patient and wife at bedside and all   questions were answered.       ____________________________________     COLE EDWARDS MD MS / SEBASTIEN    DD:  05/24/2017 23:36:24  DT:  05/25/2017 00:50:08    D#:  [id number]  Job#:  [id number]

## 2017-05-25 NOTE — DIETARY
Nutrition Services: Consult metabolic cart study    Pt is a 76 y.o. Male with Dx: CAP    Admit day 1. S/p bronch and intubation this morning. +OGT to suction with ~150 ml coffee-ground output. R/o PE/DVT. No nutrition orders @ this time.    RD following, will order metabolic cart study as indicated.

## 2017-05-25 NOTE — ED PROVIDER NOTES
CHIEF COMPLAINT  Chief Complaint   Patient presents with   • Shortness of Breath     sudden onset post long car ride       HPI  Sulaiman Rodgers is a 76 y.o. male with a history of diabetes and hypothyroidism who presents with shortness of breath while driving a car from [facility]. Reports he also has had a cough for the past 2 weeks after her recent blood transfusion for GI bleed. Denies continued bleeding. Reports he had a colonoscopy where they did not see a definitive source of bleeding. Has chest pain with coughing however no chest pain at rest. No nausea or vomiting.    She denies any leg pain, swelling, history of PE. Denies history of CHF or significant heart problems in the past.    He was brought in by EMS and was found to be hypoxic with tachycardia. He presented with facemask oxygen. Was found to be febrile here. No known sick contacts.    REVIEW OF SYSTEMS  See HPI for further details. All other systems are negative.     PAST MEDICAL HISTORY   has a past medical history of Disorder of thyroid; Cataract; and Diabetes (CMS-HCC).    SOCIAL HISTORY  Social History     Social History Main Topics   • Smoking status: Former Smoker -- 3.00 packs/day for 20 years     Types: Cigarettes     Quit date: 01/01/1970   • Smokeless tobacco: Never Used   • Alcohol Use: Yes      Comment: 1/week   • Drug Use: No   • Sexual Activity: Not on file       SURGICAL HISTORY   has past surgical history that includes other (03/10/2016); microlaryngoscopy with laser (N/A, 4/22/2016); and colonoscopy (N/A, 3/22/2017).    CURRENT MEDICATIONS  Home Medications     Reviewed by Lakeisha Villaseñor R.N. (Registered Nurse) on 05/24/17 at 2046  Med List Status: Complete    Medication Last Dose Status    amlodipine (NORVASC) 5 MG Tab 5/24/2017 Active    aspirin (ASA) 81 MG Chew Tab chewable tablet 5/24/2017 Active    ferrous sulfate 325 (65 FE) MG tablet 5/24/2017 Active    levothyroxine (SYNTHROID) 150 MCG Tab 5/24/2017 Active     "lisinopril (PRINIVIL) 5 MG Tab 5/24/2017 Active    Magnesium 500 MG Tab 5/23/2017 Active    magnesium gluconate (MAG-G) 500 MG tablet  Active    metformin (GLUCOPHAGE) 500 MG Tab 5/24/2017 Active    metformin ER (GLUCOPHAGE XR) 500 MG TABLET SR 24 HR 5/24/2017 Active    niacin 500 MG Tab 5/24/2017 Active    pravastatin (PRAVACHOL) 40 MG tablet 5/23/2017 Active                ALLERGIES  No Known Allergies    PHYSICAL EXAM  VITAL SIGNS: /79 mmHg  Pulse 120  Temp(Src) 39.5 °C (103.1 °F)  Resp 24  Ht 1.778 m (5' 10\")  Wt 106.595 kg (235 lb)  BMI 33.72 kg/m2  SpO2 90%  Pulse ox interpretation: I interpret this pulse ox as low.  Constitutional: Moderate respiratory distress apparent distress.  HENT: No signs of trauma, Bilateral external ears normal, Nose normal.   Eyes: Pupils are equal and reactive, Conjunctiva normal, Non-icteric.   Neck: Normal range of motion, No tenderness, Supple, No stridor.   Cardiovascular: Tachycardic rate and regular rhythm, no murmurs.   Thorax & Lungs: Bilateral rales right greater than left, moderate respiratory distress, tachypnea, No wheezing, No chest tenderness.   Abdomen: Bowel sounds normal, Soft, No tenderness, No masses, No pulsatile masses. No peritoneal signs.  Skin: Warm, Dry, No erythema, No rash.   Back: No bony tenderness, No CVA tenderness.   Extremities: Intact distal pulses, No edema, No tenderness, No cyanosis  Neurologic: Alert , Normal motor function and gait, Normal sensory function, No focal deficits noted.       DIAGNOSTIC STUDIES / PROCEDURES    EKG  5/24/2017 at 8:33 PM  Sinus tachycardia 117  CA, QRS, QTC within normal limits  No ST elevations or depressions  No evidence of right heart strain such as S1 QIII TIII  Normal axis    LABS  Labs Reviewed   CBC WITH DIFFERENTIAL - Abnormal; Notable for the following:     RBC 3.26 (*)     Hemoglobin 8.1 (*)     Hematocrit 27.9 (*)     MCH 23.9 (*)     MCHC 27.9 (*)     RDW 79.7 (*)     Platelet Count 91 (*)  "    Neutrophils-Polys 74.10 (*)     Lymphocytes 18.50 (*)     All other components within normal limits    Narrative:     Indicate which anticoagulants the patient is on:->NONE   COMP METABOLIC PANEL - Abnormal; Notable for the following:     Glucose 170 (*)     Bun 26 (*)     Creatinine 1.60 (*)     AST(SGOT) 46 (*)     Total Bilirubin 2.4 (*)     All other components within normal limits    Narrative:     Indicate which anticoagulants the patient is on:->NONE   LACTIC ACID - Abnormal; Notable for the following:     Lactic Acid 2.9 (*)     All other components within normal limits    Narrative:     Indicate which anticoagulants the patient is on:->NONE   CRP QUANTITIVE (NON-CARDIAC) - Abnormal; Notable for the following:     Stat C-Reactive Protein 10.76 (*)     All other components within normal limits    Narrative:     Indicate which anticoagulants the patient is on:->NONE   LIPASE - Abnormal; Notable for the following:     Lipase 98 (*)     All other components within normal limits    Narrative:     Indicate which anticoagulants the patient is on:->NONE   PROTHROMBIN TIME - Abnormal; Notable for the following:     PT 14.7 (*)     All other components within normal limits    Narrative:     Indicate which anticoagulants the patient is on:->NONE   TROPONIN - Abnormal; Notable for the following:     Troponin I 0.11 (*)     All other components within normal limits    Narrative:     Indicate which anticoagulants the patient is on:->NONE   ESTIMATED GFR - Abnormal; Notable for the following:     GFR If  51 (*)     GFR If Non  42 (*)     All other components within normal limits    Narrative:     Indicate which anticoagulants the patient is on:->NONE   URINALYSIS - Abnormal; Notable for the following:     Character Sl Cloudy (*)     Protein 50 (*)     Leukocyte Esterase Small (*)     Occult Blood Trace (*)     All other components within normal limits    Narrative:     If not done within  the last 24 hours  Indication for culture:->Suspected Sepsis   URINE MICROSCOPIC (W/UA) - Abnormal; Notable for the following:     WBC 2-5 (*)     RBC 5-10 (*)     Hyaline Cast 6-10 (*)     All other components within normal limits    Narrative:     If not done within the last 24 hours  Indication for culture:->Suspected Sepsis   ISTAT ARTERIAL BLOOD GAS - Abnormal; Notable for the following:     Ph 7.042 (*)     Pco2 78.7 (*)     Po2 52 (*)     S02 68 (*)     BE -9 (*)     All other components within normal limits   BLOOD CULTURE    Narrative:     If has line draw blood culture from line only X1 (or from  each port if multiple ports). If no line, peripheral blood  culture X1 only.   APTT    Narrative:     Indicate which anticoagulants the patient is on:->NONE   BTYPE NATRIURETIC PEPTIDE    Narrative:     Indicate which anticoagulants the patient is on:->NONE   TSH    Narrative:     Indicate which anticoagulants the patient is on:->NONE   FREE THYROXINE    Narrative:     Indicate which anticoagulants the patient is on:->NONE   DIFFERENTIAL MANUAL    Narrative:     Indicate which anticoagulants the patient is on:->NONE   PERIPHERAL SMEAR REVIEW    Narrative:     Indicate which anticoagulants the patient is on:->NONE   PLATELET ESTIMATE    Narrative:     Indicate which anticoagulants the patient is on:->NONE   MORPHOLOGY    Narrative:     Indicate which anticoagulants the patient is on:->NONE   BLOOD CULTURE   CULTURE RESPIRATORY W/ GRM STN   URINE CULTURE(NEW)    Narrative:     If not done within the last 24 hours  Indication for culture:->Suspected Sepsis   LACTIC ACID   TROPONIN   INFLUENZA BY PCR, A/B/H1N1   TROPONIN   CBC WITH DIFFERENTIAL   LACTIC ACID   TRIGLYCERIDE   TROPONIN       RADIOLOGY  DX-CHEST-PORTABLE (1 VIEW)   Final Result      Interval intubation with worsening diffuse hazy opacification worrisome for edema over pneumonia      DX-CHEST-PORTABLE (1 VIEW)   Final Result      Peripheral basilar  consolidation could indicate edema or pneumonia      Moderate cardiac silhouette enlargement      DX-CHEST-PORTABLE (1 VIEW)    (Results Pending)       Intubation Procedure Note    Indication: Respiratory failure, potential airway compromise and hypoxia    Consent: Unable to be obtained due to the emergent nature of this procedure.    Medications Used: ketamine intravenously and rocuronium intravenously    Procedure: The patient was placed in the appropriate position.  Cricoid pressure was utilized.  Intubation was performed by direct laryngoscopy using a laryngoscope and an 8.0 cuffed endotracheal tube.  The cuff was then inflated and the tube was secured appropriately at a distance of 23 cm to the dental ridge.  Initial confirmation of placement included bilateral breath sounds, an end tidal CO2 detector, absence of sounds over the stomach, tube fogging and adequate chest rise.  A chest x-ray to verify correct placement of the tube showed appropriate tube position.    The patient tolerated the procedure well.     Complications: None    The total critical care time on this patient is 40 minutes, resuscitating patient, speaking with admitting physician, and deciphering test results. This 40 minutes is exclusive of separately billable procedures.      COURSE & MEDICAL DECISION MAKING  Pertinent Labs & Imaging studies reviewed. (See chart for details)  76 y.o. male with a history of diabetes presenting with shortness of breath, hypoxia, fever, tachycardia, tachypnea. Hypoxia responded to supplemental oxygen treatment. Reports cough over the past few days. Also reports prolonged travel driving back from [place] today. Had acutely worsening shortness of breath symptoms today. No hemoptysis. Reports cough that is only occasionally productive. No prior history of CHF or ACS.    X-ray showing bibasilar consolidation versus edema. BNP was unremarkable.    No significant leukocytosis. Patient has chronic anemia from recently  diagnosed GI bleed. Hemoglobin is up from prior hemoglobin study. Elevated troponin may be secondary to demand ischemia from his hypoxia. No signs of ACS or right heart strain on the EKG though he is tachycardic.    The patient has lactic acidosis. Patient meets sepsis criteria. Was started on IV antibiotics promptly due to suspected pneumonia. Cannot fully rule out pulmonary embolism. Cannot obtain CT pulmonary angiogram safely however given the patient's chronic kidney disease.    The patient was admitted to the hospitalist for further management for PE versus pneumonia versus acute pulmonary edema. It is possible that the patient might be suffering from ARDS from his pulmonary infection/sepsis.    The patient was admitted to the hospitalist and heparin was ordered for the patient. Was receiving heparin drip. I was called to the room emergently due to shaina hemoptysis. No obvious hematemesis. Patient was markedly hypoxic. With bag valve mask ventilation, could not obtain pulse oximetry greater than 80%. Patient appeared rather lethargic. Due to the shaina hemoptysis, persistent hypoxia, patient's altered mental status, decision was made to intubate the patient. Intubated on 1st attempt without difficulty. Noted red frothy sputum from the airway during intubation/laryngoscopy.    Contacted [facility] promptly after intubation. Planned for bronchoscopy here in the emergency department.       FINAL IMPRESSION  Respiratory failure  Fever  Hypoxia  Sepsis  Elevated troponin  Pneumonia versus pulmonary edema        Electronically signed by: Warren Chavira, 5/24/2017 8:32 PM

## 2017-05-25 NOTE — ED NOTES
Med rec completed per pt and wife at bedside with  Medication list. List returned to pt's wife  Allergies reviewed - NKDA  No ABX in last 30 days   Pt was previously on aspirin 81mg, lisinopril 5mg,   And amlodipine 5mg, but was taken off of them on  4/1/17 due to a GI bleed

## 2017-05-25 NOTE — CONSULTS
DATE OF SERVICE:  05/25/2017    DATE OF CONSULTATION:  05/25/2017    REQUESTING PHYSICIAN:  Warren Chavira MD    CONSULTING PHYSICIAN:  Kaz Livingston MD    TYPE OF CONSULTATION:  Pulmonary medicine and critical care medicine.    REASON FOR CONSULTATION:  Evaluation and management of ventilator-dependent   respiratory failure, hemoptysis, pneumonia, and sepsis.    CHIEF COMPLAINT:  The patient cannot provide.    HISTORY OF PRESENT ILLNESS:  The entire history is obtained from healthcare   providers and medical record as this gentleman cannot give me any history.  I   was kindly asked by Dr. Chavira to see and evaluate this gentleman regarding the   above problems.  This is a 76-year-old gentleman, who presented earlier this   evening to [facility] with shortness of breath.  He   apparently recently took a car trip to [place], [place].  He drove to [place]   approximately 1 week ago and drove back from [place] on or about 05/24/2017.  He   presented to the emergency room complaining of shortness of breath.  He was   hypoxemic on presentation and was placed on supplemental oxygen.  There was a   concern that he may have an acute pulmonary embolism and he was given a   loading dose of intravenous heparin and started on a heparin drip on the   weight-based protocol.  CT angiography of the chest was not ordered because of   an increased creatinine of 1.60.  He has a history of underlying chronic   kidney disease.  Subsequent starting his heparin, he felt like he had to cough.    He had hemoptysis with bright red blood.  He developed tachypnea and hypoxemia   and was then emergently intubated and placed on the ventilator.  At the time   of my arrival in the emergency room, he is sedated on full mechanical   ventilatory support.    CURRENT MEDICATIONS:  Iron sulfate 325 mg twice a day, levothyroxine 150 mcg a   day, magnesium 400 mg twice a day, metformin  mg a day, multivitamin   daily, niacin 500 mg a  day, and pravastatin 40 mg a day.    ALLERGIES:  No known drug allergies.    PAST MEDICAL HISTORY:  He was recently hospitalized at [facility]   from on or about March 21st to 03/23/2017.  He was admitted with an acute   lower gastrointestinal hemorrhage.  He was seen by gastroenterology.  He   underwent lower endoscopy.  He had sigmoid diverticulosis, but there was no   evidence of any active bleeding.  He was transfused packed red blood cells   during that admission.  He was dismissed in stable condition.  He has grade I   diastolic dysfunction with chronic diastolic heart failure, hypothyroidism,   diabetes mellitus type 2, and dyslipidemia.    SOCIAL HISTORY:  He quit smoking 40 years ago.  Prior to that he smoked up to   3 packs of cigarettes a day for 25 years.  He rarely drinks alcohol.    FAMILY HISTORY:  Not obtainable.    REVIEW OF SYSTEMS:  Not obtainable.    PHYSICAL EXAMINATION:  VITAL SIGNS:  His temperature is 103.1, blood pressure 128/79, heart rate 109,   and respiratory rate is 24.  GENERAL:  He is a sedated man on the ventilator.  HEENT:  Normocephalic, atraumatic.  Sinuses are nontender.  Nares patent.    Oropharynx with moist mucous membranes.  An endotracheal tube is in place.    There is no scleral icterus.  His pupils are 3-4 mm equal and react to light.  NECK:  Trachea midline, supple.  CHEST:  Symmetrical.  HEART:  Regular rhythm.  LUNGS:  Scattered coarse and fine crackles bilaterally.  No wheezing.  ABDOMEN:  Soft, nondistended, and nontender.  No masses.  EXTREMITIES:  No clubbing or cyanosis.  NEUROLOGIC:  He has been sedated.    DIAGNOSTIC DATA:  His white blood cell count is 8000, hemoglobin 8.1,   hematocrit 27.9, and platelet count is 91,000.  Sodium 138, potassium 4.2,   chloride 108, CO2 20, BUN 26, creatinine 1.60, and glucose 170.  Lipase is 98.    Lactic acid 2.9.  Troponin I 0.11.  His INR is 1.11.  Urinalysis with 2-5   white blood cells per high-powered field.   Arterial blood gas reveals a pH of   7.04, pCO2 of 79, and pO2 of 52.  Chest x-ray shows significant increased   opacification in the mid and lower lung zones bilaterally.    IMPRESSION:  1.  Ventilator-dependent respiratory failure.  2.  Hemoptysis.  He underwent urgent bronchoscopy in the emergency department.    No bleeding sites were identified in the lungs.  There were some bright red   bloody secretions seen bilaterally.  There was no active bleeding or   endobronchial tumor.  There were no focally increased bloody secretions in   either lung or any particular lobes to suggest a location of active bleeding   from his lungs.  There is a possibility that he may have had an acute   pulmonary embolism.  He is at risk given his recent car ride to and from   [place], [place].  CT angiography of the chest has not been performed as of yet   because of his increased creatinine.  3.  Community-acquired pneumonia.  4.  Sepsis, pulmonary source.  5.  Anemia with thrombocytopenia.  6.  Acute on chronic kidney disease.  7.  Elevated lipase.  His abdominal exam is unremarkable.  8.  Elevated lactic acid.  9.  Elevated troponin.  10.  Chronic diastolic heart failure with grade I diastolic dysfunction.  11.  Hypothyroidism.  12.  Diabetes mellitus type 2.  13.  Dyslipidemia.  14.  Recent hospitalization for lower gastrointestinal hemorrhage, presumed   due to sigmoid diverticulosis.  An orogastric tube has been placed in the   emergency department.  He has dark appearing fluid from his stomach.  His   hemoglobin has not significantly decreased from 05/10/2017.  15.  Remote history of significant smoking.    PLAN/MEDICAL DECISION MAKING:  This gentleman is critically ill.  He is going   to be admitted to the intensive care unit.  He will remain intubated on full   mechanical ventilatory support.  Deep venous thrombosis prophylaxis and stress   ulcer prophylaxis will both be provided.  I am going to obtain bilateral   lower  extremity venous Doppler studies.  Because of his significant   hemoptysis, the heparin drip has been discontinued.  At this particular point   in time, I feel that full anticoagulation is contraindicated as we do not have   definite proof of an acute thromboembolic event and he has significant   hemoptysis, requiring emergent intubation in the emergency room.  His blood   and sputum will be cultured.  We will empirically place him on intravenous   ampicillin/sulbactam and doxycycline, pending culture results.  He has been   placed on the sepsis protocol and will receive the appropriate amount of   intravenous crystalloid solution.  I am going to repeat a creatinine level.    If it has decreased with his intravenous fluids, then I am going to order a CT   angiogram of the chest to evaluate his lungs and make sure he has not had an   acute pulmonary embolism.  His hemoglobin and platelet count will be followed   very closely as well as his renal function.  His blood sugars will be   controlled.  We will trend his lactic acid and troponins.  At the current   time, his prognosis is quite guarded and he is critically ill.  I have spent a   total of 90 minutes providing direct critical care services at the bedside.    There has been no time overlap.  The time spent excludes the time spent   performing procedures (65571, [id number]).    The case has been reviewed with Dr. Mathew, nursing, and respiratory therapy.    Thank you Dr. Mathew for allowing us to participate in the care of this   gentleman.  We will continue to follow him with great interest.       ____________________________________     MD KARSTEN LANDIN / SEBASTIEN    DD:  05/25/2017 03:06:56  DT:  05/25/2017 03:47:36    D#:  [id number]  Job#:  [id number]    cc: COLE EDWARDS MD, DORIAN MATHEW MD

## 2017-05-25 NOTE — ED NOTES
"Assist RN: Pt assisted to sitting position to use urinal.   Pea sized blood clot observed in pt's cup.  Pt states, \"I need to cough up the mucus.\"   Pt with hemoptysis observed  "

## 2017-05-25 NOTE — PROGRESS NOTES
Pt to T624 from ED with 2 ICU RN and RT. Bedside report received from JAIME Pagan. Pt intubated and sedated. No s/s of pain or distress at this time. Pt in soft bilat wrist restraints with active order. Fall and safety precautions in place.

## 2017-05-26 PROBLEM — K20.90 ESOPHAGITIS: Status: ACTIVE | Noted: 2017-01-01

## 2017-05-26 NOTE — CARE PLAN
Problem: Risk for injury related to physical restraint use  Goal: Safe and appropriate use of physical restraints. Restraints discontinued at the earliest possibility while ensuring patient safety.  Outcome: PROGRESSING AS EXPECTED  Restraint risk assessment completed and documented Q2 hours. Psychosocial, environmental and physiological alternatives considered and documented. CMS and skin assessed for breakdown and impaired circulation.    Problem: Risk of Aspiration  Goal: Absence of aspiration  Outcome: PROGRESSING AS EXPECTED  Pt remains NPO until cleared and while on ventilator. OG to low suction, head of bed elevated greater than 30 degrees. Collaboration with RT in practice.    Problem: Hemodynamic Status  Goal: Vital Signs and Fluid Balance Management  Outcome: PROGRESSING AS EXPECTED  Continuous cardiac monitoring and pulse oximetry in place. I&O monitoring and documentation completed. Daily weights completed.

## 2017-05-26 NOTE — PROGRESS NOTES
Hospital Medicine Interval Note  Date of Service:  5/26/2017    Chief Complaint  76 y.o.-year-old male admitted 5/24/2017 with shortness of breath and required intubation.    Interval Problem Update    His Hb had continued to drop.  Bronchoscopy done 5/24. Blood out of the ETT this morning. Hb has dropped to 6.5 thus transfused 3 units RBCs. Cr is 2 thus he cannot have a CTA.  Propofol and fentanyl drips for sedation. OG is putting out bright red blood.  Dr. Stoll did an EGD today revealing severe ulcerations of the esophagus.   Consultants/Specialty  Pulmonology.  GI  I discussed with Dr. Bautista on [facility].   Disposition  ICU     Review of Systems   Unable to perform ROS: intubated      Physical Exam Laboratory/Imaging   Filed Vitals:    05/26/17 0528 05/26/17 0530 05/26/17 0545 05/26/17 0617   BP:   111/56    Pulse:  97 96    Temp:   37.2 °C (99 °F)    Resp:  24 23    Height:       Weight:       SpO2: 94% 93% 96% 96%     Physical Exam   Constitutional: No distress.   Eyes:   Pale conjunctiva   Cardiovascular: Normal rate and regular rhythm.    Murmur heard.  Abdominal: Soft. He exhibits no distension. There is no tenderness.   Genitourinary:   servin   Musculoskeletal: He exhibits no edema or tenderness.   Neurological:   Sedated, opens eyes.    Skin: Skin is warm and dry. There is pallor.    Lab Results   Component Value Date/Time    WBC 6.8 05/26/2017 06:00 AM    HEMOGLOBIN 7.7* 05/26/2017 06:00 AM    HEMATOCRIT 25.5* 05/26/2017 06:00 AM    PLATELET COUNT 69* 05/26/2017 06:00 AM     Lab Results   Component Value Date/Time    SODIUM 140 05/26/2017 01:00 AM    POTASSIUM 4.4 05/26/2017 01:00 AM    CHLORIDE 111 05/26/2017 01:00 AM    CO2 19* 05/26/2017 01:00 AM    GLUCOSE 133* 05/26/2017 01:00 AM    BUN 35* 05/26/2017 01:00 AM    CREATININE 2.63* 05/26/2017 01:00 AM      Assessment/Plan    Acute respiratory failure with hypoxia (CMS-HCC) (present on admission)  Assessment & Plan  Initiated on the vent 5/24.  Pulmonary consulted. Possible CTA chest once Cr is bossman.   DVT studies negative.    Septic shock (CMS-HCC) (present on admission)  Assessment & Plan  Source is community acquired pneumonia. IV unasyn/doxy. Bronch was done on admit.    Esophagitis (present on admission)  Assessment & Plan  With ulcerations seen on EGD 5/26. IV PPI. Cortrak removed.     Hemoptysis (present on admission)  Assessment & Plan  Bright-red blood in the ER, bronchoscopy was negative.     Acute on chronic renal insufficiency (HCC) (present on admission)  Assessment & Plan  CKD stage III. Baseline Cr appears to be 1.3 by review of the records Cr now up to 2.6 today.    DM (diabetes mellitus) (CMS-HCC) (present on admission)  Assessment & Plan  Sliding scale    HTN (hypertension) (present on admission)  Assessment & Plan  Hx of.    CAP (community acquired pneumonia) (present on admission)  Assessment & Plan  IV abx.    Anemia (present on admission)  Assessment & Plan  Acute blood loss from upper GI bleed. Transfuse one unit of RBCs. GI consult for  EGD. Protonix drip.    Thrombocytopenia (CMS-HCC) (present on admission)  Assessment & Plan  Platelets 80    Aortic stenosis, severe (present on admission)  Assessment & Plan  Possible TAVR candidate when stable.     See echo:  CONCLUSIONS  Normal left ventricular systolic function.  Left ventricular ejection fraction is visually estimated to be 60%.  Mild mitral regurgitation.  Severe aortic stenosis.  Aortic valve area calculated from the continuity equation is 1.0 cm2.  Vmax is 4.22  m/s. Transvalvular gradients are - Peak: 76  mmHg, Mean:   51  mmHg.   Moderate aortic insufficiency.  Moderate tricuspid regurgitation.  Right ventricular systolic pressure is estimated to be 60 mmHg.       Labs reviewed, Medications reviewed and Radiology images reviewed  Bond catheter: Unconscious / Sedated Patient on a Ventilator  Central line in place: Sepsis                       Pt is critically  Ill in the  ICU, 31 minutes of critical care time spent with patient and nursing and in specific management of hypotensive shock in the ICU. See orders for blood transfusion.

## 2017-05-26 NOTE — PROGRESS NOTES
VIDYA Shore at bedside for an EGD. Procedure lasted about 10 minutes. Pt sedated with propofol. Pt tolerated well. OG removed. Two biopsies taken

## 2017-05-26 NOTE — CARE PLAN
Problem: Bronchoconstriction:  Goal: Improve in air movement and diminished wheezing  Outcome: PROGRESSING SLOWER THAN EXPECTED  Pt on Q4 Tx's    Problem: Ventilation Defect:  Goal: Ability to achieve and maintain unassisted ventilation or tolerate decreased levels of ventilator support  Outcome: PROGRESSING SLOWER THAN EXPECTED  Adult Ventilation Update    Total Vent Days: 1      Patient Lines/Drains/Airways Status    Active Airway      Name: Placement date: Placement time: Site: Days:     Airway Group ET Tube Oral 8.0 05/25/17    Oral  1                  Plateau Pressure (Q Shift): 27 (05/25/17 0908)  Static Compliance (ml / cm H2O): 89 (05/25/17 1437)    Patient failed trials because of Barriers to Wean: Continuous Propofol Infusion;Other (Comments) (critical pt, just admited to unit) (05/25/17 0622)    Mobility Group  Activity Performed: Unable to mobilize (05/25/17 1200)  Pt Calls for Assistance: No (05/25/17 0800)  Reason Not Mobilized: Unstable condition (05/25/17 1200)  Mobilization Comments: GI bleed and RASS -3 (05/25/17 1200)    Events/Summary/Plan: Titrated FiO2 through out shift, bloody, thin secretions. Will continue to monitor.

## 2017-05-26 NOTE — CARE PLAN
Problem: Bronchoconstriction:  Goal: Improve in air movement and diminished wheezing  Outcome: PROGRESSING SLOWER THAN EXPECTED  Pt continues to be on Q4 TX's.    Problem: Ventilation Defect:  Goal: Ability to achieve and maintain unassisted ventilation or tolerate decreased levels of ventilator support  Outcome: PROGRESSING SLOWER THAN EXPECTED  Adult Ventilation Update    Total Vent Days: 2      Patient Lines/Drains/Airways Status    Active Airway      Name: Placement date: Placement time: Site: Days:     Airway Group ET Tube Oral 8.0 05/25/17    Oral  2                 Plateau Pressure (Q Shift): 21 (05/26/17 1410)  Static Compliance (ml / cm H2O): 96 (05/26/17 1410)    Patient failed trials because of Barriers to Wean: FiO2 >50% OR PEEP >8 (PMA Only) (05/26/17 0617)    Mobility Group  Activity Performed: Unable to mobilize (05/26/17 0600)  Pt Calls for Assistance: No (05/26/17 0800)  Reason Not Mobilized: Unstable condition (05/26/17 0600)  Mobilization Comments: PEEP 14 (05/26/17 0600)    Events/Summary/Plan: No vent changes made during day shift, pt remains at 95% on SpO2. Will continue to monitor for changes.

## 2017-05-26 NOTE — OR SURGEON
Immediate Post-Operative Note      PreOp Diagnosis:GIB  PostOp Diagnosis: Ulcerative esophagitis  Procedure(s):  GASTROSCOPY-ENDO    Surgeon(s):  Rick Cordero M.D.    Anesthesiologist/Type of Anesthesia:  No anesthesia staff entered./* No anesthesia type entered *    Surgical Staff:  Endoscopy Technician: (Unknown)  Sedation/Monitoring Nurse: (Unknown)    Specimen: antrum HP    Estimated Blood Loss: none  Findings:   1. Ulcerative esophagitis GR 3  2. Small circular hemorrhagic blebs along longitudinal axis of   Complications: NA    REC:  PPI BID  FU GI outpatient for EGD in 2 months        5/26/2017 2:00 PM Rick Cordero

## 2017-05-26 NOTE — CARE PLAN
Problem: Ventilation Defect:  Goal: Ability to achieve and maintain unassisted ventilation or tolerate decreased levels of ventilator support  Outcome: PROGRESSING AS EXPECTED  Adult Ventilation Update    Total Vent Days: 2      Patient Lines/Drains/Airways Status    Active Airway      Name: Placement date: Placement time: Site: Days:     Airway Group ET Tube Oral 8.0 05/25/17    Oral  1               CMV 24 440 +14 45%       Plateau Pressure (Q Shift): 22 (05/26/17 0246)  Static Compliance (ml / cm H2O): 57.6 (05/26/17 0246)    Sputum/Suction  Cough: Productive (05/26/17 0246)  Sputum Amount: Moderate (05/26/17 0400)  Sputum Color: Bloody (05/26/17 0400)  Sputum Consistency: Thick (05/26/17 0400)      Events/Summary/Plan: Increased FiO2 to 45% after morning ABG (05/26/17 0528)

## 2017-05-27 NOTE — CARE PLAN
Problem: Pain Management  Goal: Pain level will decrease to patient’s comfort goal  Outcome: PROGRESSING AS EXPECTED  Patient has not complained of pain while being on fentanyl drip

## 2017-05-27 NOTE — CONSULTS
DATE OF SERVICE:  05/26/2017    DATE OF SERVICE:  05/26/2017    CHIEF COMPLAINT:  GI bleeding and hematemesis.    HISTORY OF PRESENT ILLNESS:  We were asked by the attending physician, Dr. Tapia to consult on this patient emergently for GI bleeding.  The patient   was emergently intubated for acute community-acquired pneumonia and acute   hypoxic respiratory failure.  He was also broncho because there was concern   for hemoptysis.  Nevertheless, bronchoscopy was done did not reveal any source   of bleeding.  It became apparent that the patient is bleeding from the   stomach.  He is on aspirin at home.  Lately, he has been taking some Advil for   aches and pains.  Does not have chronic abdominal pain.  Of interest, his   platelet count is low.  This is an incidental finding now because it was not   known before.  The wife is not aware of and patient can shake his head, but   obviously is intubated currently.  He does not have a history of liver   disease.    The patient denies abdominal pain.  Otherwise, unable to interview given the   situation,    PAST MEDICAL HISTORY:  Diabetes, lower GI bleed, he underwent a colonoscopy by   Dr. Taveras in the last 3 months, which was negative and chronic renal   insufficiency.    SOCIAL HISTORY:  He smoked for a few decades, but quit many decades ago.  No   alcohol use of any concern.    FAMILY HISTORY:  Unable to obtain, but apparently negative.    ALLERGIES:  NKDA.    MEDICATIONS:  Ferrous sulfate, Synthroid, metformin, MVI, and niacin.    REVIEW OF SYSTEMS:  Unable to obtain, but we can gather from the chart.  He is   hypothyroid and has dyslipidemia.  The rest of review of systems is negative.    PHYSICAL EXAMINATION:  VITAL SIGNS:  The blood pressure is 120/63, 93% O2 sat, 24 respirations, and   92 heart rate.  GENERAL:  Nontoxic, nondiaphoretic.  The patient is intubated and sedated.    The patient is awake, but is unable to communicate given sedation.  CHEST:   Mechanical ventilation.  ABDOMEN:  Soft, not distended, no pain, no apparent tenderness to palpation.  EXTREMITIES:  Patient has leg boots for prevention of contractures and deep   vein thrombosis.  Otherwise, moves all extremities.  SKIN:  No obvious rash, palmar erythema, Dupuytren's contractures.  The   locomotor unable to assess, but appears to be otherwise stable   hemodynamically.    IMPRESSION:  1.  Gastrointestinal bleed.  2.  Acute drop in hemoglobin.  3.  Low platelet count.    RECOMMENDATIONS:  Emergent EGD.  Further recommendation following our   endoscopic evaluation.       ____________________________________     Rick Stoll MD EMD / SEBASTIEN    DD:  05/26/2017 13:36:59  DT:  05/26/2017 18:09:34    D#:  [id number]  Job#:  [id number]

## 2017-05-27 NOTE — PROCEDURES
DATE OF SERVICE:  05/26/2017    PREOPERATIVE DIAGNOSES:  Gastrointestinal bleeding, hematemesis.    POSTOPERATIVE DIAGNOSES:  1.  Esophageal ulcers and grade III ulcerative esophagitis.  2.  Presumed NG tube suction, hold trauma along the longitudinal axis of the   esophagus.  3.  Mild erosive gastritis.    PROCEDURE:  EGD with biopsy for H. pylori.    CONSENT:  The patient was consented.    SEDATION:  Propofol per ICU.    CONSENT:  The written informed consent was obtained.    PROCEDURE:  The patient was positioned in the decubitus position.  The   endotracheal intubation was in placed.  The upper endoscope by Olympus   diagnostic type was advanced without difficulty up to the second portion of   duodenum and withdrawn in stomach and retroflexed before complete withdrawal.    The mucosa was carefully inspected.  The patient tolerated the procedure   well.  There were no immediate complications.    FINDINGS:  Duodenum:  Normal.    Stomach:  Mild erosive gastritis.  Biopsy for H. pylori was done.  Small ecchymosis was noted just at the proximal cardia.  It was rinsed and   there was no significant bleeding.  There was no underlying ulcer.    Esophagus:  Grade III ulcerative esophagitis with several smaller ulcers noted   just above the GE junction.  No active bleeding, no stigmata or that needed   therapeutic intervention.  No active bleeding.    At least 3 round hemorrhagic areas not actively bleeding along the   longitudinal axis in the mid esophagus and distal esophagus, possibly   consistent with trauma from suction from NG tube placement, not actively   bleeding.    RECOMMENDATION:  1.  Proton pump inhibitor b.i.d. IV for 2 months.  2.  Follow up in the outpatient setting and repeat EGD to make sure there is   no underlying Davis's in 2 months.  Follow up with [facility].  3.  Further elucidate the cause of thrombocytopenia, which certainly has   contributed to the bleeding in this  setting.       ____________________________________     Rick Stoll MD EMD / SEBASTIEN    DD:  05/26/2017 13:56:33  DT:  05/27/2017 02:16:33    D#:  [id number]  Job#:  [id number]

## 2017-05-27 NOTE — PROGRESS NOTES
Pt's Hgb was 6.8 when it arrived in results for viewing, Dr. Tapia made aware. 1 unite of PRBC ordered. 1 unit began at 0845. Pt's VSS.

## 2017-05-27 NOTE — PROGRESS NOTES
Pulmonary Critical Care Progress Note        DOS:  5/27/2017    Chief Complaint: SOB    History of Present Illness:   The entire history is obtained from healthcare providers and medical record as this gentleman cannot give me any history.  I was kindly asked by Dr. Chavira to see and evaluate this gentleman regarding the above problems.  This is a 76-year-old gentleman, who presented earlier this evening to [facility] with shortness of breath.  He apparently recently took a car trip to [place], [place].  He drove to [place] approximately 1 week ago and drove back from [place] on or about 05/24/2017.  He presented to the emergency room complaining of shortness of breath.  He was hypoxemic on presentation and was placed on supplemental oxygen.  There was a concern that he may have an acute pulmonary embolism and he was given a loading dose of intravenous heparin and started on a heparin drip on the weight-based protocol.  CT angiography of the chest was not ordered because of an increased creatinine of 1.60.  He has a history of underlying chronic kidney disease.  Subsequent starting his heparin, he felt like he had to cough. He had hemoptysis with bright red blood.  He developed tachypnea and hypoxemia and was then emergently intubated and placed on the ventilator.  At the time of my arrival in the emergency room, he is sedated on full mechanical ventilatory support.    ROS:    Respiratory: unable to perform due to the patient's inability to effectively communicate,   Cardiac: unable to perform due to the patient's inability to effectively communicate,   GI: unable to perform due to the patient's inability to effectively communicate.     Interval Events:  24 hour interval history reviewed   Alert, PERRL and moves all 4 extremities.   SR/ST, BP  systolic.   Hemoglobin of 6.8. 1 unit pRBC ordered - Hct better in day  NPO, no feeding tube.   Tmax 99, WBC normal   Renal function slight better today  +722  cc over the last 24 hours.   CXR with improved pulmonary edema.   Hemoptysis with O2 desats later in day urgent Tx FOB performed    PFSH:  No change.    Respiratory:  Strong Vent Mode: APVCMV, Rate (breaths/min): 24, Vt Target (mL): 440, PEEP/CPAP: 14, FiO2: 45, Static Compliance (ml / cm H2O): 70, Control VTE (exp VT): 461  Pulse Oximetry: 96 %  PIP 24  Exam: mildly labored on full support, clear anteriorly, worse bilateral coarse rales at bases L>R  ImagingAvailable data reviewed     Recent Labs      05/25/17   0451  05/26/17   0436  05/27/17   0500   ISTATAPH  7.204*  7.389*  7.377*   ISTATAPCO2  57.3*  29.8  33.9   ISTATAPO2  69  52*  80   ISTATATCO2  24  19*  21   ILCVGXQ7CMT  89*  87*  96   ISTATARTHCO3  22.6  18.0  19.9   ISTATARTBE  -5*  -6*  -5*   ISTATTEMP  97.5 F  37.3 C  37.8 C   ISTATFIO2  100  40  45   ISTATSPEC  Arterial  Arterial  Arterial   ISTATAPHTC  7.212*  7.384*  7.365*   [id number]  66  53*  84       HemoDynamics:  Pulse: 96, Heart Rate (Monitored): 94  NIBP: 100/51 mmHg     Exam: ST, RR, no M, no E, cool but has good cap refill   Imaging: Available data reviewed     Recent Labs      05/24/17   2057   05/25/17   1630  05/26/17   0100  05/26/17   0600   TROPONINI  0.11*   < >  0.27*  0.14*  0.10*   BNPBTYPENAT  67   --    --    --    --     < > = values in this interval not displayed.       Neuro:  GCS Total [place] Coma Score: 10  Propofol 20, fent 75  Exam: Sleepy, PERRL, moves all four, restrained, follows simple commands.  Imaging: Available data reviewed    Fluids:  Intake/Output       05/25/17 0700 - 05/26/17 0659 05/26/17 0700 - 05/27/17 0659 05/27/17 0700 - 05/28/17 0659 0700-1859 1900-0659 Total 0700-1859 1900-0659 Total 0700-1859 1900-0659 Total       Intake    I.V.  1175.8  1748.9 2924.7  1419.1  1351.1 2770.2  --  -- --    Propofol Volume 167.8 184.9 352.7 205.1 256.1 461.2 -- -- --    IV Volume (IV maintenance)   -- -- --    Fent 12 18 30 18 15 33 --  -- --    IV Volume (bolus) -- 550 550 -- -- -- -- -- --    Protonix -- -- -- 200 250 450 -- -- --    Blood  --  600 600  --  -- --  --  -- --    Volume (RELEASE RED BLOOD CELLS) -- 300 300 -- -- -- -- -- --    Volume (RELEASE RED BLOOD CELLS) -- 300 300 -- -- -- -- -- --    Other  60  180 240  30  -- 30  --  -- --    Medications (P.O./ Enteral Liquids) 60 180 240 30 -- 30 -- -- --    Enteral  90  120 210  60  -- 60  --  -- --    Free Water / Tube Flush 90 120 210 60 -- 60 -- -- --    Total Intake 1325.8 2648.9 3974.7 1509.1 1351.1 2860.2 -- -- --       Output    Urine  412  900 1312  1465  275 1740  --  -- --    Indwelling Cathether  4530 568 7444 -- -- --    Stool  --  -- --  --  -- --  --  -- --    Number of Times Stooled 0 x 0 x 0 x -- -- -- -- -- --    Total Output  3972 890 3609 -- -- --       Net I/O     913.8 1748.9 2662.7 44.1 1076.1 1120.2 -- -- --           Recent Labs      05/24/17 2057 05/25/17 0510 05/26/17   0100   SODIUM  138  141  140   POTASSIUM  4.2  5.1  4.4   CHLORIDE  108  110  111   CO2  20  17*  19*   BUN  26*  29*  35*   CREATININE  1.60*  2.07*  2.63*   CALCIUM  8.5  7.9*  7.3*       GI/Nutrition:  Exam: Obese, ND, NT, soft, + BS  Imaging: Available data reviewed  NPO     Liver Function  Recent Labs      05/24/17 2057 05/25/17 0510 05/26/17   0100   ALTSGPT  16   --   11   ASTSGOT  46*   --   34   ALKPHOSPHAT  44   --   35   TBILIRUBIN  2.4*   --   2.2*   LIPASE  98*   --   24   GLUCOSE  170*  249*  133*       Heme:  Recent Labs      05/24/17 2057   05/26/17   1040  05/26/17   1406  05/27/17   0029   RBC  3.26*   < >  2.97*  2.87*  2.71*   HEMOGLOBIN  8.1*   < >  7.6*  7.3*  7.1*   HEMATOCRIT  27.9*   < >  25.4*  24.7*  23.7*   PLATELETCT  91*   < >  67*  69*  68*   PROTHROMBTM  14.7*   --    --    --    --    APTT  35.7   --    --    --    --    INR  1.11   --    --    --    --     < > = values in this interval not displayed.       Infectious  Disease:  No Data Recorded  Micro: reviewed  Recent Labs      05/24/17 2057 05/26/17   0100   05/26/17   1040  05/26/17   1406  05/27/17   0029   WBC  8.0   < >   --    < >  6.5  6.1  5.7   NEUTSPOLYS  74.10*   < >   --    < >  83.50*  82.20*  84.10*   LYMPHOCYTES  18.50*   < >   --    < >  6.40*  9.00*  9.70*   MONOCYTES  1.80   < >   --    < >  2.80  5.60  2.60   EOSINOPHILS  0.00   < >   --    < >  0.00  0.30  0.00   BASOPHILS  0.90   < >   --    < >  0.00  0.80  0.90   ASTSGOT  46*   --   34   --    --    --    --    ALTSGPT  16   --   11   --    --    --    --    ALKPHOSPHAT  44   --   35   --    --    --    --    TBILIRUBIN  2.4*   --   2.2*   --    --    --    --     < > = values in this interval not displayed.     Current Facility-Administered Medications   Medication Dose Frequency Provider Last Rate Last Dose   • pantoprazole (PROTONIX) 80 mg in  mL Infusion  8 mg/hr Continuous Clayton Bautista M.D. 25 mL/hr at 05/26/17 1213 8 mg/hr at 05/26/17 1213   • ampicillin/sulbactam (UNASYN) 3 g in  mL IVPB  3 g Q12HRS Mahin Jean Baptiste, PHARMD   Stopped at 05/26/17 2052   • magnesium sulfate ivpb premix 1 g  1 g Daily-0800 Clayton Bautista M.D.       • acetaminophen (TYLENOL) tablet 650 mg  650 mg Q6HRS PRN Caridad Ibarra M.D.   Stopped at 05/25/17 0300   • propofol (DIPRIVAN) injection  5-80 mcg/kg/min Continuous Kaz Mobley M.D. 28.8 mL/hr at 05/27/17 0243 45 mcg/kg/min at 05/27/17 0243   • doxycycline (VIBRAMYCIN) 100 mg in  mL IVPB  100 mg Q12HRS Kaz Mobley M.D.   Stopped at 05/26/17 2128   • Respiratory Care per Protocol   Continuous RT Kaz Mobley M.D.       • senna-docusate (PERICOLACE or SENOKOT S) 8.6-50 MG per tablet 2 Tab  2 Tab BID Kaz Mobley M.D.   Stopped at 05/26/17 2100    And   • polyethylene glycol/lytes (MIRALAX) PACKET 1 Packet  1 Packet QDAY PRN Kaz Mobley M.D.        And   • magnesium hydroxide (MILK OF MAGNESIA)  suspension 30 mL  30 mL QDAY PRN Kaz Mobley M.D.        And   • bisacodyl (DULCOLAX) suppository 10 mg  10 mg QDAY PRN Kaz Mobley M.D.       • chlorhexidine (PERIDEX) 0.12 % solution 15 mL  15 mL BID Kaz Mobley M.D.   15 mL at 05/26/17 2027   • lidocaine (XYLOCAINE) 1%  injection  1-2 mL Q30 MIN PRN Kaz Mobley M.D.       • MD ALERT...Adult ICU Electrolyte Replacement per Pharmacy Protocol   pharmacy to dose Kaz Mobley M.D.       • fentaNYL (SUBLIMAZE) injection 25 mcg  25 mcg Q HOUR PRN Kaz Mobley M.D.        Or   • fentaNYL (SUBLIMAZE) injection 50 mcg  50 mcg Q HOUR PRN Kaz Mobley M.D.        Or   • fentaNYL (SUBLIMAZE) injection 100 mcg  100 mcg Q HOUR PRN Kaz Mobley M.D.       • fentaNYL (SUBLIMAZE) 50 mcg/mL in 50mL   Continuous Kaz Mobley M.D. 1.5 mL/hr at 05/25/17 1830 75 mcg at 05/26/17 0740   • ipratropium-albuterol (DUONEB) nebulizer solution 3 mL  3 mL Q2HRS PRN (RT) Kaz Mobley M.D.       • ipratropium-albuterol (DUONEB) nebulizer solution 3 mL  3 mL Q4HRS (RT) Kaz Mobley M.D.   3 mL at 05/27/17 0244   • insulin lispro (HUMALOG) injection 3-14 Units  3-14 Units Q6HRS Clayton Bautista M.D.   Stopped at 05/26/17 0000   • ferrous sulfate tablet 325 mg  325 mg BID WITH MEALS Caridad Ibarra M.D.   325 mg at 05/26/17 1703   • levothyroxine (SYNTHROID) tablet 150 mcg  150 mcg AM ES Caridad Ibarra M.D.   150 mcg at 05/26/17 0538   • niacin tablet 500 mg  500 mg DAILY Caridad Ibarra M.D.   500 mg at 05/26/17 0745   • pravastatin (PRAVACHOL) tablet 40 mg  40 mg QHS Caridad Ibarra M.D.   Stopped at 05/26/17 2100   • NS infusion 3,198 mL  30 mL/kg Once PRN Caridad Ibarra M.D.       • NS infusion   Continuous Caridad Ibarra M.D. 83 mL/hr at 05/26/17 2110     • NS (BOLUS) infusion 1,000 mL  1,000 mL Once PRN Caridad Ibarra M.D.       • oxycodone immediate-release (ROXICODONE) tablet 2.5 mg  2.5 mg Q3HRS PRN  Caridad Ibarra M.D.        And   • oxycodone immediate-release (ROXICODONE) tablet 5 mg  5 mg Q3HRS PRN Caridad Ibarra M.D.        And   • morphine (pf) 4 mg/ml injection 2 mg  2 mg Q3HRS PRN Caridad Ibarra M.D.       • glucose 4 g chewable tablet 16 g  16 g Q15 MIN PRN Caridad Ibarra M.D.        And   • dextrose 50% (D50W) injection 25 mL  25 mL Q15 MIN PRN Caridad Ibarra M.D.       • ondansetron (ZOFRAN) syringe/vial injection 4 mg  4 mg Q4HRS PRN Caridad Ibarra M.D.       • ondansetron (ZOFRAN ODT) dispertab 4 mg  4 mg Q4HRS PRN Caridad Ibarra M.D.       • multivitamin (THERAGRAN) tablet 1 Tab  1 Tab DAILY Caridad Ibarra M.D.   1 Tab at 05/26/17 0740     Last reviewed on 5/24/2017  9:56 PM by Zora Mccarthy, [facility]    Quality  Measures:  Labs reviewed, Radiology images reviewed and Medications reviewed                      Problems/plan:  Ventilator-dependent respiratory failure - > ARDS/aspPNA? From UGIB?   Intubated in ER early AM 5/25   RT protocols   Not ready for SBT   Wean PEEP to 8 and fiO2 to 0.4 as tolerated   Mobilize and sedation vacations when safe  Hemoptysis.  He underwent urgent FOBin the ER 5/24.  No bleeding sites were identified in the lungs.  There was no endobronchial tumor.  There is a possibility that he may have had an acute pulmonary embolism.  He is at risk given his recent car ride to and from [place], [place].  CT angiography of the chest has not been performed as of yet because of his increased creatinine. NIVT negative.   SUSPECT UGIB AND ASPIRATION    Eval ongoing - no CTA - AKLI   NIVT neg   Monitor for need for Tx FOB again -    Recurrent hemoptysis 5/27 - lots of plugs/no clear source bleeding   No heparin for now   Monitor for need for IVC filter - no for now  Esophageal ulcer   S/p EGD 5/26   PPI drip -> Bolus PPI   NG pulled with EGD   Cortrak when ok with GI   Community-acquired pneumonia - vs aspiration PNA/ARDS   Cultures pending   Pulmonary toilet   IV ABX - Unasyn/Doxy  Sepsis, pulmonary  source.   Protocols - fluids - serial LA   Pressors PRN - so far none!  Anemia with thrombocytopenia.   Serial lab - transfuse per policy - needs blood today   TEG with plt mapping if keeps bleeding - plt low - coags ok  Acute on chronic kidney disease.   No contrast die   Avoid nephrotoxins   May need renal eval  Elevated lipase.  His abdominal exam is unremarkable.   F/u lab in AM - image abd?  Elevated troponin - demand ischemia - NSTEMI?   ECHO - f/u labs - Cards eval PRN  Chronic diastolic heart failure with grade I diastolic dysfunction.  Hypothyroidism - replete  Diabetes mellitus type 2 - glycemic control - monitor for need for I drip  Dyslipidemia - diet/statin  Recent hospitalization for lower gastrointestinal hemorrhage, presumed due to sigmoid diverticulosis.  An orogastric tube has been placed in the emergency department.  He has dark appearing fluid from his stomach.  His hemoglobin has not significantly decreased from 05/10/2017.   Serial H/H   PPI IV   GI eval PRN  Remote history of significant smoking - COPD? PRN nebs.  Enteral nutrion when clinically appropriate  Prophylaxis    De-escalate abx. Cultures noted  Tx FOB for hemoptysis/plugs - no clear bleeding site - tons of bloody plugs - O2 sats/fiO2/compliance much better after FOB    Discussed patient condition and risk of morbidity and/or mortality with Family, RN, RT, Pharmacy, Charge nurse / hot rounds and GI and hospitalist.    The patient remains critically ill.  Critical care time = 35 minutes in directly providing and coordinating critical care and extensive data review.  No time overlap and excludes procedures.    Demetria DIAL ([place]), am scribing for, and in the presence of, Clayton Bautista M.D.   Electronically signed by: Demetria Ramon ([place]), 5/27/2017  Clayton DIAL M.D. personally performed the services described in this documentation, as scribed by Demetria Ramon in my presence, and it is both accurate and  complete.

## 2017-05-27 NOTE — PROGRESS NOTES
Hospital Medicine Interval Note  Date of Service:  5/27/2017    Chief Complaint  76 y.o.-year-old male admitted 5/24/2017 with shortness of breath and required intubation.    Interval Problem Update  Dr. Rodgers  His Hb had continued to drop.  Bronchoscopy done 5/24. Blood out of the ETT this morning. Hb has dropped to 6.8 this morning and will thus have been transfused 4 units RBCs. Cr is 2.5 this morning. He has been in sinus rhythm.  Propofol and fentanyl drips for sedation. Tmax 101.3  Dr. Stoll did an EGD 5/26 revealing severe ulcerations of the esophagus. His wife is at bedside and we reviewed his condition. His daughter is coming in from [place].  Consultants/Specialty  Pulmonology.  GI  I discussed with Dr. Bautista on Hot Rounds.   Disposition  ICU     Review of Systems   Unable to perform ROS: intubated      Physical Exam Laboratory/Imaging   Filed Vitals:    05/27/17 0400 05/27/17 0500 05/27/17 0600 05/27/17 0641   BP:       Pulse: 96 84 89    Temp:       Resp: 24 26 24    Height:       Weight:       SpO2: 95% 95% 96% 93%     Physical Exam   Constitutional: No distress.   Eyes:   Pale conjunctiva   Neck: Neck supple.   Central line   Cardiovascular: Normal rate and regular rhythm.    Murmur heard.  Pulmonary/Chest:   Vent, moderate air movement   Abdominal: Soft. He exhibits no distension. There is no tenderness. There is no rebound.   Genitourinary:   servin   Musculoskeletal: He exhibits no edema.   Neurological:   Sedated, opens eyes and follows some commands, moving extremities.    Skin: Skin is warm and dry. There is pallor.    Lab Results   Component Value Date/Time    WBC 5.4 05/27/2017 06:14 AM    HEMOGLOBIN 6.8* 05/27/2017 06:14 AM    HEMATOCRIT 23.8* 05/27/2017 06:14 AM    PLATELET COUNT 78* 05/27/2017 06:14 AM     Lab Results   Component Value Date/Time    SODIUM 142 05/27/2017 06:14 AM    POTASSIUM 4.2 05/27/2017 06:14 AM    CHLORIDE 115* 05/27/2017 06:14 AM    CO2 21 05/27/2017 06:14 AM     GLUCOSE 105* 05/27/2017 06:14 AM    BUN 32* 05/27/2017 06:14 AM    CREATININE 2.50* 05/27/2017 06:14 AM      Assessment/Plan    Acute respiratory failure with hypoxia (CMS-HCC) (present on admission)  Assessment & Plan  Initiated on the vent 5/24. Pulmonary consulted.   DVT studies negative. PEEP 14.    Septic shock (CMS-HCC) (present on admission)  Assessment & Plan  Source is community acquired pneumonia. IV unasyn for 7 days. Bronch was done on admit.    Esophagitis (present on admission)  Assessment & Plan  With ulcerations seen on EGD 5/26. IV PPI. Cortrak removed.     Hemoptysis (present on admission)  Assessment & Plan  Bright-red blood in the ER, bronchoscopy was negative.     Acute on chronic renal insufficiency (HCC) (present on admission)  Assessment & Plan  CKD stage III. Baseline Cr appears to be 1.3 by review of the records Cr now up to 2.5 today.    DM (diabetes mellitus) (CMS-HCC) (present on admission)  Assessment & Plan  Sliding scale insulin    HTN (hypertension) (present on admission)  Assessment & Plan  Hx of.    CAP (community acquired pneumonia) (present on admission)  Assessment & Plan  IV abx.    Anemia (present on admission)  Assessment & Plan  Acute blood loss from upper GI bleed. Transfuse one unit of RBCs this morning for Hb 6.8. GI consulted for EGD. Protonix BID. Transfuse for Hb less than 7.    Thrombocytopenia (CMS-HCC) (present on admission)  Assessment & Plan  Platelets 78    Aortic stenosis, severe (present on admission)  Assessment & Plan  Possible TAVR candidate when stable.     See echo:  CONCLUSIONS  Normal left ventricular systolic function.  Left ventricular ejection fraction is visually estimated to be 60%.  Mild mitral regurgitation.  Severe aortic stenosis.  Aortic valve area calculated from the continuity equation is 1.0 cm2.  Vmax is 4.22  m/s. Transvalvular gradients are - Peak: 76  mmHg, Mean:   51  mmHg.   Moderate aortic insufficiency.  Moderate tricuspid  regurgitation.  Right ventricular systolic pressure is estimated to be 60 mmHg.       Labs reviewed, Medications reviewed and Radiology images reviewed  Bond catheter: Unconscious / Sedated Patient on a Ventilator  Central line in place: Sepsis    DVT Prophylaxis: Contraindicated - High bleeding risk  DVT prophylaxis - mechanical: SCDs

## 2017-05-27 NOTE — CARE PLAN
Problem: Respiratory:  Goal: Respiratory status will improve  Outcome: PROGRESSING SLOWER THAN EXPECTED  Patient is still on ventilator

## 2017-05-28 PROBLEM — J18.9 CAP (COMMUNITY ACQUIRED PNEUMONIA): Status: RESOLVED | Noted: 2017-01-01 | Resolved: 2017-01-01

## 2017-05-28 PROBLEM — J69.0 ASPIRATION PNEUMONIA (HCC): Status: ACTIVE | Noted: 2017-01-01

## 2017-05-28 PROBLEM — E80.6 HYPERBILIRUBINEMIA: Status: ACTIVE | Noted: 2017-01-01

## 2017-05-28 NOTE — OP REPORT
DATE OF SERVICE:  05/27/2017    PROCEDURE:  Therapeutic flexible fiberoptic bronchoscopy.    PREOPERATIVE DIAGNOSIS:  Hemoptysis with desaturation, significant bloody   mucoid plugs suspected.    POSTOPERATIVE DIAGNOSIS:  Obstructive airways with fibrinous bloody mucus   plugs, no active bleeding, no obvious source.    INDICATIONS:  Patient is a 76-year-old male admitted with respiratory failure   and has had initial bronchoscopy that found similar results with hemoptysis   and no obvious source of bleeding and some old bloody clots.  Patient felt to   have a GI bleed as well and perhaps aspiration pneumonia as the primary reason   for respiratory failure.  Worsening hypoxemia prompted the bronchoscopy to   clear plugs.    DESCRIPTION OF PROCEDURE:  A time-out was observed.  FiO2 was increased from   % 30 minutes prior to the procedure.  Sats were 99% throughout.  Vital   signs were good throughout as well.  Blood pressure running around 90 systolic   before, during, and after the procedure.  He was sedated with propofol   throughout and just normal 40 mcg sedation dose was employed without needing   to increase it.  I believe he is on a fentanyl drip as well.    A time-out observed.  Portex adapter placed in line.  Bronchoscope passed   distally.  Old appearing blood and mucus mixed and plugs were occluding the   distal tip of the ET tube impart with some phlegm anchored on the Roche's   port.  This was lavaged free and clear.  Tube was noted be in good position   2-3 cm above the main tamera.  Both airways were surveyed.  The patient had a   strong cough with mild dynamic air collapse.  There were no endobronchial mass   or lesions, no anatomic variance.  There are diffuse mucoid blood tinged   plugs in all areas of the lung.  About a third of a Lukens trap of thick   plugs, bloody plugs was removed.  Postprocedure compliance appeared better on   the ventilator.  Airways were definitely clean and open.  I  gave him several   breaks throughout the procedure.  With the last evaluation, all the airways   were surveyed again and no obvious fresh bleeding again was noted from any   segment.  He clearly is having hemoptysis, however.  Follow up chest x-ray in   the a.m.  We will continue to keep up with secretions and prevent further   atelectasis.       ____________________________________     MD HAM [place] / SEBASTIEN    DD:  05/27/2017 15:29:21  DT:  05/27/2017 17:10:18    D#:  [id number]  Job#:  [id number]

## 2017-05-28 NOTE — PROGRESS NOTES
Gastroenterology Progress Note     Author: Rick Cordero   Date & Time Created: 5/28/2017 1:31 PM    Interval History:  No further bleeding from GI tract    Review of Systems:  Review of Systems   Unable to perform ROS: intubated       Physical Exam:  Physical Exam   Constitutional: He appears well-developed and well-nourished.   HENT:   Head: Normocephalic and atraumatic.   Eyes: Scleral icterus is present.   Neck: No tracheal deviation present. No thyromegaly present.   Abdominal: Soft. He exhibits no distension.   Skin: Skin is warm and dry.       Labs:  Recent Labs      05/26/17   0436  05/27/17   0500  05/28/17   0447   ISTATAPH  7.389*  7.377*  7.352*   ISTATAPCO2  29.8  33.9  32.3   ISTATAPO2  52*  80  73   ISTATATCO2  19*  21  19*   [id number]  87*  96  94   ISTATARTHCO3  18.0  19.9  17.9   ISTATARTBE  -6*  -5*  -7*   ISTATTEMP  37.3 C  37.8 C  38.4 C   ISTATFIO2  40  45  40   ISTATSPEC  Arterial  Arterial  Arterial   ISTATAPHTC  7.384*  7.365*  7.332*   [id number]  53*  84  81     Recent Labs      05/25/17   1630  05/26/17   0100  05/26/17   0600   TROPONINI  0.27*  0.14*  0.10*     Recent Labs      05/26/17   0100  05/27/17   0614  05/28/17   0634   SODIUM  140  142  146*   POTASSIUM  4.4  4.2  4.3   CHLORIDE  111  115*  119*   CO2  19*  21  20   BUN  35*  32*  32*   CREATININE  2.63*  2.50*  2.47*   MAGNESIUM   --   2.8*  3.0*   PHOSPHORUS   --   3.3  2.6   CALCIUM  7.3*  7.3*  7.6*     Recent Labs      05/26/17   0100  05/27/17   0614  05/28/17   0634   ALTSGPT  11   --    --    ASTSGOT  34   --    --    ALKPHOSPHAT  35   --    --    TBILIRUBIN  2.2*   --    --    LIPASE  24   --    --    GLUCOSE  133*  105*  109*     Recent Labs      05/27/17   0614  05/27/17   1250   05/28/17   0053  05/28/17   0634  05/28/17   1310   RBC  2.70*  3.12*   --    --   3.09*   --    HEMOGLOBIN  6.8*  8.2*   < >  7.7*  8.0*  7.8*   HEMATOCRIT  23.8*  27.3*   < >  26.2*  27.4*  27.0*   PLATELETCT  78*  79*   --    --    70*   --     < > = values in this interval not displayed.     Recent Labs      17   0100   17   0614  17   1250  17   0634   WBC   --    < >  5.4  6.5  6.0   NEUTSPOLYS   --    < >  88.30*  72.30*  76.20*   LYMPHOCYTES   --    < >  7.20*  17.80*  13.20*   MONOCYTES   --    < >  0.00  2.70  3.70   EOSINOPHILS   --    < >  2.70  0.00  2.00   BASOPHILS   --    < >  0.90  2.70*  1.20   ASTSGOT  34   --    --    --    --    ALTSGPT  11   --    --    --    --    ALKPHOSPHAT  35   --    --    --    --    TBILIRUBIN  2.2*   --    --    --    --     < > = values in this interval not displayed.     Hemodynamics:  Temp (24hrs), Av.3 °C (99.1 °F), Min:37.2 °C (99 °F), Max:37.3 °C (99.2 °F)  Temperature: 37.3 °C (99.2 °F)  Pulse  Av.6  Min: 71  Max: 120Heart Rate (Monitored): 95  NIBP: 119/60 mmHg     Respiratory:  Strong Vent Mode: APVCMV, Rate (breaths/min): 24, PEEP/CPAP: 12, FiO2: 40, P Peak (PIP): 23, P MEAN: 15 Respiration: (!) 24, Pulse Oximetry: 95 %     Work Of Breathing / Effort: Vented  RUL Breath Sounds: Crackles, RML Breath Sounds: Diminished, RLL Breath Sounds: Diminished, CRIS Breath Sounds: Crackles, LLL Breath Sounds: Diminished  Fluids:    Intake/Output Summary (Last 24 hours) at 17 1331  Last data filed at 17 0600   Gross per 24 hour   Intake 1852.99 ml   Output   1575 ml   Net 277.99 ml        GI/Nutrition:  Orders Placed This Encounter   Procedures   • Diet NPO     Standing Status: Standing      Number of Occurrences: 1      Standing Expiration Date:      Order Specific Question:  Type:     Answer:  Now [1]     Order Specific Question:  Restrict to:     Answer:  Strict [1]     Medical Decision Making, by Problem:  Active Hospital Problems    Diagnosis   • Esophagitis [K20.9]   • Acute respiratory failure with hypoxia (CMS-HCC) [J96.01]   • Septic shock (CMS-HCC) [A41.9, R65.21]   • Hemoptysis [R04.2]   • Anemia [D64.9]   • Thrombocytopenia (CMS-HCC) [D69.6]   •  Aortic stenosis, severe [I35.0]   • CAP (community acquired pneumonia) [J18.9]   • HTN (hypertension) [I10]   • DM (diabetes mellitus) (CMS-HCC) [E11.9]   • Acute on chronic renal insufficiency (HCC) [N28.9, N18.9]       Plan:  Severe ulcerative esophagitis   GERD  Acute hemorrhagic anemia  Low PLT    REC  PPI   FU EGD in 2 months  FU GI in 2 months  No contraindication for NG or Dobhoff tube and feeding  Would review iron studies    Will sign off, Thanks!  Call with questions    Core Measures

## 2017-05-28 NOTE — CARE PLAN
Problem: Ventilation Defect:  Goal: Ability to achieve and maintain unassisted ventilation or tolerate decreased levels of ventilator support  Outcome: PROGRESSING AS EXPECTED  Adult Ventilation Update    Total Vent Days: 3      Patient Lines/Drains/Airways Status    Active Airway      Name: Placement date: Placement time: Site: Days:     Airway Group ET Tube Oral 8.0 @ 26 05/25/17    Oral  3               APVcmv  24  /  440  /  +12  /  40%  DUO Q4    Plateau Pressure (Q Shift): 24 (05/28/17 0715)  Static Compliance (ml / cm H2O): 91 (05/28/17 1540)    Patient failed trials because of Barriers to Wean: FiO2 >50% OR PEEP >8 (PMA Only) (05/28/17 0715)    Cough: Productive (05/28/17 1540)  Sputum Amount: Scant (05/28/17 1540)  Sputum Color: Tan;Bloody (05/28/17 1540)  Sputum Consistency: Thick (05/28/17 1540)    Events/Summary/Plan: vent check and in line med given (05/28/17 1540)

## 2017-05-28 NOTE — CARE PLAN
Problem: Pain Management  Goal: Pain level will decrease to patient’s comfort goal  Pt on fentanyl drip for pain.     Problem: Urinary Elimination:  Goal: Ability to reestablish a normal urinary elimination pattern will improve  Bond in place to monitor I/Os.

## 2017-05-28 NOTE — PROGRESS NOTES
Hospital Medicine Interval Note  Date of Service:  5/28/2017    Chief Complaint  76 y.o.-year-old male admitted 5/24/2017 with shortness of breath and required intubation.    Interval Problem Update  Dr. Rodgers  His Hb had continued to drop.  Bronchoscopy done 5/24. Blood out of the ETT this morning. Hb has dropped and has been transfused 4 units RBCs. Cr remains elevated. He has been in sinus rhythm. PEEP 12 and FiO2 40%.   Propofol and fentanyl drips for sedation.   Dr. Stoll did an EGD 5/26 revealing severe ulcerations of the esophagus. Bronch done 5/27. Today his bili went from 2 to 12. GI has cleared him for cortrak.  Consultants/Specialty  Pulmonology.  GI  I discussed with Dr. Bautista on Hot Rounds.   Disposition  ICU     Review of Systems   Unable to perform ROS: intubated      Physical Exam Laboratory/Imaging   Filed Vitals:    05/28/17 0400 05/28/17 0500 05/28/17 0600 05/28/17 0715   BP:       Pulse: 92 89 85    Temp:       Resp: 24 23 24    Height:       Weight:       SpO2: 94% 94% 95% 93%     Physical Exam   Constitutional: No distress.   Eyes: Scleral icterus is present.   Pale conjunctiva   Neck:   Central line   Cardiovascular: Normal rate and regular rhythm.    Murmur heard.  Pulmonary/Chest:   Vent, moderate air movement   Abdominal: Soft. He exhibits no distension. There is no tenderness. There is no rebound.   Genitourinary:   servin   Musculoskeletal: He exhibits no edema.   Neurological:   Sedated, he withdraws to pain but does not follow commands   Skin: Skin is warm and dry. He is not diaphoretic. There is pallor.   jaundiced    Lab Results   Component Value Date/Time    WBC 6.0 05/28/2017 06:34 AM    HEMOGLOBIN 8.0* 05/28/2017 06:34 AM    HEMATOCRIT 27.4* 05/28/2017 06:34 AM    PLATELET COUNT 70* 05/28/2017 06:34 AM     Lab Results   Component Value Date/Time    SODIUM 146* 05/28/2017 06:34 AM    POTASSIUM 4.3 05/28/2017 06:34 AM    CHLORIDE 119* 05/28/2017 06:34 AM    CO2 20  05/28/2017 06:34 AM    GLUCOSE 109* 05/28/2017 06:34 AM    BUN 32* 05/28/2017 06:34 AM    CREATININE 2.47* 05/28/2017 06:34 AM      Assessment/Plan    Acute respiratory failure with hypoxia (CMS-HCC) (present on admission)  Assessment & Plan  Initiated on the vent 5/24. Pulmonary consulted.   DVT studies negative. PEEP 12. Bronch done again on 5/27.    Septic shock (CMS-HCC) (present on admission)  Assessment & Plan  Source is community acquired pneumonia. IV unasyn for 7 days. Bronch was done on admit.    Esophagitis (present on admission)  Assessment & Plan  With ulcerations seen on EGD 5/26. IV PPI. Cortrak removed.     Hemoptysis (present on admission)  Assessment & Plan  Bright-red blood in the ER, bronchoscopy was negative.     Acute on chronic renal insufficiency (HCC) (present on admission)  Assessment & Plan  CKD stage III. Baseline Cr appears to be 1.3 by review of the records Cr now up to 2.47 today.    DM (diabetes mellitus) (CMS-HCC) (present on admission)  Assessment & Plan  Sliding scale insulin    HTN (hypertension) (present on admission)  Assessment & Plan  Hx of.    Anemia (present on admission)  Assessment & Plan  Acute blood loss from upper GI bleed. GI consulted for EGD. Protonix BID. Transfuse for Hb less than 7. Stop serial Hb and check in the morning. Cortrak will be placed for meds and feeding.    Thrombocytopenia (CMS-HCC) (present on admission)  Assessment & Plan  Platelets 70    Aortic stenosis, severe (present on admission)  Assessment & Plan  Possible TAVR candidate when stable.     See echo:  CONCLUSIONS  Normal left ventricular systolic function.  Left ventricular ejection fraction is visually estimated to be 60%.  Mild mitral regurgitation.  Severe aortic stenosis.  Aortic valve area calculated from the continuity equation is 1.0 cm2.  Vmax is 4.22  m/s. Transvalvular gradients are - Peak: 76  mmHg, Mean:   51  mmHg.   Moderate aortic insufficiency.  Moderate tricuspid  regurgitation.  Right ventricular systolic pressure is estimated to be 60 mmHg.    Aspiration pneumonia (CMS-HCC) (present on admission)  Assessment & Plan  On IV unasyn, s/p bronchoscopy.    Hyperbilirubinemia (present on admission)  Assessment & Plan  Marked increase from 2 to 12. LFTs otherwise normal. Possible hemolysis. Repeat CMP in the morning and consider isozymes if still elevated.        Labs reviewed, Medications reviewed and Radiology images reviewed  Bond catheter: Unconscious / Sedated Patient on a Ventilator  Central line in place: Sepsis    DVT Prophylaxis: Contraindicated - High bleeding risk  DVT prophylaxis - mechanical: SCDs

## 2017-05-28 NOTE — PROGRESS NOTES
Pulmonary Critical Care Progress Note        DOS:  5/28/2017    Chief Complaint: SOB    History of Present Illness:   The entire history is obtained from healthcare providers and medical record as this gentleman cannot give me any history.  I was kindly asked by Dr. Chavira to see and evaluate this gentleman regarding the above problems.  This is a 76-year-old gentleman, who presented earlier this evening to [facility] with shortness of breath.  He apparently recently took a car trip to [place], [place].  He drove to [place] approximately 1 week ago and drove back from [place] on or about 05/24/2017.  He presented to the emergency room complaining of shortness of breath.  He was hypoxemic on presentation and was placed on supplemental oxygen.  There was a concern that he may have an acute pulmonary embolism and he was given a loading dose of intravenous heparin and started on a heparin drip on the weight-based protocol.  CT angiography of the chest was not ordered because of an increased creatinine of 1.60.  He has a history of underlying chronic kidney disease.  Subsequent starting his heparin, he felt like he had to cough. He had hemoptysis with bright red blood.  He developed tachypnea and hypoxemia and was then emergently intubated and placed on the ventilator.  At the time of my arrival in the emergency room, he is sedated on full mechanical ventilatory support.    ROS:    Respiratory: unable to perform due to the patient's inability to effectively communicate,   Cardiac: unable to perform due to the patient's inability to effectively communicate,   GI: unable to perform due to the patient's inability to effectively communicate.     Interval Events:  24 hour interval history reviewed   Following on sedation vacation  Appears more jaundiced  No more ET or NG bleeding  SR, BP okay  I/O's +581cc/24hrs, rianna colored  Prop 25, Fentanyl 75  IV pushes of protonix  Tmax 99.2, WBC normal  No SBTs  Small amount  of bloody secretions  Post bronch oxygenation better  Unasyn day 4  Cultures show no growth  CXR shows: Worse pulmonary edema/atx    PFSH:  No change.    Respiratory:  Strong Vent Mode: APVCMV, Rate (breaths/min): 24, Vt Target (mL): 440, PEEP/CPAP: 12, FiO2: 40, Static Compliance (ml / cm H2O): 84, Control VTE (exp VT): 426  Pulse Oximetry: 95 %  PIP 21-22  Exam: mild to moderately labored on full support, clear anteriorly, persistent bilateral coarse rales at bases L>R  Imaging Available data reviewed     Recent Labs      05/26/17   0436  05/27/17   0500  05/28/17   0447   ISTATAPH  7.389*  7.377*  7.352*   ISTATAPCO2  29.8  33.9  32.3   ISTATAPO2  52*  80  73   ISTATATCO2  19*  21  19*   [id number]  87*  96  94   ISTATARTHCO3  18.0  19.9  17.9   ISTATARTBE  -6*  -5*  -7*   ISTATTEMP  37.3 C  37.8 C  38.4 C   ISTATFIO2  40  45  40   ISTATSPEC  Arterial  Arterial  Arterial   ISTATAPHTC  7.384*  7.365*  7.332*   [id number]  53*  84  81       HemoDynamics:  Pulse: 85, Heart Rate (Monitored): 95  NIBP: 119/60 mmHg     Exam: ST, RR, no M, no E, cool but has good cap refill. No change  Imaging: Available data reviewed     Recent Labs      05/25/17   1630  05/26/17   0100  05/26/17   0600   TROPONINI  0.27*  0.14*  0.10*       Neuro:  GCS Total [place] Coma Score: 10  Propofol 25, fent 75  Exam: Sleepy, PERRL, moves all four, restrained, follows simple commands. No change  Imaging: Available data reviewed    Fluids:  Intake/Output       05/26/17 0700 - 05/27/17 0659 05/27/17 0700 - 05/28/17 0659 05/28/17 0700 - 05/29/17 0659      1093-4461 7579-7882 Total 6438-3893 4529-2930 Total 7938-6221 0920-2492 Total       Intake    I.V.  1419.1  1627.7 3046.8  1058.5  1272 2330.5  --  -- --    Propofol Volume 205.1 313.7 518.8 325.5 258 583.5 -- -- --    IV Volume (IV maintenance)   -- -- --    Fent 18 18 36 18 18 36 -- -- --    Protonix 200 300 500 300 0 300 -- -- --    Blood  --  -- --  350  -- 350   --  -- --    Volume (RELEASE RED BLOOD CELLS) -- -- -- 350 -- 350 -- -- --    Other  30  -- 30  --  -- --  --  -- --    Medications (P.O./ Enteral Liquids) 30 -- 30 -- -- -- -- -- --    Enteral  60  -- 60  --  -- --  --  -- --    Free Water / Tube Flush 60 -- 60 -- -- -- -- -- --    Total Intake 1509.1 1627.7 3136.8 1408.5 1272 2680.5 -- -- --       Output    Urine  1465  950 2415  900  1200 2100  --  -- --    Indwelling Cathether 3217 206 4920 900 1200 2100 -- -- --    Total Output 0944 262 4525 900 1200 2100 -- -- --       Net I/O     44.1 677.7 721.8 508.5 72 580.5 -- -- --           Recent Labs      17   01017   0614   SODIUM  140  142   POTASSIUM  4.4  4.2   CHLORIDE  111  115*   CO2  19*  21   BUN  35*  32*   CREATININE  2.63*  2.50*   MAGNESIUM   --   2.8*   PHOSPHORUS   --   3.3   CALCIUM  7.3*  7.3*       GI/Nutrition:  Exam: Obese, ND, NT, soft, + BS.   Imaging: Available data reviewed  NPO , no Cortrak or NG per GI still (since  EGD)    Liver Function  Recent Labs      17   0100  17   0614   ALTSGPT  11   --    ASTSGOT  34   --    ALKPHOSPHAT  35   --    TBILIRUBIN  2.2*   --    LIPASE  24   --    GLUCOSE  133*  105*       Heme:  Recent Labs      17   0029  17   0614  17   1250  17   1815  17   0053   RBC  2.71*  2.70*  3.12*   --    --    HEMOGLOBIN  7.1*  6.8*  8.2*  7.7*  7.7*   HEMATOCRIT  23.7*  23.8*  27.3*  26.2*  26.2*   PLATELETCT  68*  78*  79*   --    --        Infectious Disease:  Temp  Av.3 °C (99.1 °F)  Min: 37.2 °C (99 °F)  Max: 37.3 °C (99.2 °F)  Micro: reviewed  Recent Labs      17   0100   17   0029  17   0614  17   1250   WBC   --    < >  5.7  5.4  6.5   NEUTSPOLYS   --    < >  84.10*  88.30*  72.30*   LYMPHOCYTES   --    < >  9.70*  7.20*  17.80*   MONOCYTES   --    < >  2.60  0.00  2.70   EOSINOPHILS   --    < >  0.00  2.70  0.00   BASOPHILS   --    < >  0.90  0.90  2.70*   ASTSGOT  34   --    --     --    --    ALTSGPT  11   --    --    --    --    ALKPHOSPHAT  35   --    --    --    --    TBILIRUBIN  2.2*   --    --    --    --     < > = values in this interval not displayed.     Current Facility-Administered Medications   Medication Dose Frequency Provider Last Rate Last Dose   • pantoprazole (PROTONIX) injection 40 mg  40 mg BID Clayton Bautista M.D.   40 mg at 05/27/17 2016   • ampicillin/sulbactam (UNASYN) 3 g in  mL IVPB  3 g Q12HRS Mahin Jean Baptiste, PHARMD   Stopped at 05/27/17 2046   • acetaminophen (TYLENOL) tablet 650 mg  650 mg Q6HRS PRN Caridad Ibarra M.D.   Stopped at 05/25/17 0300   • propofol (DIPRIVAN) injection  5-80 mcg/kg/min Continuous Kaz Mobley M.D. 19.2 mL/hr at 05/28/17 0238 30 mcg/kg/min at 05/28/17 0238   • Respiratory Care per Protocol   Continuous RT Kaz Mobley M.D.       • senna-docusate (PERICOLACE or SENOKOT S) 8.6-50 MG per tablet 2 Tab  2 Tab BID Kaz Mobley M.D.   Stopped at 05/26/17 2100    And   • polyethylene glycol/lytes (MIRALAX) PACKET 1 Packet  1 Packet QDAY PRN Kaz Mobley M.D.        And   • magnesium hydroxide (MILK OF MAGNESIA) suspension 30 mL  30 mL QDAY PRN Kaz Mobley M.D.        And   • bisacodyl (DULCOLAX) suppository 10 mg  10 mg QDAY PRN Kaz Mobley M.D.       • chlorhexidine (PERIDEX) 0.12 % solution 15 mL  15 mL BID Kaz Mobley M.D.   15 mL at 05/27/17 2016   • lidocaine (XYLOCAINE) 1%  injection  1-2 mL Q30 MIN PRN Kaz Mobley M.D.       • MD ALERT...Adult ICU Electrolyte Replacement per Pharmacy Protocol   pharmacy to dose Kaz Mobley M.D.       • fentaNYL (SUBLIMAZE) injection 25 mcg  25 mcg Q HOUR PRN Kaz Mobley M.D.        Or   • fentaNYL (SUBLIMAZE) injection 50 mcg  50 mcg Q HOUR PRN Kaz Mobley M.D.        Or   • fentaNYL (SUBLIMAZE) injection 100 mcg  100 mcg Q HOUR PRN Kaz Mobley M.D.       • fentaNYL  (SUBLIMAZE) 50 mcg/mL in 50mL   Continuous Kaz Mobley M.D. 1.5 mL/hr at 05/27/17 0703 75 mcg at 05/27/17 2149   • ipratropium-albuterol (DUONEB) nebulizer solution 3 mL  3 mL Q2HRS PRN (RT) Kaz Mobley M.D.       • ipratropium-albuterol (DUONEB) nebulizer solution 3 mL  3 mL Q4HRS (RT) Kaz Mobley M.D.   3 mL at 05/28/17 0241   • insulin lispro (HUMALOG) injection 3-14 Units  3-14 Units Q6HRS Clayton Bautista M.D.   Stopped at 05/26/17 0000   • ferrous sulfate tablet 325 mg  325 mg BID WITH MEALS Caridad Ibarra M.D.   Stopped at 05/27/17 0730   • levothyroxine (SYNTHROID) tablet 150 mcg  150 mcg AM ES Caridad Ibarra M.D.   Stopped at 05/27/17 0700   • niacin tablet 500 mg  500 mg DAILY Caridad Ibarra M.D.   Stopped at 05/27/17 0900   • pravastatin (PRAVACHOL) tablet 40 mg  40 mg QHS Caridad Ibarra M.D.   Stopped at 05/26/17 2100   • NS infusion 3,198 mL  30 mL/kg Once PRN Caridad Ibarra M.D.       • NS infusion   Continuous Caridad Ibarra M.D. 83 mL/hr at 05/28/17 0053     • NS (BOLUS) infusion 1,000 mL  1,000 mL Once PRN Caridad Ibarra M.D.       • oxycodone immediate-release (ROXICODONE) tablet 2.5 mg  2.5 mg Q3HRS PRN Caridad Ibarra M.D.        And   • oxycodone immediate-release (ROXICODONE) tablet 5 mg  5 mg Q3HRS PRN Caridad Ibarra M.D.        And   • morphine (pf) 4 mg/ml injection 2 mg  2 mg Q3HRS PRN Caridad Ibarra M.D.       • glucose 4 g chewable tablet 16 g  16 g Q15 MIN PRN Caridad Ibarra M.D.        And   • dextrose 50% (D50W) injection 25 mL  25 mL Q15 MIN PRN Caridad Ibarra M.D.       • ondansetron (ZOFRAN) syringe/vial injection 4 mg  4 mg Q4HRS PRN Caridad Ibarra M.D.       • ondansetron (ZOFRAN ODT) dispertab 4 mg  4 mg Q4HRS PRN Caridad Ibarra M.D.       • multivitamin (THERAGRAN) tablet 1 Tab  1 Tab DAILY Caridad Ibarra M.D.   Stopped at 05/27/17 0900     Last reviewed on 5/24/2017  9:56 PM by Keturah Cheek    Quality  Measures:  Labs reviewed, Radiology images reviewed and Medications  reviewed                      Problems/plan:  Ventilator-dependent respiratory failure - > ARDS/aspPNA? From UGIB?   Intubated in ER early AM 5/25   RT protocols   Not ready for SBT   Wean PEEP to 8 and fiO2 to 0.4 as tolerated   Mobilize and sedation vacations when safe  Hemoptysis.  He underwent urgent FOBin the ER 5/24.  No bleeding sites were identified in the lungs.  There was no endobronchial tumor.  There is a possibility that he may have had an acute pulmonary embolism.  He is at risk given his recent car ride to and from [place], [place].  CT angiography of the chest has not been performed as of yet because of his increased creatinine. NIVT negative.   SUSPECT UGIB AND ASPIRATION    Eval ongoing - no CTA - AKLI   NIVT neg   Monitor for need for Tx FOB again -    Recurrent hemoptysis 5/27 - lots of plugs/no clear source bleeding   No heparin for now   Monitor for need for IVC filter - no for now  Esophageal ulcer   S/p EGD 5/26   PPI drip -> Bolus PPI   NG pulled with EGD   Cortrak when ok with GI   Community-acquired pneumonia - vs aspiration PNA/ARDS   Cultures pending   Pulmonary toilet   IV ABX - Unasyn/Doxy  Sepsis, pulmonary source.   Protocols - fluids - serial LA   Pressors PRN - so far none!  Anemia with thrombocytopenia.   Serial lab - transfuse per policy - needs blood today   TEG with plt mapping if keeps bleeding - plt low - coags ok  Acute on chronic kidney disease.   No contrast die   Avoid nephrotoxins   May need renal eval  Elevated lipase.  His abdominal exam is unremarkable.   F/u lab in AM - image abd?  Elevated troponin - demand ischemia - NSTEMI?   ECHO - f/u labs - Cards eval PRN  Chronic diastolic heart failure with grade I diastolic dysfunction.  Hypothyroidism - replete  Diabetes mellitus type 2 - glycemic control - monitor for need for I drip  Dyslipidemia - diet/statin  Recent hospitalization for lower gastrointestinal hemorrhage, presumed due to sigmoid diverticulosis.  An orogastric  tube has been placed in the emergency department.  He has dark appearing fluid from his stomach.  His hemoglobin has not significantly decreased from 05/10/2017.   Serial H/H   PPI IV   GI eval PRN  Remote history of significant smoking - COPD? PRN nebs.  Enteral nutrion when clinically appropriate  Prophylaxis    Repeat CMP - eval jaundice?  Switch NS to LR - CL climbing - NGMA  De-escalate abx. Cultures noted  D/w GI ok to place Cortrak now and start TF - reviewed with RN in afternoon    Updated wife and daughter at bedside    Discussed patient condition and risk of morbidity and/or mortality with Family, RN, RT, Pharmacy, Charge nurse / hot rounds and GI and hospitalist.    The patient remains critically ill.  Critical care time = 34 minutes in directly providing and coordinating critical care and extensive data review.  No time overlap and excludes procedures.    Arianna DIAL ([place]), am scribing for, and in the presence of, Clayton Bautista M.D.   Electronically signed by: Arianna Wadsworth ([place]), 5/28/2017  IClayton M.D. personally performed the services described in this documentation, as scribed by Arianna Wadsworth in my presence, and it is both accurate and complete.

## 2017-05-28 NOTE — CARE PLAN
Problem: Ventilation Defect:  Goal: Ability to achieve and maintain unassisted ventilation or tolerate decreased levels of ventilator support  Outcome: PROGRESSING SLOWER THAN EXPECTED  Adult Ventilation Update    Total Vent Days: 3      Patient Lines/Drains/Airways Status    Active Airway      Name: Placement date: Placement time: Site: Days:     Airway Group ET Tube Oral 8.0 05/25/17    Oral  3                 Plateau Pressure (Q Shift): 22 (05/27/17 0641)  Static Compliance (ml / cm H2O): 61 (05/27/17 1425)    Patient failed trials because of Barriers to Wean: FiO2 >50% OR PEEP >8 (PMA Only) (05/27/17 1425)    Mobility Group  Activity Performed: Unable to mobilize (05/27/17 0800)  Pt Calls for Assistance: No (05/27/17 0800)  Reason Not Mobilized: Unstable condition (05/27/17 0800)  Mobilization Comments: PEEP 14 (05/27/17 0800)    Events/Summary/Plan:  Pt holding SpO2 at 95% and good PaO2, after 2nd rounds pt coughed up large bloody casting into et-tube, and was suctioning bloody secretions. Bronchoscopy preformed removing lots of bloody clots with no signs of active bleeding. Titrated FiO2 to 40% post bronchoscopy pt holding Sat's at 95%, plan is to decrease PEEP by 2 if pt tolerates 40% with stable sat's 95%. No SBT's

## 2017-05-29 NOTE — DIETARY
"Nutrition Support (Gastric Cortrak) Assessment - Male    Sulaiman Rodgers is a 76 y.o. male with admitting DX of CAP (community acquired pneumonia)  Pertinent History: hypothyroidism, type 2 diabetes mellitus, dyslipidemia, lower GI bleed, CKD stage III, hypothyroidism, blood loss anemia.   Allergies:  Review of patient's allergies indicates no known allergies.    Height: 177.8 cm (5' 10\")  Weight: 112.5 kg (248 lb 0.3 oz)  Weight to Use in Calculations: 108 kg (238 lb 1.6 oz) (Per I/O's, pt is ~6 L fluid positive)  Ideal Body Weight: 75.45 kg (166 lb 5.4 oz)  Percent Ideal Body Weight: 149.1  BMI per wt used in calculations: 34.1    Pertinent Labs:   Recent Labs      05/27/17   0614   05/28/17   0634   05/28/17   1310  05/28/17   1810   05/29/17   0420  05/29/17   0530  05/29/17   0835  05/29/17   1219   SODIUM 101  142   --   146*   --   149*   --    --    --    --   149*   --    POTASSIUM 102  4.2   --   4.3   --   4.3   --    --    --    --   4.5   --      32*   --   32*   --   34*   --    --    --    --   39*   --    CREATININE 109  2.50*   --   2.47*   --   2.39*   --    --    --    --   2.17*   --    GLUCOSE 112  105*   --   109*   --   108*   --    --    --    --   107*   --    CALCIUM 105  7.3*   --   7.6*   --   8.0*   --    --    --    --   8.1*   --    PHOSPHORUS 120  3.3   --   2.6   --    --    --    --    --    --    --    --       --    --    --    --   35   --    --    --    --   45   --       --    --    --    --   19   --    --    --    --   27   --    ALBUMIN 111   --    --    --    --   2.7*   --    --    --    --   2.7*   --    TOTAL BILIRUBIN 113   --    --    --    --   12.2*   --    --    --    --   12.5*   --    MAGNESIUM 561  2.8*   --   3.0*   --    --    --    --    --    --    --    --    WBC 1501  5.4   < >  6.0   --    --    --    --   5.4   --    --    --    HGB 1503  6.8*   < >  8.0*   --   7.8*  7.8*   --   7.5*   --    --    --    HCT 1504  23.8*   < >  " 27.4*   --   27.0*  26.8*   --   25.6*   --    --    --    RBC 1502  2.70*   < >  3.09*   --    --    --    --   2.86*   --    --    --    ACCU CHECK GLUCOSE 788   --    < >   --    < >   --    --    < >   --   102*   --   101*    < > = values in this interval not displayed.       Last BM:  (PTA)  Pertinent Medications: ferrous sulfate, SSI, Synthroid, Theragran, niacin, Protonix, bowel care  Pertinent Fluids: Fentanyl, LR @ 83 ml/hr, propofol.   Surgery / Procedures: gastric Cortrak placed ; EGD w/ biopsy for H. Pylori .   Skin: no pressure ulcers noted per chart review.     Estimated Needs per MS[facility] x1 = 1823 kcal/day; per [facility] = 2133 kcal/day; Temp (24hrs), Av.5 °C (99.5 °F), Min:37.5 °C (99.5 °F), Max:37.5 °C (99.5 °F)) x65-70% REE in critically ill obese patient:   Total Calories / day: 1386 - 1493  (Calories / k- 14)  Total Grams Protein / day: 113 - 151  (Grams Protein / kg IBW: 1.5 - 2)  Total Fluids ml / day: 1622.9 ml         Assessment / Evaluation: Enteral nutrition support started s/p EGD determining patient has erosive esophagitis, intubated; unsafe for PO diet at this time.  Ok per GI to begin nutrition support via gastric Cortrak - patient will benefit from trickle feedings at this time, low-fiber TF to promote GI absorption.  Propofol currently running at 25.6 ml/hr = 676 kcal from lipid - RD to monitor propofol needs and adjust TF prn. Due to difficulty meeting increased protein needs indicated with hypocaloric feeds will feed closer to REE until propofol requirements are lower.     Plan / Recommendation:   1. With & without propofol, please start Peptamen Intense VHP @ 15 ml/hr and advance slowly (by 15 ml/hr q8-12 hours) to final goal rate of 55 ml/hr continuously = 1320 kcal (1996 kcal with propofol), 123 grams protein, 1008 ml free water per day.   2. Fluids per MD.

## 2017-05-29 NOTE — PROGRESS NOTES
Pulmonary Critical Care Progress Note        Date of service: 5/29/2017    Chief Complaint: SOB    History of Present Illness:   The entire history is obtained from healthcare providers and medical record as this gentleman cannot give me any history.  I was kindly asked by Dr. Chavira to see and evaluate this gentleman regarding the above problems.  This is a 76-year-old gentleman, who presented earlier this evening to [facility] with shortness of breath.  He apparently recently took a car trip to [place], [place].  He drove to [place] approximately 1 week ago and drove back from [place] on or about 05/24/2017.  He presented to the emergency room complaining of shortness of breath.  He was hypoxemic on presentation and was placed on supplemental oxygen.  There was a concern that he may have an acute pulmonary embolism and he was given a loading dose of intravenous heparin and started on a heparin drip on the weight-based protocol.  CT angiography of the chest was not ordered because of an increased creatinine of 1.60.  He has a history of underlying chronic kidney disease.  Subsequent starting his heparin, he felt like he had to cough. He had hemoptysis with bright red blood.  He developed tachypnea and hypoxemia and was then emergently intubated and placed on the ventilator.  At the time of my arrival in the emergency room, he is sedated on full mechanical ventilatory support.    ROS:    Respiratory: unable to perform due to the patient's inability to effectively communicate,   Cardiac: unable to perform due to the patient's inability to effectively communicate,   GI: unable to perform due to the patient's inability to effectively communicate.     Interval Events:  24 hour interval history reviewed     Following with eyes  NSR   NPO, GI cleared yesterday.   950 out last night via servin catheter  LR running  Fent 75, Prop 40  Vent Day 4, RR 24  PEEP 12 at 40%  Chest xray slightly improved left lower lobe  infiltrates, pulmonary vascular congestion  Electrolyte replacement stopped due to renal failure  On unasyn    Physical Exam  General: Sedated on Propofol.  Neuro/Psych: Propofol 40. Fentanyl 75.   HEENT: PERRL. Right nare cortrak in place. Tube feeds 15. ET tube in place. Supple. Trachea midline. No crepitus. RIJ CVC, minimal erythema, no purulence.   CVS: Distant heart tones. Regular rate and rhythm.   Respiratory: Course breath sounds. No wheezing.   Abdomen/: Adipose soft. Hypoactive bowel sounds.   Extremities: 2+ bilateral DP. 1+ upper and lower extremities anasarca.   Skin: Cool, dry, perfused.     PFSH:  No change.    Respiratory:  Strong Vent Mode: APVCMV, Rate (breaths/min): 24, Vt Target (mL): 440, PEEP/CPAP: 12, FiO2: 40, Static Compliance (ml / cm H2O): 40, Control VTE (exp VT): 434  Pulse Oximetry: 96 %  Imaging Available data reviewed     Recent Labs      05/27/17   0500  05/28/17   0447  05/29/17   0438   ISTATAPH  7.377*  7.352*  7.367*   ISTATAPCO2  33.9  32.3  32.7   ISTATAPO2  80  73  93*   ISTATATCO2  21  19*  20   NEHJAOJ6REM  96  94  97   ISTATARTHCO3  19.9  17.9  18.8   ISTATARTBE  -5*  -7*  -6*   ISTATTEMP  37.8 C  38.4 C  37.6 C   ISTATFIO2  45  40  40   ISTATSPEC  Arterial  Arterial  Arterial   ISTATAPHTC  7.365*  7.332*  7.358*   [id number]  84  81  96*       HemoDynamics:  Pulse: 85, Heart Rate (Monitored): 86  NIBP: 107/50 mmHg     Imaging: Available data reviewed           Neuro:  GCS Total [place] Coma Score: 10  Imaging: Available data reviewed    Fluids:  Intake/Output       05/27/17 0700 - 05/28/17 0659 05/28/17 0700 - 05/29/17 0659 05/29/17 0700 - 05/30/17 0659      8555-0245 3043-3341 Total 2191-6493 9029-4515 Total 3262-8456 8454-2437 Total       Intake    I.V.  1058.5  1272 2330.5  1274.8  1239.5 2514.3  440.4  -- 440.4    Propofol Volume 325.5 258 583.5 260.8 308.5 569.3 102.4 -- 102.4    IV Volume (IV maintenance)   332 -- 332    Fent 18 18 36 18  18 36 6 -- 6    Protonix 300 0 300 0 -- 0 -- -- --    Blood  350  -- 350  --  -- --  --  -- --    Volume (RELEASE RED BLOOD CELLS) 350 -- 350 -- -- -- -- -- --    Total Intake 1408.5 1272 2680.5 1274.8 1239.5 2514.3 440.4 -- 440.4       Output    Urine  900  1200 2100  1100  2025  200  -- 200    Indwelling Cathether 900 1200 2100 5082 470 8918 200 -- 200    Total Output 900 1200 2100 4318 820 5020 200 -- 200       Net I/O     508.5 72 580.5 174.8 314.5 489.3 240.4 -- 240.4        Weight: 112.5 kg (248 lb 0.3 oz)  Recent Labs      17   0614  17   0634  17   1310  17   0835   SODIUM  142  146*  149*  149*   POTASSIUM  4.2  4.3  4.3  4.5   CHLORIDE  115*  119*  119*  120*   CO2  21  20  20  21   BUN  32*  32*  34*  39*   CREATININE  2.50*  2.47*  2.39*  2.17*   MAGNESIUM  2.8*  3.0*   --    --    PHOSPHORUS  3.3  2.6   --    --    CALCIUM  7.3*  7.6*  8.0*  8.1*       GI/Nutrition:  Imaging: Available data reviewed  NPO , no Cortrak or NG per GI still (since  EGD)    Liver Function  Recent Labs      17   0634  17   1310  17   0835   ALTSGPT   --   19  27   ASTSGOT   --   35  45   ALKPHOSPHAT   --   40  41   TBILIRUBIN   --   12.2*  12.5*   GLUCOSE  109*  108*  107*       Heme:  Recent Labs      17   1250   17   0634  17   1310  17   1810  17   0420   RBC  3.12*   --   3.09*   --    --   2.86*   HEMOGLOBIN  8.2*   < >  8.0*  7.8*  7.8*  7.5*   HEMATOCRIT  27.3*   < >  27.4*  27.0*  26.8*  25.6*   PLATELETCT  79*   --   70*   --    --   124*    < > = values in this interval not displayed.       Infectious Disease:  Temp  Av.5 °C (99.5 °F)  Min: 37.5 °C (99.5 °F)  Max: 37.5 °C (99.5 °F)  Micro: reviewed  Recent Labs      17   1250  17   0634  17   1310  17   0420  17   0835   WBC  6.5  6.0   --   5.4   --    NEUTSPOLYS  72.30*  76.20*   --   74.70*   --    LYMPHOCYTES  17.80*  13.20*   --   12.80*   --     MONOCYTES  2.70  3.70   --   4.10   --    EOSINOPHILS  0.00  2.00   --   3.00   --    BASOPHILS  2.70*  1.20   --   1.30   --    ASTSGOT   --    --   35   --   45   ALTSGPT   --    --   19   --   27   ALKPHOSPHAT   --    --   40   --   41   TBILIRUBIN   --    --   12.2*   --   12.5*     Current Facility-Administered Medications   Medication Dose Frequency Provider Last Rate Last Dose   • lactated ringers infusion   Continuous Clayton Bautista M.D. 83 mL/hr at 05/29/17 0017     • ampicillin/sulbactam (UNASYN) 3 g in  mL IVPB  3 g Q6HRS Bianka Macias, PHARMD   Stopped at 05/29/17 0620   • pantoprazole (PROTONIX) injection 40 mg  40 mg BID Clayton Bautista M.D.   40 mg at 05/29/17 0826   • acetaminophen (TYLENOL) tablet 650 mg  650 mg Q6HRS PRN Caridad Ibarra M.D.   Stopped at 05/25/17 0300   • propofol (DIPRIVAN) injection  5-80 mcg/kg/min Continuous Kaz Mobley M.D. 25.6 mL/hr at 05/29/17 0550 40 mcg/kg/min at 05/29/17 0550   • Respiratory Care per Protocol   Continuous RT Kaz Mobley M.D.       • senna-docusate (PERICOLACE or SENOKOT S) 8.6-50 MG per tablet 2 Tab  2 Tab BID Kaz Mobley M.D.   2 Tab at 05/29/17 0826    And   • polyethylene glycol/lytes (MIRALAX) PACKET 1 Packet  1 Packet QDAY PRN Kaz Mobley M.D.        And   • magnesium hydroxide (MILK OF MAGNESIA) suspension 30 mL  30 mL QDAY PRN Kaz Mobley M.D.        And   • bisacodyl (DULCOLAX) suppository 10 mg  10 mg QDAY PRN Kaz Mobley M.D.       • chlorhexidine (PERIDEX) 0.12 % solution 15 mL  15 mL BID Kaz Mobley M.D.   15 mL at 05/29/17 0826   • lidocaine (XYLOCAINE) 1%  injection  1-2 mL Q30 MIN PRN Kaz Mobley M.D.       • fentaNYL (SUBLIMAZE) injection 25 mcg  25 mcg Q HOUR PRN Kaz Mobley M.D.        Or   • fentaNYL (SUBLIMAZE) injection 50 mcg  50 mcg Q HOUR PRN Kaz Mobley M.D.        Or   • fentaNYL (SUBLIMAZE) injection 100  mcg  100 mcg Q HOUR PRN Kaz Mobley M.D.       • fentaNYL (SUBLIMAZE) 50 mcg/mL in 50mL   Continuous Kaz Mobley M.D. 1.5 mL/hr at 05/27/17 0703     • ipratropium-albuterol (DUONEB) nebulizer solution 3 mL  3 mL Q2HRS PRN (RT) Kaz Mobley M.D.       • ipratropium-albuterol (DUONEB) nebulizer solution 3 mL  3 mL Q4HRS (RT) Kaz Mobley M.D.   3 mL at 05/29/17 1150   • insulin lispro (HUMALOG) injection 3-14 Units  3-14 Units Q6HRS Calyton Bautista M.D.   Stopped at 05/26/17 0000   • ferrous sulfate tablet 325 mg  325 mg BID WITH MEALS Caridad Ibarra M.D.   325 mg at 05/29/17 0635   • levothyroxine (SYNTHROID) tablet 150 mcg  150 mcg AM ES Caridad Ibarra M.D.   150 mcg at 05/29/17 0634   • niacin tablet 500 mg  500 mg DAILY Caridad Ibarra M.D.   500 mg at 05/29/17 0826   • pravastatin (PRAVACHOL) tablet 40 mg  40 mg QHS Caridad Ibarra M.D.   40 mg at 05/28/17 2236   • NS infusion 3,198 mL  30 mL/kg Once PRN Caridad Ibarra M.D.       • NS (BOLUS) infusion 1,000 mL  1,000 mL Once PRN Caridad Ibarra M.D.       • oxycodone immediate-release (ROXICODONE) tablet 2.5 mg  2.5 mg Q3HRS PRN Caridad Ibarra M.D.        And   • oxycodone immediate-release (ROXICODONE) tablet 5 mg  5 mg Q3HRS PRN Caridad Ibarra M.D.        And   • morphine (pf) 4 mg/ml injection 2 mg  2 mg Q3HRS PRN Caridad Ibarra M.D.       • glucose 4 g chewable tablet 16 g  16 g Q15 MIN PRN Caridad Ibarra M.D.        And   • dextrose 50% (D50W) injection 25 mL  25 mL Q15 MIN PRN Caridad Ibarra M.D.       • ondansetron (ZOFRAN) syringe/vial injection 4 mg  4 mg Q4HRS PRN Caridad Ibarra M.D.       • ondansetron (ZOFRAN ODT) dispertab 4 mg  4 mg Q4HRS PRN Caridad Ibarra M.D.       • multivitamin (THERAGRAN) tablet 1 Tab  1 Tab DAILY Caridad Ibarra M.D.   1 Tab at 05/29/17 0826     Last reviewed on 5/24/2017  9:56 PM by Keturah Cheek    Quality  Measures:  Labs reviewed, Radiology images reviewed and Medications  reviewed                    Problems/plan:  Ventilator-dependent respiratory failure - > ARDS/aspPNA? From UGIB?   - Intubated in ER early AM 5/25   - RT protocols   - Wean PEEP, FiO2 as able; no SBT yet   - Mobilize and sedation vacationsas awa  Hemoptysis   -  FOB in the ER 5/24.    - No bleeding sites were identified in the lungs.  There was no endobronchial tumor.   - ? aspiration with UGIB   - NIVT neg   - Recurrent hemoptysis 5/27 - lots of plugs/no clear source bleeding   - No heparin for now  Esophageal ulcer   - S/p EGD 5/26   - PPI   - start TF  Community-acquired pneumonia - vs aspiration PNA/ARDS   - ABX - Unasyn/Doxy  Sepsis, pulmonary source.   - Protocols  Anemia with thrombocytopenia.   - Serial lab - transfuse per restrictive threshold  Acute on chronic kidney disease.   No contrast die   Avoid nephrotoxins  Elevated lipase.  His abdominal exam is unremarkable.   F/u lab in AM - image abd?  Elevated troponin - demand ischemia - NSTEMI?  Chronic diastolic heart failure with grade I diastolic dysfunction.  Hypothyroidism - replete  Diabetes mellitus type 2 - glycemic control - monitor for need for I drip  Dyslipidemia - diet/statin  Recent hospitalization for lower gastrointestinal hemorrhage, presumed due to sigmoid diverticulosis.  An orogastric tube has been placed in the emergency department.  He has dark appearing fluid from his stomach.  His hemoglobin has not significantly decreased from 05/10/2017.   Serial H/H   PPI IV  Remote history of significant smoking - COPD? PRN nebs.  Enteral nutrion when clinically appropriate  Prophylaxis      Discussed patient condition and risk of morbidity and/or mortality with Family, RN, RT, Pharmacy, Charge nurse / hot rounds and GI and hospitalist.    The patient remains critically ill.  Critical care time = 32 minutes in directly providing and coordinating critical care and extensive data review.  No time overlap and excludes procedures.    Maribell DIAL  and John Cameron (Scribe), am scribing for, and in the presence of Dr. Edwin Greene M.D.   Electronically signed by: Maribell Ramirez and John Cameron (Scribe), 5/29/2017  IEdwin M.D. personally performed the services described in this documentation, as scribed by Maribell Ramirez and John Cameron (Scribe) in my presence, and it is both accurate and complete.

## 2017-05-29 NOTE — PROGRESS NOTES
Dr. Tapia and Dr. Bautista made aware of pt's increased bilirubin (12.2) and increased jaundice in sclera and on skin. No new orders received.

## 2017-05-29 NOTE — PROGRESS NOTES
Pt started on impact peptide at 15 mL/h per dietician through cortrack at 1300. Head of bed greater than 30 degrees.

## 2017-05-29 NOTE — PROGRESS NOTES
Irina per Dr. Cueva to begin tube feedings. Order received from Dr. Bautista to begin tube feedings. Order placed for gastric tube insertion.

## 2017-05-30 PROBLEM — E87.0 HYPERNATREMIA: Status: ACTIVE | Noted: 2017-01-01

## 2017-05-30 NOTE — PROGRESS NOTES
Pulmonary Critical Care Progress Note      Date of service: 5/30/2017    Chief Complaint: SOB    History of Present Illness:   The entire history is obtained from healthcare providers and medical record as this gentleman cannot give me any history.  I was kindly asked by Dr. Chavira to see and evaluate this gentleman regarding the above problems.  This is a 76-year-old gentleman, who presented earlier this evening to [facility] with shortness of breath.  He apparently recently took a car trip to [place], [place].  He drove to [place] approximately 1 week ago and drove back from [place] on or about 05/24/2017.  He presented to the emergency room complaining of shortness of breath.  He was hypoxemic on presentation and was placed on supplemental oxygen.  There was a concern that he may have an acute pulmonary embolism and he was given a loading dose of intravenous heparin and started on a heparin drip on the weight-based protocol.  CT angiography of the chest was not ordered because of an increased creatinine of 1.60.  He has a history of underlying chronic kidney disease.  Subsequent starting his heparin, he felt like he had to cough. He had hemoptysis with bright red blood.  He developed tachypnea and hypoxemia and was then emergently intubated and placed on the ventilator.  At the time of my arrival in the emergency room, he is sedated on full mechanical ventilatory support.      ROS:    Respiratory: unable to perform due to the patient's inability to effectively communicate,   Cardiac: unable to perform due to the patient's inability to effectively communicate,   GI: unable to perform due to the patient's inability to effectively communicate.     Interval Events:  24 hour interval history reviewed     Follow commands. Propofol 40, Fentanyl 75.  Non-mobilized.   SR, -140 systolic.  Tmax 101.7 °F, received Tylenol with improvement.   Vent day 5.   CXR with unchanged bilateral lower lobe infiltrates.    Iron study pending per GI.   Kristi-colored urine output.     PFSH:  No change.    Physical Exam  General: Sedated on Propofol.  Neuro/Psych: Propofol 40. Fentanyl 75.   HEENT: PERRL. Right nare cortrak in place. Tube feeds 15. ET tube in place. Supple. Trachea midline. No crepitus. RIJ CVC, minimal erythema, no purulence.   CVS: Distant heart tones. Regular rate and rhythm.   Respiratory: Course breath sounds. No wheezing.   Abdomen/: Adipose soft. Hypoactive bowel sounds.   Extremities: 2+ bilateral DP. 1+ upper and lower extremities anasarca.   Skin: Cool, dry, perfused.     Respiratory:  Strong Vent Mode: APVCMV, Rate (breaths/min): 24, Vt Target (mL): 440, PEEP/CPAP: 12, FiO2: 40, Static Compliance (ml / cm H2O): 36.8, Control VTE (exp VT): 442  Pulse Oximetry: 92 %  Imaging Available data reviewed   Recent Labs      05/28/17   0447  05/29/17   0438  05/30/17   0434   ISTATAPH  7.352*  7.367*  7.365*   ISTATAPCO2  32.3  32.7  37.0   ISTATAPO2  73  93*  99*   ISTATATCO2  19*  20  22   [id number]  94  97  98   ISTATARTHCO3  17.9  18.8  21.1   ISTATARTBE  -7*  -6*  -4   ISTATTEMP  38.4 C  37.6 C  38.0 C   ISTATFIO2  40  40  40   ISTATSPEC  Arterial  Arterial  Arterial   ISTATAPHTC  7.332*  7.358*  7.350*   [id number]  81  96*  105*     HemoDynamics:  Pulse: 95, Heart Rate (Monitored): 95  NIBP: 137/70 mmHg     Imaging: Available data reviewed         Neuro:  GCS Total Repton Coma Score: 10  Imaging: Available data reviewed    Fluids:  Intake/Output       05/28/17 0700 - 05/29/17 0659 05/29/17 0700 - 05/30/17 0659 05/30/17 0700 - 05/31/17 0659      9821-8011 7742-4958 Total 3514-6472 8484-3243 Total 3119-4232 0102-8742 Total       Intake    I.V.  1274.8  1239.5 2514.3  1448.7  1426 2874.7  --  -- --    Propofol Volume 260.8 308.5 569.3 307.2 312 619.2 -- -- --    IV Volume (IV maintenance)  1120.5 1096 2216.5 -- -- --    Fent 18 18 36 21 18 39 -- -- --    Protonix 0 -- 0 -- -- -- -- -- --     Other  --  -- --  60  60 120  --  -- --    Medications (P.O./ Enteral Liquids) -- -- -- 60 60 120 -- -- --    Enteral  --  -- --  165  300 465  --  -- --    Enteral Volume -- -- -- 75 270 345 -- -- --    Free Water / Tube Flush -- -- -- 90 30 120 -- -- --    Total Intake 1274.8 1239.5 2514.3 1673.7 1786 3459.7 -- -- --       Output    Urine  1100  925   92  1092015  --  -- --    Indwelling Cathether 1028 685 6506 92 1092015 -- -- --    Drains  --  -- --  0  0 0  --  -- --    Residual Amount (ml) (Discarded) -- -- -- 0 0 0 -- -- --    Total Output 7601 466 2008 92 1092015 -- -- --       Net I/O     174.8 314.5 489.3 748.7 696 1444.7 -- -- --           Recent Labs      1734  17   1310  17   0835  17   0430   SODIUM  146*  149*  149*  150*   POTASSIUM  4.3  4.3  4.5  4.4   CHLORIDE  119*  119*  120*  121*   CO2  20  20  21  21   BUN  32*  34*  39*  37*   CREATININE  2.47*  2.39*  2.17*  2.01*   MAGNESIUM  3.0*   --    --    --    PHOSPHORUS  2.6   --    --    --    CALCIUM  7.6*  8.0*  8.1*  8.3*     GI/Nutrition:  Imaging: Available data reviewed  NPO , no Cortrak or NG per GI still (since  EGD)    Liver Function:  Recent Labs      17   1310  17   0835  17   0430   ALTSGPT  19  27   --    ASTSGOT  35  45   --    ALKPHOSPHAT  40  41   --    TBILIRUBIN  12.2*  12.5*   --    GLUCOSE  108*  107*  111*     Heme:  Recent Labs      17   0634   17   0420  17   2345  17   0430   RBC  3.09*   --   2.86*   --   2.93*   HEMOGLOBIN  8.0*   < >  7.5*  7.7*  7.6*   HEMATOCRIT  27.4*   < >  25.6*  26.8*  26.4*   PLATELETCT  70*   --   124*   --   84*    < > = values in this interval not displayed.     Infectious Disease:  Monitored Temp  Av.3 °C (101 °F)  Min: 38.1 °C (100.6 °F)  Max: 38.5 °C (101.3 °F)  Micro: reviewed   Day  Unasyn.  Recent Labs      17   0634  17   1310  17   0420  17   0835  17   0430    WBC  6.0   --   5.4   --   5.6   NEUTSPOLYS  76.20*   --   74.70*   --   78.60*   LYMPHOCYTES  13.20*   --   12.80*   --   5.30*   MONOCYTES  3.70   --   4.10   --   0.00   EOSINOPHILS  2.00   --   3.00   --   4.50   BASOPHILS  1.20   --   1.30   --   6.20*   ASTSGOT   --   35   --   45   --    ALTSGPT   --   19   --   27   --    ALKPHOSPHAT   --   40   --   41   --    TBILIRUBIN   --   12.2*   --   12.5*   --      Current Facility-Administered Medications   Medication Dose Frequency Provider Last Rate Last Dose   • lactated ringers infusion   Continuous Clayton Bautista M.D. 83 mL/hr at 05/30/17 0325 1,000 mL at 05/30/17 0325   • ampicillin/sulbactam (UNASYN) 3 g in  mL IVPB  3 g Q6HRS Bianka Macias PHARMD 200 mL/hr at 05/30/17 0547 3 g at 05/30/17 0547   • pantoprazole (PROTONIX) injection 40 mg  40 mg BID Clayton Bautista M.D.   40 mg at 05/29/17 2013   • acetaminophen (TYLENOL) tablet 650 mg  650 mg Q6HRS PRN Caridad Ibarra M.D.   Stopped at 05/25/17 0300   • propofol (DIPRIVAN) injection  5-80 mcg/kg/min Continuous Kaz Mobley M.D. 25.6 mL/hr at 05/30/17 0445 40 mcg/kg/min at 05/30/17 0445   • Respiratory Care per Protocol   Continuous RT Kaz Mobley M.D.       • senna-docusate (PERICOLACE or SENOKOT S) 8.6-50 MG per tablet 2 Tab  2 Tab BID Kaz Mobley M.D.   2 Tab at 05/29/17 2014    And   • polyethylene glycol/lytes (MIRALAX) PACKET 1 Packet  1 Packet QDAY PRN Kaz Mobley M.D.   1 Packet at 05/30/17 0327    And   • magnesium hydroxide (MILK OF MAGNESIA) suspension 30 mL  30 mL QDAY PRN Kaz Mobley M.D.        And   • bisacodyl (DULCOLAX) suppository 10 mg  10 mg QDAY PRN Kaz Mobley M.D.       • chlorhexidine (PERIDEX) 0.12 % solution 15 mL  15 mL BID Kaz Mobley M.D.   15 mL at 05/29/17 2013   • lidocaine (XYLOCAINE) 1%  injection  1-2 mL Q30 MIN PRN Kaz Mobley M.D.       • fentaNYL (SUBLIMAZE) injection 25  mcg  25 mcg Q HOUR PRN Kaz Mobley M.D.        Or   • fentaNYL (SUBLIMAZE) injection 50 mcg  50 mcg Q HOUR PRN Kaz Mobley M.D.   50 mcg at 05/30/17 0002    Or   • fentaNYL (SUBLIMAZE) injection 100 mcg  100 mcg Q HOUR PRN Kaz Mobley M.D.       • fentaNYL (SUBLIMAZE) 50 mcg/mL in 50mL   Continuous Kaz Mobley M.D. 1.5 mL/hr at 05/29/17 1255 75 mcg/hr at 05/29/17 1255   • ipratropium-albuterol (DUONEB) nebulizer solution 3 mL  3 mL Q2HRS PRN (RT) Kaz Mobley M.D.       • ipratropium-albuterol (DUONEB) nebulizer solution 3 mL  3 mL Q4HRS (RT) Kaz Mobley M.D.   3 mL at 05/30/17 0237   • insulin lispro (HUMALOG) injection 3-14 Units  3-14 Units Q6HRS Clayton Bautista M.D.   Stopped at 05/26/17 0000   • ferrous sulfate tablet 325 mg  325 mg BID WITH MEALS Caridad Ibarra M.D.   325 mg at 05/29/17 1802   • levothyroxine (SYNTHROID) tablet 150 mcg  150 mcg AM ES Caridad Ibarra M.D.   150 mcg at 05/30/17 0547   • niacin tablet 500 mg  500 mg DAILY Caridad Ibarra M.D.   500 mg at 05/29/17 0826   • pravastatin (PRAVACHOL) tablet 40 mg  40 mg QHS Caridad Ibarra M.D.   40 mg at 05/29/17 2013   • NS infusion 3,198 mL  30 mL/kg Once PRN Caridad Ibarra M.D.       • NS (BOLUS) infusion 1,000 mL  1,000 mL Once PRN Caridad Ibarra M.D.       • oxycodone immediate-release (ROXICODONE) tablet 2.5 mg  2.5 mg Q3HRS PRN Caridad Ibarra M.D.        And   • oxycodone immediate-release (ROXICODONE) tablet 5 mg  5 mg Q3HRS PRN Caridad Ibarra, M.D.        And   • morphine (pf) 4 mg/ml injection 2 mg  2 mg Q3HRS PRN Caridad Ibarra M.D.       • glucose 4 g chewable tablet 16 g  16 g Q15 MIN PRN Caridad Ibarra M.D.        And   • dextrose 50% (D50W) injection 25 mL  25 mL Q15 MIN PRN Caridad Ibarra M.D.       • ondansetron (ZOFRAN) syringe/vial injection 4 mg  4 mg Q4HRS PRN Caridad Ibarra M.D.       • ondansetron (ZOFRAN ODT) dispertab 4 mg  4 mg Q4HRS PRN Caridad Ibarra M.D.       • multivitamin (THERAGRAN) tablet 1  Tab  1 Tab DAILY Caridad Ibarra M.D.   1 Tab at 05/29/17 0826     Last reviewed on 5/24/2017  9:56 PM by Zora Mccarthy, [facility]    Quality  Measures:  Labs reviewed, Radiology images reviewed and Medications reviewed  Bond catheter: No Bond                  Problems/plan:  Ventilator-dependent respiratory failure - > ARDS/aspPNA? From UGIB?   - Intubated in ER early AM 5/25   - RT protocols   - Wean PEEP, FiO2 as able; no SBT yet   - Mobilize and sedation vacationsas awa  Hemoptysis   -  FOB in the ER 5/24.    - No bleeding sites were identified in the lungs.  There was no endobronchial tumor.   - ? aspiration with UGIB   - NIVT neg   - Recurrent hemoptysis 5/27 - lots of plugs/no clear source bleeding   - No heparin for now  Esophageal ulcer   - S/p EGD 5/26   - PPI   - start TF  Community-acquired pneumonia - vs aspiration PNA/ARDS   - ABX - Unasyn/Doxy  Sepsis, pulmonary source.   - Protocols  Anemia with thrombocytopenia.   - Serial lab - transfuse per restrictive threshold  Acute on chronic kidney disease.   No contrast die   Avoid nephrotoxins  Elevated lipase.  His abdominal exam is unremarkable.   F/u lab in AM - image abd?  Elevated troponin - demand ischemia - NSTEMI?  Chronic diastolic heart failure with grade I diastolic dysfunction.  Hypothyroidism - replete  Diabetes mellitus type 2 - glycemic control - monitor for need for I drip  Dyslipidemia - diet/statin  Recent hospitalization for lower gastrointestinal hemorrhage, presumed due to sigmoid diverticulosis.  An orogastric tube has been placed in the emergency department.  He has dark appearing fluid from his stomach.  His hemoglobin has not significantly decreased from 05/10/2017.   Serial H/H   PPI IV  Remote history of significant smoking - COPD? PRN nebs.  Enteral nutrion when clinically appropriate  Prophylaxis    Prilosec 40 BID.   Decrease PEEP 10.   SBT tomorrow after weaning vent.   Free water flushes 250 q6.   Bronchoscopy    Discussed  patient condition and risk of morbidity and/or mortality with Family, RN, RT, Pharmacy, Charge nurse / hot rounds and GI and hospitalist.    The patient remains critically ill.  Critical care time = 31 minutes in directly providing and coordinating critical care and extensive data review.  No time overlap and excludes procedures.     Jose DIAL (Scribe), am scribing for, and in the presence of, Edwin Greene M.D.    Electronically signed by: Maribell Ramirez (Scribe), 5/30/2017    Jose DIAL M.D. personally performed the services described in this documentation, as scribed by Maribell Ramirez in my presence, and it is both accurate and complete.

## 2017-05-30 NOTE — DISCHARGE PLANNING
Remains intubated.  Spouse is bedside.  [place] resident.  Medicare.  CAP admit.     Follow for extubation.

## 2017-05-30 NOTE — PROGRESS NOTES
Hospital Medicine Interval Note  Date of Service:  5/30/2017    Chief Complaint  76 y.o.-year-old male admitted 5/24/2017 with shortness of breath and required intubation.    Interval Problem Update  Mr. Rodgers was admitted with hemoptysis.  Bronchoscopy done 5/24. Blood out of the ETT. Hb had dropped and he had been transfused 4 units RBCs. Cr remains elevated. He has been in sinus rhythm. PEEP 12 and FiO2 40%.   Propofol 40  and fentanyl 75 drips for sedation.   Dr. Stoll did an EGD 5/26 revealing severe ulcerations of the esophagus. Bronch done 5/27. Today his bili went from 2 to 12 and has remained 12. GI has cleared him for cortrak thus tube feeds started.  950 ml urine over night. I discussed with his wife.  Consultants/Specialty  Pulmonology.  GI--DHA  I discussed with Dr. Greene on Hot Rounds.   Disposition  ICU     Review of Systems   Unable to perform ROS: intubated      Physical Exam Laboratory/Imaging   Filed Vitals:    05/30/17 1200 05/30/17 1217 05/30/17 1400 05/30/17 1530   BP:       Pulse: 86  101    Temp: 38.7 °C (101.7 °F)  38.5 °C (101.3 °F)    Resp: 32  28    Height:       Weight:       SpO2: 91% 92% 91% 93%     Physical Exam   Constitutional: He appears well-developed. No distress. He is intubated.   HENT:   Head: Normocephalic.   Right Ear: External ear normal.   Left Ear: External ear normal.   Eyes: Right eye exhibits no discharge. Left eye exhibits no discharge. Scleral icterus is present.   Neck: No JVD present. No tracheal deviation present.   Cardiovascular: Normal rate and regular rhythm.  Exam reveals no gallop and no friction rub.    Murmur heard.  Pulmonary/Chest: No stridor. He is intubated. He has rhonchi. He has rales. He exhibits no tenderness.   Abdominal: Soft. He exhibits distension. There is no tenderness. There is no rebound.   No RUQ tenderness   Genitourinary:   servin   Musculoskeletal: He exhibits no edema.   Neurological:   Sedated, no gross focal deficits noted    Skin: Skin is warm and dry.   jaundiced   Psychiatric: He has a normal mood and affect.    Lab Results   Component Value Date/Time    WBC 5.6 05/30/2017 04:30 AM    HEMOGLOBIN 7.8* 05/30/2017 02:30 PM    HEMATOCRIT 27.4* 05/30/2017 02:30 PM    PLATELET COUNT 84* 05/30/2017 04:30 AM     Lab Results   Component Value Date/Time    SODIUM 150* 05/30/2017 04:30 AM    POTASSIUM 4.4 05/30/2017 04:30 AM    CHLORIDE 121* 05/30/2017 04:30 AM    CO2 21 05/30/2017 04:30 AM    GLUCOSE 111* 05/30/2017 04:30 AM    BUN 37* 05/30/2017 04:30 AM    CREATININE 2.01* 05/30/2017 04:30 AM      Assessment/Plan    Acute respiratory failure with hypoxia (CMS-HCC) (present on admission)  Assessment & Plan  Intubated 5/24. Discussed with Dr Stubbs      Septic shock (CMS-[facility]) (present on admission)  Assessment & Plan  resolved.     Esophagitis (present on admission)  Assessment & Plan  With ulcerations seen on EGD 5/26.   prilosec bid monitor cbc    Hemoptysis (present on admission)  Assessment & Plan  Bright-red blood in the ER, bronchoscopy was negative.     Acute on chronic renal insufficiency (HCC) (present on admission)  Assessment & Plan  Monitor renal function and electrolytes    DM (diabetes mellitus) (CMS-[facility]) (present on admission)  Assessment & Plan  ISS monitor cbg's    HTN (hypertension) (present on admission)  Assessment & Plan  Hx of.    Anemia (present on admission)  Assessment & Plan  Acute blood loss from upper GI bleed.  Monitor Hg transfuse if <7 check iron levels    Thrombocytopenia (CMS-HCC) (present on admission)  Assessment & Plan  Likely reactive, monitor cbc    Aortic stenosis, severe (present on admission)  Assessment & Plan  Further workup and evaluation  When more stable          Aspiration pneumonia (CMS-[facility]) (present on admission)  Assessment & Plan  Continue unasyn complete 7 day course      Hyperbilirubinemia (present on admission)  Assessment & Plan  Likely hemolysis post transfusion  Recheck LFT's in am        Labs reviewed, Medications reviewed and Radiology images reviewed  Bond catheter: Unconscious / Sedated Patient on a Ventilator  Central line in place: Sepsis    DVT Prophylaxis: Contraindicated - High bleeding risk  DVT prophylaxis - mechanical: SCDs  Ulcer prophylaxis: Yes  Antibiotics: Treating active infection/contamination beyond 24 hours perioperative coverage

## 2017-05-30 NOTE — CARE PLAN
Problem: Safety  Goal: Will remain free from injury  Outcome: PROGRESSING AS EXPECTED  Pt provided assistance with toileting. Pt provided education on safety, fall prevention, and use of call light. Treaded socks and soft wrist restraints in place, call light within reach, hourly rounding in effect.         Problem: Skin Integrity  Goal: Risk for impaired skin integrity will decrease  Outcome: PROGRESSING AS EXPECTED  Pt risk factors for impaired skin integrity assessed, Chris score documented in flowsheet. Mepilex placed, heel float boots and pillows in use for support and positioning, Q 2 hr turning and hourly rounding in effect.

## 2017-05-30 NOTE — PROGRESS NOTES
Order received from dietician to change tube feedings to peptamin bariatric (peptamen intense) starting at 15 mL/h.Tube feedings changed to peptamen intense.

## 2017-05-30 NOTE — CARE PLAN
Problem: Safety  Goal: Will remain free from injury  Pt intubated and sedated, restraints on BUE.  T&p q2 hours with ROM.  Free from further injuries overnight  Goal: Will remain free from falls  Outcome: PROGRESSING AS EXPECTED  Pt intubated and sedated, restraints on BUE.  Bed in low position, risk sign in place, bed rails up, bed alarm on, no falls overnight

## 2017-05-30 NOTE — CARE PLAN
Problem: Ventilation Defect:  Goal: Ability to achieve and maintain unassisted ventilation or tolerate decreased levels of ventilator support  Outcome: PROGRESSING AS EXPECTED  Adult Ventilation Update    Total Vent Days: 4      Patient Lines/Drains/Airways Status    Active Airway      Name: Placement date: Placement time: Site: Days:     Airway Group ET Tube Oral 8.0 @ 26 05/25/17    Oral  4               APVcmv  24  /  440  /  +12  /  40%  DUO q4    Plateau Pressure (Q Shift): 23 (05/29/17 1150)  Static Compliance (ml / cm H2O): 38 (05/29/17 1512)    Patient failed trials because of Barriers to Wean: FiO2 >50% OR PEEP >8 (PMA Only) (05/29/17 0736)    Cough: Productive (05/29/17 1512)  Sputum Amount: Moderate (05/29/17 1512)  Sputum Color: Yellow;Bloody (05/29/17 1512)  Sputum Consistency: Thick (05/29/17 1512)    Mobility Group  Activity Performed: Unable to mobilize (05/29/17 1200)  Pt Calls for Assistance: No (05/29/17 0800)  Assistive Devices: None (05/29/17 0600)  Reason Not Mobilized: Other (comment) (05/29/17 1200)  Mobilization Comments: PEEP of 12 (05/29/17 1200)    Events/Summary/Plan: vent check and in line med given (05/29/17 1512)

## 2017-05-31 NOTE — PROGRESS NOTES
Initial pt assessment complete.  Intubated and sedated on fentanyl at 100 and propofol at 50 post bronch from 1700h. Pt opens eyes to physical stimuli but not following commands.  Fentanyl decreased to 75 and propofol decreased to 40 (see mar).  Pt febrile to 38.3C, tylenol given and ice packs applied.  TF at goal feeding rate as of 1800h today.  Last BM PTA, Dulcolox suppository given. VSS

## 2017-05-31 NOTE — PROGRESS NOTES
Pt sedation decreased enough so pt is following commands.  Mobilized to bedside with staff assist x4.  Tolerated well, generally weak, VSS.  Mod amt loose stool, skin care provided, linens changed, pt CHG bathed.

## 2017-05-31 NOTE — CARE PLAN
Problem: Bowel/Gastric:  Goal: Normal bowel function is maintained or improved  Outcome: PROGRESSING AS EXPECTED  Pt with two loose dark brown bms overnight after receiving Dulcolax.  Abd round, distended and bowel sounds normoactive

## 2017-05-31 NOTE — CARE PLAN
Problem: Nutritional:  Goal: Nutrition support tolerated and meeting greater than 85% of estimated needs  Outcome: MET Date Met:  05/31/17  TF @ goal.

## 2017-05-31 NOTE — CARE PLAN
Problem: Venous Thromboembolism (VTW)/Deep Vein Thrombosis (DVT) Prevention:  Goal: Patient will participate in Venous Thrombosis (VTE)/Deep Vein Thrombosis (DVT)Prevention Measures  Outcome: NOT MET  Pt intubated and sedated, pt unable to participate.  SCDs in place, anticoag c/i per MD.  T&p q2 with ROM exercises.  No evidence of DVT/VTE at this time

## 2017-05-31 NOTE — PROGRESS NOTES
Hospital Medicine Interval Note  Date of Service:  5/31/2017    Chief Complaint  76 y.o.-year-old male admitted 5/24/2017 with shortness of breath and required intubation.    Interval Problem Update  No overnight events, CXR improved follows command  Consultants/Specialty  Pulmonology.  GI--DHA  I discussed with Dr. Greene on Hot Rounds.   Disposition  ICU     Review of Systems   Unable to perform ROS: intubated      Physical Exam Laboratory/Imaging   Filed Vitals:    05/31/17 1000 05/31/17 1115 05/31/17 1200 05/31/17 1413   BP:       Pulse: 86  78    Temp: 38 °C (100.4 °F)  37.7 °C (99.9 °F)    Resp: 24  20    Height:       Weight:       SpO2: 95% 97% 96% 97%     Physical Exam   Constitutional: He appears well-developed. No distress. He is intubated.   HENT:   Head: Normocephalic.   Right Ear: External ear normal.   Left Ear: External ear normal.   Eyes: Pupils are equal, round, and reactive to light. Right eye exhibits no discharge. Left eye exhibits no discharge. Scleral icterus is present.   Neck: No JVD present. No tracheal deviation present.   Cardiovascular: Normal rate and regular rhythm.  Exam reveals no gallop and no friction rub.    Murmur heard.  Pulmonary/Chest: No stridor. He is intubated. He has rhonchi. He has rales. He exhibits no tenderness.   Abdominal: Soft. He exhibits distension. There is no tenderness. There is no rebound.   No RUQ tenderness   Musculoskeletal: He exhibits edema. He exhibits no tenderness.   Neurological:   Sedated, no gross focal deficits noted   Skin: Skin is warm and dry. No cyanosis. Nails show no clubbing.   Psychiatric: He has a normal mood and affect.   Nursing note and vitals reviewed.   Lab Results   Component Value Date/Time    WBC 5.3 05/31/2017 04:55 AM    HEMOGLOBIN 7.0* 05/31/2017 03:34 PM    HEMATOCRIT 24.5* 05/31/2017 03:34 PM    PLATELET COUNT 83* 05/31/2017 04:55 AM     Lab Results   Component Value Date/Time    SODIUM 149* 05/31/2017 04:55 AM    POTASSIUM  4.3 05/31/2017 04:55 AM    CHLORIDE 119* 05/31/2017 04:55 AM    CO2 23 05/31/2017 04:55 AM    GLUCOSE 149* 05/31/2017 04:55 AM    BUN 41* 05/31/2017 04:55 AM    CREATININE 1.88* 05/31/2017 04:55 AM      Assessment/Plan    Acute respiratory failure with hypoxia (CMS-HCC) (present on admission)  Assessment & Plan  Intubated 5/24. Discussed with Dr Stubbs  Bronch 5/30 old aspirated blood     Septic shock (CMS-HCC) (present on admission)  Assessment & Plan  resolved.     Esophagitis (present on admission)  Assessment & Plan  With ulcerations seen on EGD on 5/26.   Continue prilosec bid monitor cbc    Hemoptysis (present on admission)  Assessment & Plan  Bright-red blood in the ER, s/p bronchoscopy .     Acute on chronic renal insufficiency (HCC) (present on admission)  Assessment & Plan  Improved Monitor renal function and electrolytes    DM (diabetes mellitus) (CMS-[facility]) (present on admission)  Assessment & Plan  Continue ISS monitor cbg's    HTN (hypertension) (present on admission)  Assessment & Plan  Hx of.    Anemia (present on admission)  Assessment & Plan  Acute blood loss from upper GI bleed.  Monitor Hg   transfuse if <7      Thrombocytopenia (CMS-[facility]) (present on admission)  Assessment & Plan  Likely reactive, stable, monitor cbc    Aortic stenosis, severe (present on admission)  Assessment & Plan  Further workup and evaluation  As outpt When more stable          Aspiration pneumonia (CMS-[facility]) (present on admission)  Assessment & Plan  Completed  unasyn   7 day course      Hyperbilirubinemia (present on admission)  Assessment & Plan  Likely hemolysis post transfusion  Bilirubin trending down, monitor       Labs reviewed, Medications reviewed and Radiology images reviewed  Bond catheter: Unconscious / Sedated Patient on a Ventilator  Central line in place: Sepsis    DVT Prophylaxis: Contraindicated - High bleeding risk  DVT prophylaxis - mechanical: SCDs  Ulcer prophylaxis: Yes  Antibiotics: Treating active  infection/contamination beyond 24 hours perioperative coverage

## 2017-05-31 NOTE — CARE PLAN
Adult Ventilation Update    Total Vent Days: 6    Patient Lines/Drains/Airways Status    Active Airway     Name: Placement date: Placement time: Site: Days:    Airway Group ET Tube Oral 8.0 05/25/17    Oral  6                         Plateau Pressure (Q Shift): 22 (05/30/17 1849)  Static Compliance (ml / cm H2O): 39.9 (05/31/17 0237)    Patient failed trials because of Barriers to Wean: FiO2 >50% OR PEEP >8 (PMA Only) (05/30/17 1849)  Barriers to SBT    Length of Weaning Trial      Sputum/Suction   Cough: Productive (05/31/17 0237)  Sputum Amount: Small (05/31/17 0237)  Sputum Color: Clear;Yellow (05/31/17 0237)  Sputum Consistency: Thick;Thin (05/31/17 0237)    Mobility Group  Activity Performed: Edge of bed (05/31/17 0000)  Time Activity Tolerated: 5 min (05/31/17 0000)  Distance Per Occurrence (ft.): 0 feet (05/31/17 0000)  # of Times Distance was Traveled: 0 (05/31/17 0000)  Assistance / Tolerance: Assistance of Two or More;Tolerates Well;General Weakness (05/31/17 0000)  Pt Calls for Assistance: No (05/31/17 0000)  Staff Present for Mobilization: RN (05/31/17 0000)  Assistive Devices: Other (Comments) (staff members x4) (05/31/17 0000)  Reason Not Mobilized: Other (comment);Unstable condition (PEEP 12) (05/30/17 1600)  Mobilization Comments: PEEP of 12 (05/29/17 1200)    Events/Summary/Plan: Titrated Fio2 to 40% post ABG (05/31/17 0445)

## 2017-05-31 NOTE — PROGRESS NOTES
Pulmonary Critical Care Progress Note      Date of service: 5/31/2017    Chief Complaint: SOB    History of Present Illness:  Patient is a 77 y/o M, history of Type II DM, hypothyroidism, history of hospitalization on 03/24/17 for sigmoid diverticulosis with GI bleed and negative colonoscopy, presented to ED 5/24/17 with complaints of dyspnea x2 days. Hematemesis? In ED, possibly aspirated blood, was intubated and bronchoscopy at that time showed blood tinged secretions with no endobronchial lesions, repeat bronchoscopy 5/27/17 showed significant thick bloody secretions otherwise clear.     ROS:    Respiratory: unable to perform due to the patient's inability to effectively communicate,   Cardiac: unable to perform due to the patient's inability to effectively communicate,   GI: unable to perform due to the patient's inability to effectively communicate.     Interval Events:  24 hour interval history reviewed     No significant overnight events   Follow commands. Propofol 40, Fentanyl 50.  SR 80s-90s  TMAX 38.5 C  Right nare cortrak, at TF at 55, which is goal   LR 83  Vent day 5   PEEP 10 40%  -32 NIF   Prilosec  Day 7/7 Unasyn   Started on free water 250 q6    CXR: Improving bilateral lower lobe infiltrate, left greater than right.     PFSH:  No change.    Physical Exam  General: Sedated on Propofol. Arouses.   Neuro/Psych: Propofol 30. Fentanyl 50.   HEENT: PERRL. Right nare cortrak in place. Tube feeds 55. ET tube in place. RIJ CVC CDI. Supple. Trachea midline. No crepitus.   CVS: Distant heart tones. Regular rate and rhythm.   Respiratory: Scattered coarse crackles. No wheezes.   Abdomen/: Adipose soft. Non tender. Bowel sounds present.    Extremities: 2+ upper extremity anasarca. 1+ dependent edema. 1+ bilateral DP pulses.   Skin: Cool, dry, perfused. Capillary refill intact.     Respiratory:  Strong Vent Mode: Spont, Rate (breaths/min): 24, Vt Target (mL): 440, PEEP/CPAP: 10, FiO2: 40, Static Compliance  (ml / cm H2O): 50, Control VTE (exp VT): 425  Pulse Oximetry: 99 %  Imaging Available data reviewed   Recent Labs      05/29/17   0438  05/30/17   0434  05/31/17   0447   ISTATAPH  7.367*  7.365*  7.415   ISTATAPCO2  32.7  37.0  35.3   ISTATAPO2  93*  99*  59*   ISTATATCO2  20  22  24   FRXGRIN7SOT  97  98  91*   ISTATARTHCO3  18.8  21.1  22.7   ISTATARTBE  -6*  -4  -2   ISTATTEMP  37.6 C  38.0 C  38.4 C   ISTATFIO2  40  40  30   ISTATSPEC  Arterial  Arterial  Arterial   ISTATAPHTC  7.358*  7.350*  7.394*   [id number]  96*  105*  65     HemoDynamics:  Pulse: (!) 102, Heart Rate (Monitored): (!) 102  NIBP: 140/71 mmHg     Imaging: Available data reviewed         Neuro:  GCS Total Oscar Coma Score: 10  Imaging: Available data reviewed    Fluids:  Intake/Output       05/29/17 0700 - 05/30/17 0659 05/30/17 0700 - 05/31/17 0659 05/31/17 0700 - 06/01/17 0659      3810-1761 4507-9600 Total 2851-1213 8224-2461 Total 3058-9989 1556-9704 Total       Intake    I.V.  1448.7  1426 2874.7  1507.4  1280 2787.4  --  -- --    Propofol Volume 307.2 312 619.2 326.4 264 590.4 -- -- --    IV Volume (IV maintenance) 1120.5 1096 2216.5 4945 162 8785 -- -- --    Fent 21 18 39 19 20 39 -- -- --    Other  60  60 120  --  150 150  --  -- --    Medications (P.O./ Enteral Liquids) 60 60 120 -- 150 150 -- -- --    Enteral  165  300 465  480  720 1200  --  -- --    Enteral Volume 75 270 345  -- -- --    Free Water / Tube Flush 90 30 120 -- 60 60 -- -- --    Total Intake 1673.7 1786 3459.7 1987.4 2150 4137.4 -- -- --       Output    Urine  925  1090 2015  1469  1563 3030  --  -- --    Indwelling Cathether 925 1090 2015 1465 1565 3030 -- -- --    Drains  0  0 0  --  0 0  --  -- --    Residual Amount (ml) (Discarded) 0 0 0 -- 0 0 -- -- --    Stool  --  -- --  --  -- --  --  -- --    Number of Times Stooled -- -- -- 0 x 2 x 2 x -- -- --    Total Output 925 1090 2015 1469 1563 3030 -- -- --       Net I/O     748.7 696 1444.7 522.4 585  1107.4 -- -- --        Weight: 109 kg (240 lb 4.8 oz)  Recent Labs      17   0835  17   045   SODIUM  149*  150*  149*   POTASSIUM  4.5  4.4  4.3   CHLORIDE  120*  121*  119*   CO2  21  21  23   BUN  39*  37*  41*   CREATININE  2.17*  2.01*  1.88*   CALCIUM  8.1*  8.3*  8.5     GI/Nutrition:  Imaging: Available data reviewed  NPO , no Cortrak or NG per GI still (since  EGD)    Liver Function:  Recent Labs      17   1310  17   0835  17   ALTSGPT  19  27   --   53*   ASTSGOT  35  45   --   86*   ALKPHOSPHAT  40  41   --   50   TBILIRUBIN  12.2*  12.5*   --   9.0*   DBILIRUBIN   --    --    --   6.0*   GLUCOSE  108*  107*  111*  149*     Heme:  Recent Labs      17   0420   17   0430  17   1130  17   1430  17   RBC  2.86*   --   2.93*   --    --   2.79*   HEMOGLOBIN  7.5*   < >  7.6*   --   7.8*  7.1*   HEMATOCRIT  25.6*   < >  26.4*   --   27.4*  25.1*   PLATELETCT  124*   --   84*   --    --   83*   IRON   --    --    --   119   --    --    FERRITIN   --    --    --   1171.6*   --    --    TOTIRONBC   --    --    --   155*   --    --     < > = values in this interval not displayed.     Infectious Disease:  Temp  Av.4 °C (101.1 °F)  Min: 38 °C (100.4 °F)  Max: 38.7 °C (101.7 °F)  Micro: reviewed   Day  Unasyn.  Recent Labs      17   1310  17   0420  17   0835  17   04317   WBC   --   5.4   --   5.6  5.3   NEUTSPOLYS   --   74.70*   --   78.60*  71.70   LYMPHOCYTES   --   12.80*   --   5.30*  12.90*   MONOCYTES   --   4.10   --   0.00  2.80   EOSINOPHILS   --   3.00   --   4.50  6.40   BASOPHILS   --   1.30   --   6.20*  1.30   ASTSGOT  35   --   45   --   86*   ALTSGPT  19   --   27   --   53*   ALKPHOSPHAT  40   --   41   --   50   TBILIRUBIN  12.2*   --   12.5*   --   9.0*     Current Facility-Administered Medications   Medication Dose Frequency Provider Last  Rate Last Dose   • omeprazole 2 mg/mL in sodium bicarbonate (PRILOSEC) oral susp 40 mg  40 mg Q12HRS Edwin Greene M.D.   40 mg at 05/30/17 2007   • lactated ringers infusion   Continuous Clayton Bautista M.D. 83 mL/hr at 05/30/17 1926 1,000 mL at 05/30/17 1926   • ampicillin/sulbactam (UNASYN) 3 g in  mL IVPB  3 g Q6HRS Bianka Macias PHARMD 200 mL/hr at 05/31/17 0641 3 g at 05/31/17 0641   • acetaminophen (TYLENOL) tablet 650 mg  650 mg Q6HRS PRN Caridad Ibarra M.D.   650 mg at 05/31/17 0357   • propofol (DIPRIVAN) injection  5-80 mcg/kg/min Continuous Kaz Mobley M.D. 19.2 mL/hr at 05/31/17 0610 30 mcg/kg/min at 05/31/17 0610   • Respiratory Care per Protocol   Continuous RT Kaz Mobley M.D.       • senna-docusate (PERICOLACE or SENOKOT S) 8.6-50 MG per tablet 2 Tab  2 Tab BID Kaz Mobley M.D.   2 Tab at 05/30/17 2007    And   • polyethylene glycol/lytes (MIRALAX) PACKET 1 Packet  1 Packet QDAY PRN Kaz Mobley M.D.   1 Packet at 05/30/17 0327    And   • magnesium hydroxide (MILK OF MAGNESIA) suspension 30 mL  30 mL QDAY PRN Kaz Mobley M.D.   30 mL at 05/30/17 1419    And   • bisacodyl (DULCOLAX) suppository 10 mg  10 mg QDAY PRN Kaz Mobley M.D.   10 mg at 05/30/17 1927   • chlorhexidine (PERIDEX) 0.12 % solution 15 mL  15 mL BID Kaz Mobley M.D.   15 mL at 05/30/17 2006   • lidocaine (XYLOCAINE) 1%  injection  1-2 mL Q30 MIN PRN Kaz Mobley M.D.       • fentaNYL (SUBLIMAZE) injection 25 mcg  25 mcg Q HOUR PRN Kaz Mobley M.D.        Or   • fentaNYL (SUBLIMAZE) injection 50 mcg  50 mcg Q HOUR PRN Kaz Mobley M.D.   50 mcg at 05/30/17 0002    Or   • fentaNYL (SUBLIMAZE) injection 100 mcg  100 mcg Q HOUR PRN Kaz Mobley M.D.       • fentaNYL (SUBLIMAZE) 50 mcg/mL in 50mL   Continuous Kaz Mobley M.D. 1.5 mL/hr at 05/30/17 2007 75 mcg at 05/31/17 0343   • ipratropium-albuterol  (DUONEB) nebulizer solution 3 mL  3 mL Q2HRS PRN (RT) Kaz Mobley M.D.       • ipratropium-albuterol (DUONEB) nebulizer solution 3 mL  3 mL Q4HRS (RT) Kaz Mobley M.D.   3 mL at 05/31/17 0611   • insulin lispro (HUMALOG) injection 3-14 Units  3-14 Units Q6HRS Clayton Bautista M.D.   Stopped at 05/26/17 0000   • ferrous sulfate tablet 325 mg  325 mg BID WITH MEALS Caridad Ibarra M.D.   325 mg at 05/30/17 1730   • levothyroxine (SYNTHROID) tablet 150 mcg  150 mcg AM ES Caridad Ibarra M.D.   150 mcg at 05/31/17 0641   • niacin tablet 500 mg  500 mg DAILY Caridad Ibarra M.D.   500 mg at 05/30/17 0909   • pravastatin (PRAVACHOL) tablet 40 mg  40 mg QHS Caridad Ibarra M.D.   40 mg at 05/30/17 2007   • NS infusion 3,198 mL  30 mL/kg Once PRN Caridad Ibarra M.D.       • NS (BOLUS) infusion 1,000 mL  1,000 mL Once PRN Caridad Ibarra M.D.       • oxycodone immediate-release (ROXICODONE) tablet 2.5 mg  2.5 mg Q3HRS PRN Caridad Ibarra M.D.        And   • oxycodone immediate-release (ROXICODONE) tablet 5 mg  5 mg Q3HRS PRN Caridad Ibarra M.D.        And   • morphine (pf) 4 mg/ml injection 2 mg  2 mg Q3HRS PRN Caridad Ibarra M.D.       • glucose 4 g chewable tablet 16 g  16 g Q15 MIN PRN Caridad Ibarra M.D.        And   • dextrose 50% (D50W) injection 25 mL  25 mL Q15 MIN PRN Caridad Ibarra M.D.       • ondansetron (ZOFRAN) syringe/vial injection 4 mg  4 mg Q4HRS PRN Caridad Ibarra M.D.       • ondansetron (ZOFRAN ODT) dispertab 4 mg  4 mg Q4HRS PRN Caridad Ibarra M.D.       • multivitamin (THERAGRAN) tablet 1 Tab  1 Tab DAILY Caridad Ibarra M.D.   1 Tab at 05/30/17 0909     Last reviewed on 5/24/2017  9:56 PM by Keturah Cheek    Quality  Measures:  Labs reviewed, Radiology images reviewed and Medications reviewed  Bond catheter: No Bond  Central line in place: Concentrated IV drugs                Problems/plan:  Ventilator-dependent respiratory failure - > ARDS/aspPNA? From UGIB?   - Intubated in ER early AM 5/25   - RT protocols   -  Wean PEEP, FiO2 as able; no SBT yet   - Mobilize and sedation vacationsas awa  Hemoptysis   -  FOB in the ER 5/24.    - No bleeding sites were identified in the lungs.  There was no endobronchial tumor.   - ? aspiration with UGIB   - NIVT neg   - Recurrent hemoptysis 5/27 - lots of plugs/no clear source bleeding   - No heparin for now  Esophageal ulcer   - S/p EGD 5/26   - PPI   - start TF  Community-acquired pneumonia - vs aspiration PNA/ARDS   - ABX - Unasyn/Doxy - complete today  Sepsis, pulmonary source.   - Protocols  Anemia with thrombocytopenia.   - Serial lab - transfuse per restrictive threshold  Acute on chronic kidney disease.   No contrast die   Avoid nephrotoxins  Elevated lipase.  His abdominal exam is unremarkable.   F/u lab in AM - image abd?  Elevated troponin - demand ischemia - NSTEMI?  Chronic diastolic heart failure with grade I diastolic dysfunction.  Hypothyroidism - replete  Diabetes mellitus type 2 - glycemic control - monitor for need for I drip  Dyslipidemia - diet/statin  Recent hospitalization for lower gastrointestinal hemorrhage, presumed due to sigmoid diverticulosis.  An orogastric tube has been placed in the emergency department.  He has dark appearing fluid from his stomach.  His hemoglobin has not significantly decreased from 05/10/2017.   Serial H/H   PPI IV  Remote history of significant smoking - COPD? PRN nebs.  Enteral nutrion when clinically appropriate  Prophylaxis      Discussed patient condition and risk of morbidity and/or mortality with Family, RN, RT, Pharmacy, Charge nurse / hot rounds and GI and hospitalist.    The patient remains critically ill.  Critical care time = 34 minutes in directly providing and coordinating critical care and extensive data review.  No time overlap and excludes procedures.     Maribell DIAL ([place]), am scribing for, and in the presence of, Edwin Greene M.D.    Electronically signed by: Maribell Ramirez ([place]), 5/31/2017    Edwin DIAL  RAINA Greene. personally performed the services described in this documentation, as scribed by Maribell Ramirez in my presence, and it is both accurate and complete.

## 2017-05-31 NOTE — CARE PLAN
Problem: Fluid Volume:  Goal: Will maintain balanced intake and output  Outcome: PROGRESSING SLOWER THAN EXPECTED  Pt fluid status monitored with strict I/O documentation, assessment of skin integrity, edema and lung sounds.         Problem: Skin Integrity  Goal: Risk for impaired skin integrity will decrease  Outcome: PROGRESSING AS EXPECTED  Pt risk factors for impaired skin integrity assessed, Chris score documented in flowsheet. Mepilex placed, heel float boots and pillows in use for support and positioning, Q 2 hr turning and hourly rounding in effect.

## 2017-05-31 NOTE — DISCHARGE PLANNING
Remains intubated.  Day 6.  No changes overnight.   SR 80-90's.  Tube feeds at goal.  Good urine output thru servin.     Spouse remains bedside.     Will follow for extubation.  Medicare and [facility] [place].

## 2017-06-01 PROBLEM — A41.9 SEPTIC SHOCK(785.52): Status: RESOLVED | Noted: 2017-01-01 | Resolved: 2017-01-01

## 2017-06-01 PROBLEM — R65.21 SEPTIC SHOCK(785.52): Status: RESOLVED | Noted: 2017-01-01 | Resolved: 2017-01-01

## 2017-06-01 NOTE — CARE PLAN
Problem: Ventilation Defect:  Goal: Ability to achieve and maintain unassisted ventilation or tolerate decreased levels of ventilator support  Outcome: PROGRESSING AS EXPECTED  Adult Ventilation Update    Total Vent Days: 6      Patient Lines/Drains/Airways Status    Active Airway      Name: Placement date: Placement time: Site: Days:     Airway Group ET Tube Oral 8.0 05/25/17    Oral                    In the last 24 hours, the patient tolerated SBT for 1hr on settings of 5/10.    #FVC / Vital Capacity (liters) : 0.5 (05/31/17 0727)  NIF (cm H2O) : -32 (05/31/17 0727)  Rapid Shallow Breathing Index (RR/VT): 42 (05/31/17 0727)  Plateau Pressure (Q Shift): 20 (05/31/17 1413)  Static Compliance (ml / cm H2O): 66 (05/31/17 1413)    Patient failed trials because of Barriers to Wean: Other (Comments) (once daily per Dr. Greene) (05/31/17 1413)  Barriers to SBT Weaning Trial Stopped due to:: Pt weaned for 1 hour and returned to rest settings per protocol (05/31/17 0729)  Length of Weaning Trial Length of Weaning Trial (Hours): 1 (05/31/17 0729)    Sputum/Suction   Cough: Non Productive;Weak (05/31/17 1200)  Sputum Amount: Small (05/31/17 1200)  Sputum Color: White;Tan (05/31/17 1200)  Sputum Consistency: Thick (05/31/17 1200)      Events/Summary/Plan:Ok to wean once daily per Dr. Greene in AM rounds. No other changes. Will continue to monitor.

## 2017-06-01 NOTE — CARE PLAN
Problem: Skin Integrity  Goal: Risk for impaired skin integrity will decrease  Outcome: PROGRESSING AS EXPECTED  Turned q2h    Problem: Mobility  Goal: Risk for activity intolerance will decrease  Outcome: PROGRESSING AS EXPECTED  EOB 5 min, 2 RNs, 1 RT, max assist

## 2017-06-01 NOTE — CARE PLAN
Problem: Skin Integrity  Goal: Risk for impaired skin integrity will decrease  Outcome: PROGRESSING AS EXPECTED  Pt risk factors for impaired skin integrity assessed, Chris score documented in flowsheet. Mepilex placed, heel float boots and pillows in use for support and positioning, Q 2 hr turning and hourly rounding in effect.

## 2017-06-01 NOTE — PROGRESS NOTES
Pulmonary Critical Care Progress Note      Date of service: 6/1/2017    Chief Complaint: SOB    History of Present Illness:  Patient is a 77 y/o M, history of Type II DM, hypothyroidism, history of hospitalization on 03/24/17 for sigmoid diverticulosis with GI bleed and negative colonoscopy, presented to ED 5/24/17 with complaints of dyspnea x2 days. Hematemesis? In ED, possibly aspirated blood, was intubated and bronchoscopy at that time showed blood tinged secretions with no endobronchial lesions, repeat bronchoscopy 5/27/17 showed significant thick bloody secretions otherwise clear.     ROS:    Respiratory: unable to perform due to the patient's inability to effectively communicate,   Cardiac: unable to perform due to the patient's inability to effectively communicate,   GI: unable to perform due to the patient's inability to effectively communicate.       Interval Events:  24 hour interval history reviewed   Follows commands and PERRL.   Prop 15 and Fent 25   SR, BP  systolic. No vasopressors  Tmax 38.3, temp now improved, unasyn completed yesterday.   TF at goal, BM overnight, 2500 cc UOP.   Mobile to edge of bed.   SBT for one hour today with RSBI 39, NID -13 and VC 0.7  CXR shows improved infiltrate predominately to left lower lobe, alveolar infiltrate.       PFSH:  No change.    Physical Exam  General: Sedated on Propofol. Arouses.   Neuro/Psych: Propofol Fentanyl; arouses, follows, moves all ext symmetrically   HEENT: PERRL. Right nare cortrak in place. Tube feeds 55. ET tube in place. RIJ CVC CDI. Supple. Trachea midline. No crepitus.   CVS: Distant heart tones. Regular rate and rhythm.   Respiratory: Scattered coarse crackles. No wheezes.   Abdomen/: Adipose soft. Non tender. Bowel sounds present.    Extremities: 2+ upper extremity anasarca. 1+ dependent edema. 1+ bilateral DP pulses.   Skin: Cool, dry, perfused. Capillary refill intact.     Respiratory:  Strong Vent Mode: APVCMV, Rate  (breaths/min): 24, Vt Target (mL): 440, PEEP/CPAP: 10, FiO2: 40, P Support: 5, Static Compliance (ml / cm H2O): 26.1, Control VTE (exp VT): 499  Pulse Oximetry: 96 %  Imaging Available data reviewed   Recent Labs      05/30/17   0434  05/31/17   0447  06/01/17   0502   ISTATAPH  7.365*  7.415  7.400   ISTATAPCO2  37.0  35.3  38.8*   ISTATAPO2  99*  59*  73   ISTATATCO2  22  24  25   [id number]  98  91*  95   ISTATARTHCO3  21.1  22.7  24.0   ISTATARTBE  -4  -2  -1   ISTATTEMP  38.0 C  38.4 C  37.7 C   ISTATFIO2  40  30  40   ISTATSPEC  Arterial  Arterial  Arterial   ISTATAPHTC  7.350*  7.394*  7.389*   [id number]  105*  65  77     HemoDynamics:  Pulse: 82, Heart Rate (Monitored): 91  NIBP: 101/44 mmHg     Imaging: Available data reviewed         Neuro:  GCS Total [place] Coma Score: 10  Imaging: Available data reviewed    Fluids:  Intake/Output       05/30/17 0700 - 05/31/17 0659 05/31/17 0700 - 06/01/17 0659 06/01/17 0700 - 06/02/17 0659      2388-3749 1588-3103 Total 5076-2653 4440-8651 Total 0739-0380 0714-7288 Total       Intake    I.V.  1507.4  1325 2832.4  1303.5  1306.8 2610.3  --  -- --    Propofol Volume 326.4 309 635.4 192.5 298.8 491.3 -- -- --    IV Volume (IV maintenance) 1313 760 4212  -- -- --    Fent 19 20 39 15 12 27 -- -- --    IV Volume (bolus) -- -- -- 100 -- 100 -- -- --    Other  --  150 150  60  60 120  --  -- --    Medications (P.O./ Enteral Liquids) -- 150 150 60 60 120 -- -- --    Enteral  980  720 1700  1410  1160 2570  --  -- --    Enteral Volume   -- -- --    Free Water / Tube Flush 500 60 560  -- -- --    Total Intake 2487.4 2195 4682.4 2773.5 2526.8 5300.3 -- -- --       Output    Urine  1465  1565 3030  1075  2500 3575  --  -- --    Indwelling Cathether 1465 1565 3030 1075 2500 3575 -- -- --    Drains  --  0 0  0  -- 0  --  -- --    Residual Amount (ml) (Discarded) -- 0 0 0 -- 0 -- -- --    Stool  --  -- --  --  -- --  --  -- --     Number of Times Stooled 0 x 2 x 2 x 2 x 4 x 6 x -- -- --    Total Output 1465 1565 3030 1075 2500 3575 -- -- --       Net I/O     1022.4 630 1652.4 1698.5 26.8 1725.3 -- -- --        Weight: 115.8 kg (255 lb 4.7 oz)  Recent Labs      17   04317   SODIUM  150*  149*  148*   POTASSIUM  4.4  4.3  4.3   CHLORIDE  121*  119*  118*   CO2  21  23  25   BUN  37*  41*  39*   CREATININE  2.01*  1.88*  1.42*   CALCIUM  8.3*  8.5  8.3*     GI/Nutrition:  Imaging: Available data reviewed  NPO , no [place] or NG per GI still (since  EGD)    Liver Function:  Recent Labs      17   0835  17   ALTSGPT  27   --   53*   --    ASTSGOT  45   --   86*   --    ALKPHOSPHAT  41   --   50   --    TBILIRUBIN  12.5*   --   9.0*   --    DBILIRUBIN   --    --   6.0*   --    GLUCOSE  107*  111*  149*  135*     Heme:  Recent Labs      17   0430  17   1130   175  17   1534  17   RBC  2.93*   --    --   2.79*   --   2.80*   HEMOGLOBIN  7.6*   --    < >  7.1*  7.0*  7.2*   HEMATOCRIT  26.4*   --    < >  25.1*  24.5*  25.4*   PLATELETCT  84*   --    --   83*   --   87*   IRON   --   119   --    --    --    --    FERRITIN   --   1171.6*   --    --    --    --    TOTIRONBC   --   155*   --    --    --    --     < > = values in this interval not displayed.     Infectious Disease:  Temp  Av.9 °C (100.3 °F)  Min: 37.7 °C (99.9 °F)  Max: 38.3 °C (100.9 °F)  Micro: reviewed   Day  Unasyn.  Recent Labs      17   0835  17   0430  17   0455  17   0318   WBC   --   5.6  5.3  5.6   NEUTSPOLYS   --   78.60*  71.70  64.50   LYMPHOCYTES   --   5.30*  12.90*  18.20*   MONOCYTES   --   0.00  2.80  5.50   EOSINOPHILS   --   4.50  6.40  6.40   BASOPHILS   --   6.20*  1.30  1.80   ASTSGOT  45   --   86*   --    ALTSGPT  27   --   53*   --    ALKPHOSPHAT  41   --   50   --    TBILIRUBIN  12.5*   --   9.0*    --      Current Facility-Administered Medications   Medication Dose Frequency Provider Last Rate Last Dose   • omeprazole 2 mg/mL in sodium bicarbonate (PRILOSEC) oral susp 40 mg  40 mg Q12HRS Edwin Greene M.D.   40 mg at 05/31/17 2025   • lactated ringers infusion   Continuous Clayton Bautista M.D. 83 mL/hr at 05/31/17 2247     • acetaminophen (TYLENOL) tablet 650 mg  650 mg Q6HRS PRN Caridad Ibarra M.D.   650 mg at 05/31/17 0357   • propofol (DIPRIVAN) injection  5-80 mcg/kg/min Continuous Kaz Mobley M.D. 9.6 mL/hr at 06/01/17 0649 15 mcg/kg/min at 06/01/17 0649   • Respiratory Care per Protocol   Continuous RT Kaz Mobley M.D.       • senna-docusate (PERICOLACE or SENOKOT S) 8.6-50 MG per tablet 2 Tab  2 Tab BID Kaz Mobley M.D.   Stopped at 05/31/17 2100    And   • polyethylene glycol/lytes (MIRALAX) PACKET 1 Packet  1 Packet QDAY PRN Kaz Mobley M.D.   1 Packet at 05/30/17 0327    And   • magnesium hydroxide (MILK OF MAGNESIA) suspension 30 mL  30 mL QDAY PRN Kaz Mobley M.D.   30 mL at 05/30/17 1419    And   • bisacodyl (DULCOLAX) suppository 10 mg  10 mg QDAY PRN Kaz Mobley M.D.   10 mg at 05/30/17 1927   • chlorhexidine (PERIDEX) 0.12 % solution 15 mL  15 mL BID Kaz Mobley M.D.   15 mL at 05/31/17 2024   • lidocaine (XYLOCAINE) 1%  injection  1-2 mL Q30 MIN PRN Kaz Mobley M.D.       • fentaNYL (SUBLIMAZE) injection 25 mcg  25 mcg Q HOUR PRN Kaz Mobley M.D.        Or   • fentaNYL (SUBLIMAZE) injection 50 mcg  50 mcg Q HOUR PRN Kaz Mobley M.D.   50 mcg at 05/30/17 0002    Or   • fentaNYL (SUBLIMAZE) injection 100 mcg  100 mcg Q HOUR PRN Kaz Mobley M.D.       • fentaNYL (SUBLIMAZE) 50 mcg/mL in 50mL   Continuous Kaz Mobley M.D. 1 mL/hr at 05/31/17 1929 50 mcg/hr at 05/31/17 1929   • ipratropium-albuterol (DUONEB) nebulizer solution 3 mL  3 mL Q2HRS PRN (RT) Kaz kingston  Colten Cervantes M.D.       • ipratropium-albuterol (DUONEB) nebulizer solution 3 mL  3 mL Q4HRS (RT) Kaz Mobley M.D.   3 mL at 06/01/17 0700   • insulin lispro (HUMALOG) injection 3-14 Units  3-14 Units Q6HRS Clayton Bautista M.D.   Stopped at 05/26/17 0000   • ferrous sulfate tablet 325 mg  325 mg BID WITH MEALS Caridad Ibarra M.D.   325 mg at 05/31/17 1831   • levothyroxine (SYNTHROID) tablet 150 mcg  150 mcg AM ES Caridad Ibarra M.D.   150 mcg at 06/01/17 0606   • niacin tablet 500 mg  500 mg DAILY Caridad Ibarra M.D.   500 mg at 05/31/17 0926   • pravastatin (PRAVACHOL) tablet 40 mg  40 mg QHS Caridad Ibarra M.D.   40 mg at 05/31/17 2025   • NS infusion 3,198 mL  30 mL/kg Once PRN Caridad Ibarra M.D.       • NS (BOLUS) infusion 1,000 mL  1,000 mL Once PRN Caridad Ibarra M.D.       • oxycodone immediate-release (ROXICODONE) tablet 2.5 mg  2.5 mg Q3HRS PRN Caridad Ibarra M.D.        And   • oxycodone immediate-release (ROXICODONE) tablet 5 mg  5 mg Q3HRS PRN Caridad Ibarra M.D.        And   • morphine (pf) 4 mg/ml injection 2 mg  2 mg Q3HRS PRN Caridad Ibarra M.D.       • glucose 4 g chewable tablet 16 g  16 g Q15 MIN PRN Caridad Ibarra M.D.        And   • dextrose 50% (D50W) injection 25 mL  25 mL Q15 MIN PRN Caridad Ibarra M.D.       • ondansetron (ZOFRAN) syringe/vial injection 4 mg  4 mg Q4HRS PRN Caridad Ibarra M.D.       • ondansetron (ZOFRAN ODT) dispertab 4 mg  4 mg Q4HRS PRN Caridad Ibarra M.D.       • multivitamin (THERAGRAN) tablet 1 Tab  1 Tab DAILY Caridad Ibarra M.D.   1 Tab at 05/31/17 0926     Last reviewed on 5/24/2017  9:56 PM by Zora Mccarthy, [facility]    Quality  Measures:  Labs reviewed, Radiology images reviewed and Medications reviewed  Bond catheter: No Bond  Central line in place: Concentrated IV drugs                Problems/plan:  Ventilator-dependent respiratory failure - > ARDS/aspPNA? From UGIB?   - Intubated in ER early AM 5/25   - RT protocols   - daily SBT; better this afternoon; not quite ready for extubation    Hemoptysis   -  FOB in the ER 5/24.    - No bleeding sites were identified in the lungs.  There was no endobronchial tumor.   - ? aspiration with UGIB   - NIVT neg   - Recurrent hemoptysis 5/27 - lots of plugs/no clear source bleeding  Esophageal ulcer   - S/p EGD 5/26   - PPI   - start TF  Community-acquired pneumonia - vs aspiration PNA/ARDS   - ABX - Unasyn/Doxy  Sepsis, pulmonary source.   - Protocols  Anemia with thrombocytopenia.   - Serial lab - transfuse per restrictive threshold  Acute on chronic kidney disease.   - follow with addition of furosemide   Elevated lipase.  His abdominal exam is unremarkable.  Elevated troponin - demand ischemia - NSTEMI?  Chronic diastolic heart failure with grade I diastolic dysfunction.  Hypothyroidism - replete  Diabetes mellitus type 2 - glycemic control - monitor for need for I drip  Dyslipidemia - diet/statin  Recent hospitalization for lower gastrointestinal hemorrhage, presumed due to sigmoid diverticulosis.  An orogastric tube has been placed in the emergency department.  He has dark appearing fluid from his stomach.  His hemoglobin has not significantly decreased from 05/10/2017.   Serial H/H   PPI IV  Remote history of significant smoking - COPD? PRN nebs.  Enteral nutrion  Prophylaxis  Free water          Discussed patient condition and risk of morbidity and/or mortality with Family, RN, RT, Pharmacy, Charge nurse / hot rounds and GI and hospitalist.    The patient remains critically ill.  Critical care time = 34 minutes in directly providing and coordinating critical care and extensive data review.  No time overlap and excludes procedures.    Demetria DIAL ([place]), am scribing for, and in the presence of, Edwin Greene M.D.  Electronically signed by: Demetria Ramon ([place]), 6/1/2017  Edwin DIAL M.D. personally performed the services described in this documentation, as scribed by Demetria Ramon in my presence, and it is both accurate and complete.

## 2017-06-01 NOTE — CARE PLAN
Adult Ventilation Update    Total Vent Days: 7    Patient Lines/Drains/Airways Status    Active Airway     Name: Placement date: Placement time: Site: Days:    Airway Group ET Tube Oral 8.0 05/25/17    Oral  7              In the last 24 hours, the patient tolerated SBT for 1hr on settings of 5/10.    #FVC / Vital Capacity (liters) : 0.5 (05/31/17 0727)  NIF (cm H2O) : -32 (05/31/17 0727)  Rapid Shallow Breathing Index (RR/VT): 42 (05/31/17 0727)  Plateau Pressure (Q Shift): 22 (05/31/17 2229)  Static Compliance (ml / cm H2O): 82.9 (06/01/17 0218)    Patient failed trials because of Barriers to Wean: Other (Comments) (once daily per Dr. Greene) (05/31/17 1413)  Barriers to SBT Weaning Trial Stopped due to:: Pt weaned for 1 hour and returned to rest settings per protocol (05/31/17 0729)  Length of Weaning Trial Length of Weaning Trial (Hours): 1 (05/31/17 0729)        Sputum/Suction   Cough: Productive;Strong (06/01/17 0400)  Sputum Amount: Small (06/01/17 0400)  Sputum Color: Tan;Brown (06/01/17 0400)  Sputum Consistency: Thick;Frothy (06/01/17 0400)    Mobility Group  Activity Performed: Edge of bed (06/01/17 0400)  Time Activity Tolerated: 5 min (06/01/17 0400)  Distance Per Occurrence (ft.): 0 feet (05/31/17 0000)  # of Times Distance was Traveled: 0 (05/31/17 0000)  Assistance / Tolerance: Assistance of Two or More (06/01/17 0400)  Pt Calls for Assistance: No (06/01/17 0400)  Staff Present for Mobilization: RN;RT (06/01/17 0400)  Assistive Devices: Other (Comments) (staff members X3) (06/01/17 0400)  Reason Not Mobilized: Other (comment);Unstable condition (PEEP 12) (05/30/17 1600)  Mobilization Comments: PEEP of 12 (05/29/17 1200)    Events/Summary/Plan: Pt return to rest settings (05/31/17 0729)

## 2017-06-02 PROBLEM — R13.10 DYSPHAGIA: Status: ACTIVE | Noted: 2017-01-01

## 2017-06-02 NOTE — CARE PLAN
Adult Ventilation Update    Total Vent Days: 8    Patient Lines/Drains/Airways Status    Active Airway     Name: Placement date: Placement time: Site: Days:    Airway Group ET Tube Oral 8.0 05/25/17    Oral  8                  #FVC / Vital Capacity (liters) : 1.1 (06/01/17 1648)  NIF (cm H2O) : -25 (06/01/17 1648)  Rapid Shallow Breathing Index (RR/VT): 33 (06/01/17 1648)  Plateau Pressure (Q Shift): 22 (06/01/17 1914)  Static Compliance (ml / cm H2O): 166 (06/02/17 0619)    Patient failed trials because of Barriers to Wean: Other (Comments) (once daily per Dr. Greene) (05/31/17 1413)  Barriers to SBT Weaning Trial Stopped due to:: Pt weaned for 1 hour and returned to rest settings per protocol (06/01/17 1648)  Length of Weaning Trial Length of Weaning Trial (Hours): 1 (06/01/17 1648)      Sputum/Suction   Cough: Productive (06/02/17 0619)  Sputum Amount: Small;Moderate (06/02/17 0619)  Sputum Color: White;Clear (06/02/17 0619)  Sputum Consistency: Thick (06/02/17 0619)    Mobility Group  Activity Performed: Unable to mobilize (06/02/17 0619)  Time Activity Tolerated: 10 min (06/01/17 2100)  Distance Per Occurrence (ft.): 0 feet (05/31/17 0000)  # of Times Distance was Traveled: 0 (05/31/17 0000)  Assistance / Tolerance: Assistance of Two or More (06/01/17 2100)  Pt Calls for Assistance: No (06/01/17 2100)  Staff Present for Mobilization: RN;RT (06/01/17 2100)  Assistive Devices: Other (Comments) (staff members X3) (06/01/17 1800)  Reason Not Mobilized: Other (comment) (will reassess with day RN) (06/02/17 0619)  Mobilization Comments: PEEP of 12 (05/29/17 1200)    Events/Summary/Plan: placed pt on SPONT (06/02/17 0619)

## 2017-06-02 NOTE — CARE PLAN
Problem: Bowel/Gastric:  Goal: Normal bowel function is maintained or improved  Outcome: PROGRESSING AS EXPECTED  LBM 6/2    Problem: Skin Integrity  Goal: Risk for impaired skin integrity will decrease  Outcome: PROGRESSING AS EXPECTED  Pt is moderate skin breakdown. Pt is repositioned Q2H. Pillows are used for support, positioning and to float bony prominences; heel float boot utilized; mepilex in place. Skin assessment documented in doc flow sheet.

## 2017-06-02 NOTE — CARE PLAN
Problem: Bronchoconstriction:  Goal: Improve in air movement and diminished wheezing  Outcome: PROGRESSING AS EXPECTED    Problem: Ventilation Defect:  Goal: Ability to achieve and maintain unassisted ventilation or tolerate decreased levels of ventilator support  Outcome: PROGRESSING AS EXPECTED  Adult Ventilation Update    Total Vent Days: 7      Patient Lines/Drains/Airways Status    Active Airway      Name: Placement date: Placement time: Site: Days:     Airway Group ET Tube Oral 8.0 05/25/17    Oral  7                 Events/Summary/Plan: PT SBT'ed 2 (1hr) times, No vent changes made at this time, will continue to monitor

## 2017-06-02 NOTE — CARE PLAN
Problem: Safety  Goal: Will remain free from injury  Outcome: PROGRESSING AS EXPECTED  Hourly rounding in place, restrains verified and educated patient on importance of safety.     Problem: Mobility  Goal: Risk for activity intolerance will decrease  Outcome: PROGRESSING AS EXPECTED  Mobilized patient to edge of bed, patient tolerated well, encouraged patient to practice lifting extremities to gain strength back.

## 2017-06-02 NOTE — THERAPY
"Speech Language Therapy Clinical Swallow Evaluation completed.  Functional Status: The patient was seen for clinical swallow evaluation this date. The patient was awake, alert but appeared fatigued/withdrawn and required mod cues to complete simple directives. The patient was noted to have generalize weakness throughout oral motor structures. With cues patient able to produce clear strong vocal quality. Patient was given a single ice chip as well as one 1/8tsp trial of NTL via teaspoon. PO trials resulted in overt s/s concerning for aspiration in both trials as evidenced by coughing. Choking and oral suction needing to be utilized to clear. Laryngeal elevation on palpation appeared incomplete. At this time, recommend patient remain strict NPO with aggressive oral care. SLP following.    Recommendations - Diet:  NPO/TF.                           Strategies: Head of Bed at 90 Degrees                          Medication Administration:    Plan of Care: Will benefit from Speech Therapy 3 times per week.  Post-Acute Therapy: Discharge to a transitional care facility for continued skilled therapy services.    See \"Rehab Therapy-Acute\" Patient Summary Report for complete documentation.   "

## 2017-06-02 NOTE — PROGRESS NOTES
Pulmonary Critical Care Progress Note      Date of service: 6/2/2017    Chief Complaint: SOB    History of Present Illness:  Patient is a 75 y/o M, history of Type II DM, hypothyroidism, history of hospitalization on 03/24/17 for sigmoid diverticulosis with GI bleed and negative colonoscopy, presented to ED 5/24/17 with complaints of dyspnea x2 days. Hematemesis? In ED, possibly aspirated blood, was intubated and bronchoscopy at that time showed blood tinged secretions with no endobronchial lesions, repeat bronchoscopy 5/27/17 showed significant thick bloody secretions otherwise clear.     ROS:    Respiratory: unable to perform due to the patient's inability to effectively communicate,   Cardiac: unable to perform due to the patient's inability to effectively communicate,   GI: unable to perform due to the patient's inability to effectively communicate.       Interval Events:  24 hour interval history reviewed   SBT this AM; CXR unchanged  Will extubate  40 lasix again today  KCL  F/u HH  PT/OT      PFSH:  No change.    Physical Exam  General: Sedated on Propofol. Arouses.   Neuro/Psych: Propofol Fentanyl; arouses, follows, moves all ext symmetrically   HEENT: PERRL. Right nare cortrak in place. Tube feeds 55. ET tube in place. RIJ CVC CDI. Supple. Trachea midline. No crepitus.   CVS: Distant heart tones. Regular rate and rhythm.   Respiratory: Scattered coarse crackles. No wheezes.   Abdomen/: Adipose soft. Non tender. Bowel sounds present.    Extremities: 2+ upper extremity anasarca. 1+ dependent edema. 1+ bilateral DP pulses.   Skin: Cool, dry, perfused. Capillary refill intact.     Respiratory:  Strong Vent Mode: Spont, Rate (breaths/min): 24, Vt Target (mL): 440, PEEP/CPAP: 10, FiO2: 40, P Support: 5, Static Compliance (ml / cm H2O): 166, Weaning Trial Stopped due to:: Pt weaned for 1 hour and returned to rest settings per protocol, Length of Weaning Trial (Hours): 1  Pulse Oximetry: 94 %  Imaging  Available data reviewed   Recent Labs      05/31/17   0447  06/01/17   0502  06/02/17   0537   ISTATAPH  7.415  7.400  7.411   ISTATAPCO2  35.3  38.8*  43.1*   ISTATAPO2  59*  73  95*   ISTATATCO2  24  25  29   LCEDCOF0BBE  91*  95  97   ISTATARTHCO3  22.7  24.0  27.4*   ISTATARTBE  -2  -1  3   ISTATTEMP  38.4 C  37.7 C  98.0 F   ISTATFIO2  30  40  40   ISTATSPEC  Arterial  Arterial  Arterial   ISTATAPHTC  7.394*  7.389*  7.416   [id number]  65  77  93*     HemoDynamics:  Pulse: 89, Heart Rate (Monitored): 89  NIBP: 132/56 mmHg     Imaging: Available data reviewed         Neuro:  GCS Total [place] Coma Score: 11  Imaging: Available data reviewed    Fluids:  Intake/Output       05/31/17 0700 - 06/01/17 0659 06/01/17 0700 - 06/02/17 0659 06/02/17 0700 - 06/03/17 0659      8316-2614 4789-9631 Total 8149-8387 6268-5650 Total 2179-7736 2403-0472 Total       Intake    I.V.  1303.5  1306.8 2610.3  720.6  445.7 1166.3  59.9  -- 59.9    Propofol Volume 192.5 298.8 491.3 128.6 195.7 324.3 18.9 -- 18.9    IV Volume (IV maintenance)  578 240 818 40 -- 40    Fent 15 12 27 14 10 24 1 -- 1    IV Volume (bolus) 100 -- 100 -- -- -- -- -- --    Other  60  60 120  30  100 130  120  -- 120    Medications (P.O./ Enteral Liquids) 60 60 120 30 100 130 120 -- 120    Enteral  1410  1160 2570  1520  910 2430  140  -- 140    Enteral Volume   110 -- 110    Free Water / Tube Flush   30 -- 30    Total Intake 2773.5 2526.8 5300.3 2270.6 1455.7 3726.3 319.9 -- 319.9       Output    Urine  1075  2500 3575  3385  1260 4645  300  -- 300    Indwelling Cathether 1075 2500 3575 3385 1260 4645 300 -- 300    Drains  0  -- 0  0  -- 0  --  -- --    Residual Amount (ml) (Discarded) 0 -- 0 0 -- 0 -- -- --    Stool  --  -- --  --  -- --  --  -- --    Number of Times Stooled 2 x 4 x 6 x 3 x -- 3 x -- -- --    Total Output 1075 2500 3575 3385 1260 4645 300 -- 300       Net I/O     1698.5 26.8 1725.3  -1114.4 195.7 -918.7 19.9 -- 19.9        Weight: 105.9 kg (233 lb 7.5 oz)  Recent Labs      17   SODIUM  149*  148*  145   POTASSIUM  4.3  4.3  4.0   CHLORIDE  119*  118*  113*   CO2  23  25  27   BUN  41*  39*  44*   CREATININE  1.88*  1.42*  1.43*   CALCIUM  8.5  8.3*  8.7     GI/Nutrition:  Imaging: Available data reviewed  NPO , no Cortrak or NG per GI still (since  EGD)    Liver Function:  Recent Labs      17   ALTSGPT  53*   --   86*   ASTSGOT  86*   --   110*   ALKPHOSPHAT  50   --   95   TBILIRUBIN  9.0*   --   6.6*   DBILIRUBIN  6.0*   --   4.2*   GLUCOSE  149*  135*  123*     Heme:  Recent Labs      17   1130   17   1540  17   RBC   --    --   2.79*   --   2.80*   --   2.69*   HEMOGLOBIN   --    < >  7.1*   < >  7.2*  8.3*  7.0*   HEMATOCRIT   --    < >  25.1*   < >  25.4*  29.1*  24.3*   PLATELETCT   --    --   83*   --   87*   --   104*   IRON  119   --    --    --    --    --    --    FERRITIN  1171.6*   --    --    --    --    --    --    TOTIRONBC  155*   --    --    --    --    --    --     < > = values in this interval not displayed.     Infectious Disease:  Temp  Av.5 °C (99.5 °F)  Min: 37.4 °C (99.3 °F)  Max: 37.6 °C (99.7 °F)  Micro: reviewed   Day  Unasyn.  Recent Labs      17   WBC  5.3  5.6  5.0   NEUTSPOLYS  71.70  64.50  74.60*   LYMPHOCYTES  12.90*  18.20*  13.70*   MONOCYTES  2.80  5.50  1.80   EOSINOPHILS  6.40  6.40  3.60   BASOPHILS  1.30  1.80  2.70*   ASTSGOT  86*   --   110*   ALTSGPT  53*   --   86*   ALKPHOSPHAT  50   --   95   TBILIRUBIN  9.0*   --   6.6*     Current Facility-Administered Medications   Medication Dose Frequency Provider Last Rate Last Dose   • omeprazole 2 mg/mL in sodium bicarbonate (PRILOSEC) oral susp 40 mg  40 mg Q12HRS Edwin Greene M.D.   40 mg at  06/02/17 0754   • acetaminophen (TYLENOL) tablet 650 mg  650 mg Q6HRS PRN Caridad Ibarra M.D.   650 mg at 05/31/17 0357   • propofol (DIPRIVAN) injection  5-80 mcg/kg/min Continuous Kaz Mobley M.D.   Stopped at 06/02/17 0915   • Respiratory Care per Protocol   Continuous RT Kaz Mobley M.D.       • senna-docusate (PERICOLACE or SENOKOT S) 8.6-50 MG per tablet 2 Tab  2 Tab BID Kaz Mobley M.D.   Stopped at 06/01/17 2100    And   • polyethylene glycol/lytes (MIRALAX) PACKET 1 Packet  1 Packet QDAY PRN Kaz Mobley M.D.   1 Packet at 05/30/17 0327    And   • magnesium hydroxide (MILK OF MAGNESIA) suspension 30 mL  30 mL QDAY PRN Kaz Mobley M.D.   30 mL at 05/30/17 1419    And   • bisacodyl (DULCOLAX) suppository 10 mg  10 mg QDAY PRN Kaz Mobley M.D.   10 mg at 05/30/17 1927   • chlorhexidine (PERIDEX) 0.12 % solution 15 mL  15 mL BID Kaz Mobley M.D.   15 mL at 06/02/17 0754   • lidocaine (XYLOCAINE) 1%  injection  1-2 mL Q30 MIN PRN Kaz Mobley M.D.       • fentaNYL (SUBLIMAZE) injection 25 mcg  25 mcg Q HOUR PRN Kaz Mobley M.D.        Or   • fentaNYL (SUBLIMAZE) injection 50 mcg  50 mcg Q HOUR PRN Kaz Mobley M.D.   50 mcg at 05/30/17 0002    Or   • fentaNYL (SUBLIMAZE) injection 100 mcg  100 mcg Q HOUR PRN Kaz Mobley M.D.       • fentaNYL (SUBLIMAZE) 50 mcg/mL in 50mL   Continuous Kaz Mobley M.D.   Stopped at 06/02/17 0930   • ipratropium-albuterol (DUONEB) nebulizer solution 3 mL  3 mL Q2HRS PRN (RT) Kaz Mobley M.D.       • ipratropium-albuterol (DUONEB) nebulizer solution 3 mL  3 mL Q4HRS (RT) Kaz Mobley M.D.   3 mL at 06/02/17 0615   • insulin lispro (HUMALOG) injection 3-14 Units  3-14 Units Q6HRS Clayton Bautista M.D.   Stopped at 06/02/17 0000   • ferrous sulfate tablet 325 mg  325 mg BID WITH MEALS Caridad Ibarra M.D.   325 mg at 06/02/17 0533   •  levothyroxine (SYNTHROID) tablet 150 mcg  150 mcg AM ES Caridad Ibarra M.D.   150 mcg at 06/02/17 0533   • niacin tablet 500 mg  500 mg DAILY Caridad Ibarra M.D.   500 mg at 06/02/17 0754   • pravastatin (PRAVACHOL) tablet 40 mg  40 mg QHS Caridad Ibarra M.D.   40 mg at 06/01/17 2009   • NS infusion 3,198 mL  30 mL/kg Once PRN Caridad Ibarra M.D.       • NS (BOLUS) infusion 1,000 mL  1,000 mL Once PRN Caridad Ibarra M.D.       • oxycodone immediate-release (ROXICODONE) tablet 2.5 mg  2.5 mg Q3HRS PRN Caridad Ibarra M.D.        And   • oxycodone immediate-release (ROXICODONE) tablet 5 mg  5 mg Q3HRS PRN Caridad Ibarra M.D.        And   • morphine (pf) 4 mg/ml injection 2 mg  2 mg Q3HRS PRN Caridad Ibarra M.D.       • glucose 4 g chewable tablet 16 g  16 g Q15 MIN PRN Caridad Ibarra M.D.        And   • dextrose 50% (D50W) injection 25 mL  25 mL Q15 MIN PRN Caridad Ibarra M.D.       • ondansetron (ZOFRAN) syringe/vial injection 4 mg  4 mg Q4HRS PRN Caridad Ibarra M.D.       • ondansetron (ZOFRAN ODT) dispertab 4 mg  4 mg Q4HRS PRN Caridad Ibarra M.D.       • multivitamin (THERAGRAN) tablet 1 Tab  1 Tab DAILY Caridad Ibarra M.D.   1 Tab at 06/02/17 0754     Last reviewed on 5/24/2017  9:56 PM by Zora Mccarthy, [facility]    Quality  Measures:  Labs reviewed, Radiology images reviewed and Medications reviewed                    Problems/plan:  Ventilator-dependent respiratory failure - > ARDS/aspPNA? From UGIB?   - Intubated in ER early AM 5/25   - RT protocols   - SBT better today   - will extubate; follow closely; may require re-intubation   Hemoptysis   -  FOB in the ER 5/24.    - No bleeding sites were identified in the lungs.  There was no endobronchial tumor.   - ? aspiration with UGIB   - NIVT neg   - Recurrent hemoptysis 5/27 - lots of plugs/no clear source bleeding  Esophageal ulcer   - S/p EGD 5/26   - PPI   - start TF  Community-acquired pneumonia - vs aspiration PNA/ARDS   - ABX - completed Unasyn/Doxy  Sepsis, pulmonary source.   - Protocols  Anemia  with thrombocytopenia.   - Serial lab - transfuse per restrictive threshold  Acute on chronic kidney disease.   - follow with addition of furosemide   Elevated lipase.  His abdominal exam is unremarkable.  Elevated troponin - demand ischemia - NSTEMI?  Chronic diastolic heart failure with grade I diastolic dysfunction.  Hypothyroidism - replete  Diabetes mellitus type 2 - glycemic control - monitor for need for I drip  Dyslipidemia - diet/statin  Recent hospitalization for lower gastrointestinal hemorrhage   - presumed due to sigmoid diverticulosis.   - Serial H/H   - PPI   Remote history of significant smoking - COPD? PRN nebs.  Enteral nutrion  Prophylaxis  Free water          Discussed patient condition and risk of morbidity and/or mortality with Family, RN, RT, Pharmacy, Charge nurse / hot rounds and GI and hospitalist.    The patient remains critically ill.  Critical care time = 32 minutes in directly providing and coordinating critical care and extensive data review.  No time overlap and excludes procedures.

## 2017-06-02 NOTE — THERAPY
"Occupational Therapy Evaluation completed.   Functional Status:  Pt is total a x 2 for supine to sit and back to bed, mod a for EOB sitting, mod a for face wipe with cloth, total a to don socks, max x 2 sit to stand. Pt limited by weakness.   Plan of Care: Will benefit from Occupational Therapy 3 times per week  Discharge Recommendations:  Equipment: Will Continue to Assess for Equipment Needs. Post-acute therapy Discharge to a transitional care facility for continued skilled therapy services.    See \"Rehab Therapy-Acute\" Patient Summary Report for complete documentation.    "

## 2017-06-02 NOTE — RESPIRATORY CARE
Extubation    Cuff leak noted: yes  Stridor present: not at this time     Patient toleration: well    Events/Summary/Plan: extubated pt per Dr. Greene, cuff leak present, no stridor at this time, placed pt on 5L oxymask and tolerating well at this time (06/02/17 4422)

## 2017-06-02 NOTE — THERAPY
"Physical Therapy Evaluation completed.   Bed Mobility:  Supine to Sit: Total Assist X 2  Transfers: Sit to Stand: Maximal Assist (x2 w/ blocking knees)  Gait: Level Of Assist: Unable to Participate with Front-Wheel Walker       Plan of Care: Will benefit from Physical Therapy 3 times per week  Discharge Recommendations: Equipment: Front-Wheel Walker. Post-acute therapy Discharge to a transitional care facility for continued skilled therapy services.    See \"Rehab Therapy-Acute\" Patient Summary Report for complete documentation.     "

## 2017-06-02 NOTE — PROGRESS NOTES
Hospital Medicine Interval Note  Date of Service:  6/1/2017    Chief Complaint  76 y.o.-year-old male admitted 5/24/2017 with shortness of breath and required intubation.    Interval Problem Update  Tolerated SBT CXR improved  Consultants/Specialty  Pulmonology.  GI--DHA  I discussed with Dr. Greene on Hot Rounds.   Disposition  ICU     Review of Systems   Unable to perform ROS: intubated      Physical Exam Laboratory/Imaging   Filed Vitals:    06/01/17 1000 06/01/17 1431 06/01/17 1540 06/01/17 1648   BP:       Pulse: 90      Temp: 37.7 °C (99.9 °F)      Resp: 24      Height:       Weight:       SpO2: 95% 96% 97% 95%     Physical Exam   Constitutional: He appears well-developed. No distress. He is intubated.   HENT:   Head: Normocephalic.   Mouth/Throat: Oropharynx is clear and moist.   Eyes: Right eye exhibits no discharge. Left eye exhibits no discharge. Scleral icterus is present.   Neck: No JVD present. No tracheal deviation present.   Cardiovascular: Normal rate and regular rhythm.    Murmur heard.  Pulmonary/Chest: He is intubated. He has rales. He exhibits no tenderness and no crepitus.   Abdominal: Soft. He exhibits no distension. There is no tenderness. There is no rebound.   No RUQ tenderness   Musculoskeletal: He exhibits edema. He exhibits no tenderness.   Neurological: No cranial nerve deficit. He exhibits normal muscle tone.   Sedated    Skin: Skin is warm and dry. No cyanosis. Nails show no clubbing.   Psychiatric:   sedated   Nursing note and vitals reviewed.   Lab Results   Component Value Date/Time    WBC 5.6 06/01/2017 03:18 AM    HEMOGLOBIN 8.3* 06/01/2017 03:40 PM    HEMATOCRIT 29.1* 06/01/2017 03:40 PM    PLATELET COUNT 87* 06/01/2017 03:18 AM     Lab Results   Component Value Date/Time    SODIUM 148* 06/01/2017 03:18 AM    POTASSIUM 4.3 06/01/2017 03:18 AM    CHLORIDE 118* 06/01/2017 03:18 AM    CO2 25 06/01/2017 03:18 AM    GLUCOSE 135* 06/01/2017 03:18 AM    BUN 39* 06/01/2017 03:18 AM     CREATININE 1.42* 06/01/2017 03:18 AM      Assessment/Plan    Acute respiratory failure with hypoxia (CMS-HCC) (present on admission)  Assessment & Plan  Intubated 5/24. Discussed with Dr Enoc Naqvi 5/30 old aspirated blood   SBT as tolerated    Esophagitis (present on admission)  Assessment & Plan  With ulcerations seen on EGD on 5/26.   Continue prilosec bid  Monitor Hfg    Hemoptysis (present on admission)  Assessment & Plan  Bright-red blood in the ER, s/p bronchoscopy .   resolved    Acute on chronic renal insufficiency (HCC) (present on admission)  Assessment & Plan  Improved Scr 1.4,   D/c IVF  Start diuresis   Monitor renal function and electrolytes with diuresis    DM (diabetes mellitus) (CMS-HCC) (present on admission)  Assessment & Plan  Continue ISS monitor cbg's    HTN (hypertension) (present on admission)  Assessment & Plan  Hx of.    Anemia (present on admission)  Assessment & Plan  Acute blood loss from upper GI bleed.  Hg improved    transfuse if <7      Thrombocytopenia (CMS-HCC) (present on admission)  Assessment & Plan  Likely reactive, stable, monitor cbc    Aortic stenosis, severe (present on admission)  Assessment & Plan  Further workup and evaluation  As outpt When more stable          Aspiration pneumonia (CMS-HCC) (present on admission)  Assessment & Plan  Completed     7 day course of abx      Hyperbilirubinemia (present on admission)  Assessment & Plan  Likely hemolysis post transfusion  Bilirubin trending down, recheck in am    Hypernatremia  Assessment & Plan  Free water flushes and monitor       Labs reviewed, Medications reviewed and Radiology images reviewed  Bond catheter: Unconscious / Sedated Patient on a Ventilator  Central line in place: Sepsis    DVT Prophylaxis: Contraindicated - High bleeding risk  DVT prophylaxis - mechanical: SCDs  Ulcer prophylaxis: Yes  Antibiotics: Treating active infection/contamination beyond 24 hours perioperative coverage

## 2017-06-03 PROBLEM — E87.0 HYPERNATREMIA: Status: RESOLVED | Noted: 2017-01-01 | Resolved: 2017-01-01

## 2017-06-03 NOTE — PROGRESS NOTES
Cortrak Placement    Tube Team verified patient name and medical record number prior to tube placement.  Cortrak tube (43 inches, 10 Cypriot) placed at 65 cm in right nare.  Per Cortrak picture, tube appears to be in the stomach.  Nursing Instructions: Awaiting KUB to confirm placement before use for medications or feeding. Once placement confirmed, flush tube with 30 ml of water, and then remove and save stylet, in patient medication drawer.

## 2017-06-03 NOTE — CARE PLAN
Problem: Bronchoconstriction:  Goal: Improve in air movement and diminished wheezing  Outcome: PROGRESSING AS EXPECTED    Problem: Hyperinflation:  Goal: Prevent or improve atelectasis  Outcome: PROGRESSING SLOWER THAN EXPECTED  PT IS is 250    Problem: Oxygenation:  Goal: Maintain adequate oxygenation dependent on patient condition  Outcome: PROGRESSING SLOWER THAN EXPECTED  PT on HHFNC 50L/40%

## 2017-06-03 NOTE — PROGRESS NOTES
Hospital Medicine Interval Note  Date of Service:  6/2/2017    Chief Complaint  76 y.o.-year-old male admitted 5/24/2017 with shortness of breath and required intubation.    Interval Problem Update  Extubated this am  Consultants/Specialty  Pulmonology.  GI--DHA  I discussed with Dr. Greene on Hot Rounds.   Disposition  ICU     Review of Systems   Constitutional: Positive for malaise/fatigue. Negative for fever and chills.   HENT: Negative.    Eyes: Negative.    Respiratory: Positive for cough and shortness of breath.    Cardiovascular: Negative for chest pain and palpitations.   Gastrointestinal: Positive for melena. Negative for nausea, vomiting, abdominal pain, diarrhea and blood in stool.   Genitourinary: Negative for hematuria and flank pain.   Musculoskeletal: Negative for joint pain and falls.   Skin: Negative.    Neurological: Positive for weakness. Negative for focal weakness and seizures.   Psychiatric/Behavioral: Negative for memory loss. The patient is not nervous/anxious.       Physical Exam Laboratory/Imaging   Filed Vitals:    06/02/17 1613 06/02/17 1700 06/02/17 1741 06/02/17 1800   BP:       Pulse: 86 94 88 86   Temp:       Resp: 20 25 26 23   Height:       Weight:       SpO2: 94% 92% 92% 92%     Physical Exam   Constitutional: He is oriented to person, place, and time. He appears well-developed. No distress.   HENT:   Head: Normocephalic.   Right Ear: External ear normal.   Left Ear: External ear normal.   Eyes: Right eye exhibits no discharge. Left eye exhibits no discharge. Scleral icterus is present.   Neck: No JVD present. No tracheal deviation present.   Cardiovascular: Normal rate and regular rhythm.    Murmur heard.  Pulmonary/Chest: No stridor. No respiratory distress. He has rhonchi. He has rales. He exhibits no tenderness and no crepitus.   Abdominal: Soft. He exhibits no distension. There is no tenderness. There is no rebound and no guarding.   Musculoskeletal: He exhibits edema. He  exhibits no tenderness.   Neurological: He is alert and oriented to person, place, and time. No cranial nerve deficit. He exhibits normal muscle tone.   Follows command   Skin: Skin is warm and dry. He is not diaphoretic. No cyanosis. Nails show no clubbing.   Psychiatric: He has a normal mood and affect. His behavior is normal.   Nursing note and vitals reviewed.   Lab Results   Component Value Date/Time    WBC 5.0 06/02/2017 04:10 AM    HEMOGLOBIN 7.4* 06/02/2017 03:00 PM    HEMATOCRIT 25.8* 06/02/2017 03:00 PM    PLATELET COUNT 104* 06/02/2017 04:10 AM     Lab Results   Component Value Date/Time    SODIUM 145 06/02/2017 04:10 AM    POTASSIUM 4.0 06/02/2017 04:10 AM    CHLORIDE 113* 06/02/2017 04:10 AM    CO2 27 06/02/2017 04:10 AM    GLUCOSE 123* 06/02/2017 04:10 AM    BUN 44* 06/02/2017 04:10 AM    CREATININE 1.43* 06/02/2017 04:10 AM      Assessment/Plan    Acute respiratory failure with hypoxia (CMS-HCC) (present on admission)  Assessment & Plan  Intubated 5/24. Extubated 6-2   Discussed with Dr Stubbs  RT protcol    Esophagitis (present on admission)  Assessment & Plan  With ulcerations seen on EGD on 5/26.   Continue prilosec bid  Monitor clinically    Hemoptysis (present on admission)  Assessment & Plan  Bright-red blood in the ER, s/p bronchoscopy .   resolved    Acute on chronic renal insufficiency (HCC) (present on admission)  Assessment & Plan  Stable renal function   Continue  diuresis   Monitor renal function and electrolytes      DM (diabetes mellitus) (CMS-HCC) (present on admission)  Assessment & Plan  Continue ISS monitor cbg's    HTN (hypertension) (present on admission)  Assessment & Plan  Hx of.    Anemia (present on admission)  Assessment & Plan  Acute blood loss from upper GI bleed.  Monitor hg    transfuse if <7      Thrombocytopenia (CMS-HCC) (present on admission)  Assessment & Plan  Likely reactive, plts 104 monitor cbc    Aortic stenosis, severe (present on admission)  Assessment &  Plan  Further workup and evaluation  As outpt When more stable          Aspiration pneumonia (CMS-HCC) (present on admission)  Assessment & Plan  Completed     7 day course of abx      Hyperbilirubinemia (present on admission)  Assessment & Plan  Likely hemolysis post transfusion  Bilirubin improving    Hypernatremia  Assessment & Plan  Improved with Free water flushes          Labs reviewed, Medications reviewed and Radiology images reviewed  Bond catheter: Unconscious / Sedated Patient on a Ventilator  Central line in place: Sepsis    DVT Prophylaxis: Contraindicated - High bleeding risk  DVT prophylaxis - mechanical: SCDs  Ulcer prophylaxis: Yes  Antibiotics: Treating active infection/contamination beyond 24 hours perioperative coverage

## 2017-06-03 NOTE — CARE PLAN
Problem: Bronchoconstriction:  Goal: Improve in air movement and diminished wheezing  Intervention: Implement inhaled treatments  Duoneb 4x/day      Problem: Ventilation Defect:  Goal: Ability to achieve and maintain unassisted ventilation or tolerate decreased levels of ventilator support  Outcome: MET Date Met:  06/02/17  Extubated    Problem: Hyperinflation:  Goal: Prevent or improve atelectasis  Intervention: Instruct incentive spirometry usage  IS use with PEP  Intervention: Perform hyperinflation therapy as indicated by assessment  PEP therapy 4x/day post-extubation

## 2017-06-03 NOTE — CARE PLAN
Problem: Safety  Goal: Will remain free from falls  Outcome: PROGRESSING AS EXPECTED  Pt is high fall risk. Hourly rounding in place. Treaded socks on; appropriate sign on door placed for number of assistance needed. Personal belongings and call light within reach. Bed alarm in place. Pt in room next to RN station.     Problem: Bowel/Gastric:  Goal: Normal bowel function is maintained or improved  Outcome: PROGRESSING AS EXPECTED  LBM 6/3

## 2017-06-03 NOTE — PROGRESS NOTES
Pulmonary Critical Care Progress Note      Date of service: 6/3/2017    Chief Complaint: SOB    History of Present Illness:  Patient is a 77 y/o M, history of Type II DM, hypothyroidism, history of hospitalization on 03/24/17 for sigmoid diverticulosis with GI bleed and negative colonoscopy, presented to ED 5/24/17 with complaints of dyspnea x2 days. Hematemesis? In ED, possibly aspirated blood, was intubated and bronchoscopy at that time showed blood tinged secretions with no endobronchial lesions, repeat bronchoscopy 5/27/17 showed significant thick bloody secretions otherwise clear.     ROS:    Respiratory: unable to perform due to the patient's inability to effectively communicate,   Cardiac: unable to perform due to the patient's inability to effectively communicate,   GI: unable to perform due to the patient's inability to effectively communicate.     Interval Events:  24 hour interval history reviewed   Alert and oriented x3-4, anxious, given Oxy for relaxation  SR/ST, -140 systolic. Afebrile.  Failed swallow eval yesterday.   Last BM this AM, adequate urine output with Lasix 40 BID.   Mobilizing well.  7L oxymask, incentive spirometry 250-300 mL, intermittent mask pulling.   CXR with cardiomegaly, bilateral lower lobe infiltrate, atelectasis, with probable ongoing pulmonary edema.   ISS 3 units in the last 24hr.     PFSH:  No change.    Physical Exam  General: more lethargic  Neuro/Psych: follows, weak 4/5 th/o,   HEENT: PERRL. Right nare cortrak in place. Tube feeds 55. ET tube in place. RIJ CVC CDI. Supple. Trachea midline. No crepitus.   CVS: Distant heart tones. Regular rate and rhythm.   Respiratory: Scattered coarse crackles. No wheezes. Diminished   Abdomen/: Adipose soft. Non tender. Bowel sounds present.    Extremities: 2+ upper extremity anasarca. 1+ dependent edema. 1+ bilateral DP pulses.   Skin: Cool, dry, perfused. Capillary refill intact.       Respiratory:     Pulse Oximetry: 92  %  Imaging Available data reviewed   Recent Labs      06/01/17   0502  06/02/17   0537   ISTATAPH  7.400  7.411   ISTATAPCO2  38.8*  43.1*   ISTATAPO2  73  95*   ISTATATCO2  25  29   [id number]  95  97   ISTATARTHCO3  24.0  27.4*   ISTATARTBE  -1  3   ISTATTEMP  37.7 C  98.0 F   ISTATFIO2  40  40   ISTATSPEC  Arterial  Arterial   ISTATAPHTC  7.389*  7.416   [id number]  77  93*     HemoDynamics:  Pulse: 89, Heart Rate (Monitored): 91  NIBP: 128/58 mmHg     Imaging: Available data reviewed         Neuro:  GCS Total [place] Coma Score: 11  Imaging: Available data reviewed    Fluids:  Intake/Output       06/01/17 0700 - 06/02/17 0659 06/02/17 0700 - 06/03/17 0659 06/03/17 0700 - 06/04/17 0659      0700-1859 1900-0659 Total 0700-1859 1900-0659 Total 1849-5901 0725-8728 Total       Intake    I.V.  720.6  445.7 1166.3  268.4  200 468.4  --  -- --    Propofol Volume 128.6 195.7 324.3 26.9 -- 26.9 -- -- --    IV Volume (IV maintenance) 578 240 818 240 200 440 -- -- --    Fent 14 10 24 1.5 -- 1.5 -- -- --    Other  30  100 130  180  -- 180  --  -- --    Medications (P.O./ Enteral Liquids) 30 100 130 180 -- 180 -- -- --    Enteral  1520  910 2430  1220  800 2020  --  -- --    Enteral Volume   -- -- --    Free Water / Tube Flush  560 250 810 -- -- --    Total Intake 2270.6 1455.7 3726.3 1668.4 1000 2668.4 -- -- --       Output    Urine  3385  1260 4645  3500  1700 5200  --  -- --    Indwelling Cathether 3385 1260 4645 3500 1700 5200 -- -- --    Drains  0  -- 0  --  -- --  --  -- --    Residual Amount (ml) (Discarded) 0 -- 0 -- -- -- -- -- --    Stool  --  -- --  --  -- --  --  -- --    Number of Times Stooled 3 x -- 3 x 3 x -- 3 x -- -- --    Total Output 3385 1260 4645 3500 1700 5200 -- -- --       Net I/O     -1114.4 195.7 -918.7 -1831.6 -700 -6111.6 -- -- --        Weight: 109.7 kg (241 lb 13.5 oz)  Recent Labs      06/01/17   0318  06/02/17   0410  06/03/17   0327   SODIUM  148*  145   144   POTASSIUM  4.3  4.0  3.4*   CHLORIDE  118*  113*  109   CO2  25  27  29   BUN  39*  44*  43*   CREATININE  1.42*  1.43*  1.26   CALCIUM  8.3*  8.7  8.9     GI/Nutrition:  Imaging: Available data reviewed  NPO , no Cortrak or NG per GI still (since 5/26 EGD)    Liver Function:  Recent Labs      06/01/17 0318 06/02/17   0410  06/03/17   0327   ALTSGPT   --   86*   --    ASTSGOT   --   110*   --    ALKPHOSPHAT   --   95   --    TBILIRUBIN   --   6.6*   --    DBILIRUBIN   --   4.2*   --    GLUCOSE  135*  123*  125*     Heme:  Recent Labs      06/01/17 0318 06/02/17 0410  06/02/17   1100  06/02/17   1500  06/03/17 0327   RBC  2.80*   --   2.69*   --    --   2.81*   HEMOGLOBIN  7.2*   < >  7.0*  7.2*  7.4*  7.3*   HEMATOCRIT  25.4*   < >  24.3*   --   25.8*  25.3*   PLATELETCT  87*   --   104*   --    --   118*    < > = values in this interval not displayed.     Infectious Disease:  No Data Recorded  Micro: reviewed   Recent Labs      06/01/17 0318 06/02/17 0410 06/03/17 0327   WBC  5.6  5.0  5.3   NEUTSPOLYS  64.50  74.60*  70.90   LYMPHOCYTES  18.20*  13.70*  13.80*   MONOCYTES  5.50  1.80  4.70   EOSINOPHILS  6.40  3.60  3.40   BASOPHILS  1.80  2.70*  1.10   ASTSGOT   --   110*   --    ALTSGPT   --   86*   --    ALKPHOSPHAT   --   95   --    TBILIRUBIN   --   6.6*   --      Current Facility-Administered Medications   Medication Dose Frequency Provider Last Rate Last Dose   • ipratropium-albuterol (DUONEB) nebulizer solution 3 mL  3 mL 4X/DAY (RT) Edwin Greene M.D.   3 mL at 06/03/17 0626   • omeprazole 2 mg/mL in sodium bicarbonate (PRILOSEC) oral susp 40 mg  40 mg Q12HRS Edwin Greene M.D.   40 mg at 06/03/17 0807   • acetaminophen (TYLENOL) tablet 650 mg  650 mg Q6HRS PRN Caridad Ibarra M.D.   650 mg at 05/31/17 0357   • propofol (DIPRIVAN) injection  5-80 mcg/kg/min Continuous Kaz Mobley M.D.   Stopped at 06/02/17 0915   • Respiratory Care per Protocol   Continuous RT Kaz ANDREWS  thee Cervantes M.D.       • senna-docusate (PERICOLACE or SENOKOT S) 8.6-50 MG per tablet 2 Tab  2 Tab BID Kaz Mobley M.D.   Stopped at 06/01/17 2100    And   • polyethylene glycol/lytes (MIRALAX) PACKET 1 Packet  1 Packet QDAY PRN Kaz Mobley M.D.   1 Packet at 05/30/17 0327    And   • magnesium hydroxide (MILK OF MAGNESIA) suspension 30 mL  30 mL QDAY PRN Kaz Mobley M.D.   30 mL at 05/30/17 1419    And   • bisacodyl (DULCOLAX) suppository 10 mg  10 mg QDAY PRN Kaz Mobley M.D.   10 mg at 05/30/17 1927   • lidocaine (XYLOCAINE) 1%  injection  1-2 mL Q30 MIN PRN Kaz Mobley M.D.       • fentaNYL (SUBLIMAZE) injection 25 mcg  25 mcg Q HOUR PRN Kaz Mobley M.D.        Or   • fentaNYL (SUBLIMAZE) injection 50 mcg  50 mcg Q HOUR PRN Kaz Mobley M.D.   50 mcg at 05/30/17 0002    Or   • fentaNYL (SUBLIMAZE) injection 100 mcg  100 mcg Q HOUR PRN Kaz Mobley M.D.       • fentaNYL (SUBLIMAZE) 50 mcg/mL in 50mL   Continuous Kaz Mobley M.D.   Stopped at 06/02/17 0930   • ipratropium-albuterol (DUONEB) nebulizer solution 3 mL  3 mL Q2HRS PRN (RT) Kaz Mobley M.D.       • insulin lispro (HUMALOG) injection 3-14 Units  3-14 Units Q6HRS Clayton Bautista M.D.   Stopped at 06/02/17 1800   • ferrous sulfate tablet 325 mg  325 mg BID WITH MEALS Caridad Ibarra M.D.   325 mg at 06/03/17 0808   • levothyroxine (SYNTHROID) tablet 150 mcg  150 mcg AM ES Caridad Ibarra M.D.   150 mcg at 06/03/17 0807   • niacin tablet 500 mg  500 mg DAILY Caridad Ibarra M.D.   500 mg at 06/03/17 0807   • pravastatin (PRAVACHOL) tablet 40 mg  40 mg QHS Caridad Ibarra M.D.   40 mg at 06/02/17 2214   • NS infusion 3,198 mL  30 mL/kg Once PRN Caridad Ibarra M.D.       • NS (BOLUS) infusion 1,000 mL  1,000 mL Once PRN Caridad Ibarra M.D.       • oxycodone immediate-release (ROXICODONE) tablet 2.5 mg  2.5 mg Q3HRS PRN Caridad Ibarra M.D.        And   • oxycodone  immediate-release (ROXICODONE) tablet 5 mg  5 mg Q3HRS PRN Caridad Ibarra M.D.        And   • morphine (pf) 4 mg/ml injection 2 mg  2 mg Q3HRS PRN Caridad Ibarra M.D.       • glucose 4 g chewable tablet 16 g  16 g Q15 MIN PRN Caridad Ibarra M.D.        And   • dextrose 50% (D50W) injection 25 mL  25 mL Q15 MIN PRN Caridad Ibarra M.D.       • ondansetron (ZOFRAN) syringe/vial injection 4 mg  4 mg Q4HRS PRN Caridad Ibarra M.D.       • ondansetron (ZOFRAN ODT) dispertab 4 mg  4 mg Q4HRS PRN Caridad Ibarra M.D.       • multivitamin (THERAGRAN) tablet 1 Tab  1 Tab DAILY Caridad Ibarra M.D.   1 Tab at 06/03/17 0808     Last reviewed on 5/24/2017  9:56 PM by Zora Mccarthy, [facility]    Quality  Measures:  Labs reviewed, Radiology images reviewed and Medications reviewed                    Problems/plan:  Ventilator-dependent respiratory failure - > ARDS/aspPNA? From UGIB?   - Intubated in ER early AM 5/25   - RT protocols   - extubated 6/2   - marginal resp status   - will try HFNC today; possible bipap; hope to avoid re-intubation    - f/u ABG   - lasix  Hemoptysis   -  FOB in the ER 5/24.    - No bleeding sites were identified in the lungs.  There was no endobronchial tumor.   - ? aspiration with UGIB   - NIVT neg   - Recurrent hemoptysis 5/27 - lots of plugs/no clear source bleeding  Esophageal ulcer   - S/p EGD 5/26   - PPI   - start TF  Community-acquired pneumonia - vs aspiration PNA/ARDS   - ABX - completed Unasyn/Doxy  Sepsis, pulmonary source.   - Protocols  Anemia with thrombocytopenia.   - Serial lab - transfuse per restrictive threshold  Acute on chronic kidney disease.   - follow with addition of furosemide   Elevated lipase.  His abdominal exam is unremarkable.  Elevated troponin - demand ischemia - NSTEMI?  Chronic diastolic heart failure with grade I diastolic dysfunction.  Hypothyroidism - replete  Diabetes mellitus type 2 - glycemic control - monitor for need for I drip  Dyslipidemia - diet/statin  Recent hospitalization for  lower gastrointestinal hemorrhage   - presumed due to sigmoid diverticulosis.   - Serial H/H   - PPI   Remote history of significant smoking - COPD? PRN nebs.  Enteral nutrion  Prophylaxis  Free water      D/C Fentanyl   Lasix QD  Switch to HFNC    Discussed patient condition and risk of morbidity and/or mortality with Family, RN, RT, Pharmacy, Charge nurse / hot rounds and GI and hospitalist.    The patient remains critically ill.  Critical care time = 32 minutes in directly providing and coordinating critical care and extensive data review.  No time overlap and excludes procedures.    Jose DIAL ([place]), am scribing for, and in the presence of, Edwin Greene M.D.    Electronically signed by: Jose Hicks ([place]), 6/3/2017    Edwin DIAL M.D. personally performed the services described in this documentation, as scribed by Jose Hicks in my presence, and it is both accurate and complete.

## 2017-06-03 NOTE — RESPIRATORY CARE
COPD EDUCATION by COPD CLINICAL EDUCATOR  6/2/2017 at 5:41 PM by Aurea Kim     Patient reviewed by COPD education team. Patient does not qualify for COPD program.

## 2017-06-04 PROBLEM — J81.0 ACUTE PULMONARY EDEMA (HCC): Status: ACTIVE | Noted: 2017-01-01

## 2017-06-04 NOTE — CARE PLAN
Problem: Pain Management  Goal: Pain level will decrease to patient’s comfort goal  Outcome: PROGRESSING AS EXPECTED  Pt denies pain. Will reassess pain level at least Q2H and as needed.     Problem: Mobility  Goal: Risk for activity intolerance will decrease  Outcome: PROGRESSING AS EXPECTED  Pt mobilizes to edge of bed and tolerates standing momentarily to transfer to chair.

## 2017-06-04 NOTE — PROGRESS NOTES
" Pulmonary Critical Care Progress Note      Date of service: 6/4/2017    Chief Complaint: SOB    History of Present Illness:  Patient is a 77 y/o M, history of Type II DM, hypothyroidism, history of hospitalization on 03/24/17 for sigmoid diverticulosis with GI bleed and negative colonoscopy, presented to ED 5/24/17 with complaints of dyspnea x2 days. Hematemesis? In ED, possibly aspirated blood, was intubated and bronchoscopy at that time showed blood tinged secretions with no endobronchial lesions, repeat bronchoscopy 5/27/17 showed significant thick bloody secretions otherwise clear.       ROS:    Respiratory: unable to perform due to the patient's inability to effectively communicate,   Cardiac: unable to perform due to the patient's inability to effectively communicate,   GI: unable to perform due to the patient's inability to effectively communicate.       Interval Events:  24 hour interval history reviewed   Patient now restrained after pulling cortrak out yesterday.   Wrote \"Kill me\" on clipboard yesterday. Patient is still a full code.   Alert and oriented X1, confused.   Pain medication X1 overnight.   SR/ST,  systolic   38 Tmax   TF at goal, Last BM today, 1300 cc UOP   Mobilizing.   HFNC 50L at 45% and  mL  CXR shows increasing pulmonary edema and bilateral pleural effusion       PFSH:  No change.      Physical Exam  General: Pulled cortrak out, placed in restraints. Confused on HFNC.   Neuro/Psych: Confused and oriented X1. Moves all extremities symmetrically with 5/5 strength in bilateral upper and lower extremities.   HEENT: PERRL. Right nare cortrak in place. Tube feeds 55. ET tube in place. RIJ CVC CDI. Supple. Trachea midline. No crepitus. Dry mucous membranes.   CVS: Distant heart tones. Regular rate and rhythm.   Respiratory: Scattered coarse crackles. No wheezes. Diminished   Abdomen/: Adipose soft. Non tender. No rebound or guarding.  Bowel sounds present.    Extremities: Trace " edema. 2+ bilateral DP pulses.   Skin: Cool, dry, perfused. No mottling.  Capillary refill intact.       Respiratory:     Pulse Oximetry: 96 %  Imaging Available data reviewed   Recent Labs      06/02/17   0537  06/03/17   1446   ISTATAPH  7.411  7.510*   ISTATAPCO2  43.1*  33.1   ISTATAPO2  95*  58*   ISTATATCO2  29  27   [id number]  97  93   ISTATARTHCO3  27.4*  26.4*   ISTATARTBE  3  3   ISTATTEMP  98.0 F  37.6 C   ISTATFIO2  40  40   ISTATSPEC  Arterial  Arterial   ISTATAPHTC  7.416  7.501*   [id number]  93*  61*     HemoDynamics:  Pulse: 90, Heart Rate (Monitored): 90  NIBP: 132/53 mmHg     Imaging: Available data reviewed       Neuro:  GCS Total [place] Coma Score: 15  Imaging: Available data reviewed    Fluids:  Intake/Output       06/02/17 0700 - 06/03/17 0659 06/03/17 0700 - 06/04/17 0659 06/04/17 0700 - 06/05/17 0659      8526-4981 9816-7803 Total 0529-7849 8894-4058 Total 1486-5152 9008-3558 Total       Intake    I.V.  268.4  200 468.4  200  40 240  --  -- --    Propofol Volume 26.9 -- 26.9 -- -- -- -- -- --    IV Volume (IV maintenance) 240 200 440 200 40 240 -- -- --    Fent 1.5 -- 1.5 -- -- -- -- -- --    Other  180  -- 180  180  150 330  --  -- --    Medications (P.O./ Enteral Liquids) 180 -- 180 180 150 330 -- -- --    Enteral  1220  800 2020  945  1440 2385  --  -- --    Enteral Volume   -- -- --    Free Water / Tube Flush 560 250 810  -- -- --    Total Intake 1668.4 1000 2668.4 1325 1630 2955 -- -- --       Output    Urine  3500  1700 5200  2700  1600 4300  --  -- --    Indwelling Cathether 3500 1700 5200 2700 1600 4300 -- -- --    Stool  --  -- --  --  -- --  --  -- --    Number of Times Stooled 3 x -- 3 x 1 x -- 1 x -- -- --    Total Output 3500 1700 5200 2700 1600 4300 -- -- --       Net I/O     -1831.6 -700 -2531.6 -1375 30 -2102 -- -- --           Recent Labs      06/02/17   0410  06/03/17   0327  06/04/17   0448   SODIUM  145  144  145   POTASSIUM  4.0   3.4*  3.6   CHLORIDE  113*  109  111   CO2  27  29  26   BUN  44*  43*  42*   CREATININE  1.43*  1.26  1.25   CALCIUM  8.7  8.9  8.6     GI/Nutrition:  Imaging: Available data reviewed  NPO , no Cortrak or NG per GI still (since 5/26 EGD)    Liver Function:  Recent Labs      06/02/17 0410  06/03/17 0327 06/04/17 0448   ALTSGPT  86*   --    --    ASTSGOT  110*   --    --    ALKPHOSPHAT  95   --    --    TBILIRUBIN  6.6*   --    --    DBILIRUBIN  4.2*   --    --    GLUCOSE  123*  125*  148*     Heme:  Recent Labs      06/02/17 0410   06/02/17   1500  06/03/17 0327 06/04/17 0448   RBC  2.69*   --    --   2.81*  2.86*   HEMOGLOBIN  7.0*   < >  7.4*  7.3*  7.5*   HEMATOCRIT  24.3*   --   25.8*  25.3*  25.6*   PLATELETCT  104*   --    --   118*  160*    < > = values in this interval not displayed.     Infectious Disease:  No Data Recorded  Micro: reviewed     Recent Labs      06/02/17 0410 06/03/17 0327 06/04/17 0448   WBC  5.0  5.3  6.4   NEUTSPOLYS  74.60*  70.90  70.60   LYMPHOCYTES  13.70*  13.80*  11.90*   MONOCYTES  1.80  4.70  3.70   EOSINOPHILS  3.60  3.40  2.70   BASOPHILS  2.70*  1.10  1.80   ASTSGOT  110*   --    --    ALTSGPT  86*   --    --    ALKPHOSPHAT  95   --    --    TBILIRUBIN  6.6*   --    --      Current Facility-Administered Medications   Medication Dose Frequency Provider Last Rate Last Dose   • furosemide (LASIX) injection 40 mg  40 mg Q DAY Edwin Greene M.D.   40 mg at 06/03/17 1116   • potassium chloride (KLOR-CON) 20 MEQ packet 40 mEq  40 mEq BID Edwin Greene M.D.   40 mEq at 06/03/17 2012   • ipratropium-albuterol (DUONEB) nebulizer solution 3 mL  3 mL 4X/DAY (RT) Edwin Greene M.D.   3 mL at 06/04/17 0701   • omeprazole 2 mg/mL in sodium bicarbonate (PRILOSEC) oral susp 40 mg  40 mg Q12HRS Edwin Greene M.D.   40 mg at 06/03/17 2012   • acetaminophen (TYLENOL) tablet 650 mg  650 mg Q6HRS PRN Caridad Ibarra M.D.   650 mg at 05/31/17 0357   • Respiratory Care per  Protocol   Continuous RT Kaz Mobley M.D.       • senna-docusate (PERICOLACE or SENOKOT S) 8.6-50 MG per tablet 2 Tab  2 Tab BID Kaz Mobley M.D.   Stopped at 06/01/17 2100    And   • polyethylene glycol/lytes (MIRALAX) PACKET 1 Packet  1 Packet QDAY PRN Kaz Mobley M.D.   1 Packet at 05/30/17 0327    And   • magnesium hydroxide (MILK OF MAGNESIA) suspension 30 mL  30 mL QDAY PRN Kaz Mobley M.D.   30 mL at 05/30/17 1419    And   • bisacodyl (DULCOLAX) suppository 10 mg  10 mg QDAY PRN Kaz Mobley M.D.   10 mg at 05/30/17 1927   • lidocaine (XYLOCAINE) 1%  injection  1-2 mL Q30 MIN PRN Kaz Mobley M.D.       • ipratropium-albuterol (DUONEB) nebulizer solution 3 mL  3 mL Q2HRS PRN (RT) Kaz Mobley M.D.       • insulin lispro (HUMALOG) injection 3-14 Units  3-14 Units Q6HRS Clayton Bautista M.D.   Stopped at 06/03/17 1800   • ferrous sulfate tablet 325 mg  325 mg BID WITH MEALS Caridad Ibarra M.D.   325 mg at 06/04/17 0552   • levothyroxine (SYNTHROID) tablet 150 mcg  150 mcg AM ES Caridad Ibarra M.D.   150 mcg at 06/04/17 0552   • niacin tablet 500 mg  500 mg DAILY Caridad Ibarra M.D.   500 mg at 06/03/17 0807   • pravastatin (PRAVACHOL) tablet 40 mg  40 mg QHS Caridad Ibarra M.D.   40 mg at 06/03/17 2227   • NS infusion 3,198 mL  30 mL/kg Once PRN Caridad Ibarra M.D.       • NS (BOLUS) infusion 1,000 mL  1,000 mL Once PRN Caridad Ibarra M.D.       • oxycodone immediate-release (ROXICODONE) tablet 2.5 mg  2.5 mg Q3HRS PRN Caridad Ibarra M.D.        And   • oxycodone immediate-release (ROXICODONE) tablet 5 mg  5 mg Q3HRS PRN Caridad Ibarra M.D.   5 mg at 06/03/17 2012    And   • morphine (pf) 4 mg/ml injection 2 mg  2 mg Q3HRS PRN Caridad Ibarra M.D.       • glucose 4 g chewable tablet 16 g  16 g Q15 MIN PRN Caridad Ibarra M.D.        And   • dextrose 50% (D50W) injection 25 mL  25 mL Q15 MIN PRLEON Ibarra M.D.       • ondansetron (ZOFRAN) syringe/vial injection 4 mg   4 mg Q4HRS PRN Caridad Ibarra M.D.       • ondansetron (ZOFRAN ODT) dispertab 4 mg  4 mg Q4HRS PRN Caridad Ibarra M.D.       • multivitamin (THERAGRAN) tablet 1 Tab  1 Tab DAILY Caridad Ibarra M.D.   1 Tab at 06/03/17 0808     Last reviewed on 5/24/2017  9:56 PM by Zora Mccarthy, T    Quality  Measures:  Labs reviewed, Radiology images reviewed and Medications reviewed                    Problems/plan:  Ventilator-dependent respiratory failure - > ARDS/aspPNA? From UGIB?   - Intubated in ER early AM 5/25   - RT protocols   - extubated 6/2   - marginal resp status   - will try HFNC today; possible bipap; hope to avoid re-intubation    - f/u ABG   - lasix  Hemoptysis   -  FOB in the ER 5/24.    - No bleeding sites were identified in the lungs.  There was no endobronchial tumor.   - ? aspiration with UGIB   - NIVT neg   - Recurrent hemoptysis 5/27 - lots of plugs/no clear source bleeding  Esophageal ulcer   - S/p EGD 5/26   - PPI   - start TF  Community-acquired pneumonia - vs aspiration PNA/ARDS   - ABX - completed Unasyn/Doxy  Sepsis, pulmonary source.   - Protocols  Anemia with thrombocytopenia.   - Serial lab - transfuse per restrictive threshold  Acute on chronic kidney disease.   - follow with addition of furosemide   Elevated lipase.  His abdominal exam is unremarkable.  Elevated troponin - demand ischemia - NSTEMI?  Chronic diastolic heart failure with grade I diastolic dysfunction.  Hypothyroidism - replete  Diabetes mellitus type 2 - glycemic control - monitor for need for I drip  Dyslipidemia - diet/statin  Recent hospitalization for lower gastrointestinal hemorrhage   - presumed due to sigmoid diverticulosis.   - Serial H/H   - PPI   Remote history of significant smoking - COPD? PRN nebs.  Enteral nutrion  Prophylaxis  Free water    D/C Fentanyl   Lasix QD  Switch to HFNC    Increase diuresis   Mobilize more       Discussed patient condition and risk of morbidity and/or mortality with Family, RN, RT, Pharmacy,  Charge nurse / hot rounds and GI and hospitalist.    The patient remains critically ill.  Critical care time = 32 minutes in directly providing and coordinating critical care and extensive data review.  No time overlap and excludes procedures.    Demetria DIAL ([place]), am scribing for, and in the presence of, Edwin Greene M.D.  Electronically signed by: Demetria aRmon ([place]), 6/4/2017  Edwin DIAL M.D. personally performed the services described in this documentation, as scribed by Demetria Ramon in my presence, and it is both accurate and complete.

## 2017-06-04 NOTE — PROGRESS NOTES
Hospital Medicine Interval Note  Date of Service:  6/3/2017    Chief Complaint  76 y.o.-year-old male admitted 5/24/2017 with shortness of breath and required intubation.    Interval Problem Update  Failed swalloe eval on 7lpm oxymask  Generalized weakness    Consultants/Specialty  Pulmonology.  GI--DHA  I discussed with Dr. Greene on Hot Rounds.   Disposition  ICU     Review of Systems   Constitutional: Positive for malaise/fatigue. Negative for fever and diaphoresis.   HENT: Negative.    Eyes: Negative.    Respiratory: Positive for cough and shortness of breath. Negative for sputum production.    Cardiovascular: Positive for leg swelling. Negative for chest pain and palpitations.   Gastrointestinal: Negative for nausea, vomiting, abdominal pain, diarrhea, blood in stool and melena.   Genitourinary: Negative for hematuria and flank pain.   Musculoskeletal: Negative for joint pain and falls.   Skin: Negative.    Neurological: Positive for weakness. Negative for dizziness, focal weakness and seizures.   Psychiatric/Behavioral: Negative for memory loss. The patient is not nervous/anxious.       Physical Exam Laboratory/Imaging   Filed Vitals:    06/03/17 1500 06/03/17 1600 06/03/17 1700 06/03/17 1800   BP:       Pulse: 86 89 78 87   Temp:       Resp: 25 27 19 15   Height:       Weight:       SpO2: 93% 92%       Physical Exam   Constitutional: He is oriented to person, place, and time. He appears well-developed. No distress.   HENT:   Head: Normocephalic.   Eyes: Right eye exhibits no discharge. Left eye exhibits no discharge. Scleral icterus is present.   Neck: No JVD present. No tracheal deviation present.   Cardiovascular: Normal rate and regular rhythm.  Exam reveals no gallop and no friction rub.    Murmur heard.  Pulmonary/Chest: No stridor. No respiratory distress. He has rhonchi. He has rales. He exhibits no tenderness and no crepitus.   Abdominal: Soft. He exhibits no distension and no mass. There is no  tenderness. There is no rebound and no guarding.   Musculoskeletal: He exhibits edema. He exhibits no tenderness.   Neurological: He is alert and oriented to person, place, and time. No cranial nerve deficit. He exhibits normal muscle tone.   Skin: Skin is warm and dry. He is not diaphoretic. No cyanosis. Nails show no clubbing.   Psychiatric: He has a normal mood and affect. His behavior is normal.   Nursing note and vitals reviewed.   Lab Results   Component Value Date/Time    WBC 5.3 06/03/2017 03:27 AM    HEMOGLOBIN 7.3* 06/03/2017 03:27 AM    HEMATOCRIT 25.3* 06/03/2017 03:27 AM    PLATELET COUNT 118* 06/03/2017 03:27 AM     Lab Results   Component Value Date/Time    SODIUM 144 06/03/2017 03:27 AM    POTASSIUM 3.4* 06/03/2017 03:27 AM    CHLORIDE 109 06/03/2017 03:27 AM    CO2 29 06/03/2017 03:27 AM    GLUCOSE 125* 06/03/2017 03:27 AM    BUN 43* 06/03/2017 03:27 AM    CREATININE 1.26 06/03/2017 03:27 AM      Assessment/Plan    Acute respiratory failure with hypoxia (CMS-HCC) (present on admission)  Assessment & Plan  Intubated 5/24. Extubated 6-2   Discussed with Dr Stubbs  RT protcol    Esophagitis (present on admission)  Assessment & Plan  With ulcerations seen on EGD on 5/26.   Continue prilosec bid  Monitor clinically    Hemoptysis (present on admission)  Assessment & Plan  Bright-red blood in the ER, s/p bronchoscopy .   resolved    Acute on chronic renal insufficiency (HCC) (present on admission)  Assessment & Plan  Stable renal function   Continue  diuresis   Monitor renal function and electrolytes      DM (diabetes mellitus) (CMS-HCC) (present on admission)  Assessment & Plan  Continue ISS monitor cbg's    HTN (hypertension) (present on admission)  Assessment & Plan  Hx of.    Anemia (present on admission)  Assessment & Plan  Acute blood loss from upper GI bleed.  Monitor hg    transfuse if <7      Thrombocytopenia (CMS-HCC) (present on admission)  Assessment & Plan  Likely reactive, plts 104 monitor  cbc    Aortic stenosis, severe (present on admission)  Assessment & Plan  Further workup and evaluation  As outpt When more stable          Aspiration pneumonia (CMS-HCC) (present on admission)  Assessment & Plan  Completed     7 day course of abx      Hyperbilirubinemia (present on admission)  Assessment & Plan  Likely hemolysis post transfusion  Bilirubin improving    Hypernatremia  Assessment & Plan  Improved with Free water flushes       Dysphagia  Assessment & Plan  Failed swallow eval   Continue tube feeding and ST      Continue ICU monitoring given tenuous resp status   Plan of care reviewed with patient and wife  and discussed with nursing staff    Labs reviewed, Medications reviewed and Radiology images reviewed  Bond catheter: Critically Ill - Requiring Accurate Measurement of Urinary Output  Central line in place: Sepsis    DVT Prophylaxis: Contraindicated - High bleeding risk  DVT prophylaxis - mechanical: SCDs  Ulcer prophylaxis: Yes  Antibiotics: Treating active infection/contamination beyond 24 hours perioperative coverage

## 2017-06-04 NOTE — CARE PLAN
Problem: Communication  Goal: The ability to communicate needs accurately and effectively will improve  Intervention: Reorient patient to environment as needed  Patient reoriented to room and hospital. Educated on how to use call light.       Problem: Mobility  Goal: Risk for activity intolerance will decrease  Patient eager to re-gain strength. Patient able to sit edge of bed and stand with assistance of two people.

## 2017-06-04 NOTE — PROGRESS NOTES
Hospital Medicine Interval Note  Date of Service:  6/4/2017    Chief Complaint  76 y.o.-year-old male admitted 5/24/2017 with shortness of breath and required intubation.    Interval Problem Update    On HFNC 50l 45%  CXR with worsening pulm edema  Confused earlier AOx3 on my exam     Consultants/Specialty  Pulmonology.  GI--DHA  I discussed with Dr. Greene on Hot Rounds.   Disposition  ICU     Review of Systems   Constitutional: Positive for malaise/fatigue. Negative for fever, chills and diaphoresis.   HENT: Negative.    Eyes: Negative.    Respiratory: Positive for cough and shortness of breath. Negative for hemoptysis and sputum production.    Cardiovascular: Positive for leg swelling. Negative for chest pain, palpitations and orthopnea.   Gastrointestinal: Positive for abdominal pain. Negative for nausea and vomiting.   Genitourinary: Negative for hematuria and flank pain.   Musculoskeletal: Negative for joint pain and falls.   Skin: Negative.    Neurological: Positive for weakness (generalized). Negative for dizziness, focal weakness and seizures.   Psychiatric/Behavioral: Negative for memory loss. The patient is not nervous/anxious.       Physical Exam Laboratory/Imaging   Filed Vitals:    06/04/17 1300 06/04/17 1400 06/04/17 1419 06/04/17 1500   BP:       Pulse: 94 86 86 87   Temp:       Resp: 29 23 27 25   Height:       Weight:       SpO2: 95% 95% 93% 95%     Physical Exam   Constitutional: He is oriented to person, place, and time. He appears well-developed.   HENT:   Head: Normocephalic.   Right Ear: External ear normal.   Left Ear: External ear normal.   Mouth/Throat: No oropharyngeal exudate.   Eyes: Right eye exhibits no discharge. Left eye exhibits no discharge. Scleral icterus is present.   Neck: No JVD present. No tracheal deviation present.   Cardiovascular: Normal rate and regular rhythm.  Exam reveals no gallop and no friction rub.    Murmur heard.  Pulmonary/Chest: No stridor. No respiratory  distress. He has rhonchi. He has rales. He exhibits no tenderness and no crepitus.   Abdominal: Soft. Bowel sounds are normal. He exhibits no distension. There is no tenderness. There is no rebound.   Musculoskeletal: He exhibits edema. He exhibits no tenderness.   Neurological: He is alert and oriented to person, place, and time. No cranial nerve deficit. He exhibits normal muscle tone.   Skin: Skin is warm and dry. He is not diaphoretic. No cyanosis. Nails show no clubbing.   Psychiatric: He has a normal mood and affect. His speech is delayed. He is slowed.   Nursing note and vitals reviewed.   Lab Results   Component Value Date/Time    WBC 6.4 06/04/2017 04:48 AM    HEMOGLOBIN 7.5* 06/04/2017 04:48 AM    HEMATOCRIT 25.6* 06/04/2017 04:48 AM    PLATELET COUNT 160* 06/04/2017 04:48 AM     Lab Results   Component Value Date/Time    SODIUM 145 06/04/2017 04:48 AM    POTASSIUM 3.6 06/04/2017 04:48 AM    CHLORIDE 111 06/04/2017 04:48 AM    CO2 26 06/04/2017 04:48 AM    GLUCOSE 148* 06/04/2017 04:48 AM    BUN 42* 06/04/2017 04:48 AM    CREATININE 1.25 06/04/2017 04:48 AM      Assessment/Plan    Acute respiratory failure with hypoxia (CMS-HCC) (present on admission)  Assessment & Plan  Intubated 5/24. Extubated 6-2   Discussed with Dr Stubbs  Continue HFNC   RT protcol    Esophagitis (present on admission)  Assessment & Plan  With ulcerations seen on EGD on 5/26.   Continue prilosec bid       Hemoptysis (present on admission)  Assessment & Plan  resolved    Acute on chronic renal insufficiency (HCC) (present on admission)  Assessment & Plan    Improved continue IV lasix and  Monitor renal function and electrolytes      DM (diabetes mellitus) (CMS-HCC) (present on admission)  Assessment & Plan  Continue ISS monitor cbg's    HTN (hypertension) (present on admission)  Assessment & Plan  Hx of.    Anemia (present on admission)  Assessment & Plan  Acute blood loss from upper GI bleed.   hg 7.5  Stable   no active bleeding at  this time   Monitor and  transfuse if <7      Thrombocytopenia (CMS-HCC) (present on admission)  Assessment & Plan  Likely reactive, improved    Aortic stenosis, severe (present on admission)  Assessment & Plan  Further workup and evaluation  As outpt When more stable          Aspiration pneumonia (CMS-HCC) (present on admission)  Assessment & Plan  Completed     7 day course of abx      Hyperbilirubinemia (present on admission)  Assessment & Plan  Likely hemolysis post transfusion  Bilirubin trending down    Dysphagia  Assessment & Plan  Failed swallow eval   Continue tube feeding and ST    Acute pulmonary edema (CMS-HCC)  Assessment & Plan  Increase lasix to bid monitor hemodynamics with AS      Continue ICU monitoring given tenuous resp status   Plan of care reviewed with patient and wife  and discussed with nursing staff and Dr Stubbs   Labs reviewed, Medications reviewed and Radiology images reviewed  Bond catheter: Critically Ill - Requiring Accurate Measurement of Urinary Output  Central line in place: Sepsis    DVT Prophylaxis: Contraindicated - High bleeding risk  DVT prophylaxis - mechanical: SCDs  Ulcer prophylaxis: Yes  Antibiotics: Treating active infection/contamination beyond 24 hours perioperative coverage

## 2017-06-04 NOTE — CARE PLAN
Problem: Hyperinflation:  Goal: Prevent or improve atelectasis  Outcome: PROGRESSING AS EXPECTED  PEP QID IS values at 500    Problem: Oxygenation:  Goal: Maintain adequate oxygenation dependent on patient condition  Outcome: PROGRESSING SLOWER THAN EXPECTED  Intervention: Manage oxygen therapy by monitoring pulse oximetry and/or ABG values  FNC 50/55%  Attempted to titrate FiO2 but Pt had desaturations

## 2017-06-05 NOTE — PROGRESS NOTES
Bedside report received. Assumed Patient care. Patient A&Ox3, disoriented to event. No complaints of pain at this time. Initial assessment completed. Lines verified. Patient's plan of care discussed with Patient and with wife via telephone.      Bed is in lowest position, siderails raised and call light is within reach.     MD at bedside and aware of morning labs, interventions ordered.

## 2017-06-05 NOTE — DISCHARGE PLANNING
VM from Frances at [facility] [id number] requesting information on patient transfer to [place].     Call to Max at [facility] regarding this VM.  Max recommending since VM was directed to me I should return call.  Call to Frances at [facility] and left message requesting return call.

## 2017-06-05 NOTE — DIETARY
WEEKLY TF UPDATE - TF via gastric Cortrak: Peptamen Intense VHP, goal rate of 55 ml/hr continuously = 1320 kcal, 123 grams protein, 1109 ml free water per day.   -TF remains tolerated well at goal rate; pt failed SLP evaluation 6/2.    Pertinent Labs: 141-182, BUN 46, creatinine 1.5, AST/ALT 97/100, alk phos 112, Tbili 5, prealbumin 17, CRP 10.2.   Pertinent Meds: ferrous sulfate, Lasix, SSI, Synthroid, Theragran, niacin, Prilosec, Klor-Con, bowel meds.  Fluids: free water with TF + free water flushes 250 ml q6hrs providing total daily volume = 2109 ml/day.  RN states that free water flushes were not given today d/t pt is fluid positive.   GI: +BM 6/4  WT: 105.1 kg, per bed scale - expect wt to continue trending downward on Lasix. Per I/O's, pt is ~2.7 L fluid positive.   SKIN: no pressure ulcers noted per chart review at this time.     PLAN/ RECOMMEND - Now that patient is s/p extubation, please change TF to Replete with Fiber, goal rate of 75 ml/hr continuously = 1800 kcal, 112 grams protein, 1512 mL free water.    -Fluids per MD   - Safest diet per SLP/MD when medically feasible.

## 2017-06-05 NOTE — DISCHARGE PLANNING
15 L oxy mask.   Marginal. Extubated.  Will mobilize today.      This chart has been reviewed by the hospital case management team. Based on the chart review and the patient's current medical status, the anticipated discharge plan is pending medical team collaboration.      Cont to follow.

## 2017-06-05 NOTE — PROGRESS NOTES
Pulmonary Critical Care Progress Note      Date of service: 6/5/2017    Chief Complaint: SOB    History of Present Illness:  Patient is a 77 y/o M, history of Type II DM, hypothyroidism, history of hospitalization on 03/24/17 for sigmoid diverticulosis with GI bleed and negative colonoscopy, presented to ED 5/24/17 with complaints of dyspnea x2 days. Hematemesis? In ED, possibly aspirated blood, was intubated and bronchoscopy at that time showed blood tinged secretions with no endobronchial lesions, repeat bronchoscopy 5/27/17 showed significant thick bloody secretions otherwise clear.       ROS:    Respiratory: unable to perform due to the patient's inability to effectively communicate,   Cardiac: unable to perform due to the patient's inability to effectively communicate,   GI: unable to perform due to the patient's inability to effectively communicate.       Interval Events:  24 hour interval history reviewed   Hemoglobin 6.9 from 7.5. One unit pRBCs ordered.   Alert and oriented X3   SR,  systolic   On 15L HFNC overnight, switched to oxymask this AM.   TF 55, failed swallow evaluation.   CXR shows diffuse pulmonary edema, unchanged.   Afebrile.      PFSH:  No change.      Physical Exam  General: Pulled cortrak out, placed in restraints.   Neuro/Psych: alert and oriented X3, mildly delayed, Moves all extremities symmetrically with 5/5 strength in bilateral upper and lower extremities. NFE.   HEENT: PERRL. Mild icterus and scleral edema. Right nare cortrak in place. Tube feeds 55. ET tube in place. RIJ CVC CDI.  Trachea supple and midline. No crepitus. Dry mucous membranes.   CVS: Distant heart tones. Regular rate and rhythm. 3/6 systolic murmur.   Respiratory: Scattered coarse crackles. No wheezes. Diminished at the bases.   Abdomen/: Adipose soft. Non tender. No rebound or guarding.  Bowel sounds present.    Extremities: Trace to 1+ edema. 2+ bilateral DP pulses.   Skin: Warm, dry, perfused. No  mottling.  Capillary refill brisk       Respiratory:     Pulse Oximetry: 92 %  Imaging Available data reviewed   Recent Labs      06/03/17   1446   ISTATAPH  7.510*   ISTATAPCO2  33.1   ISTATAPO2  58*   ISTATATCO2  27   [id number]  93   ISTATARTHCO3  26.4*   ISTATARTBE  3   ISTATTEMP  37.6 C   ISTATFIO2  40   ISTATSPEC  Arterial   ISTATAPHTC  7.501*   [id number]  61*     HemoDynamics:  Pulse: 87, Heart Rate (Monitored): 87  NIBP: 117/69 mmHg     Imaging: Available data reviewed       Neuro:  GCS Total [place] Coma Score: 15  Imaging: Available data reviewed    Fluids:  Intake/Output       06/03/17 0700 - 06/04/17 0659 06/04/17 0700 - 06/05/17 0659 06/05/17 0700 - 06/06/17 0659      1626-3530 0716-6181 Total 7335-0484 2314-5751 Total 0128-0914 9985-4624 Total       Intake    I.V.  200  40 240  --  -- --  --  -- --    IV Volume (IV maintenance) 200 40 240 -- -- -- -- -- --    Other  180  150 330  150  60 210  30  -- 30    Medications (P.O./ Enteral Liquids) 180 150 330 150 60 210 30 -- 30    Enteral  945  1440 2385  1055  1190 2245  170  -- 170    Enteral Volume   110 -- 110    Free Water / Tube Flush   60 -- 60    Total Intake 1325 1630 2955 1205 1250 2455 200 -- 200       Output    Urine  2700  1600 4300  2700  1775 4475  450  -- 450    Indwelling Cathether 2700 1600 4300 2700 1775 4475 450 -- 450    Stool  --  -- --  --  -- --  --  -- --    Number of Times Stooled 1 x -- 1 x -- -- -- 0 x -- 0 x    Total Output 2700 1600 4300 2700 1775 4475 450 -- 450       Net I/O     -1375 30 -1345 -1495 -525 -2020 -250 -- -250        Weight: 105.1 kg (231 lb 11.3 oz)  Recent Labs      06/03/17   0327  06/04/17   0448  06/05/17   0610   SODIUM  144  145  144   POTASSIUM  3.4*  3.6  4.1   CHLORIDE  109  111  110   CO2  29  26  27   BUN  43*  42*  46*   CREATININE  1.26  1.25  1.53*   CALCIUM  8.9  8.6  8.9     GI/Nutrition:  Imaging: Available data reviewed  NPO , no Cortrak or NG  per GI still (since 5/26 EGD)    Liver Function:  Recent Labs      06/03/17 0327 06/04/17 0448 06/05/17   0610   ALTSGPT   --    --   100*   ASTSGOT   --    --   97*   ALKPHOSPHAT   --    --   112*   TBILIRUBIN   --    --   5.0*   PREALBUMIN   --    --   17.0*   GLUCOSE  125*  148*  141*     Heme:  Recent Labs      06/03/17 0327 06/04/17 0448 06/05/17 0610   RBC  2.81*  2.86*  2.65*   HEMOGLOBIN  7.3*  7.5*  6.9*   HEMATOCRIT  25.3*  25.6*  24.0*   PLATELETCT  118*  160*  157*     Infectious Disease:  No Data Recorded  Micro: reviewed     Recent Labs      06/03/17 0327 06/04/17 0448 06/05/17 0610   WBC  5.3  6.4  6.0   NEUTSPOLYS  70.90  70.60  75.70*   LYMPHOCYTES  13.80*  11.90*  13.60*   MONOCYTES  4.70  3.70  3.90   EOSINOPHILS  3.40  2.70  1.00   BASOPHILS  1.10  1.80  2.00*   ASTSGOT   --    --   97*   ALTSGPT   --    --   100*   ALKPHOSPHAT   --    --   112*   TBILIRUBIN   --    --   5.0*     Current Facility-Administered Medications   Medication Dose Frequency Provider Last Rate Last Dose   • furosemide (LASIX) injection 40 mg  40 mg BID DIURETIC Irwin Yeager M.D.   40 mg at 06/05/17 0600   • potassium chloride (KLOR-CON) 20 MEQ packet 40 mEq  40 mEq 4XDAY Irwin Yeager M.D.   40 mEq at 06/05/17 0129   • ipratropium-albuterol (DUONEB) nebulizer solution 3 mL  3 mL 4X/DAY (RT) Edwin Greene M.D.   3 mL at 06/05/17 0700   • omeprazole 2 mg/mL in sodium bicarbonate (PRILOSEC) oral susp 40 mg  40 mg Q12HRS Edwin Greene M.D.   40 mg at 06/04/17 2207   • acetaminophen (TYLENOL) tablet 650 mg  650 mg Q6HRS PRN Caridad Ibarra M.D.   650 mg at 05/31/17 0357   • Respiratory Care per Protocol   Continuous RT Kaz Mobley M.D.       • senna-docusate (PERICOLACE or SENOKOT S) 8.6-50 MG per tablet 2 Tab  2 Tab BID Kaz Mobley M.D.   2 Tab at 06/04/17 8207    And   • polyethylene glycol/lytes (MIRALAX) PACKET 1 Packet  1 Packet QDAY PRN Kaz Mobley M.D.    1 Packet at 05/30/17 0327    And   • magnesium hydroxide (MILK OF MAGNESIA) suspension 30 mL  30 mL QDAY PRN Kaz Mobley M.D.   30 mL at 05/30/17 1419    And   • bisacodyl (DULCOLAX) suppository 10 mg  10 mg QDAY PRN Kaz Mobley M.D.   10 mg at 05/30/17 1927   • lidocaine (XYLOCAINE) 1%  injection  1-2 mL Q30 MIN PRN Kaz Mobley M.D.       • ipratropium-albuterol (DUONEB) nebulizer solution 3 mL  3 mL Q2HRS PRN (RT) Kaz Mobley M.D.   3 mL at 06/04/17 2214   • insulin lispro (HUMALOG) injection 3-14 Units  3-14 Units Q6HRS Clayton Bautista M.D.   Stopped at 06/05/17 0600   • ferrous sulfate tablet 325 mg  325 mg BID WITH MEALS Caridad Ibarra M.D.   325 mg at 06/04/17 1745   • levothyroxine (SYNTHROID) tablet 150 mcg  150 mcg AM ES Caridad Ibarra M.D.   150 mcg at 06/05/17 0700   • niacin tablet 500 mg  500 mg DAILY Caridad Ibarra M.D.   500 mg at 06/04/17 0826   • pravastatin (PRAVACHOL) tablet 40 mg  40 mg QHS Caridad Ibarra M.D.   40 mg at 06/04/17 2207   • NS infusion 3,198 mL  30 mL/kg Once PRN Caridad Ibarra M.D.       • NS (BOLUS) infusion 1,000 mL  1,000 mL Once PRN Caridad Ibarra M.D.       • oxycodone immediate-release (ROXICODONE) tablet 2.5 mg  2.5 mg Q3HRS PRN Caridad Ibarra M.D.        And   • oxycodone immediate-release (ROXICODONE) tablet 5 mg  5 mg Q3HRS PRN Caridad Ibarra M.D.   5 mg at 06/05/17 0129    And   • morphine (pf) 4 mg/ml injection 2 mg  2 mg Q3HRS PRN Caridad Ibarra M.D.       • glucose 4 g chewable tablet 16 g  16 g Q15 MIN PRN Caridad Ibarra M.D.        And   • dextrose 50% (D50W) injection 25 mL  25 mL Q15 MIN PRN Caridad Ibarra M.D.       • ondansetron (ZOFRAN) syringe/vial injection 4 mg  4 mg Q4HRS PRN Caridad Ibarra M.D.       • ondansetron (ZOFRAN ODT) dispertab 4 mg  4 mg Q4HRS PRN Caridad Ibarra M.D.       • multivitamin (THERAGRAN) tablet 1 Tab  1 Tab DAILY Caridad Ibarra M.D.   1 Tab at 06/04/17 0826     Last reviewed on 5/24/2017  9:56 PM by Zora Mccarthy,  PhT    Quality  Measures:  Labs reviewed, Radiology images reviewed and Medications reviewed                    Problems/plan:  Ventilator-dependent respiratory failure - > ARDS/aspPNA? From UGIB?   - Intubated in ER early AM 5/25   - RT protocols   - extubated 6/2   - marginal resp status   - will try HFNC today; possible bipap; hope to avoid re-intubation    - f/u ABG   - lasix  Hemoptysis   -  FOB in the ER 5/24.    - No bleeding sites were identified in the lungs.  There was no endobronchial tumor.   - ? aspiration with UGIB   - NIVT neg   - Recurrent hemoptysis 5/27 - lots of plugs/no clear source bleeding  Esophageal ulcer   - S/p EGD 5/26   - PPI   - start TF  Community-acquired pneumonia - vs aspiration PNA/ARDS   - ABX - completed Unasyn/Doxy  Sepsis, pulmonary source.   - Protocols  Anemia with thrombocytopenia.   - Serial lab - transfuse per restrictive threshold  Acute on chronic kidney disease.   - follow with addition of furosemide   Elevated lipase.  His abdominal exam is unremarkable.  Elevated troponin - demand ischemia - NSTEMI?  Chronic diastolic heart failure with grade I diastolic dysfunction.  Hypothyroidism - replete  Diabetes mellitus type 2 - glycemic control - monitor for need for I drip  Dyslipidemia - diet/statin  Recent hospitalization for lower gastrointestinal hemorrhage   - presumed due to sigmoid diverticulosis.   - Serial H/H   - PPI   Remote history of significant smoking - COPD? PRN nebs.  Enteral nutrion  Prophylaxis  Free water    D/C Fentanyl   Lasix QD  Switch to HFNC  Mobilize more     Decrease lasix   BiPAP PRN        Discussed patient condition and risk of morbidity and/or mortality with Family, RN, RT, Pharmacy, Charge nurse / hot rounds and GI and hospitalist.    The patient remains critically ill.  Critical care time = 35 minutes in directly providing and coordinating critical care and extensive data review.  No time overlap and excludes procedures.    Demetria DIAL  (Scribe), am scribing for, and in the presence of, Edwin Greene M.D.  Electronically signed by: Demetria Ramon (Emelyibholden), 6/5/2017  IEdwin M.D. personally performed the services described in this documentation, as scribed by Demetria Ramon in my presence, and it is both accurate and complete.

## 2017-06-05 NOTE — PROGRESS NOTES
Hospital Medicine Interval Note  Date of Service:  6/5/2017    Chief Complaint  76 y.o.-year-old male admitted 5/24/2017 with shortness of breath and required intubation.    Interval Problem Update    Off HFNC did not tolerate Bipap  CXRstill with  pulm edema  Wife at bedside  Hg 6.9 transfused 1u PRBC    Consultants/Specialty  Pulmonology.  GI--DHA  I discussed with Dr. Greene on Hot Rounds.   Disposition  ICU     Review of Systems   Constitutional: Positive for malaise/fatigue. Negative for fever, chills and diaphoresis.   HENT: Negative.    Eyes: Negative.    Respiratory: Positive for cough and shortness of breath. Negative for hemoptysis, sputum production and wheezing.    Cardiovascular: Positive for leg swelling. Negative for chest pain and palpitations.   Gastrointestinal: Negative for nausea, vomiting, abdominal pain and blood in stool.   Genitourinary: Negative for hematuria and flank pain.   Musculoskeletal: Negative for joint pain and falls.   Skin: Negative.    Neurological: Positive for weakness (generalized). Negative for dizziness, focal weakness and seizures.   Psychiatric/Behavioral: Negative for memory loss. The patient is not nervous/anxious.       Physical Exam Laboratory/Imaging   Filed Vitals:    06/05/17 1300 06/05/17 1400 06/05/17 1444 06/05/17 1500   BP:       Pulse: 83 84 83 86   Temp:       Resp: 24 25 27 26   Height:       Weight:       SpO2: 93% 92% 94% 96%     Physical Exam   Constitutional: He is oriented to person, place, and time. He appears well-developed.   HENT:   Head: Normocephalic and atraumatic.   Mouth/Throat: No oropharyngeal exudate.   Eschar upper lip   Eyes: Right eye exhibits no discharge. Left eye exhibits no discharge. Scleral icterus is present.   Neck: No JVD present. No tracheal deviation present.   Cardiovascular: Normal rate and regular rhythm.  Exam reveals no gallop and no friction rub.    Murmur heard.  Pulmonary/Chest: No respiratory distress. He has rhonchi.  He has rales. He exhibits no tenderness and no crepitus.   Abdominal: Soft. Bowel sounds are normal. He exhibits no distension. There is no tenderness. There is no rebound.   Musculoskeletal: He exhibits edema. He exhibits no tenderness.   Neurological: He is alert and oriented to person, place, and time. No cranial nerve deficit. He exhibits normal muscle tone.   Skin: Skin is warm and dry. He is not diaphoretic. No cyanosis or erythema. Nails show no clubbing.   Psychiatric: He has a normal mood and affect. His speech is normal. He is slowed.   Nursing note and vitals reviewed.   Lab Results   Component Value Date/Time    WBC 6.0 06/05/2017 06:10 AM    HEMOGLOBIN 6.9* 06/05/2017 06:10 AM    HEMATOCRIT 24.0* 06/05/2017 06:10 AM    PLATELET COUNT 157* 06/05/2017 06:10 AM     Lab Results   Component Value Date/Time    SODIUM 144 06/05/2017 06:10 AM    POTASSIUM 4.1 06/05/2017 06:10 AM    CHLORIDE 110 06/05/2017 06:10 AM    CO2 27 06/05/2017 06:10 AM    GLUCOSE 141* 06/05/2017 06:10 AM    BUN 46* 06/05/2017 06:10 AM    CREATININE 1.53* 06/05/2017 06:10 AM      Assessment/Plan    Acute respiratory failure with hypoxia (CMS-HCC) (present on admission)  Assessment & Plan  Intubated 5/24. Extubated 6-2   Discussed with Dr Enoc Chaves PRN as tolerated   RT protcol    Esophagitis (present on admission)  Assessment & Plan  With ulcerations seen on EGD on 5/26.   Continue prilosec bid       Hemoptysis (present on admission)  Assessment & Plan  resolved    Acute on chronic renal insufficiency (HCC) (present on admission)  Assessment & Plan    decrease lasix to 40QD given increase in Scr   Monitor renal function and electrolytes      DM (diabetes mellitus) (CMS-HCC) (present on admission)  Assessment & Plan  Continue ISS monitor cbg's    HTN (hypertension) (present on admission)  Assessment & Plan  Hx of.    Anemia (present on admission)  Assessment & Plan  Acute blood loss from upper GI bleed.   receiving trasfusion for hg  6.9   clinically no active bleeding at this time   Monitor and  transfuse if <7      Thrombocytopenia (CMS-HCC) (present on admission)  Assessment & Plan  Likely reactive, overall improved    Aortic stenosis, severe (present on admission)  Assessment & Plan  Further workup and evaluation  As outpt When more stable discussed with pt and wife          Aspiration pneumonia (CMS-HCC) (present on admission)  Assessment & Plan  Completed     7 day course of abx  SLP  Aspiration precautions      Hyperbilirubinemia (present on admission)  Assessment & Plan  Likely hemolysis post transfusion  Bilirubin trending down abd exam benign monitor    Dysphagia  Assessment & Plan  Failed swallow eval   Continue tube feeding and ST    Acute pulmonary edema (CMS-HCC)  Assessment & Plan  Lasix, monitor hemodynamics given  AS      Continue ICU monitoring given tenuous resp status   Plan of care reviewed with patient and wife  and discussed with nursing staff and Dr Stubbs   Labs reviewed, Medications reviewed and Radiology images reviewed  Bond catheter: Critically Ill - Requiring Accurate Measurement of Urinary Output  Central line in place: Sepsis    DVT Prophylaxis: Contraindicated - High bleeding risk  DVT prophylaxis - mechanical: SCDs  Ulcer prophylaxis: Yes  Antibiotics: Treating active infection/contamination beyond 24 hours perioperative coverage

## 2017-06-05 NOTE — CARE PLAN
Problem: Communication  Goal: The ability to communicate needs accurately and effectively will improve  Intervention: [place] patient and significant other/support system to call light to alert staff of needs  Patient educated on safety concerns and the importance of staying in bed, bed alarm set.       Problem: Skin Integrity  Goal: Risk for impaired skin integrity will decrease  Intervention: Assess risk factors for impaired skin integrity and/or pressure ulcers  Skin assessed; nothing new noted at this time. Pictures placed in patient's chart on existing skin complications.

## 2017-06-05 NOTE — PROGRESS NOTES
Марина with dietary called and changed tube feed to Replete with Fiber at goal of 75.     RN will speak with MD to see if they would like to continue free water flushes.

## 2017-06-05 NOTE — DISCHARGE PLANNING
Return call from Frances at [facility] asking if patient will continue with [facility] benefits past the end of this month since he hasn't worked or is patient going to switch to [employer].  SW asked Frances to contact wife directly.  Frances stated she would call spouse.

## 2017-06-05 NOTE — CARE PLAN
Problem: Risk for injury related to physical restraint use  Goal: Safe and appropriate use of physical restraints. Restraints discontinued at the earliest possibility while ensuring patient safety.  Outcome: PROGRESSING AS EXPECTED

## 2017-06-06 NOTE — PROGRESS NOTES
[facility] Hospitalist Progress Note    Date of Service: 2017    Chief Complaint  76 y.o. male admitted 2017 with coughing blood.    Interval Problem Update  Mr. Rodgers was admitted with hemoptysis.  Bronchoscopy done . Blood out of the ETT. Hb had dropped and he had been transfused  RBCs.     Dr. Stoll did an EGD  revealing severe ulcerations of the esophagus. Bronch done . After transfusion his bili went from 2 to 12.  1,500 ml urine over night. Cortrak feeding  He did not tolerate bipap much last last night as he refuses to use it.   Consultants/Specialty  GI   Pulmonology  I discussed with Dr. Greene on Hot Rounds  Disposition  ICU        Review of Systems   Unable to perform ROS: medical condition      Physical Exam  Laboratory/Imaging   Hemodynamics  Temp (24hrs), Av.2 °C (99 °F), Min:37.1 °C (98.8 °F), Max:37.4 °C (99.3 °F)   Temperature: 37.4 °C (99.3 °F)  Pulse  Av.7  Min: 63  Max: 120 Heart Rate (Monitored): 83  Blood Pressure : 104/66 mmHg, NIBP: 120/55 mmHg      Respiratory      Respiration: (!) 25, Pulse Oximetry: 92 %, O2 Daily Delivery Respiratory : OxyMask     Work Of Breathing / Effort: Mild;Increased Work of Breathing  RUL Breath Sounds: Clear, RML Breath Sounds: Clear, RLL Breath Sounds: Diminished, CRIS Breath Sounds: Clear, LLL Breath Sounds: Diminished    Fluids    Intake/Output Summary (Last 24 hours) at 17 0815  Last data filed at 17 0600   Gross per 24 hour   Intake   2500 ml   Output   3800 ml   Net  -1300 ml       Nutrition  Orders Placed This Encounter   Procedures   • Diet NPO     Standing Status: Standing      Number of Occurrences: 1      Standing Expiration Date:      Order Specific Question:  Type:     Answer:  Now [1]     Order Specific Question:  Restrict to:     Answer:  Strict [1]     Physical Exam   Constitutional: No distress.   HENT:   cortrak   Eyes: Scleral icterus is present.   Neck: Neck supple.   Central line   Cardiovascular:  Normal rate.    Murmur heard.  Pulmonary/Chest: No respiratory distress. He has no wheezes. He has rales.   Abdominal: He exhibits distension. There is no tenderness.   Musculoskeletal: He exhibits no edema or tenderness.   Neurological:   Confusion but oriented to year and city   Skin: There is pallor.   jaundiced       Recent Labs      06/04/17 0448  06/05/17   0610  06/06/17   0635   WBC  6.4  6.0  8.7   RBC  2.86*  2.65*  3.42*   HEMOGLOBIN  7.5*  6.9*  9.2*   HEMATOCRIT  25.6*  24.0*  31.0*   MCV  89.5  90.6  90.6   MCH  26.2*  26.0*  26.9*   MCHC  29.3*  28.8*  29.7*   RDW  72.8*  75.0*  69.7*   PLATELETCT  160*  157*  179   MPV  11.5  11.1  11.0     Recent Labs      06/04/17 0448 06/05/17 0610  06/06/17   0635   SODIUM  145  144  144   POTASSIUM  3.6  4.1  4.4   CHLORIDE  111  110  108   CO2  26  27  30   GLUCOSE  148*  141*  179*   BUN  42*  46*  51*   CREATININE  1.25  1.53*  1.57*   CALCIUM  8.6  8.9  9.3                      Assessment/Plan     Acute respiratory failure with hypoxia (CMS-[facility]) (present on admission)  Assessment & Plan  Intubated 5/24. Extubated 6-2  Pulmonary consulted.  Bipap PRN as tolerated   RT protcol  IV lasix scheduled.     Esophagitis (present on admission)  Assessment & Plan  With ulcerations seen on EGD on 5/26.   Continue prilosec bid       Hemoptysis (present on admission)  Assessment & Plan  resolved    Acute on chronic renal insufficiency (HCC) (present on admission)  Assessment & Plan    decrease lasix to 40QD given increase in Scr   Monitor renal function and electrolytes      DM (diabetes mellitus) (CMS-HCC) (present on admission)  Assessment & Plan  Continue ISS monitor     HTN (hypertension) (present on admission)  Assessment & Plan  Hx of.    Anemia (present on admission)  Assessment & Plan  Acute blood loss from upper GI bleed.  S/p transfusions.   Hb 7.5  Monitor and  transfuse if <7      Thrombocytopenia (CMS-HCC) (present on admission)  Assessment &  Plan  Likely reactive, overall improved    Aortic stenosis, severe (present on admission)  Assessment & Plan  Further workup and evaluation for TAVR when bossman outpatient.          Aspiration pneumonia (CMS-[facility]) (present on admission)  Assessment & Plan  Completed     7 day course of abx  SLP  Aspiration precautions      Hyperbilirubinemia (present on admission)  Assessment & Plan  Likely hemolysis post transfusion  Bilirubin trending down abd exam now down to 5.    Dysphagia  Assessment & Plan  Failed swallow eval   Continue tube feeding and ST    Acute pulmonary edema (CMS-[facility])  Assessment & Plan  Lasix, monitor hemodynamics given  AS      Labs reviewed and Medications reviewed  Bond catheter: Epidural Catheter / Drain  Central line in place: Shock    DVT Prophylaxis: Contraindicated - Anemia requiring blood transfusion

## 2017-06-06 NOTE — PROGRESS NOTES
Cortrak Placement    Tube Team verified patient name and medical record number prior to tube placement.  Cortrak tube (43 inches, 10 Jordanian) placed at 75 cm in right nare.  Per Cortrak picture, tube appears to be in the stomach.  Nursing Instructions: Awaiting KUB to confirm placement before use for medications or feeding. Once placement confirmed, flush tube with 30 ml of water, and then remove and save stylet, in patient medication drawer.

## 2017-06-06 NOTE — CARE PLAN
Problem: Communication  Goal: The ability to communicate needs accurately and effectively will improve  Oriented patient to environment, working on speech and speaking clearly, doing speech exercise to regain strength to speak and swallow.     Problem: Fluid Volume:  Goal: Will maintain balanced intake and output  Outcome: PROGRESSING AS EXPECTED  Pt fluid status monitored with strict I/O documentation, assessment of skin integrity, edema and lung sounds.

## 2017-06-06 NOTE — PROGRESS NOTES
Pulmonary Critical Care Progress Note      Date of service: 6/6/2017    Chief Complaint: SOB    History of Present Illness:  Patient is a 77 y/o M, history of Type II DM, hypothyroidism, history of hospitalization on 03/24/17 for sigmoid diverticulosis with GI bleed and negative colonoscopy, presented to ED 5/24/17 with complaints of dyspnea x2 days. Hematemesis? In ED, possibly aspirated blood, was intubated and bronchoscopy at that time showed blood tinged secretions with no endobronchial lesions, repeat bronchoscopy 5/27/17 showed significant thick bloody secretions otherwise clear.       ROS:    Respiratory: unable to perform due to the patient's inability to effectively communicate,   Cardiac: unable to perform due to the patient's inability to effectively communicate,   GI: unable to perform due to the patient's inability to effectively communicate.       Interval Events:  24 hour interval history reviewed   Pulled out cortrak this AM.   Cortrak restarted at 75 with good toleration   BiPAP overnight for 4 hours. Currently on oxymask 8L  Alert and oriented X3, confused to time.   SR, BP  systolic   4.3 L out over the last 24 hours.   Mobilizing.   CXR shows improving pulmonary edema and bilateral pleural effusions.       PFSH:  No change.      Physical Exam  General: Pulled cortrak out, placed in restraints.   Neuro/Psych: awake, alert and following, oriented X2, mildly confused, Moves all extremities symmetrically with 5/5 strength in bilateral upper and lower extremities. NFE.   HEENT: PERRL. Mild icterus and scleral edema. RIJ CVC CDI.  Trachea supple and midline. No crepitus. Dry and pink mucous membranes.   CVS: Distant heart tones. Regular rate and rhythm. 3/6 systolic murmur.   Respiratory: Scattered coarse crackles at the bases. No wheezes. Clear anteriorly.   Abdomen/: Adipose soft. Non tender. No rebound or guarding.  Bowel sounds present.  Bond with good UOP.   Extremities: Trace to 1+  edema. 2+ bilateral DP pulses.   Skin: Warm, dry, perfused. No mottling.  Capillary refill brisk. Scattered areas of ecchymosis and purpura that is unchanged.       Respiratory:     Pulse Oximetry: 92 %  Imaging Available data reviewed   Recent Labs      06/03/17   1446   ISTATAPH  7.510*   ISTATAPCO2  33.1   ISTATAPO2  58*   ISTATATCO2  27   [id number]  93   ISTATARTHCO3  26.4*   ISTATARTBE  3   ISTATTEMP  37.6 C   ISTATFIO2  40   ISTATSPEC  Arterial   ISTATAPHTC  7.501*   [id number]  61*     HemoDynamics:  Pulse: 83, Heart Rate (Monitored): 83  Blood Pressure : 104/66 mmHg, NIBP: 120/55 mmHg     Imaging: Available data reviewed       Neuro:  GCS    Imaging: Available data reviewed    Fluids:  Intake/Output       06/04/17 0700 - 06/05/17 0659 06/05/17 0700 - 06/06/17 0659 06/06/17 0700 - 06/07/17 0659      0700-1859 9701-5997 Total 0700-1859 9269-2013 Total 0821-4674 6403-3987 Total       Intake    Blood  --  -- --  350  -- 350  --  -- --    Volume (RELEASE RED BLOOD CELLS) -- -- -- 350 -- 350 -- -- --    Other  150  60 210  60  30 90  --  -- --    Medications (P.O./ Enteral Liquids) 150 60 210 60 30 90 -- -- --    Enteral  1055  1190 2245  1050  1270 2320  --  -- --    Enteral Volume   -- -- --    Free Water / Tube Flush  390 450 840 -- -- --    Total Intake 1205 1250 2455 1460 1300 2760 -- -- --       Output    Urine  2700  1775 4475  3000  1350 4350  --  -- --    Indwelling Cathether 2700 1775 4475 3000 1350 4350 -- -- --    Stool  --  -- --  --  -- --  --  -- --    Number of Times Stooled -- -- -- 1 x 2 x 3 x -- -- --    Total Output 2700 1775 4475 3000 1350 4350 -- -- --       Net I/O     -1495 -525 -2020 -1540 -50 -1590 -- -- --           Recent Labs      06/04/17   0448  06/05/17   0610  06/06/17   0635   SODIUM  145  144  144   POTASSIUM  3.6  4.1  4.4   CHLORIDE  111  110  108   CO2  26  27  30   BUN  42*  46*  51*   CREATININE  1.25  1.53*  1.57*   CALCIUM  8.6  8.9   9.3     GI/Nutrition:  Imaging: Available data reviewed  NPO , no Cortrak or NG per GI still (since  EGD)    Liver Function:  Recent Labs      1735   ALTSGPT   --   100*   --    ASTSGOT   --   97*   --    ALKPHOSPHAT   --   112*   --    TBILIRUBIN   --   5.0*   --    PREALBUMIN   --   17.0*   --    GLUCOSE  148*  141*  179*     Heme:  Recent Labs      17   RBC  2.86*  2.65*  3.42*   HEMOGLOBIN  7.5*  6.9*  9.2*   HEMATOCRIT  25.6*  24.0*  31.0*   PLATELETCT  160*  157*  179     Infectious Disease:  Temp  Av.2 °C (99 °F)  Min: 37.1 °C (98.8 °F)  Max: 37.4 °C (99.3 °F)  Micro: reviewed     Recent Labs      1735   WBC  6.4  6.0  8.7   NEUTSPOLYS  70.60  75.70*  71.10   LYMPHOCYTES  11.90*  13.60*  11.80*   MONOCYTES  3.70  3.90  4.90   EOSINOPHILS  2.70  1.00  3.90   BASOPHILS  1.80  2.00*  1.30   ASTSGOT   --   97*   --    ALTSGPT   --   100*   --    ALKPHOSPHAT   --   112*   --    TBILIRUBIN   --   5.0*   --      Current Facility-Administered Medications   Medication Dose Frequency Provider Last Rate Last Dose   • furosemide (LASIX) injection 40 mg  40 mg BID DIURETIC Irwin Yeager M.D.   40 mg at 17 0638   • potassium chloride (KLOR-CON) 20 MEQ packet 40 mEq  40 mEq 4XDAY Irwin Yeager M.D.   40 mEq at 17 0235   • ipratropium-albuterol (DUONEB) nebulizer solution 3 mL  3 mL 4X/DAY (RT) Edwin Greene M.D.   3 mL at 17 0737   • omeprazole 2 mg/mL in sodium bicarbonate (PRILOSEC) oral susp 40 mg  40 mg Q12HRS Edwin Greene M.D.   40 mg at 17   • acetaminophen (TYLENOL) tablet 650 mg  650 mg Q6HRS PRN Caridad Ibarra M.D.   650 mg at 17   • Respiratory Care per Protocol   Continuous RT Kaz Mobley M.D.       • senna-docusate (PERICOLACE or SENOKOT S) 8.6-50 MG per tablet 2 Tab  2 Tab BID Kaz Mobley,  M.D.   2 Tab at 06/05/17 2059    And   • polyethylene glycol/lytes (MIRALAX) PACKET 1 Packet  1 Packet QDAY PRN Kaz Mobley M.D.   1 Packet at 05/30/17 0327    And   • magnesium hydroxide (MILK OF MAGNESIA) suspension 30 mL  30 mL QDAY PRN Kaz Mobley M.D.   30 mL at 05/30/17 1419    And   • bisacodyl (DULCOLAX) suppository 10 mg  10 mg QDAY PRN Kaz Mobley M.D.   10 mg at 05/30/17 1927   • lidocaine (XYLOCAINE) 1%  injection  1-2 mL Q30 MIN PRN Kaz Mobley M.D.       • ipratropium-albuterol (DUONEB) nebulizer solution 3 mL  3 mL Q2HRS PRN (RT) Kaz Mobley M.D.   3 mL at 06/04/17 2214   • insulin lispro (HUMALOG) injection 3-14 Units  3-14 Units Q6HRS Clayton Bautista M.D.   3 Units at 06/06/17 0638   • ferrous sulfate tablet 325 mg  325 mg BID WITH MEALS Caridad Ibarra M.D.   Stopped at 06/06/17 0730   • levothyroxine (SYNTHROID) tablet 150 mcg  150 mcg AM ES Caridda Ibarra M.D.   Stopped at 06/06/17 0700   • niacin tablet 500 mg  500 mg DAILY Caridad Ibarra M.D.   500 mg at 06/05/17 0808   • pravastatin (PRAVACHOL) tablet 40 mg  40 mg QHS Caridad Ibarra M.D.   40 mg at 06/05/17 2057   • NS infusion 3,198 mL  30 mL/kg Once PRN Caridad Ibarra M.D.       • NS (BOLUS) infusion 1,000 mL  1,000 mL Once PRN Caridad Ibarra M.D.       • oxycodone immediate-release (ROXICODONE) tablet 2.5 mg  2.5 mg Q3HRS PRN Caridad Ibarra M.D.        And   • oxycodone immediate-release (ROXICODONE) tablet 5 mg  5 mg Q3HRS PRN Caridad Ibarra M.D.   5 mg at 06/05/17 0129    And   • morphine (pf) 4 mg/ml injection 2 mg  2 mg Q3HRS PRN Caridad Ibarra M.D.       • glucose 4 g chewable tablet 16 g  16 g Q15 MIN PRN Caridad Ibarra M.D.        And   • dextrose 50% (D50W) injection 25 mL  25 mL Q15 MIN PRN Caridad Ibarra M.D.       • ondansetron (ZOFRAN) syringe/vial injection 4 mg  4 mg Q4HRS PRN Caridad Ibarra M.D.       • ondansetron (ZOFRAN ODT) dispertab 4 mg  4 mg Q4HRS PRN Caridad Ibarra M.D.       • multivitamin  (THERAGRAN) tablet 1 Tab  1 Tab DAILY Caridad Ibarra M.D.   1 Tab at 06/05/17 0805     Last reviewed on 5/24/2017  9:56 PM by Zora Mccarthy, T    Quality  Measures:  Labs reviewed, Radiology images reviewed and Medications reviewed                    Problems/plan:  Ventilator-dependent respiratory failure - > ARDS/aspPNA? From UGIB?   - Intubated in ER early AM 5/25   - RT protocols   - extubated 6/2   - marginal resp status   - continue HFNC/Oxy mask alternating with BiPAP to avoid re-intubation    - f/u ABG noted   - lasix  Hemoptysis   -  FOB in the ER 5/24.    - No bleeding sites were identified in the lungs.  There was no endobronchial tumor.   - ? aspiration with UGIB   - NIVT neg   - Recurrent hemoptysis 5/27 - lots of plugs/no clear source bleeding  Esophageal ulcer   - S/p EGD 5/26   - PPI   - start TF  Community-acquired pneumonia - vs aspiration PNA/ARDS   - ABX - completed Unasyn/Doxy  Sepsis, pulmonary source.   - Protocols  Anemia with thrombocytopenia.   - Serial lab - transfuse per restrictive threshold  Acute on chronic kidney disease.   - follow with addition of furosemide   Elevated lipase.  His abdominal exam is unremarkable.  Elevated troponin - demand ischemia - NSTEMI?  Chronic diastolic heart failure with grade I diastolic dysfunction.  Hypothyroidism - replete  Diabetes mellitus type 2 - glycemic control - monitor for need for I drip  Dyslipidemia - diet/statin  Recent hospitalization for lower gastrointestinal hemorrhage   - presumed due to sigmoid diverticulosis.   - Serial H/H   - PPI   Remote history of significant smoking - COPD? PRN nebs.  Enteral nutrion  Prophylaxis  Free water    D/C Fentanyl   Lasix QD  Switch to HFNC  Mobilize more   Decrease lasix   BiPAP PRN    Continue BiPAP and diuresis     Discussed patient condition and risk of morbidity and/or mortality with Family, RN, RT, Pharmacy, Charge nurse / hot rounds and GI and hospitalist.    The patient remains critically  ill.  Critical care time = 35 minutes in directly providing and coordinating critical care and extensive data review.  No time overlap and excludes procedures.    IDemetria (Migdalia), am scribing for, and in the presence of, Edwin Greene M.D.  Electronically signed by: Demetria Ramon (Migdalia), 6/6/2017  Edwin DIAL M.D. personally performed the services described in this documentation, as scribed by Demetria Ramon in my presence, and it is both accurate and complete.

## 2017-06-06 NOTE — DISCHARGE PLANNING
Received choice form from [facility] at 1021.  Referral sent to [facility] at 1034 on 06-06-17.

## 2017-06-07 NOTE — DISCHARGE SUMMARY
This document serves as a discharge summary for [facility]   and a history and physical to [facility].    IDENTIFICATION:  A 76-year-old male, patient of Dr. Dale.    REASON FOR ADMISSION TO [facility]:  Acute respiratory failure.    HISTORY OF PRESENT ILLNESS:  The patient has a history of diabetes mellitus,   chronic kidney disease that presented to [facility] on   05/24/2017 with acute respiratory failure and coughing up blood.  He underwent   a bronchoscopy on admission, which was unrevealing.  He required endotracheal   intubation.  When there was found to be blood in the nasogastric tube, he   underwent a GI workup, was found to have severe ulceration of the esophagus.    He required blood transfusions and was successfully extubated on 06/02/2017.    The patient remains encephalopathic, remains on substantial oxygen.  He has   been requiring rescue BiPAP.  Pulmonology has been following during his   intensive care unit course.    REVIEW OF SYSTEMS:  This cannot be obtained as patient is a moderate   historian.  He has not passed a swallow evaluation and he is tolerating his   tube feedings.  His wife is at bedside and notes that his confusion is   certainly improving.  Otherwise, full review of systems are not obtainable.    PAST MEDICAL HISTORY:  Recent upper gastrointestinal bleed with ulcerative   esophagitis, chronic kidney disease stage III, hypothyroidism.  He does have a   history of lower gastrointestinal bleed, non-insulin-dependent diabetes   mellitus, and dyslipidemia.    PAST SURGICAL HISTORY:  Apparently had a laryngoscopy in March 2016.    SOCIAL HISTORY:  He does not drink or smoke.  He lives with his wife locally.    FAMILY HISTORY:  According to records, his father had a heart attack at 70.    HOME MEDICATIONS:  Ferrous sulfate 325 mg twice a day, Synthroid 150 mcg   daily, magnesium oxide 400 mg twice a day, metformin 500 mg daily,    multivitamin daily, niacin 500 mg daily, and Pravachol 40 mg daily.    ALLERGIES:  None.    PHYSICAL EXAMINATION:  VITAL SIGNS:  Temperature is 97.4, pulse 95, blood pressure 118/61, O2 sat 98%   on 5 liters nasal cannula oxygen.  GENERAL:  Patient is well-developed, well-nourished appearing.  PSYCHIATRIC:  The patient is awake, alert.  He knows the year.  He knows the   month.  His mentation is somewhat marginal as to the recent events of   subsequent extubation.  His wife is at bedside.  HEENT:  His right pupil is approximately 3 mm, left is 2.  Cortrak is in   nares.  He has dry mucous membranes.  Oxygen facemask is in place.  NECK:  Supple to active and passive movement.  He has a right-sided central   venous catheter in place without redness or bleeding.  HEART:  He has a 3-4/6 systolic murmur heard throughout the precordium;   regular rate and rhythm otherwise.  LUNGS:  Crackles bilaterally.  He does have moderate increased work of   breathing, some moderate air movement, no wheezing.  ABDOMEN:  Distended, not tender though.  I do not appreciate any masses,   limited by habitus.  EXTREMITIES:  Negative Homans sign.  No distinct calf swelling.  INTEGUMENT:  He is somewhat jaundiced.  He has some bruising on his upper   arms.  No evidence of cellulitis.  NEUROLOGIC:  He seems to move his arms and legs equally without gross cranial   nerve deficits.    LABORATORY DATA:  Hemoglobin today is 9.6.  His bilirubin on 06/05/2017 was 5.    Creatinine today is 1.69.  Echocardiogram showed an aortic valve of 1 cm2, a   normal ejection fraction of 60%, right heart pressure was 60 mmHg.  He is   noted to have severe aortic stenosis.  Lower extremity duplex did not show any   evidence of deep venous thrombosis.  Bronchial washings were negative.    ASSESSMENT AND PLAN:  1.  Acute hypoxic respiratory failure.  The patient was intubated on   05/24/2017, extubated on 06/02/2017.  He is still requiring substantial    supplemental oxygen and rescue BiPAP.  Pulmonary has been consulted and will   follow him at [facility].  2.  Esophagitis.  This is quite severe.  He underwent EGD by Dr. Stoll on   05/26/2017 showing esophageal ulcers and grade III ulcerative esophagitis.  He   has required a total of 5 units of packed red blood cells.  He is on a proton   pump inhibitor.  The biopsy was negative for H. pylori and negative for   malignancy.  3.  Acute encephalopathy.  This seems to be improving slowly.  The patient is   now oriented to month and year.  4.  Dysphagia.  He continues on Cortrak feeding.  Nutrition is following and   will have a speech therapy evaluation for hopefully advancement of his diet.  5.  Acute-on-chronic renal failure.  He is a stage III.  His creatinine is   creeping up.  Today, it is 1.69; yesterday it was 1.57.  We may need to back   off on his diuretics, this continues to increase.  6.  Hemoptysis, apparently was from aspiration from gastrointestinal bleed.  7.  Diabetes mellitus.  We are using sliding scale insulin and will eventually   transition to oral once he is stabilized.  8.  Acute blood loss anemia.  He has been transfused.  Current hemoglobin   today is 9.6.  9.  Severe aortic stenosis.  The patient will require outpatient followup with   Dr. Weeks for evaluation of a possible TAVR versus an open procedure by Dr. Angel.  I did discuss this with his wife.  This can be arranged as an   outpatient once he is stabilized at first severe aortic stenosis.  10.  Acute pulmonary edema.  He has been given IV Lasix.  11.  Status post aspiration pneumonia.  He completed a 7-day course of   antibiotics.    I discussed this case with Dr. Rodriguez, the attending at [facility].       ____________________________________     MD KAILEE HOLDEN / SEBASTIEN    DD:  06/07/2017 12:39:44  DT:  06/07/2017 13:24:31    D#:  [id number]  Job#:  [id number]

## 2017-06-07 NOTE — PROGRESS NOTES
Pt discharged to [facility] per EMS.  Pt VS stable on transfer.  Report called to Patric SANDOVAL.  All questions answered.

## 2017-06-07 NOTE — CARE PLAN
Problem: Skin Integrity  Goal: Risk for impaired skin integrity will decrease  Outcome: PROGRESSING AS EXPECTED  No new skin issues noted.  Mepilex dressing and barrier cream applied to coccyx and buttock.  Foam placed over O2 mask straps for skin and ear protection    Problem: Mobility  Goal: Risk for activity intolerance will decrease  Outcome: PROGRESSING SLOWER THAN EXPECTED  Assisted pt to edge of bed and turn and pivot to chair.  Pt up to chair for 2.5 hrs.  Tolerated well.  Unsteady on fit.  Needs 2 person assist.

## 2017-06-07 NOTE — CARE PLAN
Problem: Hyperinflation:  Goal: Prevent or improve atelectasis  Outcome: PROGRESSING AS EXPECTED  Respiratory Therapy Update    Interdisciplinary Plan of Care-Goals (Indications)  Obstructive Ventilatory Defect or Pulmonary Disease without Obvious Obstruction: History / Diagnosis (06/06/17 1538)  Hyperinflation Protocol Indications: Atelectasis Documented by Chest X-Ray (06/06/17 1538)  Interdisciplinary Plan of Care-Outcomes   Bronchodilator Outcome: Patient at Stable Baseline (06/06/17 1538)  Hyperinflation Protocol Goals/Outcome: Increased Vital Capacity or Return to Pre-operative Values;Stable Vital Capacity x24 hrs and Patient Understands / uses I.S. (06/06/17 1538)    #PEP/CPT (Manual) Initial: Initial (06/02/17 1613)    Events/Summary/Plan: Patient still refusing BiPAP at this time

## 2017-06-07 NOTE — CARE PLAN
Problem: Safety  Goal: Will remain free from injury  Outcome: PROGRESSING AS EXPECTED  Patient will remain free from injury. Bed in lowest position, needs assessed, call light within reach.

## 2017-06-07 NOTE — DISCHARGE PLANNING
Omar has acceptance to [facility].  Dr Rodriguez is accepting.  DC summary completed and faxed to [facility].   Transport requested via [facility] for 1500.   Transport  Forms faxed to [facility]  13:49

## 2017-06-07 NOTE — DISCHARGE PLANNING
CCS received a PCS from to arrange transportation to transfer the patient to [facility]. CCS faxed the PCS form and Face Sheet to [facility]. CCS called and Spoke to [facility] at [facility]. Transportation has been arranged for 1500 via [facility]. SW on floor has been notified via phone.

## 2017-06-07 NOTE — PROGRESS NOTES
Pulmonary Critical Care Progress Note      Date of service: 6/7/2017    Chief Complaint: SOB    History of Present Illness:  Patient is a 77 y/o M, history of Type II DM, hypothyroidism, history of hospitalization on 03/24/17 for sigmoid diverticulosis with GI bleed and negative colonoscopy, presented to ED 5/24/17 with complaints of dyspnea x2 days. Hematemesis? In ED, possibly aspirated blood, was intubated and bronchoscopy at that time showed blood tinged secretions with no endobronchial lesions, repeat bronchoscopy 5/27/17 showed significant thick bloody secretions otherwise clear.       ROS:    Respiratory: unable to perform due to the patient's inability to effectively communicate,   Cardiac: unable to perform due to the patient's inability to effectively communicate,   GI: unable to perform due to the patient's inability to effectively communicate.       Interval Events:  24 hour interval history reviewed   Extubated 6/2  Restless last night, alert and oriented x3  SR, -120's  6L oxymask, did not use BiPAP today  Dark tarry stool x1 yesterday, H&H no change  Refusing BiPAP  IS 600mL  Tube feeds tolerated  Mobilizing to edge of bed today  CXR: Low lung volume, pulmonary edema, bibasilar atectasis and effusions, overall unchanged from yesterday and improved from two days ago      PFSH:  No change.    Physical Exam  General: Right nare cortrak in place with tube feeds at goal  Neuro/Psych: awake, alert and following, oriented X2, mildly confused, Moves all extremities symmetrically with 5/5 strength in bilateral upper and lower extremities. NFE.   HEENT: Left pupil 3mm, right pupil 4mm. RIJ CVC CDI.  Trachea supple and midline. No crepitus. pink mucous membranes.   CVS: Distant heart tones. Regular rate  Respiratory: Scattered coarse crackles at the bases. No wheezes. Clear anteriorly. 5L oxymask  Abdomen/: soft. Non tender.   Extremities: Trace to 1+ edema. 2+ bilateral DP pulses.   Skin: Warm, dry,  perfused. No mottling.  Capillary refill brisk. Scattered areas of ecchymosis and purpura that is unchanged.     Respiratory:     Pulse Oximetry: 100 %  Imaging Available data reviewed         Invalid input(s): [id number]  HemoDynamics:  Pulse: 85, Heart Rate (Monitored): 85  NIBP: 117/66 mmHg     Imaging: Available data reviewed       Neuro:  GCS Total Oscar Coma Score: 15  Imaging: Available data reviewed    Fluids:  Intake/Output       06/05/17 0700 - 06/06/17 0659 06/06/17 0700 - 06/07/17 0659 06/07/17 0700 - 06/08/17 0659      0700-1859 6558-6066 Total 0700-1859 1900-0659 Total 0700-1859 1900-0659 Total       Intake    Blood  350  -- 350  --  -- --  --  -- --    Volume (RELEASE RED BLOOD CELLS) 350 -- 350 -- -- -- -- -- --    Other  60  30 90  720  120 840  --  -- --    Medications (P.O./ Enteral Liquids) 60 30 90 720 120 840 -- -- --    Enteral  1050  1270 2320  1350  1020 2370  --  -- --    Enteral Volume   -- -- --    Free Water / Tube Flush 390 450 840 450 120 570 -- -- --    Total Intake 1460 1300 2760 2070 1140 3210 -- -- --       Output    Urine  3000  1350 4350  1800  860 2660  --  -- --    Indwelling Cathether 3000 1350 4350 9111 513 7199 -- -- --    Stool  --  -- --  --  -- --  --  -- --    Number of Times Stooled 1 x 2 x 3 x -- 1 x 1 x -- -- --    Total Output 3000 1350 4350 8210 021 0661 -- -- --       Net I/O     -1540 -50 -1590 270 280 550 -- -- --        Weight: 100.7 kg (222 lb 0.1 oz)  Recent Labs      06/05/17   0610  06/06/17   0635  06/07/17   0500   SODIUM  144  144  144   POTASSIUM  4.1  4.4  4.5   CHLORIDE  110  108  107   CO2  27  30  30   BUN  46*  51*  63*   CREATININE  1.53*  1.57*  1.69*   CALCIUM  8.9  9.3  9.4     GI/Nutrition:  Imaging: Available data reviewed  NPO and tube feed Tolerated     Liver Function:  Recent Labs      06/05/17   0610  06/06/17   0635  06/07/17   0500   ALTSGPT  100*   --    --    ASTSGOT  97*   --    --    ALKPHOSPHAT  112*    --    --    TBILIRUBIN  5.0*   --    --    PREALBUMIN  17.0*   --    --    GLUCOSE  141*  179*  143*     Heme:  Recent Labs      06/05/17   0610  06/06/17   0635  06/07/17   0500   RBC  2.65*  3.42*  3.59*   HEMOGLOBIN  6.9*  9.2*  9.6*   HEMATOCRIT  24.0*  31.0*  32.7*   PLATELETCT  157*  179  182     Infectious Disease:  No Data Recorded  Micro: reviewed     Recent Labs      06/05/17   0610  06/06/17   0635  06/07/17   0500   WBC  6.0  8.7  8.5   NEUTSPOLYS  75.70*  71.10  71.40   LYMPHOCYTES  13.60*  11.80*  13.40*   MONOCYTES  3.90  4.90  1.80   EOSINOPHILS  1.00  3.90  4.40   BASOPHILS  2.00*  1.30  0.90   ASTSGOT  97*   --    --    ALTSGPT  100*   --    --    ALKPHOSPHAT  112*   --    --    TBILIRUBIN  5.0*   --    --      Current Facility-Administered Medications   Medication Dose Frequency Provider Last Rate Last Dose   • furosemide (LASIX) injection 40 mg  40 mg BID DIURETIC Irwin Yeager M.D.   40 mg at 06/07/17 0501   • potassium chloride (KLOR-CON) 20 MEQ packet 40 mEq  40 mEq 4XDAY Irwin Yeager M.D.   40 mEq at 06/07/17 0230   • ipratropium-albuterol (DUONEB) nebulizer solution 3 mL  3 mL 4X/DAY (RT) Edwin Greene M.D.   3 mL at 06/06/17 1851   • omeprazole 2 mg/mL in sodium bicarbonate (PRILOSEC) oral susp 40 mg  40 mg Q12HRS Edwin Greene M.D.   40 mg at 06/06/17 2039   • acetaminophen (TYLENOL) tablet 650 mg  650 mg Q6HRS PRN Caridad Ibarra M.D.   650 mg at 05/31/17 0357   • Respiratory Care per Protocol   Continuous RT Kaz Mobley M.D.       • senna-docusate (PERICOLACE or SENOKOT S) 8.6-50 MG per tablet 2 Tab  2 Tab BID Kaz Mobley M.D.   Stopped at 06/06/17 0900    And   • polyethylene glycol/lytes (MIRALAX) PACKET 1 Packet  1 Packet QDAY PRN Kaz Mobley M.D.   1 Packet at 05/30/17 0327    And   • magnesium hydroxide (MILK OF MAGNESIA) suspension 30 mL  30 mL QDAY PRN Kaz Mobley M.D.   30 mL at 05/30/17 1419    And   • bisacodyl  (DULCOLAX) suppository 10 mg  10 mg QDAY PRN Kaz Mobley M.D.   10 mg at 05/30/17 1927   • lidocaine (XYLOCAINE) 1%  injection  1-2 mL Q30 MIN PRN Kaz Mobley M.D.       • ipratropium-albuterol (DUONEB) nebulizer solution 3 mL  3 mL Q2HRS PRN (RT) Kaz Mobley M.D.   3 mL at 06/04/17 2214   • insulin lispro (HUMALOG) injection 3-14 Units  3-14 Units Q6HRS Clayton Bautista M.D.   Stopped at 06/07/17 0600   • ferrous sulfate tablet 325 mg  325 mg BID WITH MEALS Caridad Ibarra M.D.   325 mg at 06/06/17 1723   • levothyroxine (SYNTHROID) tablet 150 mcg  150 mcg AM ES Caridad Ibarra M.D.   150 mcg at 06/06/17 1049   • niacin tablet 500 mg  500 mg DAILY Caridad Ibarra M.D.   500 mg at 06/06/17 1047   • pravastatin (PRAVACHOL) tablet 40 mg  40 mg QHS Caridad Ibarra M.D.   40 mg at 06/06/17 2039   • NS infusion 3,198 mL  30 mL/kg Once PRN Caridad Ibarra M.D.       • NS (BOLUS) infusion 1,000 mL  1,000 mL Once PRN Caridad Ibarra M.D.       • oxycodone immediate-release (ROXICODONE) tablet 2.5 mg  2.5 mg Q3HRS PRN Caridad Ibarra M.D.        And   • oxycodone immediate-release (ROXICODONE) tablet 5 mg  5 mg Q3HRS PRN Caridad Ibarra M.D.   5 mg at 06/05/17 0129    And   • morphine (pf) 4 mg/ml injection 2 mg  2 mg Q3HRS PRN Caridad Ibarra M.D.       • glucose 4 g chewable tablet 16 g  16 g Q15 MIN PRN Caridad Ibarra M.D.        And   • dextrose 50% (D50W) injection 25 mL  25 mL Q15 MIN PRN Caridad Ibarra M.D.       • ondansetron (ZOFRAN) syringe/vial injection 4 mg  4 mg Q4HRS PRN Caridad Ibarra M.D.       • ondansetron (ZOFRAN ODT) dispertab 4 mg  4 mg Q4HRS PRN Caridad Ibarra M.D.       • multivitamin (THERAGRAN) tablet 1 Tab  1 Tab DAILY Caridad Ibarra M.D.   1 Tab at 06/06/17 1048     Last reviewed on 5/24/2017  9:56 PM by Zora Mccarthy [facility]    Quality  Measures:  Labs reviewed, Radiology images reviewed and Medications reviewed                    Problems/plan:  Ventilator-dependent respiratory failure - > ARDS/aspPNA? From  UGIB?   - Intubated in ER early AM 5/25   - RT protocols   - extubated 6/2   - tenuous resp status   - weaning FiO2; PRN BiPAP - not required past 24+ hours   - ongoing forced diuresis   Hemoptysis   -  FOB in the ER 5/24.    - No bleeding sites were identified in the lungs.  There was no endobronchial tumor.   - ? aspiration with UGIB   - NIVT neg   - Recurrent hemoptysis 5/27 - lots of plugs/no clear source bleeding  Esophageal ulcer   - S/p EGD 5/26   - PPI   - TF  Community-acquired pneumonia - vs aspiration PNA/ARDS   - ABX - completed Unasyn/Doxy  Sepsis, pulmonary source.   - Protocols  Anemia with thrombocytopenia.   - Serial lab - transfuse per restrictive threshold  Acute on chronic kidney disease.   - follow with addition of furosemide   Elevated lipase.  His abdominal exam is unremarkable.  Elevated troponin - demand ischemia - NSTEMI?  Chronic diastolic heart failure with grade I diastolic dysfunction.  Hypothyroidism - replete  Diabetes mellitus type 2    - glycemic control    - monitor for need for I drip  Dyslipidemia - diet/statin  Recent hospitalization for lower gastrointestinal hemorrhage   - presumed due to sigmoid diverticulosis.   - Serial H/H   - PPI   Remote history of significant smoking - COPD? PRN nebs.  Enteral nutrion  Prophylaxis  Free water    D/C Fentanyl   Lasix QD  Switch to HFNC  Mobilize more   No further BiPAP or HFNC, down to 5L oxymask  Continue to mobilize as tolerated, out of bed 2 hours today   -PTOT to follow  Okay to transfer to LTAC  Continue diuresis     Discussed patient condition and risk of morbidity and/or mortality with Family, RN, RT, Pharmacy, Charge nurse / hot rounds and GI and hospitalist.      Arianna DIAL  (Migdalia), am scribing for, and in the presence of, Edwin Greene M.D.  Electronically signed by: Arianna Wadsworth (Migdalia), 6/7/2017  Edwin DIAL M.D. personally performed the services described in this documentation, as scribed by Arianna COPPOLA  Ermelinda in my presence, and it is both accurate and complete.

## 2017-06-07 NOTE — DISCHARGE INSTRUCTIONS
Discharge Instructions    Discharged to [facility] by ambulance with EMTs. Discharged via ambulance, hospital escort: Refused.  Special equipment needed: Oxygen      Be sure to schedule a follow-up appointment with your primary care doctor or any specialists as instructed.     Discharge Plan:   Diet Plan: Discussed  Activity Level: Discussed  Confirmed Follow up Appointment:  (Pt being transfered to outside facility)  Confirmed Symptoms Management: Discussed  Medication Reconciliation Updated: Yes  Influenza Vaccine Indication: Not indicated: Previously immunized this influenza season and > 8 years of age    I understand that a diet low in cholesterol, fat, and sodium is recommended for good health. Unless I have been given specific instructions below for another diet, I accept this instruction as my diet prescription.   Other diet: On tube feeds    Special Instructions: None    · Is patient discharged on Warfarin / Coumadin?   No     · Is patient Post Blood Transfusion?  Yes  POST BLOOD TRANSFUSION INFORMATION (ADULT)    The purpose of blood transfusion is to correct anemia, low levels of blood clotting factors or to correct acute blood loss.   Blood transfusion is very safe but occasionally unexpected adverse reactions do occur. Most adverse reactions occur during transfusion, within one to two days following transfusion or one to two weeks following transfusion. Some adverse reactions can occur one to six months after transfusion and some even years later.             If any of the symptoms listed below happen to you during your transfusion,                                 please notify your nurse immediately.   · Fever or Chills  · Flushing of the Face  · Hives, rash or itching  · Difficulty in breathing or shortness of breath  · Pain or oozing of blood from the IV needle site  · Low back pain  · Nausea or vomiting  · Weakness or fainting  · Chest pain  · Blood in the urine  · Decreased  frequency of urination    The above symptoms may also occur within 24 to 48 after transfusion; if so, notify your physician.     · Yellowing of the skin    This can happen one to six months after transfusion; if so, notify your physician    Depression / Suicide Risk    As you are discharged from this [facility] facility, it is important to learn how to keep safe from harming yourself.    Recognize the warning signs:  · Abrupt changes in personality, positive or negative- including increase in energy   · Giving away possessions  · Change in eating patterns- significant weight changes-  positive or negative  · Change in sleeping patterns- unable to sleep or sleeping all the time   · Unwillingness or inability to communicate  · Depression  · Unusual sadness, discouragement and loneliness  · Talk of wanting to die  · Neglect of personal appearance   · Rebelliousness- reckless behavior  · Withdrawal from people/activities they love  · Confusion- inability to concentrate     If you or a loved one observes any of these behaviors or has concerns about self-harm, here's what you can do:  · Talk about it- your feelings and reasons for harming yourself  · Remove any means that you might use to hurt yourself (examples: pills, rope, extension cords, firearm)  · Get professional help from the community (Mental Health, Substance Abuse, psychological counseling)  · Do not be alone:Call your Safe Contact- someone whom you trust who will be there for you.  · Call your local CRISIS HOTLINE 679-2599 or 171-303-5024  · Call your local Children's Mobile Crisis Response Team [place] (488) 583-1839 or www.[facility]  · Call the toll free National Suicide Prevention Hotlines   · National Suicide Prevention Lifeline 052-569-WVRY (4786)  · National Hope Line Network 800-SUICIDE (019-4947)

## 2017-06-07 NOTE — CARE PLAN
"Problem: Venous Thromboembolism (VTW)/Deep Vein Thrombosis (DVT) Prevention:  Goal: Patient will participate in Venous Thrombosis (VTE)/Deep Vein Thrombosis (DVT)Prevention Measures  Outcome: NOT MET  Pt refuses SCDs at this time.  Says he is \"too warm\" and they are a \"pain in the ass\".  Pt educated regarding the importances of DVT prevention, continues to refuse    Problem: Skin Integrity  Goal: Risk for impaired skin integrity will decrease  Outcome: PROGRESSING SLOWER THAN EXPECTED  Pt is turned Q2h and is encourages to make changes in body position whenever he likes.  Full skin assessment completed with bed bath.  See wound flowsheet for information on wounds        "

## 2017-06-08 NOTE — [facility]
Hospital Medicine Progress Note, Adult, Complex               Author: Maame Lewis Date & Time created: 6/8/2017  6:54 AM     CC: RF    Interval History:  Transferred from [facility] [place]  Na high today  No complaints    Review of Systems:  Review of Systems   Constitutional: Negative for fever.   Respiratory: Negative for cough and shortness of breath.    Cardiovascular: Negative for chest pain and palpitations.   Gastrointestinal: Negative for nausea, vomiting, abdominal pain, diarrhea and constipation.   Genitourinary: Negative for dysuria.   Neurological: Negative for headaches.       T 98 P 94 /74 RR 19 SaO2 99% 6L I/O 1/1.5 2BM tele SR  Physical Exam   Constitutional: He appears well-developed and well-nourished.   HENT:   Head: Normocephalic.   Mouth/Throat: No oropharyngeal exudate.   cortrack   Eyes: Conjunctivae are normal. Right eye exhibits no discharge. Left eye exhibits no discharge. No scleral icterus.   Neck: No tracheal deviation present.   Cardiovascular: Normal rate and regular rhythm.    Murmur (3/6 BASSAM) heard.  Pulmonary/Chest: Effort normal. No respiratory distress. He has no wheezes. He exhibits no tenderness.   Abdominal: Soft. Bowel sounds are normal. He exhibits no distension. There is no tenderness.   Musculoskeletal: He exhibits no edema.   Neurological: He is alert.   Skin: Skin is warm.   Vitals reviewed.      Labs:        Invalid input(s): [id number]      Recent Labs      06/06/17   0635  06/07/17   0500  06/08/17   0420   SODIUM  144  144  148*   POTASSIUM  4.4  4.5  4.5   CHLORIDE  108  107  108   CO2  30  30  30   BUN  51*  63*  75*   CREATININE  1.57*  1.69*  2.18*   CALCIUM  9.3  9.4  9.0     Recent Labs      06/06/17   0635  06/07/17   0500  06/08/17   0420   ALTSGPT   --    --   131*   ASTSGOT   --    --   114*   ALKPHOSPHAT   --    --   147*   TBILIRUBIN   --    --   4.3*   GLUCOSE  179*  143*  175*     Recent Labs      06/06/17   0635  06/07/17   0500  06/08/17   0420   RBC   3.42*  3.59*  3.65*   HEMOGLOBIN  9.2*  9.6*  9.9*   HEMATOCRIT  31.0*  32.7*  33.5*   PLATELETCT  179  182  194     Recent Labs      06/06/17   0635  06/07/17   0500  06/08/17   0420   WBC  8.7  8.5  8.6   NEUTSPOLYS  71.10  71.40  58.00   LYMPHOCYTES  11.80*  13.40*  21.00*   MONOCYTES  4.90  1.80  4.00   EOSINOPHILS  3.90  4.40  9.00*   BASOPHILS  1.30  0.90  2.00*   ASTSGOT   --    --   114*   ALTSGPT   --    --   131*   ALKPHOSPHAT   --    --   147*   TBILIRUBIN   --    --   4.3*     Medical Decision Making, by Problem:  Acute hypoxic RF s/p VDRF 5/24-6/2   -continue pulm toilet   -monitor for infection  Grade 3 esophagitis with UGIB, bx negative   -continue PPI, no anticoagulation/antiplatelets  Acute on CKD 3   -clinically dry wih increased sodium   -hold lasix   -free water  Hypernatremia   -hold lasix, free water  Encephaolopathy, toxic/metabolic   -frequent orientation  Severe AS   -outpatient follow up for TAVR  pulm HTN  DM  Aspiration pneumonia, treated  Anemia, acute blood loss   -iron  Transaminitis  Pulmonary edema   -lasix as tolerated  Hypothyroidism  HLD   -pravechol  H/o LGIB  Nutrition   -TF  Dysphagia   -SLP P  Debility   -PT/OT      Labs reviewed, Medications reviewed and EKG reviewed  Bond catheter: Strict Intake and Output During Sepisis or Shock  Central line in place: Need for access    DVT Prophylaxis: Contraindicated - High bleeding risk

## 2017-06-09 NOTE — PROGRESS NOTES
Pulmonary Critical Care Progress Note        Chief Complaint: Respiratory insufficiency    History of Present Illness: 76 y.o. male admitted for rehabilitation following prolonged hospitalization    ROS:  Respiratory: positive shortness of breath, Cardiac: negative, GI: negative.  All other systems negative.    Interval Events:  24 hour interval history reviewed    PFSH:  No change.    Respiratory:        Chest Tube Drains:          Exam: unlabored respirations, no intercostal retractions or accessory muscle use  ImagingAvailable data reviewed         Invalid input(s): [id number]    HemoDynamics:             Exam: regular rate and rhythm  Imaging: Available data reviewed  Recent Labs      06/09/17 0420   BNPBTYPENAT  27       Neuro:  GCS         Exam: after fall and laceration above left eye, head CT scan did not show acute intracranial bleed or injury. Patient responsive presently mental status intact  Imaging: Available data reviewed    Fluids:  Intake/Output     None           Recent Labs      06/07/17   0500  06/08/17 0420 06/09/17 0420   SODIUM  144  148*  149*   POTASSIUM  4.5  4.5  6.0*   CHLORIDE  107  108  113*   CO2  30  30  26   BUN  63*  75*  75*   CREATININE  1.69*  2.18*  2.53*   CALCIUM  9.4  9.0  9.1       GI/Nutrition:  Exam: abdomen is soft and non-tender  Imaging: None - Reviewed  taking PO  Liver Function  Recent Labs      06/07/17   0500  06/08/17 0420 06/09/17 0420   ALTSGPT   --   131*  147*   ASTSGOT   --   114*  129*   ALKPHOSPHAT   --   147*  157*   TBILIRUBIN   --   4.3*  4.2*   GLUCOSE  143*  175*  155*       Heme:  Recent Labs      06/07/17   0500  06/08/17 0420   RBC  3.59*  3.65*   HEMOGLOBIN  9.6*  9.9*   HEMATOCRIT  32.7*  33.5*   PLATELETCT  182  194       Infectious Disease:  No Data Recorded  Micro: no new positive cultures  Recent Labs      06/07/17   0500  06/08/17 0420 06/09/17 0420   WBC  8.5  8.6   --    NEUTSPOLYS  71.40  58.00   --    LYMPHOCYTES   13.40*  21.00*   --    MONOCYTES  1.80  4.00   --    EOSINOPHILS  4.40  9.00*   --    BASOPHILS  0.90  2.00*   --    ASTSGOT   --   114*  129*   ALTSGPT   --   131*  147*   ALKPHOSPHAT   --   147*  157*   TBILIRUBIN   --   4.3*  4.2*     No current facility-administered medications for this encounter.       Last reviewed on 5/24/2017  9:56 PM by Zora Mccarthy PhT    Quality  Measures:  Labs reviewed, Medications reviewed and Radiology images reviewed                      Problems:      Acute hypoxic RF s/p VDRF 5/24-6/2; did not contribute to fall last night from review with nursing and respiratory therapy; stable presently, converses, no respiratory effort or distress              -continue pulm toilet              -monitor for infection  Grade 3 esophagitis with UGIB, bx negative              -continue PPI, no anticoagulation/antiplatelets  Acute on CKD 3/ Hyperkalemia, hospitalist addressing              -check EKG              -kayexalate, check ekg  Hypernatremia              -D5W  Encephaolopathy, toxic/metabolic; seems coherent presently              -frequent reorientation  Severe AS              -outpatient follow up for TAVR  pulm HTN  DM  Aspiration pneumonia, treated    Fall 6/8 PM, left eye swollen with laceration. Conversing, head CT scan did not show intracranial injury. Orbit assessment planned              -eval CT of orbit for fracture              -ice              -wrist restraint  Plan:  post fall last night, head CT scan did not show injury, left eye swollen.  RT/O2 protocols .  aggressive pulmonary toilet .

## 2017-06-09 NOTE — [facility]
Hospital Medicine Progress Note, Adult, Complex               Author: Maame ALBINO Lewis Date & Time created: 6/9/2017  6:23 AM     CC: RF    Interval History:  Fall overnight  Left eyebrow laceration with swollen shut orbit  Lab with elevated cr, k+, na+  Started diet after pulled cortrack  Denies pain, head CT negative    Review of Systems:  Review of Systems   Constitutional: Negative for fever.   HENT: Negative for sore throat.    Eyes: Negative for pain.   Respiratory: Negative for cough and shortness of breath.    Cardiovascular: Negative for chest pain and palpitations.   Gastrointestinal: Negative for nausea, vomiting and abdominal pain.   Genitourinary: Negative for dysuria.   Musculoskeletal: Positive for falls (6/8).   Neurological: Negative for headaches.       T 97.5 P 92 /76 RR 19 SaO2 100% 4L I/O 2.1/2.7 1BM tele SR  Physical Exam   Constitutional: He appears well-developed and well-nourished.   HENT:   Head: Normocephalic.   Mouth/Throat: No oropharyngeal exudate.   Eyes: Right eye exhibits no discharge. Left eye exhibits no discharge.   L orbit swollen shut, eccymosis, cut in eyebrow   Neck: No tracheal deviation present.   Cardiovascular: Normal rate and regular rhythm.    Murmur (3/6 BASSAM) heard.  Pulmonary/Chest: Effort normal. No respiratory distress. He has no wheezes. He exhibits no tenderness.   Abdominal: Soft. Bowel sounds are normal. He exhibits no distension. There is no tenderness.   Musculoskeletal: He exhibits no edema.   Neurological: He is alert.   VALENTIN   Skin: Skin is warm.   Vitals reviewed.      Labs:        Invalid input(s): [id number]  Recent Labs      06/09/17   0420   BNPBTYPENAT  27     Recent Labs      06/07/17   0500  06/08/17   0420  06/09/17   0420   SODIUM  144  148*  149*   POTASSIUM  4.5  4.5  6.0*   CHLORIDE  107  108  113*   CO2  30  30  26   BUN  63*  75*  75*   CREATININE  1.69*  2.18*  2.53*   CALCIUM  9.4  9.0  9.1     Recent Labs      06/07/17   0500   06/08/17   0420  06/09/17   0420   ALTSGPT   --   131*  147*   ASTSGOT   --   114*  129*   ALKPHOSPHAT   --   147*  157*   TBILIRUBIN   --   4.3*  4.2*   GLUCOSE  143*  175*  155*     Recent Labs      06/06/17   0635  06/07/17   0500  06/08/17   0420   RBC  3.42*  3.59*  3.65*   HEMOGLOBIN  9.2*  9.6*  9.9*   HEMATOCRIT  31.0*  32.7*  33.5*   PLATELETCT  179  182  194     Recent Labs      06/06/17   0635  06/07/17   0500  06/08/17   0420  06/09/17   0420   WBC  8.7  8.5  8.6   --    NEUTSPOLYS  71.10  71.40  58.00   --    LYMPHOCYTES  11.80*  13.40*  21.00*   --    MONOCYTES  4.90  1.80  4.00   --    EOSINOPHILS  3.90  4.40  9.00*   --    BASOPHILS  1.30  0.90  2.00*   --    ASTSGOT   --    --   114*  129*   ALTSGPT   --    --   131*  147*   ALKPHOSPHAT   --    --   147*  157*   TBILIRUBIN   --    --   4.3*  4.2*     Medical Decision Making, by Problem:  Fall   -eval CT of orbit for fracture   -ice   -waist restraint  Hyperkalemia   -check EKG   -kayexxalate, check ekg   -follow up at noon  Acute hypoxic RF s/p VDRF 5/24-6/2   -continue pulm toilet   -monitor for infection  Grade 3 esophagitis with UGIB, bx negative   -continue PPI, no anticoagulation/antiplatelets  Acute on CKD 3   -IVF, D5W and 1/2 NS   -follow up in am  Hypernatremia   -D5W  Encephaolopathy, toxic/metabolic   -frequent orientation  Severe AS   -outpatient follow up for TAVR  pulm HTN  DM  Aspiration pneumonia, treated  Anemia, acute blood loss   -iron  Transaminitis   -hep panel negative   -hold pravechol  Pulm edema   -follow, BNP normal  Hypothyroidism  HLD   -pravechol on hold for LFTs  H/o LGIB  Nutrition   -po  Debility   -PT/OT      Labs reviewed, Medications reviewed and Radiology images reviewed  Bond catheter: Strict Intake and Output During Sepisis or Shock  Central line in place: Need for access    DVT Prophylaxis: Contraindicated - High bleeding risk

## 2017-06-10 NOTE — PROGRESS NOTES
Pulmonary Critical Care Progress Note        Chief Complaint: Respiratory insufficiency    History of Present Illness: 76 y.o. male admitted for rehabilitation following prolonged hospitalization    ROS:  Respiratory: positive shortness of breath, Cardiac: negative, GI: negative.  All other systems negative.    Interval Events:  24 hour interval history reviewed  Alert converses no resp distress  PFSH:  No change.    Respiratory:        Chest Tube Drains:          Exam: unlabored respirations, no intercostal retractions or accessory muscle use  ImagingAvailable data reviewed         Invalid input(s): [id number]    HemoDynamics:             Exam: regular rate and rhythm  Imaging: Available data reviewed  Recent Labs      06/09/17   0420   BNPBTYPENAT  27       Neuro:  GCS         Exam: after fall and laceration above left eye, head CT scan did not show acute intracranial bleed or injury. Patient responsive presently mental status intact  Imaging: Available data reviewed    Fluids:  Intake/Output     None           Recent Labs      06/09/17   0420  06/09/17   1120  06/10/17   0015   SODIUM  149*  147*  148*   POTASSIUM  6.0*  5.2  3.9   CHLORIDE  113*  112  112   CO2  26  26  27   BUN  75*  74*  66*   CREATININE  2.53*  2.39*  2.23*   CALCIUM  9.1  8.8  8.4       GI/Nutrition:  Exam: abdomen is soft and non-tender  Imaging: None - Reviewed  taking PO  Liver Function  Recent Labs      06/08/17   0420 06/09/17   0420  06/09/17   1120  06/10/17   0015   ALTSGPT  131*  147*   --    --    ASTSGOT  114*  129*   --    --    ALKPHOSPHAT  147*  157*   --    --    TBILIRUBIN  4.3*  4.2*   --    --    GLUCOSE  175*  155*  220*  153*       Heme:  Recent Labs      06/09/17   1720  06/10/17   0015  06/10/17   0550   RBC  3.47*  3.24*  3.18*   HEMOGLOBIN  9.3*  8.8*  8.6*   HEMATOCRIT  32.3*  29.9*  29.1*   PLATELETCT  199  193  180   PROTHROMBTM  14.9*   --    --    APTT  31.5   --    --    INR  1.19*   --    --         Infectious Disease:  No Data Recorded  Micro: no new positive cultures  Recent Labs      06/08/17   0420  06/09/17   0420  06/09/17   1720  06/10/17   0015  06/10/17   0550   WBC  8.6   --   8.0  8.1  6.8   NEUTSPOLYS  58.00   --    --    --    --    LYMPHOCYTES  21.00*   --    --    --    --    MONOCYTES  4.00   --    --    --    --    EOSINOPHILS  9.00*   --    --    --    --    BASOPHILS  2.00*   --    --    --    --    ASTSGOT  114*  129*   --    --    --    ALTSGPT  131*  147*   --    --    --    ALKPHOSPHAT  147*  157*   --    --    --    TBILIRUBIN  4.3*  4.2*   --    --    --      No current facility-administered medications for this encounter.       Last reviewed on 5/24/2017  9:56 PM by Zora Mccarthy, T    Quality  Measures:  Labs reviewed, Medications reviewed and Radiology images reviewed                      Problems:      Acute hypoxic RF s/p VDRF 5/24-6/2; did not contribute to fall last night from review with nursing and respiratory therapy; stable presently, converses, no respiratory effort or distress              -continue pulm toilet              -monitor for infection  Grade 3 esophagitis with UGIB, bx negative              -continue PPI, no anticoagulation/antiplatelets  Acute on CKD 3/ Hyperkalemia, hospitalist addressing            Hypernatremia              -D5W  Encephaolopathy, toxic/metabolic; seems coherent presently              -frequent reorientation  Severe AS              -outpatient follow up for TAVR  pulm HTN  DM  Aspiration pneumonia, treated    Fall 6/8 PM, left eye swollen with laceration. Conversing, head CT scan did not show intracranial injury. Orbit assessment planned        looks better  Plan:  post fall t, head CT scan did not show injury, left eye swollen.better daily  RT/O2 protocols .  aggressive pulmonary toilet .

## 2017-06-10 NOTE — [facility]
Hospital Medicine Progress Note, Adult, Complex               Author: Maame Lewis Date & Time created: 6/10/2017  8:20 AM     CC: RF    Interval History:  Orbit swelling much better  No SOB  Hematochezia per RN, no recurrence  kayexxalate with good response    Review of Systems:  Review of Systems   Constitutional: Negative for fever and chills.   Eyes: Negative for pain.   Respiratory: Negative for cough and shortness of breath.    Cardiovascular: Negative for chest pain, palpitations and leg swelling.   Gastrointestinal: Positive for blood in stool (yesterday). Negative for nausea, vomiting and abdominal pain.   Genitourinary: Negative for dysuria.   Musculoskeletal: Positive for falls (6/8).   Neurological: Negative for headaches.       T 97.6 P 76BP 112/59 RR16 SaO2 99% 4L I/O 2.6/2.1 5BM tele SR  Physical Exam   Constitutional: He appears well-developed and well-nourished. No distress.   HENT:   Head: Normocephalic.   Mouth/Throat: No oropharyngeal exudate.   Eyes: Right eye exhibits no discharge. Left eye exhibits no discharge.   L eye swelling much better, eccymosis present   Neck: No tracheal deviation present.   Cardiovascular: Normal rate and regular rhythm.    Murmur (3/6 BASSAM) heard.  Pulmonary/Chest: Effort normal. No respiratory distress. He has no wheezes. He exhibits no tenderness.   coarse   Abdominal: Soft. Bowel sounds are normal. He exhibits no distension. There is no tenderness.   Musculoskeletal: He exhibits no edema.   Neurological: He is alert.   VALENTIN   Skin: Skin is warm.   Vitals reviewed.      Labs:        Invalid input(s): [id number]  Recent Labs      06/09/17   0420   BNPBTYPENAT  27     Recent Labs      06/09/17   0420  06/09/17   1120  06/10/17   0015   SODIUM  149*  147*  148*   POTASSIUM  6.0*  5.2  3.9   CHLORIDE  113*  112  112   CO2  26  26  27   BUN  75*  74*  66*   CREATININE  2.53*  2.39*  2.23*   CALCIUM  9.1  8.8  8.4     Recent Labs      06/08/17   0420  06/09/17    0420  06/09/17   1120  06/10/17   0015   ALTSGPT  131*  147*   --    --    ASTSGOT  114*  129*   --    --    ALKPHOSPHAT  147*  157*   --    --    TBILIRUBIN  4.3*  4.2*   --    --    GLUCOSE  175*  155*  220*  153*     Recent Labs      06/09/17   1720  06/10/17   0015  06/10/17   0550   RBC  3.47*  3.24*  3.18*   HEMOGLOBIN  9.3*  8.8*  8.6*   HEMATOCRIT  32.3*  29.9*  29.1*   PLATELETCT  199  193  180   PROTHROMBTM  14.9*   --    --    APTT  31.5   --    --    INR  1.19*   --    --      Recent Labs      06/08/17 0420 06/09/17   0420  06/09/17   1720  06/10/17   0015  06/10/17   0550   WBC  8.6   --   8.0  8.1  6.8   NEUTSPOLYS  58.00   --    --    --    --    LYMPHOCYTES  21.00*   --    --    --    --    MONOCYTES  4.00   --    --    --    --    EOSINOPHILS  9.00*   --    --    --    --    BASOPHILS  2.00*   --    --    --    --    ASTSGOT  114*  129*   --    --    --    ALTSGPT  131*  147*   --    --    --    ALKPHOSPHAT  147*  157*   --    --    --    TBILIRUBIN  4.3*  4.2*   --    --    --      Medical Decision Making, by Problem:  Fall with eyebrow laceration   -no injury  Hyperkalemia-resolved  Acute hypoxic RF s/p VDRF 5/24-6/2   -continue pulm toilet   -monitor for infection  Grade 3 esophagitis with UGIB, bx negative   -continue PPI, no anticoagulation/antiplatelets   -monitor hematochezia, GI consult if needed   -HGB stable  Acute on CKD 3   -continue D5w  Hypernatremia   -D5W to 70  Encephaolopathy, toxic/metabolic   -frequent orientation  Severe AS   -outpatient follow up for TAVR  pulm HTN  DM  Aspiration pneumonia, treated  Anemia, acute blood loss   -iron  Transaminitis   -hep panel negative   -hold pravechol  Pulm edema   -follow, BNP normal  Hypothyroidism  HLD   -pravechol on hold for LFTs  H/o LGIB  Nutrition   -po  Debility   -PT/OT  D/w wife    Labs reviewed and Medications reviewed  Bond catheter: Strict Intake and Output During Sepisis or Shock  Central line in place: Need for  access    DVT Prophylaxis: Contraindicated - High bleeding risk

## 2017-06-11 NOTE — PROGRESS NOTES
Pulmonary Critical Care Progress Note        Chief Complaint: Respiratory insufficiency    History of Present Illness: 76 y.o. male admitted for rehabilitation following prolonged hospitalization    ROS:  Respiratory: positive shortness of breath, Cardiac: negative, GI: negative.  All other systems negative.    Interval Events:  24 hour interval history reviewed  Alert converses no resp distress; coherent  PFSH:  No change.    Respiratory:        Chest Tube Drains:          Exam: unlabored respirations, no intercostal retractions or accessory muscle use  ImagingAvailable data reviewed         Invalid input(s): [id number]    HemoDynamics:             Exam: regular rate and rhythm  Imaging: Available data reviewed  Recent Labs      06/09/17   0420   BNPBTYPENAT  27       Neuro:  GCS         Exam: after fall and laceration above left eye, head CT scan did not show acute intracranial bleed or injury. Patient responsive presently mental status intact  Imaging: Available data reviewed    Fluids:  Intake/Output     None           Recent Labs      06/09/17   1120  06/10/17   0015  06/11/17   0015   SODIUM  147*  148*  139   POTASSIUM  5.2  3.9  3.5*   CHLORIDE  112  112  106   CO2  26  27  25   BUN  74*  66*  50*   CREATININE  2.39*  2.23*  1.91*   CALCIUM  8.8  8.4  7.6*       GI/Nutrition:  Exam: abdomen is soft and non-tender  Imaging: None - Reviewed  taking PO  Liver Function  Recent Labs      06/09/17   0420  06/09/17   1120  06/10/17   0015  06/11/17   0015   ALTSGPT  147*   --    --   93*   ASTSGOT  129*   --    --   63*   ALKPHOSPHAT  157*   --    --   104*   TBILIRUBIN  4.2*   --    --   3.0*   GLUCOSE  155*  220*  153*  144*       Heme:  Recent Labs      06/09/17   1720   06/10/17   1745  06/11/17   0015  06/11/17   0600   RBC  3.47*   < >  3.00*  2.72*  2.71*   HEMOGLOBIN  9.3*   < >  8.1*  7.4*  7.4*   HEMATOCRIT  32.3*   < >  27.5*  24.6*  24.5*   PLATELETCT  199   < >  184  175  167   PROTHROMBTM  14.9*    --    --    --    --    APTT  31.5   --    --    --    --    INR  1.19*   --    --    --    --     < > = values in this interval not displayed.       Infectious Disease:  No Data Recorded  Micro: no new positive cultures  Recent Labs      06/09/17   0420   06/10/17   1745  06/11/17   0015  06/11/17   0600   WBC   --    < >  6.3  5.7  5.9   ASTSGOT  129*   --    --   63*   --    ALTSGPT  147*   --    --   93*   --    ALKPHOSPHAT  157*   --    --   104*   --    TBILIRUBIN  4.2*   --    --   3.0*   --     < > = values in this interval not displayed.     No current facility-administered medications for this encounter.       Last reviewed on 5/24/2017  9:56 PM by Zora Mccarthy PhT    Quality  Measures:  Labs reviewed, Medications reviewed and Radiology images reviewed                      Problems:      Acute hypoxic RF s/p VDRF 5/24-6/2; did not contribute to fall last night from review with nursing and respiratory therapy; stable presently, converses, no respiratory effort or distress              -continue pulm toilet              -monitor for infection  Grade 3 esophagitis with UGIB, bx negative              -continue PPI, no anticoagulation/antiplatelets  Acute on CKD 3/ Hyperkalemia, hospitalist addressing            Hypernatremia              -D5W  Encephaolopathy, toxic/metabolic; seems coherent presently              -frequent reorientation  Severe AS              -outpatient follow up for TAVR  pulm HTN  DM  Aspiration pneumonia, treated    Fall 6/8 PM, left eye swollen with laceration. Conversing, head CT scan did not show intracranial injury. Orbit assessment planned        looks better  Plan:  post fall t, head CT scan did not show injury, left eye swollen.better daily  RT/O2 protocols .  aggressive pulmonary toilet .    In chair converses

## 2017-06-11 NOTE — [facility]
Hospital Medicine Progress Note, Adult, Complex               Author: Maame Lewis Date & Time created: 6/11/2017  7:31 AM     CC: RF    Interval History:  hgb declined, possible dilution, no bleeding  Improved appetite    Review of Systems:  Review of Systems   Constitutional: Negative for fever.   Eyes: Positive for pain (sore).   Respiratory: Negative for cough and shortness of breath.    Cardiovascular: Negative for chest pain, palpitations and leg swelling.   Gastrointestinal: Negative for nausea, vomiting, abdominal pain and blood in stool.   Genitourinary: Negative for dysuria.   Musculoskeletal: Positive for falls (6/8).   Neurological: Negative for headaches.       T 97.6 P84BP 105/55 RR22 SaO2 95% 4L I/O 3/1.9  2BM tele SR  Physical Exam   Constitutional: He appears well-developed and well-nourished. No distress.   HENT:   Head: Normocephalic.   Mouth/Throat: No oropharyngeal exudate.   Eyes: Right eye exhibits no discharge. Left eye exhibits no discharge.   L conjunctive with hemorrhage, eccymosis present  Swelling resolving   Neck: No tracheal deviation present.   Cardiovascular: Normal rate and regular rhythm.    Murmur (3/6 BASSAM) heard.  Pulmonary/Chest: Effort normal. No respiratory distress. He has no wheezes. He exhibits no tenderness.   coarse   Abdominal: Soft. Bowel sounds are normal. He exhibits no distension. There is no tenderness.   Musculoskeletal: He exhibits no edema.   Neurological: He is alert.   VALENTIN   Skin: Skin is warm.   Vitals reviewed.      Labs:        Invalid input(s): [id number]  Recent Labs      06/09/17   0420   BNPBTYPENAT  27     Recent Labs      06/09/17   1120  06/10/17   0015  06/11/17   0015   SODIUM  147*  148*  139   POTASSIUM  5.2  3.9  3.5*   CHLORIDE  112  112  106   CO2  26  27  25   BUN  74*  66*  50*   CREATININE  2.39*  2.23*  1.91*   CALCIUM  8.8  8.4  7.6*     Recent Labs      06/09/17   0420  06/09/17   1120  06/10/17   0015  06/11/17   0015   ALTSGPT   147*   --    --   93*   ASTSGOT  129*   --    --   63*   ALKPHOSPHAT  157*   --    --   104*   TBILIRUBIN  4.2*   --    --   3.0*   GLUCOSE  155*  220*  153*  144*     Recent Labs      06/09/17   1720   06/10/17   1745  06/11/17   0015  06/11/17   0600   RBC  3.47*   < >  3.00*  2.72*  2.71*   HEMOGLOBIN  9.3*   < >  8.1*  7.4*  7.4*   HEMATOCRIT  32.3*   < >  27.5*  24.6*  24.5*   PLATELETCT  199   < >  184  175  167   PROTHROMBTM  14.9*   --    --    --    --    APTT  31.5   --    --    --    --    INR  1.19*   --    --    --    --     < > = values in this interval not displayed.     Recent Labs      06/09/17   0420   06/10/17   1745  06/11/17   0015  06/11/17   0600   WBC   --    < >  6.3  5.7  5.9   ASTSGOT  129*   --    --   63*   --    ALTSGPT  147*   --    --   93*   --    ALKPHOSPHAT  157*   --    --   104*   --    TBILIRUBIN  4.2*   --    --   3.0*   --     < > = values in this interval not displayed.     Medical Decision Making, by Problem:  Fall with eyebrow laceration  Hyperkalemia-resolved  Acute hypoxic RF s/p VDRF 5/24-6/2   -continue pulm toilet   -monitor for infection  Grade 3 esophagitis with UGIB, bx negative   -continue PPI, no anticoagulation/antiplatelets   -monitor hematochezia-resolved   -HGB decline possible dilution   -continue to follow  Acute on CKD 3   -improved   -saline lock IV  Hypernatremia-resolved  Encephaolopathy, toxic/metabolic   -frequent orientation  Severe AS   -outpatient follow up for TAVR  pulm HTN  DM  Aspiration pneumonia, treated  Anemia, acute blood loss   -iron  Transaminitis   -hep panel negative   -holding pravechol with improvement  Pulm edema   -follow, BNP normal  Hypothyroidism  HLD   -pravechol on hold for LFTs  H/o LGIB  Nutrition   -po  Debility   -PT/OT    Labs reviewed and Medications reviewed  Bond catheter: Strict Intake and Output During Sepisis or Shock  Central line in place: Need for access    DVT Prophylaxis: Contraindicated - High bleeding  risk

## 2017-06-12 NOTE — [facility]
Hospital Medicine Progress Note, Adult, Complex               Author: Maame ALBINO Lewis Date & Time created: 6/12/2017  6:36 AM     CC: RF    Interval History:  No events  K+ low still  Renal function returning to baseline  Ambulated with therapy yesterday    Review of Systems:  Review of Systems   Constitutional: Negative for fever.   Eyes: Positive for pain (sore).   Respiratory: Negative for cough and shortness of breath.    Cardiovascular: Negative for chest pain and leg swelling.   Gastrointestinal: Negative for nausea, vomiting, abdominal pain, diarrhea, constipation and blood in stool.   Genitourinary: Negative for dysuria.   Musculoskeletal: Positive for falls (6/8).   Skin: Negative for rash.   Neurological: Negative for headaches.       T 98.9 P78BP 102/58 [id number] 96% 4L I/O 1.6/1.5 1BM tele SR  Physical Exam   Constitutional: He appears well-developed and well-nourished.   HENT:   Head: Normocephalic.   Mouth/Throat: No oropharyngeal exudate.   Eyes: Right eye exhibits no discharge. Left eye exhibits no discharge.   L conjunctive with hemorrhage, eccymosis present     Neck: No tracheal deviation present.   Cardiovascular: Normal rate and regular rhythm.    Murmur (3/6 BASSAM) heard.  Pulmonary/Chest: Effort normal. No respiratory distress. He has no wheezes. He exhibits no tenderness.   coarse   Abdominal: Soft. Bowel sounds are normal. He exhibits no distension. There is no tenderness.   Musculoskeletal: He exhibits no edema.   Neurological: He is alert.   VALENTIN   Skin: Skin is warm.   Vitals reviewed.      Labs:        Invalid input(s): [id number]      Recent Labs      06/10/17   0015  06/11/17 0015  06/12/17   0010   SODIUM  148*  139  138   POTASSIUM  3.9  3.5*  3.3*   CHLORIDE  112  106  104   CO2  27  25  26   BUN  66*  50*  41*   CREATININE  2.23*  1.91*  1.88*   CALCIUM  8.4  7.6*  8.0*     Recent Labs      06/10/17   0015  06/11/17 0015  06/12/17 0010   ALTSGPT   --   93*   --    ASTSGOT    --   63*   --    ALKPHOSPHAT   --   104*   --    TBILIRUBIN   --   3.0*   --    GLUCOSE  153*  144*  116*     Recent Labs      06/09/17   1720   06/11/17   1730  06/12/17   0010  06/12/17   0600   RBC  3.47*   < >  2.76*  2.63*  2.69*   HEMOGLOBIN  9.3*   < >  7.5*  7.1*  7.3*   HEMATOCRIT  32.3*   < >  24.6*  23.5*  24.2*   PLATELETCT  199   < >  172  173  166   PROTHROMBTM  14.9*   --    --    --    --    APTT  31.5   --    --    --    --    INR  1.19*   --    --    --    --     < > = values in this interval not displayed.     Recent Labs      06/11/17   0015   06/11/17 1730 06/12/17   0010  06/12/17   0600   WBC  5.7   < >  6.1  5.4  5.7   ASTSGOT  63*   --    --    --    --    ALTSGPT  93*   --    --    --    --    ALKPHOSPHAT  104*   --    --    --    --    TBILIRUBIN  3.0*   --    --    --    --     < > = values in this interval not displayed.     Medical Decision Making, by Problem:  Fall with eyebrow laceration   -clinically improved, CT orbit negative  Acute hypoxic RF s/p VDRF 5/24-6/2   -continue pulm toilet   -monitor for infection  Grade 3 esophagitis with UGIB, bx negative   -continue PPI, no anticoagulation/antiplatelets   -monitor hematochezia-resolved   -HGB decline possible dilution, stable 24 hours  Acute on CKD 3   -improved   -saline lock IV  Encephaolopathy, toxic/metabolic   -frequent orientation  Severe AS   -outpatient follow up for TAVR  pulm HTN  DM, HgbA1c at goal  Aspiration pneumonia, treated  Anemia, acute blood loss   -iron  Transaminitis   -hep panel negative   -holding pravechol with improvement  Pulm edema   -follow, BNP normal  Hypothyroidism  HLD   -pravechol on hold for LFTs  H/o LGIB  Nutrition   -po  Debility   -PT/OT    Labs reviewed and Medications reviewed  Bond catheter: Strict Intake and Output During Sepisis or Shock  Central line in place: Need for access    DVT Prophylaxis: Contraindicated - High bleeding risk

## 2017-06-12 NOTE — PROGRESS NOTES
Pulmonary Critical Care Progress Note        Chief Complaint: Respiratory insufficiency    History of Present Illness: 76 y.o. male admitted for rehabilitation following prolonged hospitalization    ROS:  Respiratory: positive shortness of breath, Cardiac: negative, GI: negative.  All other systems negative.    Interval Events:  24 hour interval history reviewed    6/12 - on 4 lpm, d/w RT; T 98.9 P78BP 102/58 [id number] 96% 4L I/O 1.6/1.5 1BM tele SR; labs reviewed and similar to prior with persisting low K+; no new imaging or micro      PFSH:  No change.    Respiratory:        Chest Tube Drains:          Exam: unlabored respirations, no intercostal retractions or accessory muscle use  ImagingAvailable data reviewed         Invalid input(s): [id number]    HemoDynamics:             Exam: regular rate and rhythm  Imaging: Available data reviewed        Neuro:  GCS         Exam: after fall and laceration above left eye, head CT scan did not show acute intracranial bleed or injury. Patient responsive presently mental status intact  Imaging: Available data reviewed    Fluids:  Intake/Output     None           Recent Labs      06/10/17   0015  06/11/17   0015  06/12/17   0010   SODIUM  148*  139  138   POTASSIUM  3.9  3.5*  3.3*   CHLORIDE  112  106  104   CO2  27  25  26   BUN  66*  50*  41*   CREATININE  2.23*  1.91*  1.88*   CALCIUM  8.4  7.6*  8.0*       GI/Nutrition:  Exam: abdomen is soft and non-tender  Imaging: None - Reviewed  taking PO  Liver Function  Recent Labs      06/10/17   0015  06/11/17   0015  06/12/17   0010   ALTSGPT   --   93*   --    ASTSGOT   --   63*   --    ALKPHOSPHAT   --   104*   --    TBILIRUBIN   --   3.0*   --    GLUCOSE  153*  144*  116*       Heme:  Recent Labs      06/09/17   1720   06/11/17   1730  06/12/17   0010  06/12/17   0600   RBC  3.47*   < >  2.76*  2.63*  2.69*   HEMOGLOBIN  9.3*   < >  7.5*  7.1*  7.3*   HEMATOCRIT  32.3*   < >  24.6*  23.5*  24.2*   PLATELETCT  199   < >   172  173  166   PROTHROMBTM  14.9*   --    --    --    --    APTT  31.5   --    --    --    --    INR  1.19*   --    --    --    --     < > = values in this interval not displayed.       Infectious Disease:  No Data Recorded  Micro: no new positive cultures  Recent Labs      06/11/17   0015   06/11/17   1730  06/12/17   0010  06/12/17   0600   WBC  5.7   < >  6.1  5.4  5.7   ASTSGOT  63*   --    --    --    --    ALTSGPT  93*   --    --    --    --    ALKPHOSPHAT  104*   --    --    --    --    TBILIRUBIN  3.0*   --    --    --    --     < > = values in this interval not displayed.     No current facility-administered medications for this encounter.       Last reviewed on 5/24/2017  9:56 PM by Zora Mccarthy T    Quality  Measures:  Labs reviewed, Medications reviewed and Radiology images reviewed                      Problems:      Acute hypoxic RF s/p VDRF 5/24-6/2; did not contribute to fall last night from review with nursing and respiratory therapy; stable presently, converses, no respiratory effort or distress              -continue pulm toilet              -monitor for infection  Grade 3 esophagitis with UGIB, bx negative              -continue PPI, no anticoagulation/antiplatelets  Acute on CKD 3/ Hyperkalemia, hospitalist addressing            Hypernatremia              -D5W  Encephaolopathy, toxic/metabolic; seems coherent presently              -frequent reorientation  Severe AS              -outpatient follow up for TAVR  pulm HTN  DM  Aspiration pneumonia, treated    Fall 6/8 PM, left eye swollen with laceration. Conversing, head CT scan did not show intracranial injury. Orbit assessment planned        looks better    Plan:  Continue O2/RT protocols, prophylaxis, nutrition, therapies, glycemic control    Antiinfectives - none

## 2017-06-13 NOTE — [facility]
Hospital Medicine Progress Note, Adult, Complex               Author: Valorie Montserrat Date & Time created: 6/13/2017  10:32 AM     Interval History:  CC - RF  No physical complaints but has multiple questions.    Review of Systems:  Review of Systems   Constitutional: Negative for fever and chills.   HENT: Negative.    Eyes: Negative.    Respiratory: Negative for cough and shortness of breath.    Cardiovascular: Negative for chest pain and palpitations.   Gastrointestinal: Negative for nausea, vomiting and abdominal pain.   Skin: Negative for itching and rash.   Neurological: Positive for weakness.       Physical Exam:  Physical Exam   Constitutional: He is oriented to person, place, and time. No distress.   HENT:   Right Ear: External ear normal.   Left Ear: External ear normal.   Nose: Nose normal.   Resolving left eyebrow hematoma/laceration   Eyes: Right eye exhibits no discharge. Left eye exhibits no discharge.   Anisocoria (right 3 mm, left 2 mm)   Neck: Normal range of motion. Neck supple.   Cardiovascular: Normal rate and regular rhythm.    Murmur heard.  Pulmonary/Chest: No respiratory distress. He has no wheezes.   Decreased BS   Abdominal: Soft. Bowel sounds are normal. He exhibits no distension. There is no tenderness.   Musculoskeletal: He exhibits no edema or tenderness.   Neurological: He is alert and oriented to person, place, and time.   Skin: Skin is warm and dry. He is not diaphoretic.   Vitals reviewed.      Labs:        Invalid input(s): [id number]      Recent Labs      06/11/17 0015 06/12/17   0010  06/13/17   0320   SODIUM  139  138  139   POTASSIUM  3.5*  3.3*  3.7   CHLORIDE  106  104  108   CO2  25  26  24   BUN  50*  41*  33*   CREATININE  1.91*  1.88*  1.88*   MAGNESIUM   --    --   2.5   CALCIUM  7.6*  8.0*  8.2*     Recent Labs      06/11/17 0015 06/12/17   0010  06/13/17   0320   ALTSGPT  93*   --    --    ASTSGOT  63*   --    --    ALKPHOSPHAT  104*   --    --    TBILIRUBIN  3.0*    --    --    GLUCOSE  144*  116*  117*     Recent Labs      06/12/17   0010  06/12/17   0600  06/13/17   0320   RBC  2.63*  2.69*  2.70*   HEMOGLOBIN  7.1*  7.3*  7.5*   HEMATOCRIT  23.5*  24.2*  24.5*   PLATELETCT  173  166  169     Recent Labs      06/11/17   0015   06/12/17   0010  06/12/17   0600 06/13/17   0320   WBC  5.7   < >  5.4  5.7  6.3   ASTSGOT  63*   --    --    --    --    ALTSGPT  93*   --    --    --    --    ALKPHOSPHAT  104*   --    --    --    --    TBILIRUBIN  3.0*   --    --    --    --     < > = values in this interval not displayed.           Medical Decision Making, by Problem:  S/P VDRF/ASPIRATION PNA PTA - RT protocol  SEVERE AS - outpt Cardiology F/U  PHTN - monitor volume  UGIB 2/2 ESOPHAGITIS - cont PPI bid  H/O LGIB  HLD - off statin  DM - SSI  CKD III - follow renal fxn  ANEMIA - add Vit C to Fe  TRANSAMINITIS - Hepatitis Panel negative  HYPOTHYROIDISM - on no meds  ENCEPHALOPATHY  S/P FALL W/ LEFT EYEBROW LAC    Reviewed w/ pt and wife        Medications reviewed and Labs reviewed  Bond catheter: Strict Intake and Output During Sepisis or Shock  Central line in place: Need for access    DVT Prophylaxis: Contraindicated - High bleeding risk  DVT prophylaxis - mechanical: SCDs  Ulcer prophylaxis: Yes

## 2017-06-13 NOTE — PROGRESS NOTES
Pulmonary Critical Care Progress Note        Chief Complaint: Respiratory insufficiency    History of Present Illness: 76 y.o. male admitted for rehabilitation following prolonged hospitalization    ROS:  Respiratory: positive shortness of breath, Cardiac: negative, GI: negative.  All other systems negative.    Interval Events:  24 hour interval history reviewed    6/12 - on 4 lpm, d/w RT; T 98.9 P78BP 102/58 [id number] 96% 4L I/O 1.6/1.5 1BM tele SR; labs reviewed and similar to prior with persisting low K+; no new imaging or micro    6/13 - d/w RT, now on 3 lpm high flow; 98 degrees- 105/52 - 68 - 18 - 97%; remains anemic and azotemic today; no new images or micro;       PFSH:  No change.    Respiratory:        Chest Tube Drains:          Exam: unlabored respirations, no intercostal retractions or accessory muscle use  ImagingAvailable data reviewed         Invalid input(s): [id number]    HemoDynamics:             Exam: regular rate and rhythm  Imaging: Available data reviewed        Neuro:  GCS         Exam: after fall and laceration above left eye, head CT scan did not show acute intracranial bleed or injury. Patient responsive presently mental status intact  Imaging: Available data reviewed    Fluids:  Intake/Output     None           Recent Labs      06/11/17   0015  06/12/17   0010  06/13/17   0320   SODIUM  139  138  139   POTASSIUM  3.5*  3.3*  3.7   CHLORIDE  106  104  108   CO2  25  26  24   BUN  50*  41*  33*   CREATININE  1.91*  1.88*  1.88*   MAGNESIUM   --    --   2.5   CALCIUM  7.6*  8.0*  8.2*       GI/Nutrition:  Exam: abdomen is soft and non-tender  Imaging: None - Reviewed  taking PO  Liver Function  Recent Labs      06/11/17   0015  06/12/17   0010  06/13/17   0320   ALTSGPT  93*   --    --    ASTSGOT  63*   --    --    ALKPHOSPHAT  104*   --    --    TBILIRUBIN  3.0*   --    --    GLUCOSE  144*  116*  117*       Heme:  Recent Labs      06/12/17   0010  06/12/17   0600  06/13/17   0320   RBC   2.63*  2.69*  2.70*   HEMOGLOBIN  7.1*  7.3*  7.5*   HEMATOCRIT  23.5*  24.2*  24.5*   PLATELETCT  173  166  169       Infectious Disease:  No Data Recorded  Micro: no new positive cultures  Recent Labs      06/11/17   0015   06/12/17   0010  06/12/17   0600  06/13/17   0320   WBC  5.7   < >  5.4  5.7  6.3   ASTSGOT  63*   --    --    --    --    ALTSGPT  93*   --    --    --    --    ALKPHOSPHAT  104*   --    --    --    --    TBILIRUBIN  3.0*   --    --    --    --     < > = values in this interval not displayed.     No current facility-administered medications for this encounter.       Last reviewed on 5/24/2017  9:56 PM by Keturah Cheek    Quality  Measures:  Labs reviewed, Medications reviewed and Radiology images reviewed                      Problems:      Acute hypoxic RF s/p VDRF 5/24-6/2; did not contribute to fall last night from review with nursing and respiratory therapy; stable presently, converses, no respiratory effort or distress              -continue pulm toilet              -monitor for infection  Grade 3 esophagitis with UGIB, bx negative              -continue PPI, no anticoagulation/antiplatelets  Acute on CKD 3/ Hyperkalemia, hospitalist addressing            Hypernatremia              -D5W  Encephaolopathy, toxic/metabolic; seems coherent presently              -frequent reorientation  Severe AS              -outpatient follow up for TAVR  pulm HTN  DM  Aspiration pneumonia, treated    Fall 6/8 PM, left eye swollen with laceration. Conversing, head CT scan did not show intracranial injury. Orbit assessment planned        looks better    Plan:  Continue O2/RT protocols, prophylaxis, nutrition, therapies, glycemic control    Antiinfectives - none

## 2017-06-14 NOTE — PROGRESS NOTES
Pulmonary Critical Care Progress Note        Chief Complaint: Respiratory insufficiency    History of Present Illness: 76 y.o. male admitted for rehabilitation following prolonged hospitalization    ROS:  Respiratory: positive shortness of breath, Cardiac: negative, GI: negative.  All other systems negative.    Interval Events:  24 hour interval history reviewed    6/12 - on 4 lpm, d/w RT; T 98.9 P78BP 102/58 [id number] 96% 4L I/O 1.6/1.5 1BM tele SR; labs reviewed and similar to prior with persisting low K+; no new imaging or micro    6/13 - d/w RT, now on 3 lpm high flow; 98 degrees- 105/52 - 68 - 18 - 97%; remains anemic and azotemic today; no new images or micro;     6/14 - had a good night he says but confused overnight according to RN; 97.4 degrees - 110/62 - 74 - 24 - 98 degrees; piv to replace cvc; no new labs, imaging, micro      PFSH:  No change.    Respiratory:        Chest Tube Drains:          Exam: unlabored respirations, no intercostal retractions or accessory muscle use  ImagingAvailable data reviewed         Invalid input(s): [id number]    HemoDynamics:             Exam: regular rate and rhythm  Imaging: Available data reviewed        Neuro:  GCS         Exam: after fall and laceration above left eye, head CT scan did not show acute intracranial bleed or injury. Patient responsive presently mental status intact  Imaging: Available data reviewed    Fluids:  Intake/Output     None           Recent Labs      06/12/17   0010  06/13/17   0320   SODIUM  138  139   POTASSIUM  3.3*  3.7   CHLORIDE  104  108   CO2  26  24   BUN  41*  33*   CREATININE  1.88*  1.88*   MAGNESIUM   --   2.5   CALCIUM  8.0*  8.2*       GI/Nutrition:  Exam: abdomen is soft and non-tender  Imaging: None - Reviewed  taking PO  Liver Function  Recent Labs      06/12/17   0010  06/13/17   0320   GLUCOSE  116*  117*       Heme:  Recent Labs      06/12/17   0010  06/12/17   0600  06/13/17   0320   RBC  2.63*  2.69*  2.70*    HEMOGLOBIN  7.1*  7.3*  7.5*   HEMATOCRIT  23.5*  24.2*  24.5*   PLATELETCT  173  166  169       Infectious Disease:  No Data Recorded  Micro: no new positive cultures  Recent Labs      06/12/17   0010  06/12/17   0600  06/13/17   0320   WBC  5.4  5.7  6.3     No current facility-administered medications for this encounter.       Last reviewed on 5/24/2017  9:56 PM by Keturah Cheek    Quality  Measures:  Labs reviewed, Medications reviewed and Radiology images reviewed                      Problems:      Acute hypoxic RF s/p VDRF 5/24-6/2; did not contribute to fall last night from review with nursing and respiratory therapy; stable presently, converses, no respiratory effort or distress              -continue pulm toilet              -monitor for infection  Grade 3 esophagitis with UGIB, bx negative              -continue PPI, no anticoagulation/antiplatelets  Acute on CKD 3/ Hyperkalemia, hospitalist addressing            Hypernatremia              -D5W  Encephaolopathy, toxic/metabolic; seems coherent presently              -frequent reorientation  Severe AS              -outpatient follow up for TAVR  pulm HTN  DM  Aspiration pneumonia, treated    Fall 6/8 PM, left eye swollen with laceration. Conversing, head CT scan did not show intracranial injury. Orbit assessment planned        looks better    Plan:  Continue O2/RT protocols, prophylaxis, nutrition, therapies, glycemic control    Antiinfectives - none

## 2017-06-14 NOTE — PROCEDURES
Date: 5/30/2017    Procedure:  Diagnostic & therapeutic bronchoscopy with BAL LLL    Indication: Respiratory failure, ? pneumonia    Physician:  Dr. Edwin Greene MD    Consent:  Obtained    Procedure:  This patient has respiratory failure and has been intubated.  Repeat bronchoscopy was indicated for therapeutic airway lavage and BAL to evaluate for pneumonia.  A flexible FOB was inserted through the ETT without difficulty.  All airways were evaluated to the sub-segmental level.  The airway mucosa was mildly inflamed.  The RLL and LLL segments were therapeutically lavaged with small aliquots of saline.  There was some dark brown material lavaged out, consistent with resolving aspirated blood.  There was no active bleeding identified.  No endobronchial lesions were seen.  The bronchoscope was then wedged in a segment of the LLL bronchus.  30cc of saline was instilled with moderate return of dark BAL fluid.  The BAL specimen will be sent for appropriate culture.  No immediate complications.  EBL = 0.

## 2017-06-14 NOTE — [facility]
"Hospital Medicine Progress Note, Adult, Complex               Author: Valorie Mariano Date & Time created: 6/14/2017  6:31 AM     Interval History:  CC - RF  C/o being \"bored,\" otherwise, doing \"fine.\"  RN reports some transient confusion around midnight.    Review of Systems:  Review of Systems   Constitutional: Negative for fever and chills.   HENT: Negative.    Eyes: Negative.    Respiratory: Negative for cough and shortness of breath.    Cardiovascular: Negative for chest pain and palpitations.   Gastrointestinal: Negative for nausea, vomiting and abdominal pain.   Skin: Negative for itching and rash.   Neurological: Positive for weakness.       Physical Exam:  Physical Exam   Constitutional: He is oriented to person, place, and time. No distress.   HENT:   Right Ear: External ear normal.   Left Ear: External ear normal.   Nose: Nose normal.   Resolving left eyebrow hematoma/laceration   Eyes: Right eye exhibits no discharge. Left eye exhibits no discharge. No scleral icterus.   Anisocoria (right 3 mm, left 2 mm)   Neck: Normal range of motion. Neck supple. No tracheal deviation present.   Cardiovascular: Normal rate and regular rhythm.    Murmur heard.  Pulmonary/Chest: No stridor. No respiratory distress. He has no wheezes.   Decreased BS   Abdominal: Soft. Bowel sounds are normal. He exhibits no distension. There is no tenderness.   Musculoskeletal: He exhibits no edema or tenderness.   Neurological: He is alert and oriented to person, place, and time.   Skin: Skin is warm and dry. He is not diaphoretic.   Vitals reviewed.      Labs:        Invalid input(s): [id number]      Recent Labs      06/12/17   0010  06/13/17   0320   SODIUM  138  139   POTASSIUM  3.3*  3.7   CHLORIDE  104  108   CO2  26  24   BUN  41*  33*   CREATININE  1.88*  1.88*   MAGNESIUM   --   2.5   CALCIUM  8.0*  8.2*     Recent Labs      06/12/17   0010  06/13/17   0320   GLUCOSE  116*  117*     Recent Labs      06/12/17   0010  06/12/17   0600  " 06/13/17   0320   RBC  2.63*  2.69*  2.70*   HEMOGLOBIN  7.1*  7.3*  7.5*   HEMATOCRIT  23.5*  24.2*  24.5*   PLATELETCT  173  166  169     Recent Labs      06/12/17   0010  06/12/17   0600  06/13/17   0320   WBC  5.4  5.7  6.3           Medical Decision Making, by Problem:  S/P VDRF/ASPIRATION PNA PTA - RT protocol  SEVERE AS - outpt Cardiology F/U  PHTN - monitor volume  UGIB 2/2 ESOPHAGITIS - cont PPI bid  H/O LGIB  HLD - off statin  DM - SSI  CKD III - follow renal fxn  ANEMIA - Fe/Vit C  TRANSAMINITIS - Hepatitis Panel negative  HYPOTHYROIDISM - on no meds  ENCEPHALOPATHY - may be related to sleep/wake cycle in this pt given that he falls asleep after dinner, awakes at midnight confused, and clears by morning  S/P FALL W/ LEFT EYEBROW LAC - CT Head and Orbits negative  IV ACCESS - replace CVC w/ PIV    Reviewed w/ pt and RN        Medications reviewed and Labs reviewed  Bond catheter: Strict Intake and Output During Sepisis or Shock  Central line in place: Need for access    DVT Prophylaxis: Contraindicated - High bleeding risk  DVT prophylaxis - mechanical: SCDs  Ulcer prophylaxis: Yes

## 2017-06-15 NOTE — [facility]
Hospital Medicine Progress Note, Adult, Complex               Author: Valorie Montserrat Date & Time created: 6/15/2017  11:29 AM     Interval History:  CC - RF  Wants toenails clipped.  No other complaints.    Review of Systems:  Review of Systems   Constitutional: Negative for fever and chills.   HENT: Negative.    Eyes: Negative.    Respiratory: Negative for cough and shortness of breath.    Cardiovascular: Negative for chest pain and palpitations.   Gastrointestinal: Negative for nausea, vomiting and abdominal pain.   Skin: Negative for itching and rash.   Neurological: Positive for weakness.       Physical Exam:  Physical Exam   Constitutional: He is oriented to person, place, and time. No distress.   HENT:   Right Ear: External ear normal.   Left Ear: External ear normal.   Nose: Nose normal.   Resolving left eyebrow hematoma/laceration   Eyes: Conjunctivae and EOM are normal. Right eye exhibits no discharge. Left eye exhibits no discharge.   Anisocoria (right 3 mm, left 2 mm)   Neck: Normal range of motion. Neck supple. No tracheal deviation present.   Cardiovascular: Normal rate and regular rhythm.    Murmur heard.  Pulmonary/Chest: No stridor. No respiratory distress. He has no wheezes.   Decreased BS   Abdominal: Soft. Bowel sounds are normal. He exhibits no distension. There is no tenderness.   Musculoskeletal: He exhibits no edema or tenderness.   Neurological: He is alert and oriented to person, place, and time.   Skin: Skin is warm and dry. He is not diaphoretic.   Vitals reviewed.      Labs:        Invalid input(s): [id number]      Recent Labs      06/13/17   0320  06/15/17   0535   SODIUM  139  139   POTASSIUM  3.7  4.1   CHLORIDE  108  107   CO2  24  23   BUN  33*  21   CREATININE  1.88*  1.55*   MAGNESIUM  2.5   --    CALCIUM  8.2*  8.5     Recent Labs      06/13/17   0320  06/15/17   0535   ALTSGPT   --   71*   ASTSGOT   --   56*   ALKPHOSPHAT   --   97   TBILIRUBIN   --   2.5*   GLUCOSE  117*  114*      Recent Labs      06/13/17   0320  06/15/17   0535   RBC  2.70*  2.98*   HEMOGLOBIN  7.5*  8.1*   HEMATOCRIT  24.5*  26.8*   PLATELETCT  169  171     Recent Labs      06/13/17   0320  06/15/17   0535   WBC  6.3  7.6   NEUTSPOLYS   --   63.00   LYMPHOCYTES   --   23.00   MONOCYTES   --   2.00   EOSINOPHILS   --   4.00   BASOPHILS   --   1.00   ASTSGOT   --   56*   ALTSGPT   --   71*   ALKPHOSPHAT   --   97   TBILIRUBIN   --   2.5*           Medical Decision Making, by Problem:  S/P VDRF/ASPIRATION PNA PTA - RT protocol  SEVERE AS - outpt Cardiology F/U  PHTN - monitor volume  UGIB 2/2 ESOPHAGITIS - cont PPI bid  H/O LGIB  HLD - off statin  DM - SSI  CKD III - renal fxn improving  ANEMIA - Fe/Vit C  TRANSAMINITIS - Hepatitis Panel negative  HYPOTHYROIDISM - on no meds  ENCEPHALOPATHY - slowly clearing  S/P FALL W/ LEFT EYEBROW LAC - CT Head and Orbits negative  MISC - D/C Bond, Podiatry consult      Reviewed w/ pt and RN        Medications reviewed and Labs reviewed  Bond catheter: Strict Intake and Output During Sepisis or Shock      DVT Prophylaxis: Contraindicated - High bleeding risk  DVT prophylaxis - mechanical: SCDs  Ulcer prophylaxis: Yes

## 2017-06-15 NOTE — PROGRESS NOTES
Pulmonary Critical Care Progress Note        Chief Complaint: Respiratory insufficiency    History of Present Illness: 76 y.o. male admitted for rehabilitation following prolonged hospitalization    ROS:  Respiratory: positive shortness of breath, Cardiac: negative, GI: negative.  All other systems negative.    Interval Events:  24 hour interval history reviewed    6/12 - on 4 lpm, d/w RT; T 98.9 P78BP 102/58 [id number] 96% 4L I/O 1.6/1.5 1BM tele SR; labs reviewed and similar to prior with persisting low K+; no new imaging or micro    6/13 - d/w RT, now on 3 lpm high flow; 98 degrees- 105/52 - 68 - 18 - 97%; remains anemic and azotemic today; no new images or micro;     6/14 - had a good night he says but confused overnight according to RN; 97.4 degrees - 110/62 - 74 - 24 - 98 degrees; piv to replace cvc; no new labs, imaging, micro    6/15 - d/w RT; no new issues overnight; encouraged to use IS; anemia, azotemia, hyperglycemia , increased transaminases persist; no new micro or imaging      PFSH:  No change.    Respiratory:        Chest Tube Drains:          Exam: unlabored respirations, no intercostal retractions or accessory muscle use  ImagingAvailable data reviewed         Invalid input(s): [id number]    HemoDynamics:             Exam: regular rate and rhythm  Imaging: Available data reviewed        Neuro:  GCS         Exam: after fall and laceration above left eye, head CT scan did not show acute intracranial bleed or injury. Patient responsive presently mental status intact  Imaging: Available data reviewed    Fluids:  Intake/Output     None           Recent Labs      06/13/17   0320  06/15/17   0535   SODIUM  139  139   POTASSIUM  3.7  4.1   CHLORIDE  108  107   CO2  24  23   BUN  33*  21   CREATININE  1.88*  1.55*   MAGNESIUM  2.5   --    CALCIUM  8.2*  8.5       GI/Nutrition:  Exam: abdomen is soft and non-tender  Imaging: None - Reviewed  taking PO  Liver Function  Recent Labs      06/13/17   0320   06/15/17   0535   ALTSGPT   --   71*   ASTSGOT   --   56*   ALKPHOSPHAT   --   97   TBILIRUBIN   --   2.5*   GLUCOSE  117*  114*       Heme:  Recent Labs      06/13/17   0320  06/15/17   0535   RBC  2.70*  2.98*   HEMOGLOBIN  7.5*  8.1*   HEMATOCRIT  24.5*  26.8*   PLATELETCT  169  171       Infectious Disease:  No Data Recorded  Micro: no new positive cultures  Recent Labs      06/13/17   0320  06/15/17   0535   WBC  6.3  7.6   NEUTSPOLYS   --   63.00   LYMPHOCYTES   --   23.00   MONOCYTES   --   2.00   EOSINOPHILS   --   4.00   BASOPHILS   --   1.00   ASTSGOT   --   56*   ALTSGPT   --   71*   ALKPHOSPHAT   --   97   TBILIRUBIN   --   2.5*     No current facility-administered medications for this encounter.       Last reviewed on 5/24/2017  9:56 PM by Zora Mccarthy PhT    Quality  Measures:  Labs reviewed, Medications reviewed and Radiology images reviewed                      Problems:      Acute hypoxic RF s/p VDRF 5/24-6/2; did not contribute to fall last night from review with nursing and respiratory therapy; stable presently, converses, no respiratory effort or distress              -continue pulm toilet              -monitor for infection  Grade 3 esophagitis with UGIB, bx negative              -continue PPI, no anticoagulation/antiplatelets  Acute on CKD 3/ Hyperkalemia, hospitalist addressing            Hypernatremia              -D5W  Encephaolopathy, toxic/metabolic; seems coherent presently              -frequent reorientation  Severe AS              -outpatient follow up for TAVR  pulm HTN  DM  Aspiration pneumonia, treated    Fall 6/8 PM, left eye swollen with laceration. Conversing, head CT scan did not show intracranial injury. Orbit assessment planned        looks better    Plan:  Continue O2/RT protocols, prophylaxis, nutrition, therapies, glycemic control    Antiinfectives - none

## 2017-06-16 NOTE — PROGRESS NOTES
Pulmonary Critical Care Progress Note        Chief Complaint: Respiratory insufficiency    History of Present Illness: 76 y.o. male admitted for rehabilitation following prolonged hospitalization    ROS:  Respiratory: positive shortness of breath, Cardiac: negative, GI: negative.  All other systems negative.    Interval Events:  24 hour interval history reviewed    6/12 - on 4 lpm, d/w RT; T 98.9 P78BP 102/58 [id number] 96% 4L I/O 1.6/1.5 1BM tele SR; labs reviewed and similar to prior with persisting low K+; no new imaging or micro    6/13 - d/w RT, now on 3 lpm high flow; 98 degrees- 105/52 - 68 - 18 - 97%; remains anemic and azotemic today; no new images or micro;     6/14 - had a good night he says but confused overnight according to RN; 97.4 degrees - 110/62 - 74 - 24 - 98 degrees; piv to replace cvc; no new labs, imaging, micro    6/15 - d/w RT; no new issues overnight; encouraged to use IS; anemia, azotemia, hyperglycemia , increased transaminases persist; no new micro or imaging    6/16 - denies interval significant issues; 97.5 degrees - 115/74 - 83 - 20 - 93%; no new labs, images, micro         PFSH:  No change.    Respiratory:        Chest Tube Drains:          Exam: unlabored respirations, no intercostal retractions or accessory muscle use  ImagingAvailable data reviewed         Invalid input(s): [id number]    HemoDynamics:             Exam: regular rate and rhythm  Imaging: Available data reviewed        Neuro:  GCS         Exam: after fall and laceration above left eye, head CT scan did not show acute intracranial bleed or injury. Patient responsive presently mental status intact  Imaging: Available data reviewed    Fluids:  Intake/Output     None           Recent Labs      06/15/17   0535   SODIUM  139   POTASSIUM  4.1   CHLORIDE  107   CO2  23   BUN  21   CREATININE  1.55*   CALCIUM  8.5       GI/Nutrition:  Exam: abdomen is soft and non-tender  Imaging: None - Reviewed  taking PO  Liver  Function  Recent Labs      06/15/17   0535   ALTSGPT  71*   ASTSGOT  56*   ALKPHOSPHAT  97   TBILIRUBIN  2.5*   GLUCOSE  114*       Heme:  Recent Labs      06/15/17   0535   RBC  2.98*   HEMOGLOBIN  8.1*   HEMATOCRIT  26.8*   PLATELETCT  171       Infectious Disease:  No Data Recorded  Micro: no new positive cultures  Recent Labs      06/15/17   0535   WBC  7.6   NEUTSPOLYS  63.00   LYMPHOCYTES  23.00   MONOCYTES  2.00   EOSINOPHILS  4.00   BASOPHILS  1.00   ASTSGOT  56*   ALTSGPT  71*   ALKPHOSPHAT  97   TBILIRUBIN  2.5*     No current facility-administered medications for this encounter.       Last reviewed on 5/24/2017  9:56 PM by Keturah Cheek    Quality  Measures:  Labs reviewed, Medications reviewed and Radiology images reviewed                      Problems:      Acute hypoxic RF s/p VDRF 5/24-6/2; did not contribute to fall last night from review with nursing and respiratory therapy; stable presently, converses, no respiratory effort or distress              -continue pulm toilet              -monitor for infection  Grade 3 esophagitis with UGIB, bx negative              -continue PPI, no anticoagulation/antiplatelets  Acute on CKD 3/ Hyperkalemia, hospitalist addressing            Hypernatremia              -D5W  Encephaolopathy, toxic/metabolic; seems coherent presently              -frequent reorientation  Severe AS              -outpatient follow up for TAVR  pulm HTN  DM  Aspiration pneumonia, treated    Fall 6/8 PM, left eye swollen with laceration. Conversing, head CT scan did not show intracranial injury. Orbit assessment planned        looks better    Plan:  Continue O2/RT protocols, prophylaxis, nutrition, therapies, glycemic control    Antiinfectives - none

## 2017-06-16 NOTE — PROGRESS NOTES
Pulmonary Critical Care Progress Note        Chief Complaint: Respiratory insufficiency    History of Present Illness: 76 y.o. male admitted for rehabilitation following prolonged hospitalization    ROS:  Respiratory: positive shortness of breath, Cardiac: negative, GI: negative.  All other systems negative.    Interval Events:  24 hour interval history reviewed    6/12 - on 4 lpm, d/w RT; T 98.9 P78BP 102/58 [id number] 96% 4L I/O 1.6/1.5 1BM tele SR; labs reviewed and similar to prior with persisting low K+; no new imaging or micro    6/13 - d/w RT, now on 3 lpm high flow; 98 degrees- 105/52 - 68 - 18 - 97%; remains anemic and azotemic today; no new images or micro;     6/14 - had a good night he says but confused overnight according to RN; 97.4 degrees - 110/62 - 74 - 24 - 98 degrees; piv to replace cvc; no new labs, imaging, micro    6/15 - d/w RT; no new issues overnight; encouraged to use IS; anemia, azotemia, hyperglycemia , increased transaminases persist; no new micro or imaging    6/16 - denies interval significant issues; 97.5 degrees - 115/74 - 83 - 20 - 93%; no new labs, images, micro      PFSH:  No change.    Respiratory:        Chest Tube Drains:          Exam: unlabored respirations, no intercostal retractions or accessory muscle use  ImagingAvailable data reviewed         Invalid input(s): [id number]    HemoDynamics:             Exam: regular rate and rhythm  Imaging: Available data reviewed        Neuro:  GCS         Exam: after fall and laceration above left eye, head CT scan did not show acute intracranial bleed or injury. Patient responsive presently mental status intact  Imaging: Available data reviewed    Fluids:  Intake/Output     None           Recent Labs      06/15/17   0535   SODIUM  139   POTASSIUM  4.1   CHLORIDE  107   CO2  23   BUN  21   CREATININE  1.55*   CALCIUM  8.5       GI/Nutrition:  Exam: abdomen is soft and non-tender  Imaging: None - Reviewed  taking PO  Liver  Function  Recent Labs      06/15/17   0535   ALTSGPT  71*   ASTSGOT  56*   ALKPHOSPHAT  97   TBILIRUBIN  2.5*   GLUCOSE  114*       Heme:  Recent Labs      06/15/17   0535   RBC  2.98*   HEMOGLOBIN  8.1*   HEMATOCRIT  26.8*   PLATELETCT  171       Infectious Disease:  No Data Recorded  Micro: no new positive cultures  Recent Labs      06/15/17   0535   WBC  7.6   NEUTSPOLYS  63.00   LYMPHOCYTES  23.00   MONOCYTES  2.00   EOSINOPHILS  4.00   BASOPHILS  1.00   ASTSGOT  56*   ALTSGPT  71*   ALKPHOSPHAT  97   TBILIRUBIN  2.5*     No current facility-administered medications for this encounter.       Last reviewed on 5/24/2017  9:56 PM by Keturah Cheek    Quality  Measures:  Labs reviewed, Medications reviewed and Radiology images reviewed                      Problems:      Acute hypoxic RF s/p VDRF 5/24-6/2; did not contribute to fall last night from review with nursing and respiratory therapy; stable presently, converses, no respiratory effort or distress              -continue pulm toilet              -monitor for infection  Grade 3 esophagitis with UGIB, bx negative              -continue PPI, no anticoagulation/antiplatelets  Acute on CKD 3/ Hyperkalemia, hospitalist addressing            Hypernatremia              -D5W  Encephaolopathy, toxic/metabolic; seems coherent presently              -frequent reorientation  Severe AS              -outpatient follow up for TAVR  pulm HTN  DM  Aspiration pneumonia, treated    Fall 6/8 PM, left eye swollen with laceration. Conversing, head CT scan did not show intracranial injury. Orbit assessment planned        looks better    Plan:  Continue O2/RT protocols, prophylaxis, nutrition, therapies, glycemic control    Antiinfectives - none

## 2017-06-16 NOTE — [facility]
Hospital Medicine Progress Note, Adult, Complex               Author: Valorie Montserrat Date & Time created: 6/16/2017  2:17 PM     Interval History:  CC - RF  Pt having high PVR after Bond removed yesterday.    Review of Systems:  Review of Systems   Constitutional: Negative for fever and chills.   HENT: Negative.    Eyes: Negative.    Respiratory: Negative for cough and shortness of breath.    Cardiovascular: Negative for chest pain and palpitations.   Gastrointestinal: Negative for nausea, vomiting and abdominal pain.   Skin: Negative for itching and rash.   Neurological: Positive for weakness.       Physical Exam:  Physical Exam   Constitutional: He is oriented to person, place, and time. No distress.   HENT:   Right Ear: External ear normal.   Left Ear: External ear normal.   Nose: Nose normal.   Resolving left eyebrow hematoma/laceration   Eyes: Conjunctivae and EOM are normal. Right eye exhibits no discharge. Left eye exhibits no discharge.   Anisocoria (right 3 mm, left 2 mm)   Neck: Normal range of motion. Neck supple. No tracheal deviation present.   Cardiovascular: Normal rate and regular rhythm.    Murmur heard.  Pulmonary/Chest: No stridor. No respiratory distress. He has no wheezes.   Decreased BS   Abdominal: Soft. Bowel sounds are normal. He exhibits no distension. There is no tenderness.   Musculoskeletal: He exhibits no edema or tenderness.   Neurological: He is alert and oriented to person, place, and time.   Skin: Skin is warm and dry. He is not diaphoretic.   Vitals reviewed.      Labs:        Invalid input(s): [id number]      Recent Labs      06/15/17   0535   SODIUM  139   POTASSIUM  4.1   CHLORIDE  107   CO2  23   BUN  21   CREATININE  1.55*   CALCIUM  8.5     Recent Labs      06/15/17   0535   ALTSGPT  71*   ASTSGOT  56*   ALKPHOSPHAT  97   TBILIRUBIN  2.5*   GLUCOSE  114*     Recent Labs      06/15/17   0535   RBC  2.98*   HEMOGLOBIN  8.1*   HEMATOCRIT  26.8*   PLATELETCT  171     Recent Labs       06/15/17   0535   WBC  7.6   NEUTSPOLYS  63.00   LYMPHOCYTES  23.00   MONOCYTES  2.00   EOSINOPHILS  4.00   BASOPHILS  1.00   ASTSGOT  56*   ALTSGPT  71*   ALKPHOSPHAT  97   TBILIRUBIN  2.5*           Medical Decision Making, by Problem:  S/P VDRF/ASPIRATION PNA PTA - RT protocol  SEVERE AS - outpt Cardiology F/U  PHTN - monitor volume  UGIB 2/2 ESOPHAGITIS - cont PPI bid  H/O LGIB  HLD - off statin  DM - SSI  CKD III - renal fxn improving  ANEMIA - Fe/Vit C  TRANSAMINITIS - Hepatitis Panel negative  HYPOTHYROIDISM - on no meds  ENCEPHALOPATHY - slowly clearing  S/P FALL W/ LEFT EYEBROW LAC - CT Head and Orbits negative, wound care to re-dress eyebrow wound  URINARY RETENTION - straight cath if PVR>250 mL, replace Bond if straight cath=/>3, may need Flomax    Reviewed w/ pt and RN        Medications reviewed and Labs reviewed  Bond catheter: No Bond      DVT Prophylaxis: Contraindicated - High bleeding risk  DVT prophylaxis - mechanical: SCDs  Ulcer prophylaxis: Yes

## 2017-06-17 NOTE — PROGRESS NOTES
Pulmonary Critical Care Progress Note        Chief Complaint: Respiratory insufficiency    History of Present Illness: 76 y.o. male admitted for rehabilitation following prolonged hospitalization    ROS:  Respiratory: positive shortness of breath, Cardiac: negative, GI: negative.  All other systems negative.    Interval Events:  24 hour interval history reviewed    6/12 - on 4 lpm, d/w RT; T 98.9 P78BP 102/58 [id number] 96% 4L I/O 1.6/1.5 1BM tele SR; labs reviewed and similar to prior with persisting low K+; no new imaging or micro    6/13 - d/w RT, now on 3 lpm high flow; 98 degrees- 105/52 - 68 - 18 - 97%; remains anemic and azotemic today; no new images or micro;     6/14 - had a good night he says but confused overnight according to RN; 97.4 degrees - 110/62 - 74 - 24 - 98 degrees; piv to replace cvc; no new labs, imaging, micro    6/15 - d/w RT; no new issues overnight; encouraged to use IS; anemia, azotemia, hyperglycemia , increased transaminases persist; no new micro or imaging    6/16 - denies interval significant issues; 97.5 degrees - 115/74 - 83 - 20 - 93%; no new labs, images, micro    6/16 - no interval issues, loose stools, BP lower;  no new labs, images, micro             PFSH:  No change.    Respiratory:        Chest Tube Drains:          Exam: unlabored respirations, no intercostal retractions or accessory muscle use  ImagingAvailable data reviewed         Invalid input(s): [id number]    HemoDynamics:             Exam: regular rate and rhythm  Imaging: Available data reviewed        Neuro:  GCS         Exam: after fall and laceration above left eye, head CT scan did not show acute intracranial bleed or injury. Patient responsive presently mental status intact  Imaging: Available data reviewed    Fluids:  Intake/Output     None           Recent Labs      06/15/17   0535   SODIUM  139   POTASSIUM  4.1   CHLORIDE  107   CO2  23   BUN  21   CREATININE  1.55*   CALCIUM  8.5        GI/Nutrition:  Exam: abdomen is soft and non-tender  Imaging: None - Reviewed  taking PO  Liver Function  Recent Labs      06/15/17   0535   ALTSGPT  71*   ASTSGOT  56*   ALKPHOSPHAT  97   TBILIRUBIN  2.5*   GLUCOSE  114*       Heme:  Recent Labs      06/15/17   0535   RBC  2.98*   HEMOGLOBIN  8.1*   HEMATOCRIT  26.8*   PLATELETCT  171       Infectious Disease:  No Data Recorded  Micro: no new positive cultures  Recent Labs      06/15/17   0535   WBC  7.6   NEUTSPOLYS  63.00   LYMPHOCYTES  23.00   MONOCYTES  2.00   EOSINOPHILS  4.00   BASOPHILS  1.00   ASTSGOT  56*   ALTSGPT  71*   ALKPHOSPHAT  97   TBILIRUBIN  2.5*     No current facility-administered medications for this encounter.       Last reviewed on 5/24/2017  9:56 PM by Keturah Cheek    Quality  Measures:  Labs reviewed, Medications reviewed and Radiology images reviewed                      Problems:      Acute hypoxic RF s/p VDRF 5/24-6/2; did not contribute to fall last night from review with nursing and respiratory therapy; stable presently, converses, no respiratory effort or distress              -continue pulm toilet              -monitor for infection  Grade 3 esophagitis with UGIB, bx negative              -continue PPI, no anticoagulation/antiplatelets  Acute on CKD 3/ Hyperkalemia, hospitalist addressing            Hypernatremia              -D5W  Encephaolopathy, toxic/metabolic; seems coherent presently              -frequent reorientation  Severe AS              -outpatient follow up for TAVR  pulm HTN  DM  Aspiration pneumonia, treated    Fall 6/8 PM, left eye swollen with laceration. Conversing, head CT scan did not show intracranial injury. Orbit assessment planned        looks better    Plan:  Continue O2/RT protocols, prophylaxis, nutrition, therapies, glycemic control    Antiinfectives - none

## 2017-06-18 NOTE — PROGRESS NOTES
Pulmonary Critical Care Progress Note        Chief Complaint: Respiratory insufficiency    History of Present Illness: 76 y.o. male admitted for rehabilitation following prolonged hospitalization    ROS:  Respiratory: positive shortness of breath, Cardiac: negative, GI: negative.  All other systems negative.    Interval Events:  24 hour interval history reviewed    6/12 - on 4 lpm, d/w RT; T 98.9 P78BP 102/58 [id number] 96% 4L I/O 1.6/1.5 1BM tele SR; labs reviewed and similar to prior with persisting low K+; no new imaging or micro    6/13 - d/w RT, now on 3 lpm high flow; 98 degrees- 105/52 - 68 - 18 - 97%; remains anemic and azotemic today; no new images or micro;     6/14 - had a good night he says but confused overnight according to RN; 97.4 degrees - 110/62 - 74 - 24 - 98 degrees; piv to replace cvc; no new labs, imaging, micro    6/15 - d/w RT; no new issues overnight; encouraged to use IS; anemia, azotemia, hyperglycemia , increased transaminases persist; no new micro or imaging    6/16 - denies interval significant issues; 97.5 degrees - 115/74 - 83 - 20 - 93%; no new labs, images, micro    6/17 - no interval issues, loose stools, BP lower;  no new labs, images, micro    6/18 - had a good night, now up and having breakfast - 97.6 degrees - 103/55 - 84 - 22 - 99%; still anemic, more thrombocytopenic, azotemia about the same;  No new imaging; got a fluid bolus yesterday           PFSH:  No change.    Respiratory:        Chest Tube Drains:          Exam: unlabored respirations, no intercostal retractions or accessory muscle use  ImagingAvailable data reviewed         Invalid input(s): [id number]    HemoDynamics:             Exam: regular rate and rhythm  Imaging: Available data reviewed        Neuro:  GCS         Exam: after fall and laceration above left eye, head CT scan did not show acute intracranial bleed or injury. Patient responsive presently mental status intact  Imaging: Available data  reviewed    Fluids:  Intake/Output     None           Recent Labs      06/18/17   0441   SODIUM  137   POTASSIUM  3.8   CHLORIDE  105   CO2  22   BUN  20   CREATININE  1.52*   CALCIUM  8.6       GI/Nutrition:  Exam: abdomen is soft and non-tender  Imaging: None - Reviewed  taking PO  Liver Function  Recent Labs      06/18/17   0441   GLUCOSE  105*       Heme:  Recent Labs      06/18/17   0441   RBC  2.88*   HEMOGLOBIN  7.8*   HEMATOCRIT  25.5*   PLATELETCT  120*       Infectious Disease:  No Data Recorded  Micro: no new positive cultures  Recent Labs      06/18/17   0441   WBC  6.9     No current facility-administered medications for this encounter.       Last reviewed on 5/24/2017  9:56 PM by Keturah Cheek    Quality  Measures:  Labs reviewed, Medications reviewed and Radiology images reviewed                      Problems:      Acute hypoxic RF s/p VDRF 5/24-6/2; did not contribute to fall last night from review with nursing and respiratory therapy; stable presently, converses, no respiratory effort or distress              -continue pulm toilet              -monitor for infection  Grade 3 esophagitis with UGIB, bx negative              -continue PPI, no anticoagulation/antiplatelets  Acute on CKD 3/ Hyperkalemia, hospitalist addressing            Hypernatremia              -D5W  Encephaolopathy, toxic/metabolic; seems coherent presently              -frequent reorientation  Severe AS              -outpatient follow up for TAVR  pulm HTN  DM  Aspiration pneumonia, treated    Fall 6/8 PM, left eye swollen with laceration. Conversing, head CT scan did not show intracranial injury. Orbit assessment planned        looks better    Plan:  Continue O2/RT protocols, prophylaxis, nutrition, therapies, glycemic control    Antiinfectives - none

## 2017-06-18 NOTE — [facility]
Hospital Medicine Progress Note, Adult, Complex               Author: Valorie Montserrat Date & Time created: 6/18/2017  9:26 AM     Interval History:  CC - RF  Voiding better and post void residual improving.    Review of Systems:  Review of Systems   Constitutional: Negative for fever and chills.   HENT: Negative.    Eyes: Negative.    Respiratory: Negative for cough and shortness of breath.    Cardiovascular: Negative for chest pain and palpitations.   Gastrointestinal: Negative for nausea, vomiting and abdominal pain.   Skin: Negative for itching and rash.   Neurological: Positive for weakness.       Physical Exam:  Physical Exam   Constitutional: He is oriented to person, place, and time. No distress.   HENT:   Right Ear: External ear normal.   Left Ear: External ear normal.   Nose: Nose normal.   Resolving left eyebrow hematoma/laceration   Eyes: Conjunctivae and EOM are normal. Right eye exhibits no discharge. Left eye exhibits no discharge.   Anisocoria (right 3 mm, left 2 mm)   Neck: Normal range of motion. Neck supple. No tracheal deviation present.   Cardiovascular: Normal rate and regular rhythm.    Murmur heard.  Pulmonary/Chest: No stridor. No respiratory distress. He has no wheezes.   Decreased BS   Abdominal: Soft. Bowel sounds are normal. He exhibits no distension. There is no tenderness.   Musculoskeletal: He exhibits no edema or tenderness.   Neurological: He is alert and oriented to person, place, and time.   Skin: Skin is warm and dry. He is not diaphoretic.   Vitals reviewed.      Labs:        Invalid input(s): [id number]      Recent Labs      06/18/17   0441   SODIUM  137   POTASSIUM  3.8   CHLORIDE  105   CO2  22   BUN  20   CREATININE  1.52*   CALCIUM  8.6     Recent Labs      06/18/17   0441   GLUCOSE  105*     Recent Labs      06/18/17   0441   RBC  2.88*   HEMOGLOBIN  7.8*   HEMATOCRIT  25.5*   PLATELETCT  120*     Recent Labs      06/18/17   0441   WBC  6.9           Medical Decision Making, by  Problem:  S/P VDRF/ASPIRATION PNA PTA - 3 lpm NC O2  SEVERE AS - outpt Cardiology F/U  PHTN - monitor volume  UGIB 2/2 ESOPHAGITIS - cont PPI bid  H/O LGIB  HYPOTENSION - better w/ IVF  HLD - off statin  DM - has needed SSI coverage only twice in last 4 days, will D/C FSBS  CKD III - renal fxn improving  ANEMIA - Fe/Vit C  THROMBOCYTOPENIA - follow plt counts  TRANSAMINITIS - Hepatitis Panel negative, LFT's improving  HYPOTHYROIDISM - on no meds  ENCEPHALOPATHY - slowly clearing  S/P FALL W/ LEFT EYEBROW LAC - CT Head and Orbits negative, wound care to eyebrow lac  URINARY RETENTION - Bond out, PVR improving, may need Flomax if BP permits  LOOSE STOOL - decreased bowel regimen, check C Dif if persists    Reviewed w/ pt and RN        Medications reviewed and Labs reviewed  Bond catheter: No Bond      DVT Prophylaxis: Contraindicated - High bleeding risk  DVT prophylaxis - mechanical: SCDs  Ulcer prophylaxis: Yes

## 2017-06-20 NOTE — PROGRESS NOTES
Pulmonary Critical Care Progress Note        Chief Complaint: Respiratory insufficiency    ROS:  Respiratory: positive shortness of breath, improved, Cardiac: negative, GI: negative.  All other systems negative.    Interval Events:  24 hour interval history reviewed.  Comparable without dyspnea on low-flow oxygen nasal cannula, now down to 2 L/m. No new problems overnight.    PFSH:  No change.    Respiratory:  Exam: unlabored respirations, no intercostal retractions or accessory muscle use, few basilar rales without wheezing or consolidation.  ImagingAvailable data reviewed. The chest radiogram on June 7 demonstrated stable patchy bilateral densities. No film today.     HemoDynamics:  Exam: regular rate and rhythm  Imaging: Available data reviewed    Neuro:  Exam: after fall and laceration above left eye, head CT scan did not show acute intracranial bleed or injury. Patient responsive presently mental status intact  Imaging: Available data reviewed    Fluids:  Intake/Output     None           Recent Labs      06/18/17   0441   SODIUM  137   POTASSIUM  3.8   CHLORIDE  105   CO2  22   BUN  20   CREATININE  1.52*   CALCIUM  8.6       GI/Nutrition:  Exam: abdomen is soft and non-tender  Imaging: None - Reviewed  taking PO  Liver Function  Recent Labs      06/18/17   0441   GLUCOSE  105*       Heme:  Recent Labs      06/18/17   0441   RBC  2.88*   HEMOGLOBIN  7.8*   HEMATOCRIT  25.5*   PLATELETCT  120*       Infectious Disease:  No Data Recorded  Micro: no new positive cultures  Recent Labs      06/18/17 0441   WBC  6.9     No current facility-administered medications for this encounter.       Last reviewed on 5/24/2017  9:56 PM by Keturah Cheek    Quality  Measures:  Labs reviewed, Medications reviewed and Radiology images reviewed                      Problems:  Acute hypoxic respiratory failure, improved and now on low flow oxygen therapy.  Abnormal chest radiogram. Recent aspiration pneumonia,  afebrile after treatment.  Grade 3 esophagitis with recent upper gastrointestinal hemorrhage, biopsy negative  Acute on chronic kidney disease, nonoliguric and stable by numbers.  Encephalopathy without focal neurologic findings, improving.  Severe aortic stenosis, pulmonary hypertension.  Diabetes mellitus, controlled.  Anemia and thrombocytopenia, stable    Recommend:  Continue titrated supplemental oxygen, respiratory protocols.  Continue prophylaxis, nutritional support with glucose control, mobilization and therapies  Discussed with respiratory therapy.

## 2017-06-20 NOTE — [facility]
Hospital Medicine Progress Note, Adult, Complex               Author: Maame Lewis Date & Time created: 6/20/2017  6:56 AM     CC: RF    Interval History:  Feels well  Voiding without difficulty  Non gap acidosis on chemistry    Review of Systems:  Review of Systems   Constitutional: Negative for fever.   Eyes: Negative for pain.   Respiratory: Negative for cough and shortness of breath.    Cardiovascular: Negative for chest pain and leg swelling.   Gastrointestinal: Negative for nausea, vomiting, abdominal pain and blood in stool.   Genitourinary: Negative for dysuria.   Musculoskeletal: Positive for falls (6/8).   Neurological: Negative for headaches.       T 97.6 [id number] 96% 3L I/O 1.2/2.3 noBM   Physical Exam   Constitutional: He is oriented to person, place, and time. He appears well-developed and well-nourished. No distress.   HENT:   Head: Normocephalic.   Mouth/Throat: No oropharyngeal exudate.   Eyes: Right eye exhibits no discharge. Left eye exhibits no discharge.   L eyebrow cut, improved eccymosis     Neck: No tracheal deviation present.   Cardiovascular: Normal rate and regular rhythm.    Murmur (3/6 BASSAM) heard.  Pulmonary/Chest: Effort normal. No respiratory distress. He exhibits no tenderness.   coarse   Abdominal: Soft. Bowel sounds are normal. He exhibits no distension. There is no tenderness.   Musculoskeletal: He exhibits no edema.   Neurological: He is alert and oriented to person, place, and time. No cranial nerve deficit. Coordination normal.   Skin: Skin is warm.   Vitals reviewed.      Labs:        Invalid input(s): [id number]      Recent Labs      06/18/17   0441  06/20/17   0420   SODIUM  137  137   POTASSIUM  3.8  3.9   CHLORIDE  105  108   CO2  22  17*   BUN  20  26*   CREATININE  1.52*  1.40   CALCIUM  8.6  8.1*     Recent Labs      06/18/17   0441  06/20/17   0420   GLUCOSE  105*  95     Recent Labs      06/18/17   0441 06/20/17   0420   RBC  2.88*  2.69*    HEMOGLOBIN  7.8*  7.3*   HEMATOCRIT  25.5*  24.0*   PLATELETCT  120*  82*     Recent Labs      06/18/17   0441  06/20/17   0420   WBC  6.9  6.2     Medical Decision Making, by Problem:  Fall with eyebrow laceration, imaging negative for fracture  Acute hypoxic RF s/p VDRF 5/24-6/2   -continue pulm toilet, oxygen at 3L  Grade 3 esophagitis with UGIB, bx negative   -continue PPI, no anticoagulation/antiplatelets   -monitor hematochezia-resolved  Acute on CKD 3-resolved  NGMA   -add na hco3   -? post acute renal failure  Encephaolopathy, toxic/metabolic   -frequent orientation  Severe AS   -outpatient follow up for TAVR  pulm HTN  DM, HgbA1c at goal   -diet controlled  Aspiration pneumonia, treated  Anemia, acute blood loss   -iron  Transaminitis   -hep panel negative   -resume pravechol  Pulm edema   -follow, BNP normal  Hypothyroidism  HLD   -pravechol  H/o LGIB  Nutrition   -po  Debility   -PT/OT    Labs reviewed and Medications reviewed  Bond catheter: No Bond      DVT Prophylaxis: Contraindicated - High bleeding risk

## 2017-06-21 NOTE — PROGRESS NOTES
Pulmonary Critical Care Progress Note        Chief Complaint: Respiratory insufficiency    ROS:  Respiratory: positive shortness of breath, improved, Cardiac: negative, GI: negative.  All other systems negative.    Interval Events:  24 hour interval history reviewed.  Comparable without dyspnea on low-flow oxygen nasal cannula, now down to 2 L/m. out of bed to chair.No new problems overnight.    PFSH:  No change.    Respiratory:  Exam: unlabored respirations, no intercostal retractions or accessory muscle use, few basilar rales without wheezing or consolidation.  ImagingAvailable data reviewed. The chest radiogram on June 7 demonstrated stable patchy bilateral densities. No film today.     HemoDynamics:  Exam: regular rate and rhythm  Imaging: Available data reviewed    Neuro:  Exam: after fall and laceration above left eye, head CT scan did not show acute intracranial bleed or injury. Patient responsive presently mental status intact  Imaging: Available data reviewed    Fluids:  Intake/Output     None           Recent Labs      06/20/17   0420   SODIUM  137   POTASSIUM  3.9   CHLORIDE  108   CO2  17*   BUN  26*   CREATININE  1.40   CALCIUM  8.1*       GI/Nutrition:  Exam: abdomen is soft and non-tender  Imaging: None - Reviewed  taking PO  Liver Function  Recent Labs      06/20/17   0420   GLUCOSE  95       Heme:  Recent Labs      06/20/17   0420   RBC  2.69*   HEMOGLOBIN  7.3*   HEMATOCRIT  24.0*   PLATELETCT  82*       Infectious Disease:  No Data Recorded  Micro: no new positive cultures  Recent Labs      06/20/17   0420   WBC  6.2     No current facility-administered medications for this encounter.       Last reviewed on 5/24/2017  9:56 PM by Keturah Cheek    Quality  Measures:  Labs reviewed, Medications reviewed and Radiology images reviewed                      Problems:  Acute hypoxic respiratory failure, improved and now on low flow oxygen therapy.  Abnormal chest radiogram. Recent  aspiration pneumonia, afebrile after treatment.  Grade 3 esophagitis with recent upper gastrointestinal hemorrhage, biopsy negative  Acute on chronic kidney disease, nonoliguric and stable by numbers.  Encephalopathy without focal neurologic findings, improving.  Severe aortic stenosis, pulmonary hypertension.  Diabetes mellitus, controlled.  Anemia and thrombocytopenia, stable.  Non-anion gap metabolic acidosis.    Recommend:  Continue titrated supplemental oxygen, respiratory protocols.  Continue prophylaxis, nutritional support with glucose control, mobilization and therapies  Discussed with respiratory therapy.  Will sign off. Please call as needed.

## 2017-06-21 NOTE — PROGRESS NOTES
Pulmonary Critical Care Progress Note        Chief Complaint: Respiratory insufficiency    ROS:  Respiratory: positive shortness of breath, improved, Cardiac: negative, GI: negative.  All other systems negative.    Interval Events:  24 hour interval history reviewed.  Comparable without dyspnea on low-flow oxygen nasal cannula, now down to 2 L/m. out of bed to chair.No new problems overnight.    PFSH:  No change.    Respiratory:  Exam: unlabored respirations, no intercostal retractions or accessory muscle use, few basilar rales without wheezing or consolidation.  ImagingAvailable data reviewed. The chest radiogram on June 7 demonstrated stable patchy bilateral densities. No film today.     HemoDynamics:  Exam: regular rate and rhythm  Imaging: Available data reviewed    Neuro:  Exam: after fall and laceration above left eye, head CT scan did not show acute intracranial bleed or injury. Patient responsive presently mental status intact  Imaging: Available data reviewed    Fluids:  Intake/Output     None           Recent Labs      06/18/17   0441  06/20/17   0420   SODIUM  137  137   POTASSIUM  3.8  3.9   CHLORIDE  105  108   CO2  22  17*   BUN  20  26*   CREATININE  1.52*  1.40   CALCIUM  8.6  8.1*       GI/Nutrition:  Exam: abdomen is soft and non-tender  Imaging: None - Reviewed  taking PO  Liver Function  Recent Labs      06/18/17   0441  06/20/17   0420   GLUCOSE  105*  95       Heme:  Recent Labs      06/18/17   0441 06/20/17   0420   RBC  2.88*  2.69*   HEMOGLOBIN  7.8*  7.3*   HEMATOCRIT  25.5*  24.0*   PLATELETCT  120*  82*       Infectious Disease:  No Data Recorded  Micro: no new positive cultures  Recent Labs      06/18/17   0441 06/20/17   0420   WBC  6.9  6.2     No current facility-administered medications for this encounter.       Last reviewed on 5/24/2017  9:56 PM by Keturah Cheek    Quality  Measures:  Labs reviewed, Medications reviewed and Radiology images  reviewed                      Problems:  Acute hypoxic respiratory failure, improved and now on low flow oxygen therapy.  Abnormal chest radiogram. Recent aspiration pneumonia, afebrile after treatment.  Grade 3 esophagitis with recent upper gastrointestinal hemorrhage, biopsy negative  Acute on chronic kidney disease, nonoliguric and stable by numbers.  Encephalopathy without focal neurologic findings, improving.  Severe aortic stenosis, pulmonary hypertension.  Diabetes mellitus, controlled.  Anemia and thrombocytopenia, stable.  Non-anion gap metabolic acidosis.    Recommend:  Continue titrated supplemental oxygen, respiratory protocols.  Continue prophylaxis, nutritional support with glucose control, mobilization and therapies  Discussed with respiratory therapy.

## 2017-06-21 NOTE — [facility]
Hospital Medicine Progress Note, Adult, Complex               Author: Maame Lewis Date & Time created: 6/21/2017  6:21 AM     CC: RF    Interval History:  TSH elevated  Foot discomfort, suspect shortening and lengthening of tendons from pointed foot in bed    Review of Systems:  Review of Systems   Constitutional: Negative for fever.   Eyes: Negative for pain.   Respiratory: Negative for cough and shortness of breath.    Cardiovascular: Negative for chest pain.   Gastrointestinal: Negative for nausea, vomiting, abdominal pain, diarrhea and constipation.   Genitourinary: Negative for dysuria.   Musculoskeletal: Positive for falls (6/8).        Foot pain   Neurological: Negative for headaches.       T 98 [id number] 99% 4L I/O 2.8/2.3 1BM   Physical Exam   Constitutional: He is oriented to person, place, and time. He appears well-developed and well-nourished.   HENT:   Head: Normocephalic.   Mouth/Throat: No oropharyngeal exudate.   Eyes: Right eye exhibits no discharge. Left eye exhibits no discharge. No scleral icterus.   L eyebrow cut, improved eccymosis     Neck: No tracheal deviation present.   Cardiovascular: Normal rate and regular rhythm.    Murmur (3/6 BASSAM) heard.  Pulmonary/Chest: Effort normal. No respiratory distress. He exhibits no tenderness.   coarse   Abdominal: Soft. Bowel sounds are normal. He exhibits no distension. There is no tenderness.   Musculoskeletal: He exhibits no edema.   Neurological: He is alert and oriented to person, place, and time. No cranial nerve deficit. Coordination normal.   Skin: Skin is warm.   Vitals reviewed.      Labs:        Invalid input(s): [id number]      Recent Labs      06/20/17   0420   SODIUM  137   POTASSIUM  3.9   CHLORIDE  108   CO2  17*   BUN  26*   CREATININE  1.40   CALCIUM  8.1*     Recent Labs      06/20/17   0420   GLUCOSE  95     Recent Labs      06/20/17   0420   RBC  2.69*   HEMOGLOBIN  7.3*   HEMATOCRIT  24.0*   PLATELETCT  82*      Recent Labs      06/20/17   0420   WBC  6.2     Medical Decision Making, by Problem:  Fall with eyebrow laceration, imaging negative for fracture  Acute hypoxic RF s/p VDRF 5/24-6/2   -continue pulm toilet, oxygen at 4L  Grade 3 esophagitis with UGIB, bx negative   -continue PPI, no anticoagulation/antiplatelets   -monitor hematochezia-resolved  Acute on CKD 3-resolved  NGMA   -add na hco3   -? post acute renal failure   -follow in am  Encephaolopathy, toxic/metabolic-resolved  Severe AS   -outpatient follow up for TAVR  pulm HTN  DM, HgbA1c at goal   -diet controlled  Aspiration pneumonia, treated  Anemia, acute blood loss   -iron  Transaminitis   -hep panel negative   -resume pravechol  Pulm edema   -follow, BNP normal  Hypothyroidism   -start synthroid  Foot pain   -trial voltaren gel   -continue therapy  HLD   -pravechol  H/o LGIB  Nutrition   -po  Debility   -PT/OT    Labs reviewed and Medications reviewed  Bond catheter: No Bond      DVT Prophylaxis: Contraindicated - High bleeding risk

## 2017-06-22 NOTE — [facility]
Hospital Medicine Progress Note, Adult, Complex               Author: Maame Lewis Date & Time created: 6/22/2017  6:29 AM     CC: RF    Interval History:  Rehab consult P  Having a tremor of L shoulder when he gets cold, brief without associated symptoms    Review of Systems:  Review of Systems   Constitutional: Negative for fever.   Eyes: Negative for pain.   Respiratory: Negative for cough and shortness of breath.    Cardiovascular: Negative for chest pain.   Gastrointestinal: Negative for nausea, vomiting, abdominal pain, diarrhea and constipation.   Genitourinary: Negative for dysuria.   Musculoskeletal: Positive for falls (6/8).        Foot pain   Neurological: Negative for headaches.       T 97.8 [id number] 100% 4L I/O1.9/2.4 noBM   Physical Exam   Constitutional: He is oriented to person, place, and time. He appears well-developed and well-nourished.   HENT:   Head: Normocephalic.   Mouth/Throat: No oropharyngeal exudate.   Eyes: Right eye exhibits no discharge. Left eye exhibits no discharge. No scleral icterus.   L eyebrow cut, improved eccymosis     Neck: No tracheal deviation present.   Cardiovascular: Normal rate and regular rhythm.    Murmur (3/6 BASSAM) heard.  Pulmonary/Chest: Effort normal. No respiratory distress. He exhibits no tenderness.   Abdominal: Soft. Bowel sounds are normal. He exhibits no distension. There is no tenderness.   Musculoskeletal: He exhibits no edema.   Neurological: He is alert and oriented to person, place, and time. No cranial nerve deficit. Coordination normal.   Skin: Skin is warm. No rash noted. No erythema.   Vitals reviewed.      Labs:        Invalid input(s): [id number]      Recent Labs      06/20/17   0420  06/22/17   0434   SODIUM  137  138   POTASSIUM  3.9  3.6   CHLORIDE  108  106   CO2  17*  24   BUN  26*  26*   CREATININE  1.40  1.57*   CALCIUM  8.1*  8.2*     Recent Labs      06/20/17   0420  06/22/17   0434   GLUCOSE  95  121*     Recent Labs       06/20/17   0420   RBC  2.69*   HEMOGLOBIN  7.3*   HEMATOCRIT  24.0*   PLATELETCT  82*     Recent Labs      06/20/17 0420   WBC  6.2     Medical Decision Making, by Problem:  Fall with eyebrow laceration, imaging negative for fracture  Acute hypoxic RF s/p VDRF 5/24-6/2   -continue pulm toilet, oxygen at 4L  Grade 3 esophagitis with UGIB, bx negative   -continue PPI, no anticoagulation/antiplatelets   -monitor hematochezia-resolved  Acute on CKD 3-resolved  NGMA-resolved   -decrease na hco3   -? post acute renal failure  Encephaolopathy, toxic/metabolic-resolved  Severe AS   -outpatient follow up for TAVR  pulm HTN  DM, HgbA1c at goal   -diet controlled  Aspiration pneumonia, treated  Anemia, acute blood loss   -iron  Transaminitis   -hep panel negative   -resume pravechol  Pulm edema   -follow, BNP normal  Hypothyroidism   -resume home synthroid  Foot pain   -not improved with voltaren, will DC  HLD   -pravechol  H/o LGIB  Nutrition   -po  Debility   -PT/OT    Labs reviewed and Medications reviewed  Bond catheter: No Bond      DVT Prophylaxis: Contraindicated - High bleeding risk

## 2017-06-23 NOTE — [facility]
Hospital Medicine Progress Note, Adult, Complex               Author: Maame Lewis Date & Time created: 6/23/2017  6:53 AM     CC: RF    Interval History:  Patient tells me he is accepted to rehab  Went over med list with him  Started lasix for edema    Review of Systems:  Review of Systems   Constitutional: Negative for fever.   Eyes: Negative for pain.   Respiratory: Negative for cough and shortness of breath.    Cardiovascular: Negative for chest pain.   Gastrointestinal: Negative for nausea, vomiting and abdominal pain.   Genitourinary: Negative for dysuria.   Musculoskeletal: Positive for falls (6/8).        Foot pain   Neurological: Negative for headaches.       T 97.8 [id number] 99% 4L I/O2/1.9 noBM   Physical Exam   Constitutional: He appears well-developed and well-nourished.   HENT:   Head: Normocephalic.   Mouth/Throat: No oropharyngeal exudate.   Eyes: Right eye exhibits no discharge. Left eye exhibits no discharge. No scleral icterus.   L eyebrow cut, improved eccymosis     Neck: No tracheal deviation present.   Cardiovascular: Normal rate and regular rhythm.    Murmur (3/6 BASSAM) heard.  Pulmonary/Chest: Effort normal. No respiratory distress. He exhibits no tenderness.   Abdominal: Soft. Bowel sounds are normal. He exhibits no distension. There is no tenderness.   Musculoskeletal: He exhibits edema (1+ pedal).   Neurological: He is alert. No cranial nerve deficit.   Skin: Skin is warm. No rash noted. No erythema.   Vitals reviewed.      Labs:        Invalid input(s): [id number]      Recent Labs      06/22/17   0434   SODIUM  138   POTASSIUM  3.6   CHLORIDE  106   CO2  24   BUN  26*   CREATININE  1.57*   CALCIUM  8.2*     Recent Labs      06/22/17   0434   GLUCOSE  121*     No results for input(s): RBC, HEMOGLOBIN, HEMATOCRIT, PLATELETCT, PROTHROMBTM, APTT, INR, IRON, FERRITIN, TOTIRONBC in the last 72 hours.      Medical Decision Making, by Problem:  Fall with eyebrow laceration,  imaging negative for fracture  Acute hypoxic RF s/p VDRF 5/24-6/2   -continue pulm toilet, oxygen at 3L  Grade 3 esophagitis with UGIB, bx negative   -continue PPI, no anticoagulation/antiplatelets   -monitor hematochezia-resolved  CKD 3  NGMA-resolved  Encephaolopathy, toxic/metabolic-resolved  Severe AS   -outpatient follow up for TAVR   -lasix  pulm HTN  DM, HgbA1c at goal   -diet controlled   -no metformin due to gfr  Aspiration pneumonia, treated  Anemia, acute blood loss   -iron  Transaminitis   -hep panel negative  Hypothyroidism   -synthroid  Foot pain-thinks from end of bed  HLD   -pravechol  H/o LGIB  Nutrition   -po  Debility   -PT/OT  Rehab soon  Medications reviewed  Bond catheter: No Bond      DVT Prophylaxis: Contraindicated - High bleeding risk

## 2017-06-23 NOTE — [facility]
Hospital Medicine Progress Note, Adult, Complex               Author: Maame Lewis Date & Time created: 6/23/2017  7:06 AM     CC: RF    Interval History:  Patient tells me he is accepted to rehab  Went over med list with him  Started lasix for edema    Review of Systems:  Review of Systems   Constitutional: Negative for fever.   Eyes: Negative for pain.   Respiratory: Negative for cough and shortness of breath.    Cardiovascular: Negative for chest pain.   Gastrointestinal: Negative for nausea, vomiting and abdominal pain.   Genitourinary: Negative for dysuria.   Musculoskeletal: Positive for falls (6/8).        Foot pain   Neurological: Negative for headaches.       T 97.8 [id number] 99% 4L I/O2/1.9 noBM   Physical Exam   Constitutional: He appears well-developed and well-nourished.   HENT:   Head: Normocephalic.   Mouth/Throat: No oropharyngeal exudate.   Eyes: Right eye exhibits no discharge. Left eye exhibits no discharge. No scleral icterus.   L eyebrow cut, improved eccymosis     Neck: No tracheal deviation present.   Cardiovascular: Normal rate and regular rhythm.    Murmur (3/6 BASSAM) heard.  Pulmonary/Chest: Effort normal. No respiratory distress. He exhibits no tenderness.   Abdominal: Soft. Bowel sounds are normal. He exhibits no distension. There is no tenderness.   Musculoskeletal: He exhibits edema (1+ pedal).   Neurological: He is alert. No cranial nerve deficit.   Skin: Skin is warm. No rash noted. No erythema.   Vitals reviewed.      Labs:        Invalid input(s): [id number]      Recent Labs      06/22/17   0434   SODIUM  138   POTASSIUM  3.6   CHLORIDE  106   CO2  24   BUN  26*   CREATININE  1.57*   CALCIUM  8.2*     Recent Labs      06/22/17   0434   GLUCOSE  121*     No results for input(s): RBC, HEMOGLOBIN, HEMATOCRIT, PLATELETCT, PROTHROMBTM, APTT, INR, IRON, FERRITIN, TOTIRONBC in the last 72 hours.      Medical Decision Making, by Problem:  Fall with eyebrow laceration,  imaging negative for fracture  Acute hypoxic RF s/p VDRF 5/24-6/2   -continue pulm toilet, oxygen at 3L  Grade 3 esophagitis with UGIB, bx negative   -continue PPI, no anticoagulation/antiplatelets   -monitor hematochezia-resolved  CKD 3  NGMA-resolved  Encephaolopathy, toxic/metabolic-resolved  Severe AS   -outpatient follow up for TAVR   -lasix  pulm HTN  DM, HgbA1c at goal   -diet controlled   -no metformin due to gfr  Aspiration pneumonia, treated  Anemia, acute blood loss   -iron  Thrombocytopenia   -follow up  Transaminitis   -hep panel negative  Hypothyroidism   -synthroid  Foot pain-thinks from end of bed  HLD   -pravechol  H/o LGIB  Nutrition   -po  Debility   -PT/OT  Rehab soon  Medications reviewed  Bond catheter: No Bond      DVT Prophylaxis: Contraindicated - High bleeding risk

## 2017-06-24 NOTE — [facility]
Hospital Medicine Progress Note, Adult, Complex               Author: Maame ALBINO Lewis Date & Time created: 6/24/2017  8:46 AM     CC: RF    Interval History:  Significant right foot pain, sole of foot-unable to bear weight  Asymmetric edema of R leg  Denies SOB  Pharmacy believes protonix caused thrombocytopenia, changed back to prilosec    Review of Systems:  Review of Systems   Constitutional: Negative for fever.   Eyes: Negative for pain.   Respiratory: Negative for cough and shortness of breath.    Cardiovascular: Negative for chest pain.   Gastrointestinal: Negative for nausea, vomiting and abdominal pain.   Genitourinary: Negative for dysuria.   Musculoskeletal: Positive for falls (6/8).        Acute worsening R Foot pain   Neurological: Negative for headaches.       T 98 [id number] 96% 3L I/O2.2/2.6 noBM   Physical Exam   Constitutional: He appears well-developed and well-nourished.   HENT:   Head: Normocephalic.   Mouth/Throat: No oropharyngeal exudate.   Eyes: Right eye exhibits no discharge. Left eye exhibits no discharge. No scleral icterus.   L eyebrow cut  Conjunctival hemorrhage     Neck: No tracheal deviation present.   Cardiovascular: Normal rate and regular rhythm.    Murmur (3/6 BASSAM) heard.  Pulmonary/Chest: Effort normal. No respiratory distress. He exhibits no tenderness.   diminshed   Abdominal: Soft. Bowel sounds are normal. He exhibits no distension. There is no tenderness.   Musculoskeletal: He exhibits edema (2+ r foot, 1+ RLE edema).   Neurological: He is alert. No cranial nerve deficit.   Skin: Skin is warm. No erythema.   rash R foot   Vitals reviewed.      Labs:        Invalid input(s): [id number]      Recent Labs      06/22/17   0434  06/24/17 0425   SODIUM  138  138   POTASSIUM  3.6  3.8   CHLORIDE  106  105   CO2  24  25   BUN  26*  25*   CREATININE  1.57*  1.37   CALCIUM  8.2*  8.5     Recent Labs      06/22/17   0434  06/24/17 0425   ALTSGPT   --   51*   ASTSGOT    --   36   ALKPHOSPHAT   --   87   TBILIRUBIN   --   1.9*   GLUCOSE  121*  109*     Recent Labs      06/23/17   0815  06/24/17   0425   RBC  2.87*  2.68*   HEMOGLOBIN  7.8*  7.3*   HEMATOCRIT  25.5*  24.3*   PLATELETCT  74*  66*   PROTHROMBTM   --   13.5   APTT   --   39.6*   INR   --   1.05     Recent Labs      06/23/17   0815  06/24/17   0425   WBC  6.1  4.9   NEUTSPOLYS  71.00  77.00*   LYMPHOCYTES  21.00*  16.00*   MONOCYTES  2.00  2.00   EOSINOPHILS  3.00  1.00   BASOPHILS  0.00  2.00*   ASTSGOT   --   36   ALTSGPT   --   51*   ALKPHOSPHAT   --   87   TBILIRUBIN   --   1.9*     Medical Decision Making, by Problem:  Fall with eyebrow laceration, imaging negative for fracture  Acute hypoxic RF s/p VDRF 5/24-6/2   -continue pulm toilet, oxygen at 3L  Grade 3 esophagitis with UGIB, bx negative   -continue PPI, no anticoagulation/antiplatelets   -monitor hematochezia-resolved  Thrombocytopenia   -suspect protonix   -follow recovery   -has received no heparin   -no active bleed  R foot pain, edema   -check xray to f/o fracture   -duplex RLE, high risk DVT  CKD 3  NGMA-resolved  Encephaolopathy, toxic/metabolic-resolved  Severe AS   -outpatient follow up for TAVR   -lasix  pulm HTN  DM, HgbA1c at goal   -diet controlled   -no metformin due to gfr  Aspiration pneumonia, treated  Anemia, acute blood loss   -iron  Transaminitis   -hep panel negative, improved  Hypothyroidism   -synthroid  Foot pain-thinks from end of bed  HLD   -pravechol  H/o LGIB  Nutrition   -po  Debility   -PT/OT  Rehab Sunday   Medications reviewed and Labs reviewed  Bond catheter: No Obnd      DVT Prophylaxis: Contraindicated - High bleeding risk

## 2017-06-25 NOTE — [facility]
Hospital Medicine Progress Note, Adult, Complex               Author: Maame Lewis Date & Time created: 6/25/2017  9:21 AM     CC: RF    Interval History:  Imaging negative for foot and DVT  Medrol started with improved pain (NSAIDs contraindicated with renal disease and GIB)  Platelets stabilizing, hgb borderline    Review of Systems:  Review of Systems   Constitutional: Negative for fever.   Eyes: Negative for pain.   Respiratory: Negative for cough and shortness of breath.    Cardiovascular: Negative for chest pain.   Gastrointestinal: Negative for nausea, vomiting, abdominal pain, diarrhea, constipation and blood in stool.   Genitourinary: Negative for dysuria.   Musculoskeletal: Positive for falls (6/8).        R Foot pain improved   Neurological: Negative for headaches.       T 98.1 [id number] 92% 3L I/O1.8/3.2 1BM   Physical Exam   Constitutional: He appears well-developed and well-nourished.   HENT:   Head: Normocephalic.   Mouth/Throat: No oropharyngeal exudate.   Eyes: Right eye exhibits no discharge. Left eye exhibits no discharge. No scleral icterus.   L eyebrow cut  Conjunctival hemorrhage     Neck: No tracheal deviation present.   Cardiovascular: Normal rate and regular rhythm.    Murmur (3/6 BASSAM) heard.  Pulmonary/Chest: Effort normal. No respiratory distress. He exhibits no tenderness.   Diminished, basilar rales   Abdominal: Soft. Bowel sounds are normal. He exhibits no distension. There is no tenderness.   Musculoskeletal: He exhibits edema (1+ r foot, 1+ RLE edema).   Neurological: He is alert. No cranial nerve deficit.   Skin: Skin is warm. No erythema.   Bruised area lateral R foot, no change  No sign of infection (erythema, warmth)   Vitals reviewed.      Labs:        Invalid input(s): [id number]      Recent Labs      06/24/17   0425   SODIUM  138   POTASSIUM  3.8   CHLORIDE  105   CO2  25   BUN  25*   CREATININE  1.37   CALCIUM  8.5     Recent Labs      06/24/17   0425    ALTSGPT  51*   ASTSGOT  36   ALKPHOSPHAT  87   TBILIRUBIN  1.9*   GLUCOSE  109*     Recent Labs      06/23/17   0815  06/24/17   0425  06/25/17   0411   RBC  2.87*  2.68*  2.58*   HEMOGLOBIN  7.8*  7.3*  7.0*   HEMATOCRIT  25.5*  24.3*  22.5*   PLATELETCT  74*  66*  64*   PROTHROMBTM   --   13.5   --    APTT   --   39.6*   --    INR   --   1.05   --      Recent Labs      06/23/17   0815  06/24/17   0425  06/25/17   0411   WBC  6.1  4.9  6.6   NEUTSPOLYS  71.00  77.00*   --    LYMPHOCYTES  21.00*  16.00*   --    MONOCYTES  2.00  2.00   --    EOSINOPHILS  3.00  1.00   --    BASOPHILS  0.00  2.00*   --    ASTSGOT   --   36   --    ALTSGPT   --   51*   --    ALKPHOSPHAT   --   87   --    TBILIRUBIN   --   1.9*   --      Medical Decision Making, by Problem:  Fall with eyebrow laceration, imaging negative for fracture  Acute hypoxic RF s/p VDRF 5/24-6/2   -continue pulm toilet, oxygen at 3L   -follow up CXR  Grade 3 esophagitis with UGIB, bx negative   -continue PPI, no anticoagulation/antiplatelets   -monitor hematochezia-resolved  Thrombocytopenia   -suspect protonix, possibly lasix   -follow recovery   -has received no heparin   -no active bleed  R foot pain, edema   -appears to have injury without fracture   -medrol helping   -no s/s of infection or gout  CKD 3  NGMA-resolved  Encephaolopathy, toxic/metabolic-resolved  Severe AS   -outpatient follow up for TAVR   -bumex  pulm HTN  DM, HgbA1c at goal   -diet controlled   -no metformin due to gfr  Aspiration pneumonia, treated  Anemia, acute blood loss   -iron  Transaminitis   -hep panel negative, improved  Hypothyroidism   -synthroid  Foot pain-thinks from end of bed  HLD   -pravechol  H/o LGIB  Nutrition   -po  Debility   -PT/OT  Rehab P   Medications reviewed and Labs reviewed  Bond catheter: No Bond      DVT Prophylaxis: Contraindicated - High bleeding risk

## 2017-06-26 NOTE — [facility]
Hospital Medicine Progress Note, Adult, Complex               Author: Maame Lewis Date & Time created: 6/26/2017  6:33 AM     CC: RF    Interval History:  Foot pain resolved  hgb trended down without gross bleed, black stool but on iron  Platelets slightly better  Holding rehab transfer    Review of Systems:  Review of Systems   Constitutional: Negative for fever.   Eyes: Negative for pain.   Respiratory: Negative for cough and shortness of breath.    Cardiovascular: Negative for chest pain.   Gastrointestinal: Negative for nausea, vomiting, abdominal pain, diarrhea and constipation.   Genitourinary: Negative for dysuria.   Musculoskeletal: Positive for falls (6/8).        R Foot pain resolved   Neurological: Negative for headaches.       T 98.1 [id number] 93% 3L I/O1.7/2.3 1BM   Physical Exam   Constitutional: He appears well-developed and well-nourished. No distress.   HENT:   Head: Normocephalic.   Mouth/Throat: No oropharyngeal exudate.   Eyes: Right eye exhibits no discharge. Left eye exhibits no discharge. No scleral icterus.   L eyebrow cut  Conjunctival hemorrhage     Neck: No tracheal deviation present.   Cardiovascular: Normal rate and regular rhythm.    Murmur (3/6 BASSAM) heard.  Pulmonary/Chest: Effort normal. No respiratory distress. He exhibits no tenderness.   Diminished, basilar rales   Abdominal: Soft. Bowel sounds are normal. He exhibits no distension. There is no tenderness.   Musculoskeletal: He exhibits edema (tr-1+ edema).   Neurological: He is alert. No cranial nerve deficit.   Skin: Skin is warm. No erythema.   Bruised area lateral R foot, no change  No sign of infection (erythema, warmth)   Vitals reviewed.      Labs:        Invalid input(s): [id number]      Recent Labs      06/24/17   0425  06/26/17   0407   SODIUM  138  136   POTASSIUM  3.8  3.8   CHLORIDE  105  102   CO2  25  23   BUN  25*  30*   CREATININE  1.37  1.37   CALCIUM  8.5  8.3*     Recent Labs      06/24/17    0425 06/26/17 0407   ALTSGPT  51*   --    ASTSGOT  36   --    ALKPHOSPHAT  87   --    TBILIRUBIN  1.9*   --    GLUCOSE  109*  155*     Recent Labs      06/24/17 0425 06/25/17 0411 06/26/17 0407   RBC  2.68*  2.58*  2.44*   HEMOGLOBIN  7.3*  7.0*  6.6*   HEMATOCRIT  24.3*  22.5*  21.9*   PLATELETCT  66*  64*  69*   PROTHROMBTM  13.5   --    --    APTT  39.6*   --    --    INR  1.05   --    --      Recent Labs      06/23/17   0815  06/24/17 0425 06/25/17 0411 06/26/17 0407   WBC  6.1  4.9  6.6  7.9   NEUTSPOLYS  71.00  77.00*   --    --    LYMPHOCYTES  21.00*  16.00*   --    --    MONOCYTES  2.00  2.00   --    --    EOSINOPHILS  3.00  1.00   --    --    BASOPHILS  0.00  2.00*   --    --    ASTSGOT   --   36   --    --    ALTSGPT   --   51*   --    --    ALKPHOSPHAT   --   87   --    --    TBILIRUBIN   --   1.9*   --    --      Medical Decision Making, by Problem:  Fall with eyebrow laceration, imaging negative for fracture  Acute hypoxic RF s/p VDRF 5/24-6/2   -continue pulm toilet, oxygen at 3L   -follow up CXR unchanged, continue diuresis  Grade 3 esophagitis with UGIB, bx negative   -continue PPI, no anticoagulation/antiplatelets   -f/u GI with trend down  Thrombocytopenia   -suspect protonix, possibly lasix   -follow recovery, hoping has hit daylin   -has received no heparin  R foot pain, edema-pain resovled   -appears to have injury without fracture, ? Fall from admit   -medrol helping   -no s/s of infection or gout  CKD 3  NGMA-resolved  Encephaolopathy, toxic/metabolic-resolved  Severe AS   -outpatient follow up for TAVR   -bumex  pulm HTN  DM, HgbA1c at goal   -diet controlled   -no metformin due to gfr  Aspiration pneumonia, treated  Anemia, acute blood loss   -iron  Transaminitis   -hep panel negative, improved  Hypothyroidism   -synthroid  Foot pain-thinks from end of bed  HLD   -pravechol  H/o LGIB  Nutrition   -po  Debility   -PT/OT  Rehab P     Addendum [id number]  Patient developed hypoxia,  shortness of breath during pRBC.  CXR with diffuse opacity.  Unclear wether flash edema, Trali, or other process. Transfusion reaction w/u sent. Started on bipap, remained hypoxic with 100% fio2, decision to electively intubate with discussion with Dr. Laughlin.  Continus to have high p/f ratio. Metabolic acidosis also developed with lactic acidosis.  No hypotension, fever to suspect sepsis.  Stat echo ordered, cardiac enzymes, BNP largely unremarkable.  Leukocytosis possibly stress reaction, infection, or steroids. Pan cx ordered, empiric abx started.  Cardiology consult requested as well.  Wife has been present and updated.  Patient critically ill, 2 hours spent at bedside without procedures.    Medications reviewed, Labs reviewed and Radiology images reviewed  Bond catheter: No Bond      DVT Prophylaxis: Contraindicated - High bleeding risk

## 2017-06-27 NOTE — [facility]
Hospital Medicine Progress Note, Adult, Complex               Author: Valorie Montserrat Date & Time created: 6/27/2017  12:48 PM     Interval History:  CC - RF  24 hr events reviewed.  FiO2 down.  Hypotensive on Esmolol drip.  On Fentanyl/Versed.    Review of Systems:  Review of Systems   Unable to perform ROS: medical condition       Physical Exam:  Physical Exam   Constitutional: No distress.   HENT:   Right Ear: External ear normal.   Left Ear: External ear normal.   Nose: Nose normal.   Resolving left eyebrow hematoma/laceration   Eyes: Right eye exhibits no discharge. Left eye exhibits no discharge. No scleral icterus.   resolving left conjunctival hemorrhage   Neck:   Orally intubated on vent   Cardiovascular: Normal rate and regular rhythm.    Murmur heard.  Pulmonary/Chest: No respiratory distress. He has no wheezes.   Decreased BS   Abdominal: Soft. Bowel sounds are normal. He exhibits no distension. There is no tenderness.   Musculoskeletal: He exhibits no edema or tenderness.   Neurological:   Sedated   Skin: Skin is warm and dry.   Vitals reviewed.      Labs:  Recent Labs      06/26/17   1045  06/26/17   1411  06/26/17   1945  06/26/17   2135  06/27/17   0400   CSBYA72H  7.42   --    --   7.31*   --    [id number]  26.8   --    --   45.1*   --    [id number]  27.5*   --    --   116.2*   --    KCCK0XRR  49.4*   --    --   97.5   --    ARTHCO3  17   --    --   22   --    ARTBE  -6*   --    --   -4   --    ISTATSPEC   --   Venous  Venous   --   Venous     Recent Labs      06/26/17   1009  06/26/17   1108  06/26/17   1411   TROPONINI  0.02   --   0.09*  RR   BNPBTYPENAT   --   125*   --      Recent Labs      06/26/17   1411  06/27/17   0450  06/27/17   1115   SODIUM  138  137  139   POTASSIUM  4.2  6.7*  5.7*   CHLORIDE  103  102  103   CO2  19*  25  21   BUN  38*  52*  61*   CREATININE  1.66*  2.65*  2.78*   CALCIUM  8.7  8.0*  8.0*     Recent Labs      06/26/17   1411  06/27/17   0450  06/27/17   1115   ALTSGPT  49   RR  42  39   ASTSGOT  53*  41  33   ALKPHOSPHAT  100*  RR  85  76   TBILIRUBIN  2.0*  2.1*  1.7*   DBILIRUBIN  0.9*   --    --    GLUCOSE  179*  224*  220*     Recent Labs      06/26/17   1411  06/27/17   0450  06/27/17   1115   RBC  2.88*  2.48*  2.31*   HEMOGLOBIN  7.9*  6.8*  6.3*   HEMATOCRIT  26.0*  22.6*  20.5*   PLATELETCT  61*  88*  75*   PROTHROMBTM   --    --   15.4*   APTT   --    --   33.6   INR   --    --   1.24*     Recent Labs      06/26/17   1411  06/27/17   0450  06/27/17   1115   WBC  13.4*  14.7*  11.3*   ASTSGOT  53*  41  33   ALTSGPT  49  RR  42  39   ALKPHOSPHAT  100*  RR  85  76   TBILIRUBIN  2.0*  2.1*  1.7*           Medical Decision Making, by Problem:  FLASH PULM EDEMA VS TRALI - cautious diuresis, follow CXR, check BNP, transfusion rxn in progress  RECURRENT VDRF - 2/2 above, doubt PNA but cont empiric abx  S/P ASPIRATION PNA PTA  SEVERE AS - appreciate Cardiology assistance  PHTN - monitor volume  UGIB 2/2 ESOPHAGITIS - change enteral PPI to IV given worsening anemia  H/O LGIB  HYPOTENSION - IVF bolus, cont Midodrine, start Levophed if refractory, off Esmolol drip  HLD - off statin  DM - resume SSI  ARF/CKD III - Nephrology consult for TYLER  HYPERKALEMIA - improved w/ Kayexalate, cont same until K+ normalized, off KCl supplements  LEUKOCYTOSIS - likely reactive demargination but follow pancultures and empiric abx  ANEMIA - start Aranesp per Pharmacy dosing, cont Fe/Vit C, holding off additional PRBC for now until Pathology completes w/u for transfusion rxn  THROMBOCYTOPENIA - now off Lasix/Protonix, check HIT Ab  HYPOTHYROIDISM - back on Synthroid  S/P FALL W/ LEFT EYEBROW LAC - CT Head and Orbits negative, wound care to eyebrow lac    Reviewed w/ RN and Dr. Darling (Nephrology).  CCT >50 min.    ADDENDUM  Updated pt's wife, dtr, and brother-in-law.  Discussed Dx, Tx, and Px.  All questions answered.        Medications reviewed and Labs reviewed  Bond catheter: Strict Intake and Output  During Sepisis or Shock  Central line in place: Need for access    DVT Prophylaxis: Contraindicated - High bleeding risk  DVT prophylaxis - mechanical: SCDs  Ulcer prophylaxis: Yes  Antibiotics: Treating active infection/contamination beyond 24 hours perioperative coverage

## 2017-06-27 NOTE — CONSULTS
"Cardiology Consult Note:    Esmer Mcdaniel  Date of Service:    6/26/2017   5:19 PM     Referring MD:  Dr. Rodriguez    Patient ID:   Name:             Sulaiman Rodgers   YOB: 1940  Age:                 76 y.o.  male   MRN:               [id number]                                                             Chief Complaint:      Severe AS    History of Present Illness:    77 y/o male admitted from rehab found to have resp insufficiency and AS cardiac murmur; Later the CXR showed pulm consolidation ; He is intubated; Current  in Sinus tachycardia; BP is adequate for perfusion;  ;  Transthoracic Echo Report: 6/26/2017  Normal left ventricular chamber size. Severe left ventricular   hypertrophy. Normal left ventricular systolic function. Left   ventricular ejection fraction is visually estimated to be 60%. Normal   regional wall motion.  Severe and critical aortic stenosis. Vmax is 4.25 m/s. Transvalvular gradients are -   Peak: 72 mmHg, Mean: 56 mmHg.      Review of Systems:    Intubated  Unable to obtain    Past Medical History:   Past Medical History   Diagnosis Date   • Disorder of thyroid    • Cataract      bilateral IOL   • Diabetes ([facility])      oral meds only  \"pre diabetic\"     There are no hospital problems to display for this patient.      Past Surgical History:  Past Surgical History   Procedure Laterality Date   • Other  03/10/2016     larangoscopy   • Microlaryngoscopy with laser N/A 4/22/2016     Procedure: MICROLARYNGOSCOPY WITH GOLD LASER;  Surgeon: Efrain Deleon M.D.;  Location: SURGERY SAME DAY [facility] ORS;  Service:    • Colonoscopy N/A 3/22/2017     Procedure: COLONOSCOPY;  Surgeon: Gera Taveras D.O.;  Location: SURGERY [facility] ORS;  Service:    • Gastroscopy-endo  5/26/2017     Procedure: GASTROSCOPY-ENDO;  Surgeon: Rick Cordero M.D.;  Location: ENDOSCOPY [facility] ORS;  Service:        Hospital Medications:  No current facility-administered " medications for this encounter.    Current Outpatient Medications:  Prescriptions prior to admission   Medication Sig Dispense Refill Last Dose   • magnesium oxide (MAG-OX) 400 MG Tab Take 400 mg by mouth 2 times a day.   5/24/2017 at 0700   • Multiple Vitamins-Minerals (MENS 50+ ADVANCED) Cap Take 1 Cap by mouth every day.   5/24/2017 at 0700   • ferrous sulfate 325 (65 FE) MG tablet Take 1 Tab by mouth 2 times a day, with meals. 60 Tab 0 5/24/2017 at 0700   • pravastatin (PRAVACHOL) 40 MG tablet Take 1 Tab by mouth every day. 30 Tab 11 5/23/2017 at pm   • metformin ER (GLUCOPHAGE XR) 500 MG TABLET SR 24 HR Take 500 mg by mouth every day.   5/24/2017 at 0700   • levothyroxine (SYNTHROID) 150 MCG Tab Take 150 mcg by mouth Every morning on an empty stomach.   5/24/2017 at 0700   • niacin 500 MG Tab Take 500 mg by mouth every day.   5/24/2017 at 0700       Medication Allergy:  No Known Allergies    Family History:  Unable to obtain; No family history on file.    Social History:  Social History     Social History   • Marital Status:      Spouse Name: N/A   • Number of Children: N/A   • Years of Education: N/A     Occupational History   • Not on file.     Social History Main Topics   • Smoking status: Former Smoker -- 3.00 packs/day for 20 years     Types: Cigarettes     Quit date: 01/01/1970   • Smokeless tobacco: Never Used   • Alcohol Use: Yes      Comment: 1/week   • Drug Use: No   • Sexual Activity: Not on file     Other Topics Concern   • Not on file     Social History Narrative         Physical Exam:  Vitals reviewed  Weight/BMI: There is no weight on file to calculate BMI.  There were no vitals taken for this visit.  There were no vitals filed for this visit.  Oxygen Therapy:     General Appearance:   Well developed, Well nourished, No acute distress, Non-toxic appearance.   HENT:  Normocephalic, Atraumatic, moist mucous membranes, ET tube in place , Nose normal.    Eyes: SubConjunctival hemerrhage, No  discharge.  Neck:  Normal range of motion, No cervical tenderness, Supple, No stridor, + JVD 9.0cm.  No thyromegaly.  No carotid bruit.  Cardiovascular: Fast  heart rate, Regular rhythm,  S1, S2, no S3,  S4; No gallops; 3/6 SE murmurs at AoV and precordial area, No rubs, .   Extremitites with intact distal pulses, no cyanosis, clubbing but mild LE edema.  No heaves, thrills, HJR;  Peripheral pulses: carotid 2+, brachial 2+, radial 2+, ulnar 2+, femoral 2+, popliteal 1+, PT 2+, DP 2+;  Lungs: On Vent;  Respiratory effort is normal. Vent rhonchi;  breath sounds,  no wheezing.   Abdomen: Bruised Rt footBowel sounds normal, Soft, No tenderness, No guarding, No rebound, No masses, No hepatosplenomegaly.  Skin: Warm, Dry, Rt Ecchymosis/E erythema, No rash, no induration or crepitus.  Neurologic: sedated  Psychiatric:sedtaed     MDM (Data Review):     Records reviewed and summarized in current documentation    Lab Data Review:  Recent Results (from the past 24 hour(s))   CBC WITHOUT DIFFERENTIAL    Collection Time: 06/26/17  4:07 AM   Result Value Ref Range    WBC 7.9 4.8 - 10.8 K/uL    RBC 2.44 (L) 4.70 - 6.10 M/uL    Hemoglobin 6.6 (L) 14.0 - 18.0 g/dL    Hematocrit 21.9 (L) 42.0 - 52.0 %    MCV 89.8 81.4 - 97.8 fL    MCH 27.0 27.0 - 33.0 pg    MCHC 30.1 (L) 33.7 - 35.3 g/dL    RDW 64.7 (H) 35.9 - 50.0 fL    Platelet Count 69 (L) 164 - 446 K/uL    MPV 10.3 9.0 - 12.9 fL   BASIC METABOLIC PANEL    Collection Time: 06/26/17  4:07 AM   Result Value Ref Range    Sodium 136 135 - 145 mmol/L    Potassium 3.8 3.6 - 5.5 mmol/L    Chloride 102 96 - 112 mmol/L    Co2 23 20 - 33 mmol/L    Glucose 155 (H) 65 - 99 mg/dL    Bun 30 (H) 8 - 22 mg/dL    Creatinine 1.37 0.50 - 1.40 mg/dL    Calcium 8.3 (L) 8.4 - 10.2 mg/dL    Anion Gap 11.0 0.0 - 11.9   ESTIMATED GFR    Collection Time: 06/26/17  4:07 AM   Result Value Ref Range    GFR If African American >60 >60 mL/min/1.73 m 2    GFR If Non  51 (A) >60 mL/min/1.73 m 2    COD (ADULT)    Collection Time: 06/26/17  4:07 AM   Result Value Ref Range    Antibody Screen-Cod NEG     ABO Grouping Only A     Rh Grouping Only NEG     Component R       R3                  Red Blood Cells3    [id number]   issued       06/26/17   08:06      Product Type Red Blood Cells LR Pheresis     Dispense Status Issued     Unit Number (Barcoded) [id number]     Product Code (Barcoded) J9723V01     Blood Type (Barcoded) 0600    ENRIQUE WITH ANTI-IGG REAGENT AND ANTI-C3D    Collection Time: 06/26/17  4:07 AM   Result Value Ref Range    Enrique With Anti-IgG Reagent NEG     Enrique With Anti-C3D Reagent NEG    ACCU-CHEK GLUCOSE    Collection Time: 06/26/17  9:56 AM   Result Value Ref Range    Glucose - Accu-Ck 300 (H) 65 - 99 mg/dL   CBC WITHOUT DIFFERENTIAL    Collection Time: 06/26/17 10:09 AM   Result Value Ref Range    WBC 19.5 (H) 4.8 - 10.8 K/uL    RBC 3.12 (L) 4.70 - 6.10 M/uL    Hemoglobin 8.5 (L) 14.0 - 18.0 g/dL    Hematocrit 28.7 (L) 42.0 - 52.0 %    MCV 92.0 81.4 - 97.8 fL    MCH 27.2 27.0 - 33.0 pg    MCHC 29.6 (L) 33.7 - 35.3 g/dL    RDW 68.5 (H) 35.9 - 50.0 fL    Platelet Count 90 (L) 164 - 446 K/uL   BASIC METABOLIC PANEL    Collection Time: 06/26/17 10:09 AM   Result Value Ref Range    Sodium 133 (L) 135 - 145 mmol/L    Potassium 3.9 3.6 - 5.5 mmol/L    Chloride 100 96 - 112 mmol/L    Co2 14 (L) 20 - 33 mmol/L    Glucose 277 (H) 65 - 99 mg/dL    Bun 34 (H) 8 - 22 mg/dL    Creatinine 1.52 (H) 0.50 - 1.40 mg/dL    Calcium 8.6 8.4 - 10.2 mg/dL    Anion Gap 19.0 (H) 0.0 - 11.9   TROPONIN    Collection Time: 06/26/17 10:09 AM   Result Value Ref Range    Troponin I 0.02 0.00 - 0.04 ng/mL   ESTIMATED GFR    Collection Time: 06/26/17 10:09 AM   Result Value Ref Range    GFR If  54 (A) >60 mL/min/1.73 m 2    GFR If Non African American 45 (A) >60 mL/min/1.73 m 2   TRANSFUSION REACTION    Collection Time: 06/26/17 10:09 AM   Result Value Ref Range    ABO Grouping Only A     Rh Grouping Only  NEG     Enrique With Anti-IgG Reagent POS     Enrique With Anti-C3D Reagent NEG    ARTERIAL BLOOD GAS    Collection Time: 06/26/17 10:45 AM   Result Value Ref Range    Ph 7.42 7.40 - 7.50    Pco2 26.8 26.0 - 37.0 mmHg    Po2 27.5 (LL) 64.0 - 87.0 mmHg    O2 Saturation 49.4 (L) 93.0 - 99.0 %    Hco3 17 17 - 25 mmol/L    Base Excess -6 (L) -4 - 3 mmol/L    Body Temp see below Centigrade   BTYPE NATRIURETIC PEPTIDE    Collection Time: 06/26/17 11:08 AM   Result Value Ref Range    B Natriuretic Peptide 125 (H) 0 - 100 pg/mL   BASIC METABOLIC PANEL    Collection Time: 06/26/17  2:11 PM   Result Value Ref Range    Sodium 138 135 - 145 mmol/L    Potassium 4.2 3.6 - 5.5 mmol/L    Chloride 103 96 - 112 mmol/L    Co2 19 (L) 20 - 33 mmol/L    Glucose 179 (H) 65 - 99 mg/dL    Calcium 8.7 8.4 - 10.2 mg/dL    Anion Gap 16.0 (H) 0.0 - 11.9    Bun 38 (H) 8 - 22 mg/dL    Creatinine 1.66 (H) 0.50 - 1.40 mg/dL   LACTIC ACID    Collection Time: 06/26/17  2:11 PM   Result Value Ref Range    Lactic Acid 4.82 (HH) 0.50 - 2.00 mmol/L    Specimen Venous    CBC WITHOUT DIFFERENTIAL    Collection Time: 06/26/17  2:11 PM   Result Value Ref Range    WBC 13.4 (H) 4.8 - 10.8 K/uL    RBC 2.88 (L) 4.70 - 6.10 M/uL    Hemoglobin 7.9 (L) 14.0 - 18.0 g/dL    Hematocrit 26.0 (L) 42.0 - 52.0 %    MCV 90.3 81.4 - 97.8 fL    MCH 27.4 27.0 - 33.0 pg    MCHC 30.4 (L) 33.7 - 35.3 g/dL    RDW 68.1 (H) 35.9 - 50.0 fL    Platelet Count 61 (L) 164 - 446 K/uL    MPV 10.1 9.0 - 12.9 fL   HEPATIC FUNCTION PANEL    Collection Time: 06/26/17  2:11 PM   Result Value Ref Range    Alkaline Phosphatase RR 30 - 99 U/L    ALT(SGPT) RR 2 - 50 U/L   TROPONIN    Collection Time: 06/26/17  2:11 PM   Result Value Ref Range    Troponin I RR 0.00 - 0.04 ng/mL   ESTIMATED GFR    Collection Time: 06/26/17  2:11 PM   Result Value Ref Range    GFR If  49 (A) >60 mL/min/1.73 m 2    GFR If Non  41 (A) >60 mL/min/1.73 m 2   TROPONIN    Collection Time: 06/26/17   2:11 PM   Result Value Ref Range    Troponin I 0.09 (H) 0.00 - 0.04 ng/mL   HEPATIC FUNCTION PANEL    Collection Time: 06/26/17  2:11 PM   Result Value Ref Range    Alkaline Phosphatase 100 (H) 30 - 99 U/L    AST(SGOT) 53 (H) 12 - 45 U/L    ALT(SGPT) 49 2 - 50 U/L    Total Bilirubin 2.0 (H) 0.1 - 1.5 mg/dL    Direct Bilirubin 0.9 (H) 0.1 - 0.5 mg/dL    Indirect Bilirubin 1.1 (H) 0.0 - 1.0 mg/dL    Albumin 3.2 3.2 - 4.9 g/dL    Total Protein 7.7 6.0 - 8.2 g/dL   ECHOCARDIOGRAM-COMP W/ CONT    Collection Time: 06/26/17  3:43 PM   Result Value Ref Range    Left Ventrical Ejection Fraction 60        Imaging/Procedures Review:    Chest Xray:  Reviewed    EKG:   As in HPI. SR. bpm    MDM (Assessment and Plan):     PNA  Severe AS  Elevated Trop  Anemia  PNA    Severe anemia; SR/ST after blood transfusion prob pulm edema; PNA is being tx'd;  Consider BB to slow HR to allow more diastolic filling time;  Trop elevation likely from PNA, acidosis and resp insufficiency  TAVR may be considered in his future for his severe AS;  Will consider RICH and LHC when his condition improves  Consider careful diurese to avoid dehydration and hypotension;  No direct vasodilators  Balance lytes  Case discussed with attending and RN; Told pharmacist is off duty and esmolol is not available for now; will use low dose metoprolol for now  Pls resume VS check including temperature  Will follow  Thx

## 2017-06-27 NOTE — [facility]
Cardiology Progress Note               Author: Sulaiman Tesfaye Date & Time created: 6/27/2017  8:00 AM     Interval History:  Being seen by cardiology for follow up and evaluation of AS and diastolic CHF.  He appears to had a transfusion reaction yesterday. Cardiac rhythm is stabilized with esmolol and blood pressure is holding well. Oxygen requirement is coming down. Renal failure acutely worsen with hyperkalemia. Please see the notes by Dr. Laughlin regarding this. Anemia is persistent.    Review of Systems   Unable to perform ROS: critical illness       Physical Exam   Constitutional: No distress.   Sedated on ventilator   Eyes: No scleral icterus.   Neck: No JVD present.   Cardiovascular: Normal rate and regular rhythm.    Murmur (3/6 long systolic ejection murmur especially upper right sternal border radiating to carotids which are reduced) heard.  Pulmonary/Chest: He has rales (bilateral coarse).   Maintained on ventilator   Abdominal: Soft. Bowel sounds are normal.   Neurological:   Sedated on ventilator   Skin: Skin is warm and dry.       Hemodynamics:  No data recorded.     No Data Recorded         Respiratory:                Fluids:        GI/Nutrition:  Orders Placed This Encounter   Procedures   • DIET ORDER     Standing Status: Standing      Number of Occurrences: 1      Standing Expiration Date:      Order Specific Question:  Diet:     Answer:  Regular [1]     Order Specific Question:  Diet:     Answer:  Diabetic [3]     Order Specific Question:  Consistency/Fluid modifications:     Answer:  Thin Liquids [3]     Lab Results:  Recent Labs      06/26/17   0407  06/26/17   1009  06/26/17   1411  06/27/17   0450   WBC  7.9  19.5*  13.4*  14.7*   RBC  2.44*  3.12*  2.88*  2.48*   HEMOGLOBIN  6.6*  8.5*  7.9*  6.8*   HEMATOCRIT  21.9*  28.7*  26.0*  22.6*   MCV  89.8  92.0  90.3  91.1   MCH  27.0  27.2  27.4  27.4   MCHC  30.1*  29.6*  30.4*  30.1*   RDW  64.7*  68.5*  68.1*  67.3*   PLATELETCT  69*  90*   61*  88*   MPV  10.3   --   10.1  10.5     Recent Labs      06/26/17   1009  06/26/17   1411  06/27/17   0450   SODIUM  133*  138  137   POTASSIUM  3.9  4.2  6.7*   CHLORIDE  100  103  102   CO2  14*  19*  25   GLUCOSE  277*  179*  224*   BUN  34*  38*  52*   CREATININE  1.52*  1.66*  2.65*   CALCIUM  8.6  8.7  8.0*         Recent Labs      06/26/17   1108   BNPBTYPENAT  125*     Recent Labs      06/26/17   1009  06/26/17   1108  06/26/17   1411   TROPONINI  0.02   --   0.09*  RR   BNPBTYPENAT   --   125*   --              Medical Decision Making, by Problem:  Severe aortic stenosis as previously described. Preserved left ventricle systolic function and only very mildly elevated BNP but probable diastolic heart failure. Poly-factorial acute respiratory insufficiency superimposed on chronic ventilator dependence. History of gastrointestinal bleeding with severe anemia. Apparent recent transfusion reaction.  Other medical problems as previously described.      Plan:  Acute intervention on his aortic valve is not currently feasible because of his comorbidity. Cardiology will continue to follow all his medical and pulmonary issues are being addressed.  The acute deterioration this morning is renal and renal consultation is pending.    Core Measures

## 2017-06-28 NOTE — [facility]
Hospital Medicine Progress Note, Adult, Complex               Author: Valorie Mariano Date & Time created: 6/28/2017  11:27 AM     Interval History:  CC - RF  Briefly required Levophed drip last 24 hrs.  Remains on Fentanyl/Versed.    Review of Systems:  Review of Systems   Unable to perform ROS: medical condition       Physical Exam:  Physical Exam   Constitutional: No distress.   HENT:   Right Ear: External ear normal.   Left Ear: External ear normal.   Nose: Nose normal.   Resolving left eyebrow hematoma/laceration   Eyes: Right eye exhibits no discharge. Left eye exhibits no discharge.   resolving left conjunctival hemorrhage   Neck:   Orally intubated on vent   Cardiovascular: Normal rate and regular rhythm.    Murmur heard.  Pulmonary/Chest: No respiratory distress. He has no wheezes.   Decreased BS   Abdominal: Soft. Bowel sounds are normal. He exhibits no distension. There is no tenderness. There is no rebound and no guarding.   Musculoskeletal: He exhibits no edema or tenderness.   Neurological:   Sedated   Skin: Skin is warm and dry. He is not diaphoretic.   Vitals reviewed.      Labs:  Recent Labs      06/26/17   1045  06/26/17   1411  06/26/17   1945  06/26/17   2135  06/27/17   0400   [id number]  7.42   --    --   7.31*   --    [id number]  26.8   --    --   45.1*   --    [id number]  27.5*   --    --   116.2*   --    [id number]  49.4*   --    --   97.5   --    ARTHCO3  17   --    --   22   --    ARTBE  -6*   --    --   -4   --    ISTATSPEC   --   Venous  Venous   --   Venous     Recent Labs      06/26/17   1009  06/26/17   1108  06/26/17   1411  06/28/17   0430   TROPONINI  0.02   --   0.09*  RR   --    BNPBTYPENAT   --   125*   --   153*     Recent Labs      06/27/17   0450  06/27/17   1115  06/27/17   1525  06/28/17   0430   SODIUM  137  139   --   142   POTASSIUM  6.7*  5.7*  5.5  4.5   CHLORIDE  102  103   --   104   CO2  25  21   --   25   BUN  52*  61*   --   67*   CREATININE  2.65*  2.78*   --   2.55*    MAGNESIUM   --    --    --   2.3   CALCIUM  8.0*  8.0*   --   7.7*     Recent Labs      06/26/17   1411  06/27/17 0450  06/27/17 1115 06/28/17   0430   ALTSGPT  49  RR  42  39   --    ASTSGOT  53*  41  33   --    ALKPHOSPHAT  100*  RR  85  76   --    TBILIRUBIN  2.0*  2.1*  1.7*   --    DBILIRUBIN  0.9*   --    --    --    GLUCOSE  179*  224*  220*  240*     Recent Labs      06/27/17 0450 06/27/17 1115 06/28/17   0430   RBC  2.48*  2.31*  2.20*   HEMOGLOBIN  6.8*  6.3*  6.0*   HEMATOCRIT  22.6*  20.5*  20.2*   PLATELETCT  88*  75*  100*   PROTHROMBTM   --   15.4*   --    APTT   --   33.6   --    INR   --   1.24*   --      Recent Labs      06/26/17 1411 06/27/17 0450 06/27/17 1115 06/28/17   0430   WBC  13.4*  14.7*  11.3*  20.0*   NEUTSPOLYS   --    --    --   85.00*   LYMPHOCYTES   --    --    --   10.00*   MONOCYTES   --    --    --   1.00   EOSINOPHILS   --    --    --   0.00   BASOPHILS   --    --    --   1.00   ASTSGOT  53*  41  33   --    ALTSGPT  49  RR  42  39   --    ALKPHOSPHAT  100*  RR  85  76   --    TBILIRUBIN  2.0*  2.1*  1.7*   --            Medical Decision Making, by Problem:  FLASH PULM EDEMA VS TRALI - CXR improving, transfusion rxn w/u reveals minor antibodies  PROTEUS PNA - will deescalate Merrem to Teflaro given no PSAR recovered  RECURRENT VDRF - 2/2 above  S/P ASPIRATION PNA PTA  SEVERE AS - Cardiology following  PHTN - monitor volume  UGIB 2/2 ESOPHAGITIS - cont IV PPI, FOB pending  H/O LGIB  HYPOTENSION - cont volume resuscitation and pressors, check Cortisol  HLD - off statin  DM - SSI  ARF/CKD III - appreciate Nephrology assistance  HYPERKALEMIA - normalized w/ Kayexalate and off KCl supplements  LEUKOCYTOSIS - worsening so will add Fluconazole and change Merrem to Teflaro for empiric MRSA coverage, follow Lactic Acid  ANEMIA - ok to transfuse per Blood Bank, will premedicate w/ APAP/Benadryl/Pepcid/Solumedrol, cont Aranesp and Fe/Vit C  THROMBOCYTOPENIA - now  off Lasix/Protonix, HIT Ab negative, plt counts improving  HYPOTHYROIDISM - back on Synthroid, check TFT  S/P FALL W/ LEFT EYEBROW LAC - CT Head and Orbits negative, wound care to eyebrow lac    Reviewed w/ RN and Staff.  CCT >45 min.          Medications reviewed and Labs reviewed  Bond catheter: Strict Intake and Output During Sepisis or Shock  Central line in place: Need for access    DVT Prophylaxis: Contraindicated - High bleeding risk  DVT prophylaxis - mechanical: SCDs  Ulcer prophylaxis: Yes  Antibiotics: Treating active infection/contamination beyond 24 hours perioperative coverage

## 2017-06-28 NOTE — PROGRESS NOTES
Pulmonary Critical Care Progress Note        Chief Complaint: Respiratory insufficiency    ROS:  Respiratory: positive shortness of breath, improved, Cardiac: negative, GI: negative.  All other systems negative.    Interval Events:  24 hour interval history reviewed.     - asked to see again. Developed VDRF after a blood tx 2/2 TRALI vs pulmonary edema. Now weaned to 50%/12//450; ab/45/7.31, leukocytosis, anemia,  thrombocytopenia, worsened renal fxn, potassium to 6.7, kayexalate ordered and SSI; cxr possibly better     - has et cuff leak, may need to exchange if we can't wean, is on 60% and +12; no cxr today; on Esmolol and levophed; increased leukocytosis, anemia, bandemia, BUN worse but crt better, glucoses still high, BNP minimally elevated; 98.9 degrees - 96/47 - 83 - 16 - 96%;later, advanced ET tube and leak disappeared, no cuff leak    PFSH:  No change.    Respiratory:  Exam: unlabored respirations, no intercostal retractions or accessory muscle use, few basilar rales without wheezing or consolidation.  ImagingAvailable data reviewed. The chest radiogram on  demonstrated stable patchy bilateral densities. No film today.     HemoDynamics:  Exam: regular rate and rhythm  Imaging: Available data reviewed  Echo  without change  Neuro:  Exam: after fall and laceration above left eye, head CT scan did not show acute intracranial bleed or injury. Patient responsive presently mental status intact  Imaging: Available data reviewed    Fluids:  Intake/Output     None           Recent Labs      17   0450  17   1115  17   1525  17   0430   SODIUM  137  139   --   142   POTASSIUM  6.7*  5.7*  5.5  4.5   CHLORIDE  102  103   --   104   CO2  25  21   --   25   BUN  52*  61*   --   67*   CREATININE  2.65*  2.78*   --   2.55*   CALCIUM  8.0*  8.0*   --   7.7*       GI/Nutrition:  Exam: abdomen is soft and non-tender  Imaging: None - Reviewed  taking PO  Liver  Function  Recent Labs      06/26/17   1411  06/27/17   0450  06/27/17   1115  06/28/17   0430   ALTSGPT  49  RR  42  39   --    ASTSGOT  53*  41  33   --    ALKPHOSPHAT  100*  RR  85  76   --    TBILIRUBIN  2.0*  2.1*  1.7*   --    DBILIRUBIN  0.9*   --    --    --    GLUCOSE  179*  224*  220*  240*       Heme:  Recent Labs      06/27/17   0450  06/27/17 1115 06/28/17   0430   RBC  2.48*  2.31*  2.20*   HEMOGLOBIN  6.8*  6.3*  6.0*   HEMATOCRIT  22.6*  20.5*  20.2*   PLATELETCT  88*  75*  100*   PROTHROMBTM   --   15.4*   --    APTT   --   33.6   --    INR   --   1.24*   --        Infectious Disease:  No Data Recorded  Micro: no new positive cultures  Recent Labs      06/26/17   1411 06/27/17 0450  06/27/17 1115 06/28/17   0430   WBC  13.4*  14.7*  11.3*  20.0*   NEUTSPOLYS   --    --    --   85.00*   LYMPHOCYTES   --    --    --   10.00*   MONOCYTES   --    --    --   1.00   EOSINOPHILS   --    --    --   0.00   BASOPHILS   --    --    --   1.00   ASTSGOT  53*  41  33   --    ALTSGPT  49  RR  42  39   --    ALKPHOSPHAT  100*  RR  85  76   --    TBILIRUBIN  2.0*  2.1*  1.7*   --      No current facility-administered medications for this encounter.       Last reviewed on 5/24/2017  9:56 PM by Zora Mccarthy ALBINO    Quality  Measures:  Labs reviewed, Medications reviewed, Radiology images reviewed and EKG reviewed                      Problems:  Recurrent VDRF 2/2 TRALI  Hypotension, levophed  Pulmonary Edema/ALI  Transfusion Reaction  Anemia, thrombocytopenia, leukocytosis  Grade 3 esophagitis with recent upper gastrointestinal hemorrhage, biopsy negative  Acute kidney injury with hyperkalemia (on CKD)  Encephalopathy without focal neurologic findings, improving.  Severe aortic stenosis, pulmonary hypertension. Diastolic CHF.  Diabetes mellitus, controlled.  Anemia and thrombocytopenia, stable.  Non-anion gap metabolic acidosis.  Tachycardia    Recommend:  Vent support for now and wean as  able  Correct metabolic derangements  Steroid taper  Antiinfectives  Esmolol, levophed, bumex forced diuresis  Follow chemistries, abgs, hemogram, images    Antiinfectives - Merrem

## 2017-06-28 NOTE — [facility]
Cardiology Progress Note               Author: Sulaiman Tesfaye Date & Time created: 6/28/2017  1:28 PM     Interval History:  Being seen by cardiology for follow up and evaluation of aortic stenosis. He remains ventilator dependent. Anemia is worse again is going to require transfusion but he has recovered  from the transfusion reaction of 2 days ago.  He has had intermittent hypotension. His heart rate has been good off esmolol.    Review of Systems   Unable to perform ROS  HENT: Negative for nosebleeds.    Respiratory: Negative for hemoptysis.    Genitourinary: Negative for hematuria.   Endo/Heme/Allergies: Does not bruise/bleed easily.       Physical Exam   Constitutional: No distress.   Sedated on ventilator   Eyes: No scleral icterus.   Neck: No JVD present.   Cardiovascular: Normal rate and regular rhythm.    Murmur (3/6 long systolic ejection murmur especially upper right sternal border radiating to carotids which are reduced) heard.  Pulmonary/Chest: He has rales (bilateral coarse).   Maintained on ventilator   Abdominal: Soft. Bowel sounds are normal.   Neurological:   Sedated on ventilator   Skin: Skin is warm and dry. There is pallor.       Hemodynamics:  No data recorded.     No Data Recorded         Respiratory:                Fluids:        GI/Nutrition:  Orders Placed This Encounter   Procedures   • DIET NPO     Standing Status: Standing      Number of Occurrences: 1      Standing Expiration Date:      Order Specific Question:  Restrict to:     Answer:  Strict [1]   • DIET TUBE FEED     Standing Status: Standing      Number of Occurrences: 1      Standing Expiration Date:      Order Specific Question:  Diet     Answer:  Diet Tube Feed [35]     Order Specific Question:  Formula:     Answer:  Novasource Renal [35]     Order Specific Question:  Goal Rate (mL/Hour)     Answer:  10     Order Specific Question:  Route     Answer:  NG     Lab Results:  Recent Labs      06/27/17   0450  06/27/17   1115   06/28/17   0430   WBC  14.7*  11.3*  20.0*   RBC  2.48*  2.31*  2.20*   HEMOGLOBIN  6.8*  6.3*  6.0*   HEMATOCRIT  22.6*  20.5*  20.2*   MCV  91.1  88.7  91.8   MCH  27.4  27.3  27.3   MCHC  30.1*  30.7*  29.7*   RDW  67.3*  65.0*  67.7*   PLATELETCT  88*  75*  100*   MPV  10.5  10.6  10.6     Recent Labs      06/27/17   0450  06/27/17   1115  06/27/17   1525  06/28/17   0430   SODIUM  137  139   --   142   POTASSIUM  6.7*  5.7*  5.5  4.5   CHLORIDE  102  103   --   104   CO2  25  21   --   25   GLUCOSE  224*  220*   --   240*   BUN  52*  61*   --   67*   CREATININE  2.65*  2.78*   --   2.55*   CALCIUM  8.0*  8.0*   --   7.7*     Recent Labs      06/27/17   1115   APTT  33.6   INR  1.24*     Recent Labs      06/26/17   1108  06/28/17   0430   BNPBTYPENAT  125*  153*     Recent Labs      06/26/17   1009  06/26/17   1108  06/26/17   1411  06/28/17   0430   TROPONINI  0.02   --   0.09*  RR   --    BNPBTYPENAT   --   125*   --   153*             Medical Decision Making, by Problem:  Aortic stenosis. Progressive anemia. Ventilator dependent respiratory failure. Basic problem list as previously described.      Plan:  Continue supportive care. Any interventional aortic valve must be deferred until resolution of his major medical issues currently.    Core Measures

## 2017-06-29 NOTE — [facility]
Hospital Medicine Progress Note, Adult, Complex               Author: Valorie Mariano Date & Time created: 6/29/2017  12:14 PM     Interval History:  CC - RF  Tolerated blood transfusion w/o issue.  Remains intubated on Fentanyl/Versed.    Review of Systems:  Review of Systems   Unable to perform ROS: medical condition       Physical Exam:  Physical Exam   Constitutional: No distress.   HENT:   Right Ear: External ear normal.   Left Ear: External ear normal.   Nose: Nose normal.   Resolving left eyebrow hematoma/laceration   Eyes: Right eye exhibits no discharge. Left eye exhibits no discharge.   resolving left conjunctival hemorrhage   Neck:   Orally intubated on vent   Cardiovascular: Normal rate and regular rhythm.    Murmur heard.  SR   Pulmonary/Chest: No respiratory distress. He has no wheezes.   CTA anteriorly   Abdominal: Soft. Bowel sounds are normal. He exhibits no distension. There is no tenderness. There is no rebound and no guarding.   Musculoskeletal: He exhibits no edema or tenderness.   Neurological:   Sedated   Skin: Skin is warm and dry. He is not diaphoretic.   Vitals reviewed.      Labs:  Recent Labs      06/26/17   1945  06/26/17   2135  06/27/17   0400  06/29/17   0355   [id number]   --   7.31*   --    --    [id number]   --   45.1*   --    --    [id number]   --   116.2*   --    --    [id number]   --   97.5   --    --    ARTHCO3   --   22   --    --    ARTBE   --   -4   --    --    ISTATSPEC  Venous   --   Venous  Venous     Recent Labs      06/26/17   1411  06/28/17   0430   TROPONINI  0.09*  RR   --    BNPBTYPENAT   --   153*     Recent Labs      06/27/17   1115  06/27/17   1525  06/28/17   0430  06/29/17   0355   SODIUM  139   --   142  147*   POTASSIUM  5.7*  5.5  4.5  4.1   CHLORIDE  103   --   104  110   CO2  21   --   25  28   BUN  61*   --   67*  61*   CREATININE  2.78*   --   2.55*  1.87*   MAGNESIUM   --    --   2.3   --    CALCIUM  8.0*   --   7.7*  8.0*     Recent Labs      06/26/17   1411   06/27/17   0450  06/27/17   1115  06/28/17   0430  06/29/17   0355   ALTSGPT  49  RR  42  39   --   42   ASTSGOT  53*  41  33   --   33   ALKPHOSPHAT  100*  RR  85  76   --   82   TBILIRUBIN  2.0*  2.1*  1.7*   --   1.7*   DBILIRUBIN  0.9*   --    --    --    --    GLUCOSE  179*  224*  220*  240*  249*     Recent Labs      06/27/17 1115 06/28/17 0430 06/29/17   0355   RBC  2.31*  2.20*  2.12*   HEMOGLOBIN  6.3*  6.0*  6.0*   HEMATOCRIT  20.5*  20.2*  19.5*   PLATELETCT  75*  100*  64*   PROTHROMBTM  15.4*   --    --    APTT  33.6   --    --    INR  1.24*   --    --      Recent Labs      06/27/17   0450 06/27/17 1115 06/28/17 0430 06/29/17   0355   WBC  14.7*  11.3*  20.0*  8.3   NEUTSPOLYS   --    --   85.00*  84.00*   LYMPHOCYTES   --    --   10.00*  9.00*   MONOCYTES   --    --   1.00  1.00   EOSINOPHILS   --    --   0.00  0.00   BASOPHILS   --    --   1.00  0.00   ASTSGOT  41  33   --   33   ALTSGPT  42  39   --   42   ALKPHOSPHAT  85  76   --   82   TBILIRUBIN  2.1*  1.7*   --   1.7*           Medical Decision Making, by Problem:  FLASH PULM EDEMA VS TRALI - CXR improving, transfusion rxn w/u reveals minor antibodies  PROTEUS PNA - WBC normalized, cont renal dose Teflaro  RECURRENT VDRF - 2/2 above  S/P ASPIRATION PNA PTA  SEVERE AS - Cardiology following  PHTN - monitoring volume, consider decrease IVF  UGIB 2/2 ESOPHAGITIS - cont IV PPI, FOB pending  H/O LGIB  HYPOTENSION - Cortisol low so will start stress dose Hydrocortisone, off Levophed  HLD - off statin  DM - SSI  ARF/CKD III - renal fxn improving w/ hydration  HYPERNATREMIA - defer to Nephro  LEUKOCYTOSIS - improved w/ change in antimicrobials, cont Teflaro and Fluconazole, Lactic Acid trending down  ANEMIA - ok to transfuse per Blood Bank, premedicate w/ APAP/Benadryl/Pepcid/Solumedrol prior to all transfusions, cont Aranesp and Fe/Vit C  THROMBOCYTOPENIA - now off Lasix/Protonix, HIT Ab negative, follow  HYPOTHYROIDISM - euthyroid on  Synthroid  S/P FALL W/ LEFT EYEBROW LAC - CT Head and Orbits negative, wound care to eyebrow lac  SACRAL DTI - wound care and specialty bed to prevent further breakdown  PROPH - add Culturelle    Reviewed w/ family and RN.  CCT >40 min.          Medications reviewed and Labs reviewed  Bond catheter: Strict Intake and Output During Sepisis or Shock  Central line in place: Need for access    DVT Prophylaxis: Contraindicated - High bleeding risk  DVT prophylaxis - mechanical: SCDs  Ulcer prophylaxis: Yes  Antibiotics: Treating active infection/contamination beyond 24 hours perioperative coverage

## 2017-06-29 NOTE — PROGRESS NOTES
Date of Service: 6/29/2017  Chief Complaint:No chief complaint on file.    Interval History:  Esmolo gtt off; still intubated  Paper chart VS reviewed  And stable  No sig changes discussed with RN  All recent medication, labs, imaging studies and procedures reviewed    Physical Exam   There were no vitals taken for this visit.    Constitutional:  He appears well-developed.   HENT: Normocephalic and atraumatic. No scleral icterus.   Neck: No JVD present.   Cardiovascular: Normal rate.RRR  Exam reveals no gallop and no friction rub. AoV 3/6/SE murmur heard.   Pulmonary/Chest:  coarse rhonchi on vent  Abdominal: S/NT/ND BS+   Musculoskeletal: He exhibits no edema. Pulses present.  Skin: Skin is warm and dry.     No intake or output data in the 24 hours ending 06/29/17 0615    LABS:  Lab Results   Component Value Date/Time    TRIGLYCERIDES 272* 06/02/2017 04:10 AM       Lab Results   Component Value Date/Time    WBC 8.3 06/29/2017 03:55 AM    RBC 2.12* 06/29/2017 03:55 AM    HEMOGLOBIN 6.0* 06/29/2017 03:55 AM    HEMATOCRIT 19.5* 06/29/2017 03:55 AM    MCV 92.9 06/29/2017 03:55 AM    NEUTROPHILS-POLYS 84.00* 06/29/2017 03:55 AM    LYMPHOCYTES 9.00* 06/29/2017 03:55 AM    MONOCYTES 1.00 06/29/2017 03:55 AM    EOSINOPHILS 0.00 06/29/2017 03:55 AM    BASOPHILS 0.00 06/29/2017 03:55 AM    HYPOCHROMIA 1+ 06/29/2017 03:55 AM    ANISOCYTOSIS 1+ 06/29/2017 03:55 AM     Lab Results   Component Value Date/Time    SODIUM 147* 06/29/2017 03:55 AM    POTASSIUM 4.1 06/29/2017 03:55 AM    CHLORIDE 110 06/29/2017 03:55 AM    CO2 28 06/29/2017 03:55 AM    GLUCOSE 249* 06/29/2017 03:55 AM    BUN 61* 06/29/2017 03:55 AM    CREATININE 1.87* 06/29/2017 03:55 AM     Lab Results   Component Value Date    HBA1C 5.7* 06/09/2017      Lab Results   Component Value Date/Time    ALKALINE PHOSPHATASE 82 06/29/2017 03:55 AM    AST(SGOT) 33 06/29/2017 03:55 AM    ALT(SGPT) 42 06/29/2017 03:55 AM    TOTAL BILIRUBIN 1.7* 06/29/2017 03:55 AM      Lab  Results   Component Value Date/Time    B NATRIURETIC PEPTIDE 153* 06/28/2017 04:30 AM      No results found for: TSH  Lab Results   Component Value Date/Time    PT 15.4* 06/27/2017 11:15 AM    INR 1.24* 06/27/2017 11:15 AM        Medications reviewed    Imaging reviewed    ECHO(6/26/2017):Normal left ventricular chamber size. Severe left ventricular   hypertrophy. Normal left ventricular systolic function. Left   ventricular ejection fraction is visually estimated to be 60%. Normal   regional wall motion.  Severe aortic stenosis. Vmax is 4.25 m/s. Transvalvular gradients are -   Peak: 72 mmHg, Mean: 56 mmHg.  Compared to the images of the prior study done in 05/2017 -  there has   been no significant change.    Impressions:  PNA  Severe AS  Elevated Trop  Anemia  PNA    Recommendations:  Pls record VS  Severe AS, monitor, no intervention at this time  Resp failure on Vent  Anemia eval  All recent labs, imaging studies and procedures reviewed    Will follow  Thx

## 2017-06-29 NOTE — PROGRESS NOTES
Pulmonary Critical Care Progress Note        Chief Complaint: Respiratory insufficiency    ROS:  Respiratory: positive shortness of breath, improved, Cardiac: negative, GI: negative.  All other systems negative.    Interval Events:  24 hour interval history reviewed.     - asked to see again. Developed VDRF after a blood tx 2/2 TRALI vs pulmonary edema. Now weaned to 50%/12//450; ab/45/7.31, leukocytosis, anemia,  thrombocytopenia, worsened renal fxn, potassium to 6.7, kayexalate ordered and SSI; cxr possibly better     - has et cuff leak, may need to exchange if we can't wean, is on 60% and +12; no cxr today; on Esmolol and levophed; increased leukocytosis, anemia, bandemia, BUN worse but crt better, glucoses still high, BNP minimally elevated; 98.9 degrees - 96/47 - 83 - 16 - 96%;later, advanced ET tube and leak disappeared, no cuff leak     - weaned to 55% and 8 of PEEP, off of pressors; 97.5 degrees - 96/59 - 84 - 23 - 95%; H/H stable low today, 6% bands wioth wbc of 8.3, renal fxn better 61/1.87, glucoses still running high, cxr yest better;  TA shows proteus, on teflaro; received blood yesterday with pre-med and ordered today with pepcid, benadryl, solumedrol    PFSH:  No change.    Respiratory:  Exam: unlabored respirations, no intercostal retractions or accessory muscle use, few basilar rales without wheezing or consolidation.  ImagingAvailable data reviewed. The chest radiogram on  demonstrated stable patchy bilateral densities. No film today.     HemoDynamics:  Exam: regular rate and rhythm  Imaging: Available data reviewed  Echo  without change  Neuro:  Exam: after fall and laceration above left eye, head CT scan did not show acute intracranial bleed or injury. Patient responsive presently mental status intact  Imaging: Available data reviewed    Fluids:  Intake/Output     None           Recent Labs      17   1115  17   1525  17   0430   06/29/17   0355   SODIUM  139   --   142  147*   POTASSIUM  5.7*  5.5  4.5  4.1   CHLORIDE  103   --   104  110   CO2  21   --   25 28   BUN  61*   --   67*  61*   CREATININE  2.78*   --   2.55*  1.87*   MAGNESIUM   --    --   2.3   --    CALCIUM  8.0*   --   7.7*  8.0*       GI/Nutrition:  Exam: abdomen is soft and non-tender  Imaging: None - Reviewed  taking PO  Liver Function  Recent Labs      06/26/17   1411  06/27/17   0450  06/27/17   1115  06/28/17   0430  06/29/17   0355   ALTSGPT  49  RR  42  39   --   42   ASTSGOT  53*  41  33   --   33   ALKPHOSPHAT  100*  RR  85  76   --   82   TBILIRUBIN  2.0*  2.1*  1.7*   --   1.7*   DBILIRUBIN  0.9*   --    --    --    --    GLUCOSE  179*  224*  220*  240*  249*       Heme:  Recent Labs      06/27/17   1115  06/28/17 0430 06/29/17 0355   RBC  2.31*  2.20*  2.12*   HEMOGLOBIN  6.3*  6.0*  6.0*   HEMATOCRIT  20.5*  20.2*  19.5*   PLATELETCT  75*  100*  64*   PROTHROMBTM  15.4*   --    --    APTT  33.6   --    --    INR  1.24*   --    --        Infectious Disease:  No Data Recorded  Micro: no new positive cultures  Recent Labs      06/27/17   0450  06/27/17   1115  06/28/17 0430 06/29/17   0355   WBC  14.7*  11.3*  20.0*  8.3   NEUTSPOLYS   --    --   85.00*  84.00*   LYMPHOCYTES   --    --   10.00*  9.00*   MONOCYTES   --    --   1.00  1.00   EOSINOPHILS   --    --   0.00  0.00   BASOPHILS   --    --   1.00  0.00   ASTSGOT  41  33   --   33   ALTSGPT  42  39   --   42   ALKPHOSPHAT  85  76   --   82   TBILIRUBIN  2.1*  1.7*   --   1.7*     No current facility-administered medications for this encounter.       Last reviewed on 5/24/2017  9:56 PM by Keturah Cheek    Quality  Measures:  Labs reviewed, Medications reviewed, Radiology images reviewed and EKG reviewed                      Problems:  Recurrent VDRF 2/2 TRALI  Hypotension, levophed  Pulmonary Edema/ALI  Transfusion Reaction  Anemia, thrombocytopenia, leukocytosis  Grade 3 esophagitis with  recent upper gastrointestinal hemorrhage, biopsy negative  Acute kidney injury with hyperkalemia (on CKD)  Encephalopathy without focal neurologic findings, improving.  Severe aortic stenosis, pulmonary hypertension. Diastolic CHF.  Diabetes mellitus, controlled.  Anemia and thrombocytopenia, stable.  Non-anion gap metabolic acidosis.  Tachycardia    Recommend:  Vent support for now and wean as able  Correct metabolic derangements  Antiinfectives  Esmolol,  bumex forced diuresis  Follow chemistries, abgs, hemogram, images  Transfuse as need be    Antiinfectives - Teflaro

## 2017-06-29 NOTE — PROGRESS NOTES
[facility] Progress Note                 Date & Time created: 6/29/2017  12:27 PM   Author: Linsey RIOS  Supervising Physician: Pipo Darling MD    Interval History:  77 y/o male presented to [facility]note 3[facility] on 05/24/17 with acute respiratory failure and hemoptysis. He had a bronchoscopy that was unrevealing. Blood was found in his NG tube therefore GI workup was performed. He was found to have severe ulceration of hte esophagus. He required blood transfusions and was sucessfully extubated on 06/02/17.  He remained encephalopathic, was on supplemental oxygen. He had been requiring rescue BiPAP. He has severe aortic stenosis and may need TAVR. Meanwhile he was transferred to [facility] for long-term care.     Daily Nephrology Summary  06/27/17 - transferred to [facility]  06/28/17 - hgb 6.0. Previous blood transfusion reaction. Needs special blood from outside source. Scr trending down  06/29/17 -      Review of Systems:  Review of Systems   Unable to perform ROS      Physical Exam:  Physical Exam   Constitutional: He appears well-developed and well-nourished.   HENT:   Head: Normocephalic and atraumatic.   Eyes: Pupils are equal, round, and reactive to light.   Neck: Normal range of motion. Neck supple.   Cardiovascular: Normal rate, regular rhythm and normal heart sounds.    Pulmonary/Chest: Effort normal. No respiratory distress. He has no wheezes. He has rales.   Abdominal: Soft. Bowel sounds are normal.   Musculoskeletal: Normal range of motion. He exhibits no edema.       Labs:  Recent Labs      06/26/17   1945  06/26/17   2135  06/27/17   0400  06/29/17   0355   [id number]   --   7.31*   --    --    [id number]   --   45.1*   --    --    [id number]   --   116.2*   --    --    [id number]   --   97.5   --    --    ARTHCO3   --   22   --    --    ARTBE   --   -4   --    --    ISTATSPEC  Venous   --   Venous  Venous     Recent Labs      06/26/17   1411  06/28/17   0430   TROPONINI  0.09*  RR   --     BNPBTYPENAT   --   153*     Recent Labs      06/27/17   1115  06/27/17   1525  06/28/17   0430  06/29/17   0355   SODIUM  139   --   142  147*   POTASSIUM  5.7*  5.5  4.5  4.1   CHLORIDE  103   --   104  110   CO2  21   --   25 28   BUN  61*   --   67*  61*   CREATININE  2.78*   --   2.55*  1.87*   MAGNESIUM   --    --   2.3   --    CALCIUM  8.0*   --   7.7*  8.0*     Recent Labs      06/26/17   1411  06/27/17   0450  06/27/17   1115  06/28/17   0430  06/29/17   0355   ALTSGPT  49  RR  42  39   --   42   ASTSGOT  53*  41  33   --   33   ALKPHOSPHAT  100*  RR  85  76   --   82   TBILIRUBIN  2.0*  2.1*  1.7*   --   1.7*   DBILIRUBIN  0.9*   --    --    --    --    GLUCOSE  179*  224*  220*  240*  249*     Recent Labs      06/27/17   1115  06/28/17   0430  06/29/17   0355   RBC  2.31*  2.20*  2.12*   HEMOGLOBIN  6.3*  6.0*  6.0*   HEMATOCRIT  20.5*  20.2*  19.5*   PLATELETCT  75*  100*  64*   PROTHROMBTM  15.4*   --    --    APTT  33.6   --    --    INR  1.24*   --    --      Recent Labs      06/27/17   0450  06/27/17   1115  06/28/17   0430  06/29/17   0355   WBC  14.7*  11.3*  20.0*  8.3   NEUTSPOLYS   --    --   85.00*  84.00*   LYMPHOCYTES   --    --   10.00*  9.00*   MONOCYTES   --    --   1.00  1.00   EOSINOPHILS   --    --   0.00  0.00   BASOPHILS   --    --   1.00  0.00   ASTSGOT  41  33   --   33   ALTSGPT  42  39   --   42   ALKPHOSPHAT  85  76   --   82   TBILIRUBIN  2.1*  1.7*   --   1.7*           Hemodynamics:  No data recorded.     No Data Recorded         Respiratory:                Fluids:  No intake or output data in the 24 hours ending 06/29/17 1227     GI/Nutrition:  Orders Placed This Encounter   Procedures   • DIET NPO     Standing Status: Standing      Number of Occurrences: 1      Standing Expiration Date:      Order Specific Question:  Restrict to:     Answer:  Strict [1]   • DIET TUBE FEED     Standing Status: Standing      Number of Occurrences: 1      Standing Expiration Date:       Order Specific Question:  Diet     Answer:  Diet Tube Feed [35]     Order Specific Question:  Formula:     Answer:  Novasource Renal [35]     Order Specific Question:  Goal Rate (mL/Hour)     Answer:  45     Order Specific Question:  Route     Answer:  NG     Medical Decision Making, by Problem:  There are no hospital problems to display for this patient.      Core Measures    ASSESSMENT  # TYLER    Secondary to hypotension/anemia/diuretics    Improving  # CKD III   Baseline scr 1.6-2.0  #Hyperkalemia   Was on KCL supplement   TYLER/acidosis  # Acidosis   Improving   Anemia   Prior blood transfusion reaction  # Severe Aortic Stenosis  # VDRF  # Leukocystosis    SUGGESTIONS:  Maintain MAP > 60  Stop diuretics & IVFs if any  Increase free water   Daily labs with recommendations to follow  Thank you

## 2017-06-30 NOTE — PROGRESS NOTES
[facility] Progress Note                 Date & Time created: 6/30/2017  12:12 PM   Author: Linsey RIOS  Supervising Physician: Pipo Darling MD    Interval History:  77 y/o male presented to [facility] on 05/24/17 with acute respiratory failure and hemoptysis. He had a bronchoscopy that was unrevealing. Blood was found in his NG tube therefore GI workup was performed. He was found to have severe ulceration of hte esophagus. He required blood transfusions and was sucessfully extubated on 06/02/17.  He remained encephalopathic, was on supplemental oxygen. He had been requiring rescue BiPAP. He has severe aortic stenosis and may need TAVR. Meanwhile he was transferred to [facility] for long-term care.     Daily Nephrology Summary  06/27/17 - transferred to [facility]  06/28/17 - hgb 6.0. Previous blood transfusion reaction. Needs special blood from outside source. Scr trending down  06/29/17 -  Family at bedside. Renal function better. Na+ worse, start free water.  06/30/17 - Had to be reintubated last night. Na+ no better, increase free water. Renal function remains stable.    Review of Systems:  Review of Systems   Unable to perform ROS      Physical Exam:  Physical Exam   Constitutional: He appears well-developed and well-nourished.   HENT:   Head: Normocephalic and atraumatic.   Eyes: Pupils are equal, round, and reactive to light.   Neck: Normal range of motion. Neck supple.   Cardiovascular: Normal rate, regular rhythm and normal heart sounds.    Pulmonary/Chest: Effort normal. No respiratory distress. He has no wheezes. He has rales.   Abdominal: Soft. Bowel sounds are normal.   Musculoskeletal: Normal range of motion. He exhibits no edema.       Labs:  Recent Labs      06/29/17   0355  06/30/17   0020   [id number]   --   7.36*   [id number]   --   48.7*   [id number]   --   78.7   QDHN6PEX   --   94.2   ARTHCO3   --   27*   FIO2   --   70*   ARTBE   --   1   ISTATSPEC  Venous   --      Recent  Labs      06/28/17 0430   BNPBTYPENAT  153*     Recent Labs      06/28/17 0430 06/29/17 0355 06/30/17   0400   SODIUM  142  147*  148*   POTASSIUM  4.5  4.1  4.0   CHLORIDE  104  110  110   CO2  25 28 30   BUN  67*  61*  56*   CREATININE  2.55*  1.87*  1.75*   MAGNESIUM  2.3   --   3.0*   CALCIUM  7.7*  8.0*  8.1*     Recent Labs      06/28/17 0430 06/29/17 0355 06/30/17   0400   ALTSGPT   --   42  48   ASTSGOT   --   33  63*   ALKPHOSPHAT   --   82  82   TBILIRUBIN   --   1.7*  1.8*   GLUCOSE  240*  249*  306*     Recent Labs      06/28/17 0430 06/29/17 0355 06/30/17   0400   RBC  2.20*  2.12*  2.71*   HEMOGLOBIN  6.0*  6.0*  7.8*   HEMATOCRIT  20.2*  19.5*  25.6*   PLATELETCT  100*  64*  65*     Recent Labs      06/28/17 0430 06/29/17 0355 06/30/17   0400   WBC  20.0*  8.3  14.3*   NEUTSPOLYS  85.00*  84.00*   --    LYMPHOCYTES  10.00*  9.00*   --    MONOCYTES  1.00  1.00   --    EOSINOPHILS  0.00  0.00   --    BASOPHILS  1.00  0.00   --    ASTSGOT   --   33  63*   ALTSGPT   --   42  48   ALKPHOSPHAT   --   82  82   TBILIRUBIN   --   1.7*  1.8*           Hemodynamics:  No data recorded.     No Data Recorded         Respiratory:                Fluids:  No intake or output data in the 24 hours ending 06/30/17 1212     GI/Nutrition:  Orders Placed This Encounter   Procedures   • DIET NPO     Standing Status: Standing      Number of Occurrences: 1      Standing Expiration Date:      Order Specific Question:  Restrict to:     Answer:  Strict [1]   • DIET TUBE FEED     Standing Status: Standing      Number of Occurrences: 1      Standing Expiration Date:      Order Specific Question:  Diet     Answer:  Diet Tube Feed [35]     Order Specific Question:  Formula:     Answer:  Novasource Renal [35]     Order Specific Question:  Goal Rate (mL/Hour)     Answer:  45     Order Specific Question:  Route     Answer:  NG     Medical Decision Making, by Problem:  There are no hospital problems to display  for this patient.      Core Measures    ASSESSMENT  # TYLER    Secondary to hypotension/anemia/diuretics    Improving  # CKD III   Baseline scr 1.6-2.0  #Hyperkalemia   Was on KCL supplement   TYLER/acidosis  # Acidosis   Improving   Anemia   Prior blood transfusion reaction  # Severe Aortic Stenosis  # VDRF  # Leukocystosis    SUGGESTIONS:  Maintain MAP > 60  Stop diuretics & IVFs   Increase free water in tube feeds  Daily labs with recommendations to follow    Thank you

## 2017-06-30 NOTE — ED PROVIDER NOTES
I was called to beside for concern of a dislodged ETT. Firstly it was attempted to visualize ETT with glidscope but ETT was at the cords with the balloon above the cords and was about completely dislodged. The ETT was removed and patient was bagged. Patient was given 5mg of versed for visualization and once bagged succinyl choline was used and first attempt with 7.5 ETT was made with glidescope. Cords visualized but unable to pass the tube. A second attempt was made with 7.0 ETT with MAC blade and passed with success. There was color change, chest rise and mist in the tube. CXR showed the tube above the tamera and was advanced 2cm from 22cm to 24cm at the lip. Pulmonology is managing the vent. Patient was reattached to the vent and oxygen saturations where 90% and HR remained about 100 throughout the duration of the procedure.

## 2017-06-30 NOTE — [facility]
Hospital Medicine Progress Note, Adult, Complex               Author: Valorie Montserrat Date & Time created: 6/30/2017  11:16 AM     Interval History:  CC - RF  Pt desatted acutely overnight, resolved w/ emergent tube exchange by ERP.  Now w/ bloody trach secretions.    Review of Systems:  Review of Systems   Unable to perform ROS: medical condition       Physical Exam:  Physical Exam   Constitutional: No distress.   HENT:   Right Ear: External ear normal.   Left Ear: External ear normal.   Nose: Nose normal.   Resolving left eyebrow hematoma/laceration   Eyes: Right eye exhibits no discharge. Left eye exhibits no discharge.   resolving left conjunctival hemorrhage   Neck:   Orally intubated on vent   Cardiovascular: Normal rate and regular rhythm.    Murmur heard.  SR   Pulmonary/Chest: No respiratory distress. He has no wheezes.   Coarse BS   Abdominal: Soft. Bowel sounds are normal. He exhibits no distension. There is no tenderness. There is no rebound and no guarding.   Musculoskeletal: He exhibits no edema or tenderness.   Neurological:   Sedated   Skin: Skin is warm and dry. He is not diaphoretic.   Vitals reviewed.      Labs:  Recent Labs      06/29/17   0355  06/30/17   0020   [id number]   --   7.36*   [id number]   --   48.7*   [id number]   --   78.7   UADX4TMS   --   94.2   ARTHCO3   --   27*   FIO2   --   70*   ARTBE   --   1   ISTATSPEC  Venous   --      Recent Labs      06/28/17   0430   BNPBTYPENAT  153*     Recent Labs      06/28/17   0430  06/29/17   0355  06/30/17   0400   SODIUM  142  147*  148*   POTASSIUM  4.5  4.1  4.0   CHLORIDE  104  110  110   CO2  25  28  30   BUN  67*  61*  56*   CREATININE  2.55*  1.87*  1.75*   MAGNESIUM  2.3   --   3.0*   CALCIUM  7.7*  8.0*  8.1*     Recent Labs      06/28/17   0430  06/29/17   0355  06/30/17   0400   ALTSGPT   --   42  48   ASTSGOT   --   33  63*   ALKPHOSPHAT   --   82  82   TBILIRUBIN   --   1.7*  1.8*   GLUCOSE  240*  249*  306*     Recent Labs      06/28/17    0430  06/29/17   0355  06/30/17   0400   RBC  2.20*  2.12*  2.71*   HEMOGLOBIN  6.0*  6.0*  7.8*   HEMATOCRIT  20.2*  19.5*  25.6*   PLATELETCT  100*  64*  65*     Recent Labs      06/28/17   0430  06/29/17   0355  06/30/17   0400   WBC  20.0*  8.3  14.3*   NEUTSPOLYS  85.00*  84.00*   --    LYMPHOCYTES  10.00*  9.00*   --    MONOCYTES  1.00  1.00   --    EOSINOPHILS  0.00  0.00   --    BASOPHILS  1.00  0.00   --    ASTSGOT   --   33  63*   ALTSGPT   --   42  48   ALKPHOSPHAT   --   82  82   TBILIRUBIN   --   1.7*  1.8*           Medical Decision Making, by Problem:  FLASH PULM EDEMA VS TRALI - transfusion rxn w/u reveals minor antibodies, CXR w/ worsing edema so will D/C IVF  PROTEUS PNA - cont renal dose Teflaro  RECURRENT VDRF - 2/2 above  HEMOPTYSIS - query traumatic endotracheal tube exchange vs other, cont follow  S/P ASPIRATION PNA PTA  SEVERE AS - Cardiology following  PHTN - monitoring volume  UGIB 2/2 ESOPHAGITIS - cont IV PPI, FOB pending  H/O LGIB  HYPOTENSION - Cortisol low so stress dose Hydrocortisone started, off Levophed  HLD - off statin  DM - change SSI from low to medium scale while on steroids  ARF/CKD III - renal fxn improving w/ hydration  HYPERNATREMIA - D/C NS IVF and NaHCO3, increase free water  LEUKOCYTOSIS - improved w/ change in antimicrobials, now worse d/t steroids, follow  ANEMIA - ok to transfuse per Blood Bank, premedicate w/ APAP/Benadryl/Pepcid/Solumedrol prior to all transfusions, cont Fe/Vit C and Aranesp (given difficult type/cross for pRBC)  THROMBOCYTOPENIA - now off Lasix/Protonix, HIT Ab negative, following  HYPOTHYROIDISM - euthyroid on Synthroid  S/P FALL W/ LEFT EYEBROW LAC - CT Head and Orbits negative, wound care to eyebrow lac  SACRAL DTI - wound care and specialty bed to prevent further breakdown  CONSTIPATION - start Miralax    Reviewed w/ family and Nephro APN          Medications reviewed and Labs reviewed  Bond catheter: Strict Intake and Output During Sepisis  or Shock  Central line in place: Need for access    DVT Prophylaxis: Contraindicated - High bleeding risk  DVT prophylaxis - mechanical: SCDs  Ulcer prophylaxis: Yes  Antibiotics: Treating active infection/contamination beyond 24 hours perioperative coverage

## 2017-06-30 NOTE — PROGRESS NOTES
Date of Service: 6/30/2017  Chief Complaint:No chief complaint on file.    Interval History:  No sig cardiac status changes  Still sedated and intubated  Paper chart VS reviewed  All recent medication, labs, imaging studies and procedures reviewed    Physical Exam   There were no vitals taken for this visit.    Constitutional: Intubated on Vent support He appears well-developed.   HENT: Normocephalic and atraumatic. No scleral icterus.   Neck: No JVD present.   Cardiovascular: Normal rate. RRR; Exam reveals no gallop and no friction rub. No murmur heard.   Pulmonary/Chest:Vent rhonchi coarse   Abdominal: S/NT/ND BS+   Musculoskeletal: He exhibits no edema. Pulses present.  Skin: Skin is warm and dry.     No intake or output data in the 24 hours ending 06/30/17 0632    LABS:  Lab Results   Component Value Date/Time    TRIGLYCERIDES 272* 06/02/2017 04:10 AM       Lab Results   Component Value Date/Time    WBC 14.3* 06/30/2017 04:00 AM    RBC 2.71* 06/30/2017 04:00 AM    HEMOGLOBIN 7.8* 06/30/2017 04:00 AM    HEMATOCRIT 25.6* 06/30/2017 04:00 AM    MCV 94.5 06/30/2017 04:00 AM    NEUTROPHILS-POLYS 84.00* 06/29/2017 03:55 AM    LYMPHOCYTES 9.00* 06/29/2017 03:55 AM    MONOCYTES 1.00 06/29/2017 03:55 AM    EOSINOPHILS 0.00 06/29/2017 03:55 AM    BASOPHILS 0.00 06/29/2017 03:55 AM    HYPOCHROMIA 1+ 06/29/2017 03:55 AM    ANISOCYTOSIS 1+ 06/29/2017 03:55 AM     Lab Results   Component Value Date/Time    SODIUM 148* 06/30/2017 04:00 AM    POTASSIUM 4.0 06/30/2017 04:00 AM    CHLORIDE 110 06/30/2017 04:00 AM    CO2 30 06/30/2017 04:00 AM    GLUCOSE 306* 06/30/2017 04:00 AM    BUN 56* 06/30/2017 04:00 AM    CREATININE 1.75* 06/30/2017 04:00 AM     Lab Results   Component Value Date    HBA1C 5.7* 06/09/2017      Lab Results   Component Value Date/Time    ALKALINE PHOSPHATASE 82 06/30/2017 04:00 AM    AST(SGOT) 63* 06/30/2017 04:00 AM    ALT(SGPT) 48 06/30/2017 04:00 AM    TOTAL BILIRUBIN 1.8* 06/30/2017 04:00 AM      Lab  Results   Component Value Date/Time    B NATRIURETIC PEPTIDE 153* 06/28/2017 04:30 AM      No results found for: TSH  Lab Results   Component Value Date/Time    PT 15.4* 06/27/2017 11:15 AM    INR 1.24* 06/27/2017 11:15 AM        Medications reviewed    Imaging reviewed    ECHO(6/26/2017):Normal left ventricular chamber size. Severe left ventricular   hypertrophy. Normal left ventricular systolic function. Left   ventricular ejection fraction is visually estimated to be 60%. Normal   regional wall motion.  Severe aortic stenosis. Vmax is 4.25 m/s. Transvalvular gradients are -   Peak: 72 mmHg, Mean: 56 mmHg.  Compared to the images of the prior study done in 05/2017 -  there has   been no significant change.    Impressions:  PNA  Severe AS  Elevated Trop  Anemia  PNA    Recommendations:  Severe AS, monitor, no intervention at this time, no new recommendation with stable cardiac status  Resp failure on Vent  Anemia eval  All recent labs, imaging studies and procedures reviewed    Will follow  Thx

## 2017-06-30 NOTE — PROGRESS NOTES
Pulmonary Critical Care Progress Note        Chief Complaint: Respiratory insufficiency    ROS:  Respiratory: positive shortness of breath, improved, Cardiac: negative, GI: negative.  All other systems negative.    Interval Events:  24 hour interval history reviewed.     - asked to see again. Developed VDRF after a blood tx 2/2 TRALI vs pulmonary edema. Now weaned to 50%/12//450; ab/45/7.31, leukocytosis, anemia,  thrombocytopenia, worsened renal fxn, potassium to 6.7, kayexalate ordered and SSI; cxr possibly better     - has et cuff leak, may need to exchange if we can't wean, is on 60% and +12; no cxr today; on Esmolol and levophed; increased leukocytosis, anemia, bandemia, BUN worse but crt better, glucoses still high, BNP minimally elevated; 98.9 degrees - 96/47 - 83 - 16 - 96%;later, advanced ET tube and leak disappeared, no cuff leak     - weaned to 55% and 8 of PEEP, off of pressors; 97.5 degrees - 96/59 - 84 - 23 - 95%; H/H stable low today, 6% bands wioth wbc of 8.3, renal fxn better 61/1.87, glucoses still running high, cxr yest better;  TA shows proteus, on teflaro; received blood yesterday with pre-med and ordered today with pepcid, benadryl, solumedrol     - had to be re-intubated overnight, now up to 70%; reportedly difficult intubation 2/2 edema, will give a course of decadron; 98.2 degrees - 136/71 - 76 - 17 - 95%; H/H better this AM, sodium a bit worse and renal fxn improved; cxr stable abnl edema;     PFSH:  No change.    Respiratory:  Exam: unlabored respirations, no intercostal retractions or accessory muscle use, few basilar rales without wheezing or consolidation.  ImagingAvailable data reviewed. The chest radiogram on  demonstrated stable patchy bilateral densities. No film today.     HemoDynamics:  Exam: regular rate and rhythm  Imaging: Available data reviewed  Echo  without change  Neuro:  Exam: after fall and laceration above left eye,  head CT scan did not show acute intracranial bleed or injury. Patient responsive presently mental status intact  Imaging: Available data reviewed    Fluids:  Intake/Output     None           Recent Labs      06/28/17 0430 06/29/17 0355 06/30/17   0400   SODIUM  142  147*  148*   POTASSIUM  4.5  4.1  4.0   CHLORIDE  104  110  110   CO2  25 28  30   BUN  67*  61*  56*   CREATININE  2.55*  1.87*  1.75*   MAGNESIUM  2.3   --   3.0*   CALCIUM  7.7*  8.0*  8.1*       GI/Nutrition:  Exam: abdomen is soft and non-tender  Imaging: None - Reviewed  taking PO  Liver Function  Recent Labs      06/27/17 1115 06/28/17 0430 06/29/17 0355 06/30/17   0400   ALTSGPT  39   --   42  48   ASTSGOT  33   --   33  63*   ALKPHOSPHAT  76   --   82  82   TBILIRUBIN  1.7*   --   1.7*  1.8*   GLUCOSE  220*  240*  249*  306*       Heme:  Recent Labs      06/27/17 1115 06/28/17 0430 06/29/17 0355 06/30/17   0400   RBC  2.31*  2.20*  2.12*  2.71*   HEMOGLOBIN  6.3*  6.0*  6.0*  7.8*   HEMATOCRIT  20.5*  20.2*  19.5*  25.6*   PLATELETCT  75*  100*  64*  65*   PROTHROMBTM  15.4*   --    --    --    APTT  33.6   --    --    --    INR  1.24*   --    --    --        Infectious Disease:  No Data Recorded  Micro: no new positive cultures  Recent Labs      06/27/17 1115 06/28/17 0430 06/29/17 0355 06/30/17   0400   WBC  11.3*  20.0*  8.3  14.3*   NEUTSPOLYS   --   85.00*  84.00*   --    LYMPHOCYTES   --   10.00*  9.00*   --    MONOCYTES   --   1.00  1.00   --    EOSINOPHILS   --   0.00  0.00   --    BASOPHILS   --   1.00  0.00   --    ASTSGOT  33   --   33  63*   ALTSGPT  39   --   42  48   ALKPHOSPHAT  76   --   82  82   TBILIRUBIN  1.7*   --   1.7*  1.8*     No current facility-administered medications for this encounter.       Last reviewed on 5/24/2017  9:56 PM by Keturah Cheek    Quality  Measures:  Labs reviewed, Medications reviewed, Radiology images reviewed and EKG  reviewed                      Problems:  Recurrent VDRF 2/2 TRALI  Hypotension, levophed  Pulmonary Edema/ALI  Transfusion Reaction  Anemia, thrombocytopenia, leukocytosis  Grade 3 esophagitis with recent upper gastrointestinal hemorrhage, biopsy negative  Acute kidney injury with hyperkalemia (on CKD)  Encephalopathy without focal neurologic findings, improving.  Severe aortic stenosis, pulmonary hypertension. Diastolic CHF.  Diabetes mellitus, controlled.  Anemia and thrombocytopenia, stable.  Non-anion gap metabolic acidosis.  Tachycardia    Recommend:  Vent support for now and wean as able  Correct metabolic derangements  Antiinfectives  Esmolol,  bumex forced diuresis  Follow chemistries, abgs, hemogram, images  Transfuse as need be    Antiinfectives - Teflaro, fluconazole

## 2017-07-01 NOTE — [facility]
Hospital Medicine Progress Note, Adult, Complex               Author: Valorie Mariano Date & Time created: 7/1/2017  11:06 AM     Interval History:  CC - RF  Pt more agitated overnight, Propofol drip added to Fentanyl/Versed.    Review of Systems:  Review of Systems   Unable to perform ROS: medical condition       Physical Exam:  Physical Exam   Constitutional: No distress.   HENT:   Right Ear: External ear normal.   Left Ear: External ear normal.   Nose: Nose normal.   Resolving left eyebrow hematoma/laceration   Eyes: Right eye exhibits no discharge. Left eye exhibits no discharge.   resolving left conjunctival hemorrhage   Neck:   Orally intubated on vent   Cardiovascular: Normal rate and regular rhythm.    Murmur heard.  SR   Pulmonary/Chest: No respiratory distress. He has no wheezes.   Coarse BS   Abdominal: Soft. Bowel sounds are normal. He exhibits no distension. There is no tenderness. There is no rebound and no guarding.   Musculoskeletal: He exhibits no edema or tenderness.   Neurological:   Sedated   Skin: Skin is warm and dry. He is not diaphoretic.   Vitals reviewed.      Labs:  Recent Labs      06/29/17 0355 06/30/17   0020  07/01/17   0600   GRUFG90N   --    --   7.36*  7.39*   [id number]   --    --   48.7*  49.3*   LLCAI293R   --    --   78.7  85.8   TOPZ6XFI   --    --   94.2  95.5   ARTHCO3   --    --   27*  29*   FIO2   --    < >  70*  90*   ARTBE   --    --   1  4*   ISTATSPEC  Venous   --    --    --     < > = values in this interval not displayed.     Recent Labs      07/01/17   0400   BNPBTYPENAT  454*     Recent Labs      06/29/17 0355  06/30/17   0400  07/01/17   0400   SODIUM  147*  148*  149*   POTASSIUM  4.1  4.0  4.0   CHLORIDE  110  110  112   CO2  28  30  32   BUN  61*  56*  51*   CREATININE  1.87*  1.75*  1.61*   MAGNESIUM   --   3.0*   --    CALCIUM  8.0*  8.1*  8.0*     Recent Labs      06/29/17   0355  06/30/17   0400  07/01/17   0400   ALTSGPT  42  48  44   ASTSGOT  33  63*  48*    ALKPHOSPHAT  82  82  66   TBILIRUBIN  1.7*  1.8*  1.9*   GLUCOSE  249*  306*  290*     Recent Labs      06/29/17   0355  06/30/17   0400  07/01/17   0400   RBC  2.12*  2.71*  2.25*   HEMOGLOBIN  6.0*  7.8*  6.6*   HEMATOCRIT  19.5*  25.6*  21.9*   PLATELETCT  64*  65*  55*     Recent Labs      06/29/17   0355  06/30/17   0400  07/01/17   0400   WBC  8.3  14.3*  9.1   NEUTSPOLYS  84.00*   --    --    LYMPHOCYTES  9.00*   --    --    MONOCYTES  1.00   --    --    EOSINOPHILS  0.00   --    --    BASOPHILS  0.00   --    --    ASTSGOT  33  63*  48*   ALTSGPT  42  48  44   ALKPHOSPHAT  82  82  66   TBILIRUBIN  1.7*  1.8*  1.9*           Medical Decision Making, by Problem:  POSSIBLE TRALI - transfusion rxn w/u reveals minor antibodies, CXR slowly improving  PROTEUS PNA - cont renal dose Teflaro  RECURRENT VDRF - try to wean off sedating gtts, add prn Morphine/Haldol to Ativan  HEMOPTYSIS - query traumatic endotracheal tube exchange vs other, cont to follow  S/P ASPIRATION PNA PTA  SEVERE AS - Cardiology following  PHTN - monitoring volume  UGIB 2/2 ESOPHAGITIS - cont IV PPI, FOB pending  H/O LGIB  NELSON - Cortisol low so stress dose Hydrocortisone started, off Levophed  HLD - off statin  DM - medium scale SSI while on steroids  ARF/CKD III - renal fxn improving w/ gentle hydration  HYPERNATREMIA - cont to increase free water  LEUKOCYTOSIS - improved w/ change in antimicrobials, now worse d/t steroids, follow  ANEMIA - ok to transfuse per Blood Bank, premedicate w/ APAP/Benadryl/Pepcid/Solumedrol prior to all transfusions, cont Fe/Vit C and Aranesp (given difficult type/cross for pRBC)  THROMBOCYTOPENIA - now off Lasix/Protonix, HIT Ab negative, request DIC Panel  HYPOTHYROIDISM - euthyroid on Synthroid  S/P FALL W/ LEFT EYEBROW LAC - CT Head and Orbits negative, wound care to eyebrow lac  SACRAL DTI - wound care and specialty bed to prevent further breakdown  CONSTIPATION - started Miralax    Reviewed w/ RN, CCT>40  min.    ADDENDUM  Reviewed w/ wife.  Updated on Dx, Tx, and Px.  All questions answered.        Medications reviewed and Labs reviewed  Bond catheter: Strict Intake and Output During Sepisis or Shock  Central line in place: Need for access    DVT Prophylaxis: Contraindicated - High bleeding risk  DVT prophylaxis - mechanical: SCDs  Ulcer prophylaxis: Yes  Antibiotics: Treating active infection/contamination beyond 24 hours perioperative coverage

## 2017-07-01 NOTE — PROGRESS NOTES
[facility] Progress Note                 Date & Time created: 7/1/2017  8:50 AM   Author: : Pipo Darling MD    Interval History:  77 y/o male presented to [facility] on 05/24/17 with acute respiratory failure and hemoptysis. He had a bronchoscopy that was unrevealing. Blood was found in his NG tube therefore GI workup was performed. He was found to have severe ulceration of hte esophagus. He required blood transfusions and was sucessfully extubated on 06/02/17.  He remained encephalopathic, was on supplemental oxygen. He had been requiring rescue BiPAP. He has severe aortic stenosis and may need TAVR. Meanwhile he was transferred to [facility] for long-term care.     Daily Nephrology Summary  06/27/17 - transferred to [facility]  06/28/17 - hgb 6.0. Previous blood transfusion reaction. Needs special blood from outside source. Scr trending down  06/29/17 -  Family at bedside. Renal function better. Na+ worse, start free water.  06/30/17 - Had to be reintubated last night. Na+ no better, increase free water. Renal function remains stable.  07/01/17 - Vent requirements increasing. From renal standpoint no new overnight events. Scr has improved and patient is at baseline levels.    Review of Systems:  Review of Systems   Unable to perform ROS    Physical Exam:  Physical Exam   Constitutional: He appears well-developed and well-nourished.   HENT:   Head: Normocephalic and atraumatic.   Eyes: Pupils are equal, round, and reactive to light.   Neck: Normal range of motion. Neck supple.   Cardiovascular: Normal rate, regular rhythm and normal heart sounds.    Pulmonary/Chest: Effort normal. No respiratory distress. He has no wheezes. He has rales.   Abdominal: Soft. Bowel sounds are normal.   Musculoskeletal: Normal range of motion. He exhibits no edema.       Labs:  Recent Labs      06/29/17   0355   06/30/17   0020  07/01/17   0600   BBHIO39J   --    --   7.36*  7.39*   [id number]   --    --   48.7*  49.3*    PFCGX146O   --    --   78.7  85.8   VEIS1ECD   --    --   94.2  95.5   ARTHCO3   --    --   27*  29*   FIO2   --    < >  70*  90*   ARTBE   --    --   1  4*   ISTATSPEC  Venous   --    --    --     < > = values in this interval not displayed.     Recent Labs      07/01/17   0400   BNPBTYPENAT  454*     Recent Labs      06/29/17 0355 06/30/17 0400 07/01/17   0400   SODIUM  147*  148*  149*   POTASSIUM  4.1  4.0  4.0   CHLORIDE  110  110  112   CO2  28  30  32   BUN  61*  56*  51*   CREATININE  1.87*  1.75*  1.61*   MAGNESIUM   --   3.0*   --    CALCIUM  8.0*  8.1*  8.0*     Recent Labs      06/29/17 0355 06/30/17 0400 07/01/17   0400   ALTSGPT  42  48  44   ASTSGOT  33  63*  48*   ALKPHOSPHAT  82  82  66   TBILIRUBIN  1.7*  1.8*  1.9*   GLUCOSE  249*  306*  290*     Recent Labs      06/29/17 0355 06/30/17   0400  07/01/17   0400   RBC  2.12*  2.71*  2.25*   HEMOGLOBIN  6.0*  7.8*  6.6*   HEMATOCRIT  19.5*  25.6*  21.9*   PLATELETCT  64*  65*  55*     Recent Labs      06/29/17 0355 06/30/17 0400 07/01/17   0400   WBC  8.3  14.3*  9.1   NEUTSPOLYS  84.00*   --    --    LYMPHOCYTES  9.00*   --    --    MONOCYTES  1.00   --    --    EOSINOPHILS  0.00   --    --    BASOPHILS  0.00   --    --    ASTSGOT  33  63*  48*   ALTSGPT  42  48  44   ALKPHOSPHAT  82  82  66   TBILIRUBIN  1.7*  1.8*  1.9*           Hemodynamics:  No data recorded.     No Data Recorded         Respiratory:                Fluids:  No intake or output data in the 24 hours ending 07/01/17 0850     GI/Nutrition:  Orders Placed This Encounter   Procedures   • DIET NPO     Standing Status: Standing      Number of Occurrences: 1      Standing Expiration Date:      Order Specific Question:  Restrict to:     Answer:  Strict [1]   • DIET TUBE FEED     Standing Status: Standing      Number of Occurrences: 1      Standing Expiration Date:      Order Specific Question:  Diet     Answer:  Diet Tube Feed [35]     Order Specific Question:   Formula:     Answer:  Novasource Renal [35]     Order Specific Question:  Goal Rate (mL/Hour)     Answer:  45     Order Specific Question:  Route     Answer:  NG     Medical Decision Making, by Problem:  There are no hospital problems to display for this patient.      Core Measures    ASSESSMENT  # TYLER    Secondary to hypotension/anemia/diuretics    Improving  # CKD III   Baseline scr 1.6-2.0  #Hyperkalemia   Was on KCL supplement   TYLER/acidosis  # Acidosis   Improving   Anemia   Prior blood transfusion reaction  # Severe Aortic Stenosis  # VDRF  # Leukocystosis    SUGGESTIONS:  Maintain MAP > 60  No diuretics & IVFs   Will need to Increase free water in tube feeds  Daily labs with recommendations to follow    Thank you

## 2017-07-02 NOTE — PROGRESS NOTES
Pulmonary Critical Care Progress Note        Chief Complaint: Respiratory insufficiency    ROS:  Respiratory: positive shortness of breath, improved, Cardiac: negative, GI: negative.  All other systems negative.    Interval Events:  24 hour interval history reviewed.     - asked to see again. Developed VDRF after a blood tx 2/2 TRALI vs pulmonary edema. Now weaned to 50%/12//450; ab/45/7.31, leukocytosis, anemia,  thrombocytopenia, worsened renal fxn, potassium to 6.7, kayexalate ordered and SSI; cxr possibly better     - has et cuff leak, may need to exchange if we can't wean, is on 60% and +12; no cxr today; on Esmolol and levophed; increased leukocytosis, anemia, bandemia, BUN worse but crt better, glucoses still high, BNP minimally elevated; 98.9 degrees - 96/47 - 83 - 16 - 96%;later, advanced ET tube and leak disappeared, no cuff leak     - weaned to 55% and 8 of PEEP, off of pressors; 97.5 degrees - 96/59 - 84 - 23 - 95%; H/H stable low today, 6% bands wioth wbc of 8.3, renal fxn better 61/1.87, glucoses still running high, cxr yest better;  TA shows proteus, on teflaro; received blood yesterday with pre-med and ordered today with pepcid, benadryl, solumedrol     - had to be re-intubated overnight, now up to 70%; reportedly difficult intubation 2/2 edema, will give a course of decadron; 98.2 degrees - 136/71 - 76 - 17 - 95%; H/H better this AM, sodium a bit worse and renal fxn improved; cxr stable abnl edema;      - now on 90% and 10 of peep with an excellent abg, will increase PEEP to 12 cm; blood tx ordered for .6/21.9; sodium still high at 149, renal fxn better; 100.5 degrees - 103/55 - 96 - 27 - 96%; cxr similar edema, on propofol, daily abgs, flu and Teflaro     - down to 65% today with peep of 12, d/w RT; 122/69 - 78 - 25 - 94% - 99.8 degrees; leukocytosis, anemia, bandemia, sodium to 152, needs more free water, nephro following, BUN/crt worse today,  7/1 BC negative; cxr without change; remains on Teflaro and fluconazole; H/H improved today; albumin 2.7; remains on a propofol drip; abg noted, using permissive hypercapnia, PO2 aok    PFSH:  No change.    Respiratory:  Exam: unlabored respirations, no intercostal retractions or accessory muscle use, few basilar rales without wheezing or consolidation.  ImagingAvailable data reviewed. The chest radiogram on June 7 demonstrated stable patchy bilateral densities. No film today.     HemoDynamics:  Exam: regular rate and rhythm  Imaging: Available data reviewed  Echo 6/26 without change  Neuro:  Exam: after fall and laceration above left eye, head CT scan did not show acute intracranial bleed or injury. Patient responsive presently mental status intact  Imaging: Available data reviewed    Fluids:  Intake/Output     None           Recent Labs      06/30/17 0400 07/01/17 0400 07/02/17 0415   SODIUM  148*  149*  152*   POTASSIUM  4.0  4.0  3.9   CHLORIDE  110  112  112   CO2  30  32  32   BUN  56*  51*  56*   CREATININE  1.75*  1.61*  1.78*   MAGNESIUM  3.0*   --    --    CALCIUM  8.1*  8.0*  8.1*       GI/Nutrition:  Exam: abdomen is soft and non-tender  Imaging: None - Reviewed  taking PO  Liver Function  Recent Labs      06/30/17 0400 07/01/17 0400 07/02/17   0415   ALTSGPT  48  44  54*   ASTSGOT  63*  48*  58*   ALKPHOSPHAT  82  66  66   TBILIRUBIN  1.8*  1.9*  2.1*   GLUCOSE  306*  290*  328*       Heme:  Recent Labs      06/30/17 0400 07/01/17 0400 07/02/17   0415   RBC  2.71*  2.25*  2.82*   HEMOGLOBIN  7.8*  6.6*  8.2*   HEMATOCRIT  25.6*  21.9*  27.2*   PLATELETCT  65*  55*  49*   PROTHROMBTM   --    --   15.8*   APTT   --    --   26.7   INR   --    --   1.28*       Infectious Disease:  No Data Recorded  Micro: no new positive cultures  Recent Labs      06/30/17 0400 07/01/17 0400 07/02/17   0415   WBC  14.3*  9.1  12.3*   NEUTSPOLYS   --    --   83.00*   LYMPHOCYTES   --    --   7.00*    MONOCYTES   --    --   0.00   EOSINOPHILS   --    --   1.00   BASOPHILS   --    --   0.00   ASTSGOT  63*  48*  58*   ALTSGPT  48  44  54*   ALKPHOSPHAT  82  66  66   TBILIRUBIN  1.8*  1.9*  2.1*     No current facility-administered medications for this encounter.       Last reviewed on 5/24/2017  9:56 PM by Zora Mccarthy PhT    Quality  Measures:  Labs reviewed, Medications reviewed, Radiology images reviewed and EKG reviewed                      Problems:  Recurrent VDRF 2/2 TRALI - permissive hypercapnia  Pulmonary Edema/ALI  Hypernatremia  Transfusion Reaction  Anemia, thrombocytopenia, leukocytosis  Grade 3 esophagitis with recent upper gastrointestinal hemorrhage, biopsy negative  Acute kidney injury with hyperkalemia (on CKD)  Encephalopathy without focal neurologic findings, improving.  Severe aortic stenosis, pulmonary hypertension. Diastolic CHF.  Diabetes mellitus, controlled.  Anemia and thrombocytopenia, stable.  Non-anion gap metabolic acidosis.  Tachycardia    Recommend:  Vent support for now and wean as able  Correct metabolic derangements  Antiinfectives  Esmolol,  bumex forced diuresis  Follow chemistries, abgs, hemogram, images  Transfuse as need be    Antiinfectives - Teflaro, fluconazole

## 2017-07-02 NOTE — [facility]
Hospital Medicine Progress Note, Adult, Complex               Author: Valorie Mariano Date & Time created: 7/2/2017  11:27 AM     Interval History:  CC - RF  Tmax 101.2, pancultures done.  Agitation better w/ Propofol than w/ Versed, remains on Fentanyl.  Got 2 more units blood yesterday w/o incident.    Review of Systems:  Review of Systems   Unable to perform ROS: medical condition       Physical Exam:  Physical Exam   Constitutional: No distress.   HENT:   Right Ear: External ear normal.   Left Ear: External ear normal.   Nose: Nose normal.   Resolving left eyebrow hematoma/laceration   Eyes: Right eye exhibits no discharge. Left eye exhibits no discharge.   resolving left conjunctival hemorrhage   Neck:   Orally intubated on vent   Cardiovascular: Normal rate and regular rhythm.    Murmur heard.  SR   Pulmonary/Chest: No respiratory distress. He has no wheezes.   Coarse BS   Abdominal: Soft. Bowel sounds are normal. He exhibits no distension. There is no tenderness. There is no rebound and no guarding.   Musculoskeletal: He exhibits no edema or tenderness.   Neurological:   Sedated   Skin: Skin is warm and dry. He is not diaphoretic.   Vitals reviewed.      Labs:  Recent Labs      06/30/17   0020  07/01/17   0600  07/02/17   0405   VORLU62Q  7.36*  7.39*  7.34*   [id number]  48.7*  49.3*  56.1*   EFEUG167J  78.7  85.8  75.4   WHUS3QAE  94.2  95.5  93.9   ARTHCO3  27*  29*  30*   FIO2  70*  90*  70*   ARTBE  1  4*  3     Recent Labs      07/01/17   0400   BNPBTYPENAT  454*     Recent Labs      06/30/17   0400  07/01/17   0400  07/02/17   0415   SODIUM  148*  149*  152*   POTASSIUM  4.0  4.0  3.9   CHLORIDE  110  112  112   CO2  30  32  32   BUN  56*  51*  56*   CREATININE  1.75*  1.61*  1.78*   MAGNESIUM  3.0*   --    --    CALCIUM  8.1*  8.0*  8.1*     Recent Labs      06/30/17   0400  07/01/17   0400  07/02/17   0415   ALTSGPT  48  44  54*   ASTSGOT  63*  48*  58*   ALKPHOSPHAT  82  66  66   TBILIRUBIN  1.8*  1.9*   2.1*   GLUCOSE  306*  290*  328*     Recent Labs      06/30/17   0400  07/01/17   0400  07/02/17   0415   RBC  2.71*  2.25*  2.82*   HEMOGLOBIN  7.8*  6.6*  8.2*   HEMATOCRIT  25.6*  21.9*  27.2*   PLATELETCT  65*  55*  49*   PROTHROMBTM   --    --   15.8*   APTT   --    --   26.7   INR   --    --   1.28*     Recent Labs      06/30/17 0400  07/01/17 0400 07/02/17   0415   WBC  14.3*  9.1  12.3*   NEUTSPOLYS   --    --   83.00*   LYMPHOCYTES   --    --   7.00*   MONOCYTES   --    --   0.00   EOSINOPHILS   --    --   1.00   BASOPHILS   --    --   0.00   ASTSGOT  63*  48*  58*   ALTSGPT  48  44  54*   ALKPHOSPHAT  82  66  66   TBILIRUBIN  1.8*  1.9*  2.1*           Medical Decision Making, by Problem:  FEVER - follow culture results, cont Teflaro and Fluconazole  POSSIBLE TRALI - transfusion rxn w/u reveals minor antibodies, CXR slowly improving  PROTEUS PNA - cont renal dose Teflaro  RECURRENT VDRF - try to wean off sedating gtts and use prn meds instead  HEMOPTYSIS - query traumatic endotracheal tube exchange vs other, cont to follow  S/P ASPIRATION PNA PTA  SEVERE AS - Cardiology following  PHTN - monitoring volume  UGIB 2/2 ESOPHAGITIS - cont IV PPI BID, FOB pending  H/O LGIB  NELSON - slow Hydrocortisone taper, off Levophed  HLD - off statin  DM - medium scale SSI while on steroids  ARF/CKD III - renal fxn improving w/ gentle hydration  HYPERNATREMIA - free water increased per Nephro  LEUKOCYTOSIS - improved w/ change in antimicrobials, now worse d/t steroids, follow  ANEMIA - ok to transfuse per Blood Bank, premedicate w/ APAP/Benadryl/Pepcid/Solumedrol prior to all transfusions, cont Fe/Vit C and Aranesp (given difficult type/cross for pRBC)  THROMBOCYTOPENIA - now off Lasix/Protonix, HIT Ab negative, DIC Panel pending  HYPOTHYROIDISM - euthyroid on Synthroid  S/P FALL W/ LEFT EYEBROW LAC - CT Head and Orbits negative, wound care to eyebrow lac  SACRAL DTI - wound care and specialty bed, await evolution of  wound  CONSTIPATION - add Senna to Miralax    Reviewed w/ daughter and RN, CCT>45 min.          Medications reviewed and Labs reviewed  Bond catheter: Strict Intake and Output During Sepisis or Shock  Central line in place: Need for access    DVT Prophylaxis: Contraindicated - High bleeding risk  DVT prophylaxis - mechanical: SCDs  Ulcer prophylaxis: Yes  Antibiotics: Treating active infection/contamination beyond 24 hours perioperative coverage

## 2017-07-02 NOTE — PROGRESS NOTES
[facility] Progress Note                 Date & Time created: 7/2/2017  8:45 AM   Author: : Pipo Darling MD    Interval History:  77 y/o male presented to [facility] on 05/24/17 with acute respiratory failure and hemoptysis. He had a bronchoscopy that was unrevealing. Blood was found in his NG tube therefore GI workup was performed. He was found to have severe ulceration of hte esophagus. He required blood transfusions and was sucessfully extubated on 06/02/17.  He remained encephalopathic, was on supplemental oxygen. He had been requiring rescue BiPAP. He has severe aortic stenosis and may need TAVR. Meanwhile he was transferred to [facility] for long-term care.     Daily Nephrology Summary  06/27/17 - transferred to [facility]  06/28/17 - hgb 6.0. Previous blood transfusion reaction. Needs special blood from outside source. Scr trending down  06/29/17 -  Family at bedside. Renal function better. Na+ worse, start free water.  06/30/17 - Had to be reintubated last night. Na+ no better, increase free water. Renal function remains stable.  07/01/17 - Vent requirements increasing. From renal standpoint no new overnight events. Scr has improved and patient is at baseline levels.  07/02/17 - Scr still at baseline levels. SNa+ rising despite free water.    Review of Systems:  Review of Systems   Unable to perform ROS    Physical Exam:  Physical Exam   Constitutional: He appears well-developed and well-nourished.   HENT:   Head: Normocephalic and atraumatic.   Eyes: Pupils are equal, round, and reactive to light.   Neck: Normal range of motion. Neck supple.   Cardiovascular: Normal rate, regular rhythm and normal heart sounds.    Pulmonary/Chest: Effort normal. No respiratory distress. He has no wheezes. He has rales.   Abdominal: Soft. Bowel sounds are normal.   Musculoskeletal: Normal range of motion. He exhibits no edema.       Labs:  Recent Labs      06/30/17   0020  07/01/17   0600  07/02/17   0405   RCYHJ36V   7.36*  7.39*  7.34*   KKYRFY252Y  48.7*  49.3*  56.1*   RVCIJ544P  78.7  85.8  75.4   BXXD0GCO  94.2  95.5  93.9   ARTHCO3  27*  29*  30*   FIO2  70*  90*  70*   ARTBE  1  4*  3     Recent Labs      07/01/17   0400   BNPBTYPENAT  454*     Recent Labs      06/30/17 0400  07/01/17 0400 07/02/17   0415   SODIUM  148*  149*  152*   POTASSIUM  4.0  4.0  3.9   CHLORIDE  110  112  112   CO2  30  32  32   BUN  56*  51*  56*   CREATININE  1.75*  1.61*  1.78*   MAGNESIUM  3.0*   --    --    CALCIUM  8.1*  8.0*  8.1*     Recent Labs      06/30/17 0400  07/01/17 0400 07/02/17 0415   ALTSGPT  48  44  54*   ASTSGOT  63*  48*  58*   ALKPHOSPHAT  82  66  66   TBILIRUBIN  1.8*  1.9*  2.1*   GLUCOSE  306*  290*  328*     Recent Labs      06/30/17 0400  07/01/17 0400  07/02/17   0415   RBC  2.71*  2.25*  2.82*   HEMOGLOBIN  7.8*  6.6*  8.2*   HEMATOCRIT  25.6*  21.9*  27.2*   PLATELETCT  65*  55*  49*   PROTHROMBTM   --    --   15.8*   APTT   --    --   26.7   INR   --    --   1.28*     Recent Labs      06/30/17 0400 07/01/17 0400 07/02/17 0415   WBC  14.3*  9.1  12.3*   NEUTSPOLYS   --    --   83.00*   LYMPHOCYTES   --    --   7.00*   MONOCYTES   --    --   0.00   EOSINOPHILS   --    --   1.00   BASOPHILS   --    --   0.00   ASTSGOT  63*  48*  58*   ALTSGPT  48  44  54*   ALKPHOSPHAT  82  66  66   TBILIRUBIN  1.8*  1.9*  2.1*           Hemodynamics:  No data recorded.     No Data Recorded         Respiratory:                Fluids:  No intake or output data in the 24 hours ending 07/02/17 0845     GI/Nutrition:  Orders Placed This Encounter   Procedures   • DIET NPO     Standing Status: Standing      Number of Occurrences: 1      Standing Expiration Date:      Order Specific Question:  Restrict to:     Answer:  Strict [1]   • DIET TUBE FEED     Standing Status: Standing      Number of Occurrences: 1      Standing Expiration Date:      Order Specific Question:  Diet     Answer:  Diet Tube Feed [35]     Order  Specific Question:  Formula:     Answer:  Novasource Renal [35]     Order Specific Question:  Goal Rate (mL/Hour)     Answer:  45     Order Specific Question:  Route     Answer:  NG     Medical Decision Making, by Problem:  There are no hospital problems to display for this patient.      Core Measures    ASSESSMENT  # TYLER    Secondary to hypotension/anemia/diuretics    Improving  # CKD III   Baseline scr 1.6-2.0  #Hyperkalemia   Was on KCL supplement   TYLER/acidosis  # Acidosis   Improving   Anemia   Prior blood transfusion reaction  # Severe Aortic Stenosis  # VDRF  # Leukocystosis  # Hypernatremia    SUGGESTIONS:  Maintain MAP > 60  Increase free water again today  Daily labs with recommendations to follow    Thank you

## 2017-07-03 NOTE — PROCEDURES
RCC procedure note:    DOS:  7/3/2017    Procedure:  Therapeutic FOB with diagnostic bronch wash    Consent:  Emergent nature d/w wife at bedside    Indications/Dx:  Acute hypoxic/hypercarbic RF, HCAP, hemoptysis, consolidation/ATX R lung, R/o plugs-obtain culture    Sedation:  Fentanyl/Dex infusions ongoing    Timeout observed    Procedure:  Disposable FOB passed through in-line adaptor.  ET approximately 3-4 cm above sharp main tamera.  Small/moderate amount bloody mucoid secretions and plugs RLL, RUL and RML with min bloody mucoid secretions, CRIS and LLL clear, no endobronchial masses or lesions either lung, no anatomic variants noted, 1/3 Lukens Trap bloody mucoid secretions collected and sent for Micro.  No change in ventilator mechanics or improvements in saturations unfortunately, Bp 90s and  throughout, sats remained in the 70s, FOB passed in airway x several - kept in airway < 30 sec each passage to limit ventilation/oxygenation issues.    Findings reviewed with wife at bedside

## 2017-07-03 NOTE — PROGRESS NOTES
Pulmonary Critical Care Progress Note        DOS:  7/3/2017    Chief Complaint: Respiratory insufficiency    ROS:    Respiratory: unable to perform due to the patient's inability to effectively communicate,   Cardiac: unable to perform due to the patient's inability to effectively communicate,   GI: unable to perform due to the patient's inability to effectively communicate.    Interval Events:  24 hour interval history reviewed.     - asked to see again. Developed VDRF after a blood tx 2/2 TRALI vs pulmonary edema. Now weaned to 50%/12/20/450; ab/45/7.31, leukocytosis, anemia,  thrombocytopenia, worsened renal fxn, potassium to 6.7, kayexalate ordered and SSI; cxr possibly better     - has et cuff leak, may need to exchange if we can't wean, is on 60% and +12; no cxr today; on Esmolol and levophed; increased leukocytosis, anemia, bandemia, BUN worse but crt better, glucoses still high, BNP minimally elevated; 98.9 degrees - 96/47 - 83 - 16 - 96%;later, advanced ET tube and leak disappeared, no cuff leak     - weaned to 55% and 8 of PEEP, off of pressors; 97.5 degrees - 96/59 - 84 - 23 - 95%; H/H stable low today, 6% bands wioth wbc of 8.3, renal fxn better 61/1.87, glucoses still running high, cxr yest better;  TA shows proteus, on teflaro; received blood yesterday with pre-med and ordered today with pepcid, benadryl, solumedrol     - had to be re-intubated overnight, now up to 70%; reportedly difficult intubation 2/2 edema, will give a course of decadron; 98.2 degrees - 136/71 - 76 - 17 - 95%; H/H better this AM, sodium a bit worse and renal fxn improved; cxr stable abnl edema;      - now on 90% and 10 of peep with an excellent abg, will increase PEEP to 12 cm; blood tx ordered for 6.6/21.9; sodium still high at 149, renal fxn better; 100.5 degrees - 103/55 - 96 - 27 - 96%; cxr similar edema, on propofol, daily abgs, flu and Teflaro    7/ - down to 65% today with peep of  12, d/w RT; 122/69 - 78 - 25 - 94% - 99.8 degrees; leukocytosis, anemia, bandemia, sodium to 152, needs more free water, nephro following, BUN/crt worse today, 7/1 BC negative; cxr without change; remains on Teflaro and fluconazole; H/H improved today; albumin 2.7; remains on a propofol drip; abg noted, using permissive hypercapnia, PO2 aok    7/2-3  Called by RN & RT for hypotension and hypoxemia, fiO2 has been titrated up to 100% and pressors have been escalated to NE 30, Vaso 0.04 and PETER 40 - SBp 90s, HR 120s - ST, PEEP escalated to 14/16 ranged no help, Norcuron 10mg paralysis trial no change sats, PEEP 30 x 2 minutes recruitment maneuver no change sats, CXR with worsening bilateral alveolar opacities, R>L, Propofol weaned off and Fentanyl increased 50->75, Dex drip started, plt transfusions tonight noted, ABX changed to Merrem/Zyvox/Diflucan, 7/1 sputum revealed many GNR and mod GPC with lactose neg GNR growing, abg 7.12/86.2/59.4, Tm 102.8 and WBC increased to 27.7 late PM 7/2    D/w Wife and her neighbor at length, code status changed to DNR full treat by Dr CARVALHO and Wife, Dx/Rx/Prognosis reviewed, daughter left this AM and can't return, son in [facility] coming now to be here in AM, Wife considering comfort care asked me to do what I could to buy time for son to arrive.      Respiratory:  Vent:  APV/cmv peep 12/400/20   Vent mechanics reviewed with RT  Exam: labored breathing per staff early, better after sedation   Absent BS R base, diffuse fine basilar rales without wheezing   Lots of blood tinged secretions with thick bloody mucoid plugs  ImagingAvailable data reviewed.   CXR:  CM, ET ok, bilateral alveolar opacities - worse overall and worse on R!    HemoDynamics:  Bp 93/53    - ST  Exam: no ectopy, sinus tachycardia, trace edema, 1-2/6 SM  Imaging: Available data reviewed  Echo 6/26:  CONCLUSIONS  Normal left ventricular chamber size. Severe left ventricular hypertrophy. Normal left ventricular  systolic function.   Left ventricular ejection fraction is visually estimated to be 60%. Normal regional wall motion.  Severe aortic stenosis. Vmax is 4.25 m/s. Transvalvular gradients are - Peak: 72 mmHg, Mean: 56 mmHg.  Compared to the images of the prior study done in 05/2017 -  there has been no significant change.    Neuro:  Exam: Heavily sedated On paralytic agent    Not following but moving all 4 prior to Hv sedation and paralytics  Imaging: Available data reviewed     Fentanyl drip 75  Propofol -> Dex 0.2    CT head 6/26:  1.  Limited exam secondary to patient motion.  2.  No acute intracranial findings.  3.  Diffuse atrophy and periventricular white matter changes, consistent with chronic small vessel     Fluids:  Intake/Output     None           Recent Labs      06/30/17 0400 07/01/17 0400 07/02/17   0415   SODIUM  148*  149*  152*   POTASSIUM  4.0  4.0  3.9   CHLORIDE  110  112  112   CO2  30  32  32   BUN  56*  51*  56*   CREATININE  1.75*  1.61*  1.78*   MAGNESIUM  3.0*   --    --    CALCIUM  8.1*  8.0*  8.1*       GI/Nutrition:  Exam: obese, abdomen is soft and non-tender, normal active bowel sounds  Imaging: None - Reviewed  NPO and tube feed Tolerated  Liver Function  Recent Labs      06/30/17 0400 07/01/17 0400 07/02/17   0415   ALTSGPT  48  44  54*   ASTSGOT  63*  48*  58*   ALKPHOSPHAT  82  66  66   TBILIRUBIN  1.8*  1.9*  2.1*   GLUCOSE  306*  290*  328*       Heme:  Recent Labs      07/01/17 0400  07/02/17   0415  07/02/17   2230   RBC  2.25*  2.82*  3.55*   HEMOGLOBIN  6.6*  8.2*  10.6*   HEMATOCRIT  21.9*  27.2*  36.1*   PLATELETCT  55*  49*  71*   PROTHROMBTM   --   15.8*  17.4*   APTT   --   26.7  28.5   INR   --   1.28*  1.45*       Infectious Disease:  No Data Recorded  Micro: antibiotics reviewed and cultures reviewed   7/1 sputum:  Many GNR/mod GPC - culture growing Lactose negative GNR - ID & sens pending  Tm 102.8  Recent Labs      06/30/17   0400  07/01/17   0400   07/02/17   0415  07/02/17   2230   WBC  14.3*  9.1  12.3*  27.7*   NEUTSPOLYS   --    --   83.00*   --    LYMPHOCYTES   --    --   7.00*   --    MONOCYTES   --    --   0.00   --    EOSINOPHILS   --    --   1.00   --    BASOPHILS   --    --   0.00   --    ASTSGOT  63*  48*  58*   --    ALTSGPT  48  44  54*   --    ALKPHOSPHAT  82  66  66   --    TBILIRUBIN  1.8*  1.9*  2.1*   --      No current facility-administered medications for this encounter.       Last reviewed on 5/24/2017  9:56 PM by Keturah Cheek    Quality  Measures:  Labs reviewed, Medications reviewed, Radiology images reviewed and EKG reviewed                      Problems:  Acute hypoxemic and hypercarbic respiratory failure - worse   Intubated AM 6/27 by ERP   S/p vent 5/24-6/2   S/p Aspiration PNA and s/p Rx  HCAP - lactose neg GNR/GPC  Sepsis - severe - pulmonary source  Hypotension - multifactorial - sepsis, valvular heart disease, PP, meds  Pulmonary Edema/ALI/TRALI  Severe aortic stenosis, Cards following  Pulmonary hypertension - moderate/severe - RVSP 60  Diastolic CHF grade I  Hypernatremia  Transfusion Reaction?  Anemia, thrombocytopenia, leukocytosis  Grade 3 esophagitis with recent upper gastrointestinal hemorrhage, biopsy negative  Acute kidney injury with hyperkalemia (on CKD)  Encephalopathy without focal neurologic findings, had been improving.  Diabetes mellitus, controlled.    Recommend:  PC mode trial - no APRV mode/Flolan for trial  Escalate PEEP 16-22 range to recruit  Prolong I-time as well - avoid I:E ratios > 1:1  Tx FOB - obtain FOB wash  RT protocols  Paralysis trial - if helps drip  PEEP 30 recruitment maneuver x 2-4 minutes  Repeat ABG   Titrate pressors - triple NE/Vaso/PETER  Wean off Propofol secondary to hypotension  Add Precedex in its place  Continue Fentanyl  Continue Merrem/Zyvox  Blood products PRN - try to limit since TRALI may be playing a role here per notes  Continue IV steroids  Correct metabolic  derangements  Forced diuresis if hemodynamics improve  Keep HOB up > 30 degrees, try R side up - not a good prone candidate and not experience or protocol here at [facility] to safely try it  Not stable for transfer and not an ECMO candidate  Glycemic control  Prophylaxis    Prognosis GRIM - can't get sats out of the 70s  Agree with DNR  Awaiting sons arrival  Wife considering CC when son arrives  Will arrange [facility] the [facility]    Critically ill, 95 minutes CC time, no overlap  Case reviewed x 2 by phone with RN/RT then at length at bedside with RN, RT, Charge, Wife and her neighbor

## 2017-07-05 LAB
BACTERIA BRONCH AEROBE CULT: ABNORMAL
BACTERIA SPEC RESP CULT: ABNORMAL
GRAM STN SPEC: ABNORMAL
GRAM STN SPEC: ABNORMAL
GRAM STN SPEC: NORMAL
SIGNIFICANT IND 70042: ABNORMAL
SIGNIFICANT IND 70042: ABNORMAL
SIGNIFICANT IND 70042: NORMAL
SITE SITE: ABNORMAL
SITE SITE: ABNORMAL
SITE SITE: NORMAL
SOURCE SOURCE: ABNORMAL
SOURCE SOURCE: ABNORMAL
SOURCE SOURCE: NORMAL

## 2017-07-06 LAB
BACTERIA BLD CULT: NORMAL
BACTERIA BLD CULT: NORMAL
SIGNIFICANT IND 70042: NORMAL
SIGNIFICANT IND 70042: NORMAL
SITE SITE: NORMAL
SITE SITE: NORMAL
SOURCE SOURCE: NORMAL
SOURCE SOURCE: NORMAL

## 2017-07-20 LAB
MYCOBACTERIUM SPEC CULT: NORMAL
RHODAMINE-AURAMINE STN SPEC: NORMAL
SIGNIFICANT IND 70042: NORMAL
SITE SITE: NORMAL
SOURCE SOURCE: NORMAL

## 2018-06-11 NOTE — PROGRESS NOTES
Pulmonary Critical Care Progress Note        Chief Complaint: Respiratory insufficiency    ROS:  Respiratory: positive shortness of breath, improved, Cardiac: negative, GI: negative.  All other systems negative.    Interval Events:  24 hour interval history reviewed.     - asked to see again. Developed VDRF after a blood tx 2/2 TRALI vs pulmonary edema. Now weaned to 50%/12//450; ab/45/7.31, leukocytosis, anemia,  thrombocytopenia, worsened renal fxn, potassium to 6.7, kayexalate ordered and SSI; cxr possibly better    PFSH:  No change.    Respiratory:  Exam: unlabored respirations, no intercostal retractions or accessory muscle use, few basilar rales without wheezing or consolidation.  ImagingAvailable data reviewed. The chest radiogram on  demonstrated stable patchy bilateral densities. No film today.     HemoDynamics:  Exam: regular rate and rhythm  Imaging: Available data reviewed  Echo  without change  Neuro:  Exam: after fall and laceration above left eye, head CT scan did not show acute intracranial bleed or injury. Patient responsive presently mental status intact  Imaging: Available data reviewed    Fluids:  Intake/Output     None           Recent Labs      17   1009  17   1411  17   0450   SODIUM  133*  138  137   POTASSIUM  3.9  4.2  6.7*   CHLORIDE  100  103  102   CO2  14*  19*  25   BUN  34*  38*  52*   CREATININE  1.52*  1.66*  2.65*   CALCIUM  8.6  8.7  8.0*       GI/Nutrition:  Exam: abdomen is soft and non-tender  Imaging: None - Reviewed  taking PO  Liver Function  Recent Labs      17   1009  17   1411  17   0450   ALTSGPT   --   49  RR  42   ASTSGOT   --   53*  41   ALKPHOSPHAT   --   100*  RR  85   TBILIRUBIN   --   2.0*  2.1*   DBILIRUBIN   --   0.9*   --    GLUCOSE  277*  179*  224*       Heme:  Recent Labs      17   1009  17   1411  17   0450   RBC  3.12*  2.88*  2.48*   HEMOGLOBIN  8.5*  7.9*  6.8*    HEMATOCRIT  28.7*  26.0*  22.6*   PLATELETCT  90*  61*  88*       Infectious Disease:  No Data Recorded  Micro: no new positive cultures  Recent Labs      06/26/17   1009  06/26/17   1411  06/27/17   0450   WBC  19.5*  13.4*  14.7*   ASTSGOT   --   53*  41   ALTSGPT   --   49  RR  42   ALKPHOSPHAT   --   100*  RR  85   TBILIRUBIN   --   2.0*  2.1*     No current facility-administered medications for this encounter.       Last reviewed on 5/24/2017  9:56 PM by Keturah Cheek    Quality  Measures:  Labs reviewed, Medications reviewed, Radiology images reviewed and EKG reviewed                      Problems:  Recurrent VDRF 2/2 TRALI  Pulmonary Edema/ALI  Transfusion Reaction  Anemia, thrombocytopenia, leukocytosis  Grade 3 esophagitis with recent upper gastrointestinal hemorrhage, biopsy negative  Acute kidney injury with hyperkalemia (on CKD)  Encephalopathy without focal neurologic findings, improving.  Severe aortic stenosis, pulmonary hypertension.  Diabetes mellitus, controlled.  Anemia and thrombocytopenia, stable.  Non-anion gap metabolic acidosis.  Tachycardia    Recommend:  Vent support for now and wean as able  Correct metabolic derangements  Steroid taper  Antiinfectives  Esmolol  Follow chemistries, abgs, hemogram, images       Pureed diet DASH with nectar consistency fluid

## 2018-10-25 NOTE — ASSESSMENT & PLAN NOTE
decrease lasix to 40QD given increase in Scr   Monitor renal function and electrolytes    
Acute blood loss from upper GI bleed.  S/p transfusions.   Hb 7.5  Monitor and  transfuse if <7    
Completed     7 day course of abx  SLP  Aspiration precautions    
Continue ISS monitor   
Failed swallow eval   Continue tube feeding and ST  
Further workup and evaluation for TAVR when bossman outpatient.        
Hx of.  
IV abx.  
Improved with Free water flushes     
Intubated 5/24. Extubated 6-2  Pulmonary consulted.  Bipap PRN as tolerated   RT protcol  IV lasix scheduled.   
Lasix, monitor hemodynamics given  AS  
Likely hemolysis post transfusion  Bilirubin trending down abd exam now down to 5.  
Likely reactive, overall improved  
With ulcerations seen on EGD on 5/26.   Continue prilosec bid     
resolved  
resolved.   
25-Oct-2018 14:19

## 2019-06-25 NOTE — PROGRESS NOTES
Pulmonary Critical Care Progress Note        Chief Complaint: Respiratory insufficiency    ROS:  Respiratory: positive shortness of breath, improved, Cardiac: negative, GI: negative.  All other systems negative.    Interval Events:  24 hour interval history reviewed.     - asked to see again. Developed VDRF after a blood tx 2/2 TRALI vs pulmonary edema. Now weaned to 50%/12//450; ab/45/7.31, leukocytosis, anemia,  thrombocytopenia, worsened renal fxn, potassium to 6.7, kayexalate ordered and SSI; cxr possibly better     - has et cuff leak, may need to exchange if we can't wean, is on 60% and +12; no cxr today; on Esmolol and levophed; increased leukocytosis, anemia, bandemia, BUN worse but crt better, glucoses still high, BNP minimally elevated; 98.9 degrees - 96/47 - 83 - 16 - 96%;later, advanced ET tube and leak disappeared, no cuff leak     - weaned to 55% and 8 of PEEP, off of pressors; 97.5 degrees - 96/59 - 84 - 23 - 95%; H/H stable low today, 6% bands wioth wbc of 8.3, renal fxn better 61/1.87, glucoses still running high, cxr yest better;  TA shows proteus, on teflaro; received blood yesterday with pre-med and ordered today with pepcid, benadryl, solumedrol     - had to be re-intubated overnight, now up to 70%; reportedly difficult intubation 2/2 edema, will give a course of decadron; 98.2 degrees - 136/71 - 76 - 17 - 95%; H/H better this AM, sodium a bit worse and renal fxn improved; cxr stable abnl edema;      - now on 90% and 10 of peep with an excellent abg, will increase PEEP to 12 cm; blood tx ordered for .6/21.9; sodium still high at 149, renal fxn better; 100.5 degrees - 103/55 - 96 - 27 - 96%; cxr similar edema, on propofol, daily abgs, flu and Teflaro    PFSH:  No change.    Respiratory:  Exam: unlabored respirations, no intercostal retractions or accessory muscle use, few basilar rales without wheezing or consolidation.  ImagingAvailable data  Progress Note    Admit Date:  6/23/2019    Subjective:  Mr. Maegan Davis feels the same    Objective:   Patient Vitals for the past 4 hrs:   BP Temp Temp src Pulse Resp SpO2   06/25/19 0934 138/73 97.1 °F (36.2 °C) Oral 100 18 95 %            Intake/Output Summary (Last 24 hours) at 6/25/2019 1111  Last data filed at 6/25/2019 0837  Gross per 24 hour   Intake 1060 ml   Output 1050 ml   Net 10 ml       Physical Exam:    Gen: No distress. Alert. Eyes: PERRL. No sclera icterus. No conjunctival injection. ENT: No discharge. Pharynx clear. Neck: No JVD. Trachea midline. Resp: Diminished air entry throughout. No accessory muscle use. No crackles. + scattered wheezes. No rhonchi. CV: Regular rate. Regular rhythm. No murmur. No rub. +++ edema. Capillary Refill: Brisk,< 3 seconds   Peripheral Pulses: +2 palpable, equal bilaterally   GI: Non-tender. Non-distended. Normal bowel sounds. Skin: Warm and dry. No nodule on exposed extremities. No rash on exposed extremities. M/S: No cyanosis. No joint deformity. No clubbing. Neuro: Awake. Grossly nonfocal    Psych: Oriented x 3. No anxiety or agitation. I Colletta Endo have reviewed the chart on [facility] and personally interviewed and examined patient, reviewed the data (labs and imaging) and after discussion with my PA formulated the plan. Agree with note with the following edits. Physical exam:    /73   Pulse 100   Temp 97.1 °F (36.2 °C) (Oral)   Resp 18   Ht 5' 6\" (1.676 m)   Wt 170 lb 1.6 oz (77.2 kg)   SpO2 95%   BMI 27.45 kg/m²     Gen: No distress. Alert. Eyes: PERRL. No sclera icterus. No conjunctival injection. ENT: No discharge. Pharynx clear. Neck: Trachea midline. Normal thyroid. Resp: No accessory muscle use. No crackles. kwan wheezes. No rhonchi. No dullness on percussion. CV: Regular rate. Regular rhythm. No murmur or rub. 3+ edema. GI: Non-tender. Non-distended. No masses. No organomegaly.  Normal bowel sounds. No hernia. Skin: Warm and dry. No nodule on exposed extremities. No rash on exposed extremities. Lymph: No cervical LAD. No supraclavicular LAD. M/S: No cyanosis. No joint deformity. No clubbing. Neuro: Awake. Reflexes 2+ symmetric bilaterally. Moves all 4 extremities, non focal  Psych: Oriented x 3. No anxiety or agitation. SHARON Moss        Scheduled Meds:   albuterol sulfate HFA  2 puff Inhalation Q4H WA    aspirin  81 mg Oral Daily    atorvastatin  40 mg Oral Daily    diltiazem  120 mg Oral Daily    DULoxetine  60 mg Oral Daily    furosemide  40 mg Oral Daily    sodium chloride flush  10 mL Intravenous 2 times per day    enoxaparin  40 mg Subcutaneous Daily    predniSONE  40 mg Oral Daily    oxyCODONE  10 mg Oral Q12H       Continuous Infusions:      PRN Meds:  ipratropium-albuterol, sodium chloride, albuterol sulfate HFA, sodium chloride flush, magnesium hydroxide, ondansetron, oxyCODONE-acetaminophen      Data:  CBC:   Recent Labs     06/23/19 1941 06/25/19  0443   WBC 10.8 16.0*   HGB 11.7* 10.5*   HCT 36.1* 32.3*   .1* 103.5*    227     BMP:   Recent Labs     06/23/19 2101 06/23/19 2141 06/25/19 0443     --  137   K 6.1* 5.4* 4.7   CL 95*  --  92*   CO2 36*  --  38*   BUN 22*  --  25*   CREATININE 0.7*  --  0.7*     LIVER PROFILE:   Recent Labs     06/23/19 2101   AST 25   ALT 29   BILITOT 0.3   ALKPHOS 136*     PT/INR:   Recent Labs     06/23/19 1941   PROTIME 10.0   INR 0.88        RADIOLOGY  XR CHEST STANDARD (2 VW)   Final Result   Stable chest x-ray. No acute disease. VL Extremity Venous Bilateral    (Results Pending)     Echo 2/2019    - Left ventricle: The cavity size was normal. Wall thickness was    normal. Systolic function was normal. The calculated ejection    fraction was in the range of 64% to 68%. Normal diastolic    function.  - Aortic valve: Mean gradient (S): 8mm Hg. Peak gradient (S): 14mm    Hg.   - reviewed. The chest radiogram on June 7 demonstrated stable patchy bilateral densities. No film today.     HemoDynamics:  Exam: regular rate and rhythm  Imaging: Available data reviewed  Echo 6/26 without change  Neuro:  Exam: after fall and laceration above left eye, head CT scan did not show acute intracranial bleed or injury. Patient responsive presently mental status intact  Imaging: Available data reviewed    Fluids:  Intake/Output     None           Recent Labs      06/29/17 0355 06/30/17 0400  07/01/17   0400   SODIUM  147*  148*  149*   POTASSIUM  4.1  4.0  4.0   CHLORIDE  110  110  112   CO2  28  30  32   BUN  61*  56*  51*   CREATININE  1.87*  1.75*  1.61*   MAGNESIUM   --   3.0*   --    CALCIUM  8.0*  8.1*  8.0*       GI/Nutrition:  Exam: abdomen is soft and non-tender  Imaging: None - Reviewed  taking PO  Liver Function  Recent Labs      06/29/17 0355 06/30/17 0400  07/01/17   0400   ALTSGPT  42  48  44   ASTSGOT  33  63*  48*   ALKPHOSPHAT  82  82  66   TBILIRUBIN  1.7*  1.8*  1.9*   GLUCOSE  249*  306*  290*       Heme:  Recent Labs      06/29/17 0355 06/30/17 0400  07/01/17   0400   RBC  2.12*  2.71*  2.25*   HEMOGLOBIN  6.0*  7.8*  6.6*   HEMATOCRIT  19.5*  25.6*  21.9*   PLATELETCT  64*  65*  55*       Infectious Disease:  No Data Recorded  Micro: no new positive cultures  Recent Labs      06/29/17 0355 06/30/17 0400 07/01/17   0400   WBC  8.3  14.3*  9.1   NEUTSPOLYS  84.00*   --    --    LYMPHOCYTES  9.00*   --    --    MONOCYTES  1.00   --    --    EOSINOPHILS  0.00   --    --    BASOPHILS  0.00   --    --    ASTSGOT  33  63*  48*   ALTSGPT  42  48  44   ALKPHOSPHAT  82  82  66   TBILIRUBIN  1.7*  1.8*  1.9*     No current facility-administered medications for this encounter.       Last reviewed on 5/24/2017  9:56 PM by Keturah Cheek    Quality  Measures:  Labs reviewed, Medications reviewed, Radiology images reviewed and EKG  Inferior vena cava: The vessel was normal in size; the    respirophasic diameter changes were in the normal range (>= 50%);    findings are consistent with normal central venous pressure. [facility] 2/2019  Procedures performed:  1. Left Cardiac Catheterization  2. Selective Coronary Angiography  Impression[de-identified]     1. Non-obstructive coronary artery disease in a right dominant   system. 2. LVeDP: 10       Assessment/Plan:    #COPD  AE  - discharged 6/15/19 from [facility] after admission for same, treated with zithromax at that time   - prednisone taper   - inhaled bronchodilators   - no infiltrate on CXR  - smoking cessation  - PT OT     #Chronic hypoxic and hypercapnic resp failure   - uses 2.5L NC o2 at home, stable on this now     #Tobacco Dependence  -Recommended cessation  - nicotine patch ordered     #Bilateral lower extremity edema  - no history CHF, echo 2/2019 reviewed above  - rule out DVT with BLE dopplers  - suspect likely from immobility and not elevating legs at home. Has been taking lasix 40 mg daily without improvement. Would benefit from compression hose on discharge     #Non obstructive CAD  #HTN  - cont cardizem, lipitor, asa     #Chronic pain with opiate dependence  - home meds continued     DVT Prophylaxis: Lovenox   Diet: DIET CARDIAC;  Code Status: Full Code    Carol Duran PA-C  6/25/2019 11:15 AM    1. Acute COPD exacerbation. Continue prednisone taper and inhaled bronchodilators. 2.   chronic hypoxic respiratory failure. Currently on 2.5 L of oxygen. GISELE Moss.      GISELE Moss. reviewed                      Problems:  Recurrent VDRF 2/2 TRALI  Hypotension, levophed  Pulmonary Edema/ALI  Transfusion Reaction  Anemia, thrombocytopenia, leukocytosis  Grade 3 esophagitis with recent upper gastrointestinal hemorrhage, biopsy negative  Acute kidney injury with hyperkalemia (on CKD)  Encephalopathy without focal neurologic findings, improving.  Severe aortic stenosis, pulmonary hypertension. Diastolic CHF.  Diabetes mellitus, controlled.  Anemia and thrombocytopenia, stable.  Non-anion gap metabolic acidosis.  Tachycardia    Recommend:  Vent support for now and wean as able  Correct metabolic derangements  Antiinfectives  Esmolol,  bumex forced diuresis  Follow chemistries, abgs, hemogram, images  Transfuse as need be    Antiinfectives - Teflaro, fluconazole

## 2023-10-21 NOTE — [facility]
Continued Stay SW/CM Assessment/Plan of Care Note       Active Substitute Decision Maker (SDM)     SYLVIA MABRY [facility] Agent 1 - Son   Primary Phone: [phone] (Mobile)                   Progress note:  Weekend SW:    GINO completed chart review for MD consult regarding discharge planning/LTAC. GINO has been involved with pt's ongoing care. Per SW peer noted dated 10/20 pt's son wants to wait before giving SW the permission to make [facility] referrals. Pt's son did speak with  yesterday evening. SW peer noted on 10/19 that pt's son also wanted to speak with Dr. Boyce who is out of the [place] until this coming Monday.     SW to continue to follow.     See SW/CM flowsheets for other objective data.    Disposition Recommendations:  Preliminary discharge destination: Planned Discharge Destination: Home/apartment with Services/Support  SW/CM recommendation for discharge: Home care, Home therapy    Discharge Plan/Needs:     Continued Care and Services - Admitted Since 9/24/2023    Coordination has not been started for this encounter.     Continued Care and Services - Linked Episodes Includes continued care and service providers from the active episodes linked to this encounter, which are listed below    Care Transitions Episode start date: 9/25/2023   There are no active outsourced providers for this episode.                   Devices/ Equipment that need to be arranged for discharge:     Accepted   Pending insurance authorization   Others:    Anticipated date of DME availability:     Prior To Hospitalization:    Living Situation: Adult children, Family members and residing at House    .  Support Systems: Family members   Home Devices/Equipment: Commode, Hospital bed, Peritoneal Dialysis equipment, Mobility assist device, Blood glucose monitor, Shower chair            Mobility Assist Devices: Wheelchair   Type of Service Prior to Hospitalization: Social work, Nurse (specify) (For PD dialysis)            Hospital Medicine Progress Note, Adult, Complex               Author: Valorie Montserrat Date & Time created: 6/19/2017  1:08 PM     Interval History:  CC - RF  PVR now consistently less than 250 mL and pt voiding w/o difficulty.    Review of Systems:  Review of Systems   Constitutional: Negative for fever and chills.   HENT: Negative.    Eyes: Negative.    Respiratory: Negative for cough and shortness of breath.    Cardiovascular: Negative for chest pain and palpitations.   Gastrointestinal: Negative for nausea, vomiting and abdominal pain.   Skin: Negative for itching and rash.   Neurological: Positive for weakness.       Physical Exam:  Physical Exam   Constitutional: He is oriented to person, place, and time. No distress.   HENT:   Right Ear: External ear normal.   Left Ear: External ear normal.   Nose: Nose normal.   Resolving left eyebrow hematoma/laceration   Eyes: Conjunctivae and EOM are normal. Right eye exhibits no discharge. Left eye exhibits no discharge.   Anisocoria (right 3-4 mm, left 2-3 mm), resolving left conjunctival hemorrhage   Neck: Normal range of motion. Neck supple. No tracheal deviation present.   Cardiovascular: Normal rate and regular rhythm.    Murmur heard.  Pulmonary/Chest: No stridor. No respiratory distress. He has no wheezes.   Decreased BS   Abdominal: Soft. Bowel sounds are normal. He exhibits no distension. There is no tenderness.   Musculoskeletal: He exhibits no edema or tenderness.   Neurological: He is alert and oriented to person, place, and time.   Skin: Skin is warm and dry. He is not diaphoretic.   Vitals reviewed.      Labs:        Invalid input(s): [id number]      Recent Labs      06/18/17   0441   SODIUM  137   POTASSIUM  3.8   CHLORIDE  105   CO2  22   BUN  20   CREATININE  1.52*   CALCIUM  8.6     Recent Labs      06/18/17   0441   GLUCOSE  105*     Recent Labs      06/18/17   0441   RBC  2.88*   HEMOGLOBIN  7.8*   HEMATOCRIT  25.5*   PLATELETCT  120*     Recent Labs           Patient/Family discharge goal (s):  Home therapy, Home Care     Resources provided:           Therapy Recommendations for Discharge:   PT:      Last Filed Values       Value Time User    PT Discharge Needs  therapy 5 or more times per week 10/17/2023 11:20 AM Lorene Hood PTA        OT:       Last Filed Values     None        SLP:    Last Filed Values       Value Time User    SLP Discharge Needs  therapy 1-3 times per week 10/13/2023  4:11 PM Ericka Hawley, SLP          Mobility Equipment Recommended for Discharge: has w/c      Barriers to Discharge  Identified Barriers to Discharge/Transition Planning: Medical necessity for acute care                 06/18/17   0441   WBC  6.9           Medical Decision Making, by Problem:  S/P VDRF/ASPIRATION PNA PTA - 3 lpm NC O2  SEVERE AS - outpt Cardiology F/U  PHTN - monitor volume  UGIB 2/2 ESOPHAGITIS - cont PPI bid  H/O LGIB  HYPOTENSION - better w/ IVF  HLD - off statin  DM - FSBS trends seldom needing coverage, so now off SSI  CKD III - renal fxn improving  ANEMIA - Fe/Vit C  THROMBOCYTOPENIA - following plt counts  TRANSAMINITIS - Hepatitis Panel negative, LFT's improving  HYPOTHYROIDISM - on no meds, check TFT's next draw  ENCEPHALOPATHY - slowly clearing  S/P FALL W/ LEFT EYEBROW LAC - CT Head and Orbits negative, wound care to eyebrow lac  S/P URINARY RETENTION - Bond out, PVR improving, voiding well  LOOSE STOOL - decreased bowel regimen, check C Dif if persists  MISC - Podiatry eval pending    Reviewed w/ pt        Medications reviewed and Labs reviewed  Bond catheter: No Bond      DVT Prophylaxis: Contraindicated - High bleeding risk  DVT prophylaxis - mechanical: SCDs  Ulcer prophylaxis: Yes
